# Patient Record
Sex: MALE | Race: WHITE | NOT HISPANIC OR LATINO | Employment: OTHER | URBAN - METROPOLITAN AREA
[De-identification: names, ages, dates, MRNs, and addresses within clinical notes are randomized per-mention and may not be internally consistent; named-entity substitution may affect disease eponyms.]

---

## 2020-07-24 ENCOUNTER — TRANSCRIBE ORDERS (OUTPATIENT)
Dept: LAB | Facility: CLINIC | Age: 85
End: 2020-07-24

## 2020-07-24 ENCOUNTER — APPOINTMENT (OUTPATIENT)
Dept: LAB | Facility: CLINIC | Age: 85
End: 2020-07-24
Payer: MEDICARE

## 2020-07-24 DIAGNOSIS — I10 ESSENTIAL HYPERTENSION, MALIGNANT: ICD-10-CM

## 2020-07-24 DIAGNOSIS — Z79.899 ENCOUNTER FOR LONG-TERM (CURRENT) USE OF OTHER MEDICATIONS: ICD-10-CM

## 2020-07-24 DIAGNOSIS — E11.9 DIABETES MELLITUS WITHOUT COMPLICATION (HCC): ICD-10-CM

## 2020-07-24 DIAGNOSIS — E78.5 HYPERLIPIDEMIA, UNSPECIFIED HYPERLIPIDEMIA TYPE: ICD-10-CM

## 2020-07-24 DIAGNOSIS — D64.9 ANEMIA, UNSPECIFIED TYPE: ICD-10-CM

## 2020-07-24 DIAGNOSIS — E11.9 DIABETES MELLITUS WITHOUT COMPLICATION (HCC): Primary | ICD-10-CM

## 2020-07-24 LAB
ALBUMIN SERPL BCP-MCNC: 3.8 G/DL (ref 3.5–5)
ALP SERPL-CCNC: 59 U/L (ref 46–116)
ALT SERPL W P-5'-P-CCNC: 28 U/L (ref 12–78)
ANION GAP SERPL CALCULATED.3IONS-SCNC: 7 MMOL/L (ref 4–13)
AST SERPL W P-5'-P-CCNC: 14 U/L (ref 5–45)
BASOPHILS # BLD AUTO: 0.07 THOUSANDS/ΜL (ref 0–0.1)
BASOPHILS NFR BLD AUTO: 1 % (ref 0–1)
BILIRUB SERPL-MCNC: 0.41 MG/DL (ref 0.2–1)
BUN SERPL-MCNC: 28 MG/DL (ref 5–25)
CALCIUM SERPL-MCNC: 8.7 MG/DL (ref 8.3–10.1)
CHLORIDE SERPL-SCNC: 111 MMOL/L (ref 100–108)
CHOLEST SERPL-MCNC: 141 MG/DL (ref 50–200)
CK SERPL-CCNC: 68 U/L (ref 39–308)
CO2 SERPL-SCNC: 24 MMOL/L (ref 21–32)
CREAT SERPL-MCNC: 1.44 MG/DL (ref 0.6–1.3)
EOSINOPHIL # BLD AUTO: 0.13 THOUSAND/ΜL (ref 0–0.61)
EOSINOPHIL NFR BLD AUTO: 2 % (ref 0–6)
ERYTHROCYTE [DISTWIDTH] IN BLOOD BY AUTOMATED COUNT: 12.4 % (ref 11.6–15.1)
EST. AVERAGE GLUCOSE BLD GHB EST-MCNC: 140 MG/DL
GFR SERPL CREATININE-BSD FRML MDRD: 44 ML/MIN/1.73SQ M
GLUCOSE P FAST SERPL-MCNC: 87 MG/DL (ref 65–99)
HBA1C MFR BLD: 6.5 %
HCT VFR BLD AUTO: 41.9 % (ref 36.5–49.3)
HDLC SERPL-MCNC: 42 MG/DL
HGB BLD-MCNC: 13.4 G/DL (ref 12–17)
IMM GRANULOCYTES # BLD AUTO: 0.01 THOUSAND/UL (ref 0–0.2)
IMM GRANULOCYTES NFR BLD AUTO: 0 % (ref 0–2)
LDLC SERPL CALC-MCNC: 81 MG/DL (ref 0–100)
LYMPHOCYTES # BLD AUTO: 2.49 THOUSANDS/ΜL (ref 0.6–4.47)
LYMPHOCYTES NFR BLD AUTO: 37 % (ref 14–44)
MCH RBC QN AUTO: 31.1 PG (ref 26.8–34.3)
MCHC RBC AUTO-ENTMCNC: 32 G/DL (ref 31.4–37.4)
MCV RBC AUTO: 97 FL (ref 82–98)
MONOCYTES # BLD AUTO: 0.67 THOUSAND/ΜL (ref 0.17–1.22)
MONOCYTES NFR BLD AUTO: 10 % (ref 4–12)
NEUTROPHILS # BLD AUTO: 3.28 THOUSANDS/ΜL (ref 1.85–7.62)
NEUTS SEG NFR BLD AUTO: 50 % (ref 43–75)
NONHDLC SERPL-MCNC: 99 MG/DL
NRBC BLD AUTO-RTO: 0 /100 WBCS
PLATELET # BLD AUTO: 153 THOUSANDS/UL (ref 149–390)
PMV BLD AUTO: 11.4 FL (ref 8.9–12.7)
POTASSIUM SERPL-SCNC: 4.3 MMOL/L (ref 3.5–5.3)
PROT SERPL-MCNC: 7.7 G/DL (ref 6.4–8.2)
RBC # BLD AUTO: 4.31 MILLION/UL (ref 3.88–5.62)
SODIUM SERPL-SCNC: 142 MMOL/L (ref 136–145)
TRIGL SERPL-MCNC: 89 MG/DL
WBC # BLD AUTO: 6.65 THOUSAND/UL (ref 4.31–10.16)

## 2020-07-24 PROCEDURE — 83036 HEMOGLOBIN GLYCOSYLATED A1C: CPT

## 2020-07-24 PROCEDURE — 80053 COMPREHEN METABOLIC PANEL: CPT

## 2020-07-24 PROCEDURE — 85025 COMPLETE CBC W/AUTO DIFF WBC: CPT

## 2020-07-24 PROCEDURE — 80061 LIPID PANEL: CPT

## 2020-07-24 PROCEDURE — 36415 COLL VENOUS BLD VENIPUNCTURE: CPT

## 2020-07-24 PROCEDURE — 82550 ASSAY OF CK (CPK): CPT

## 2020-09-15 LAB
CREAT ?TM UR-SCNC: 138 UMOL/L
EXT MICROALBUMIN URINE RANDOM: 2
MICROALBUMIN/CREAT UR: 14.5 MG/G{CREAT}

## 2020-11-23 ENCOUNTER — APPOINTMENT (EMERGENCY)
Dept: RADIOLOGY | Facility: HOSPITAL | Age: 85
DRG: 948 | End: 2020-11-23
Payer: MEDICARE

## 2020-11-23 ENCOUNTER — APPOINTMENT (EMERGENCY)
Dept: CT IMAGING | Facility: HOSPITAL | Age: 85
DRG: 948 | End: 2020-11-23
Payer: MEDICARE

## 2020-11-23 ENCOUNTER — HOSPITAL ENCOUNTER (INPATIENT)
Facility: HOSPITAL | Age: 85
LOS: 2 days | Discharge: HOME HEALTH | DRG: 948 | End: 2020-11-25
Attending: EMERGENCY MEDICINE | Admitting: INTERNAL MEDICINE
Payer: MEDICARE

## 2020-11-23 DIAGNOSIS — I10 ESSENTIAL HYPERTENSION: Chronic | ICD-10-CM

## 2020-11-23 DIAGNOSIS — E46 PROTEIN CALORIE MALNUTRITION (HCC): ICD-10-CM

## 2020-11-23 DIAGNOSIS — R53.1 GENERALIZED WEAKNESS: ICD-10-CM

## 2020-11-23 DIAGNOSIS — M50.90 CERVICAL DISC DISEASE: ICD-10-CM

## 2020-11-23 DIAGNOSIS — R53.1 WEAKNESS: Primary | ICD-10-CM

## 2020-11-23 DIAGNOSIS — M25.512 LEFT SHOULDER PAIN: ICD-10-CM

## 2020-11-23 DIAGNOSIS — E44.1 MILD PROTEIN-CALORIE MALNUTRITION (HCC): ICD-10-CM

## 2020-11-23 PROBLEM — E11.9 TYPE 2 DIABETES MELLITUS WITHOUT COMPLICATION, WITH LONG-TERM CURRENT USE OF INSULIN (HCC): Chronic | Status: ACTIVE | Noted: 2020-11-23

## 2020-11-23 PROBLEM — Z79.4 TYPE 2 DIABETES MELLITUS WITHOUT COMPLICATION, WITH LONG-TERM CURRENT USE OF INSULIN (HCC): Chronic | Status: ACTIVE | Noted: 2020-11-23

## 2020-11-23 PROBLEM — N18.30 STAGE 3 CHRONIC KIDNEY DISEASE (HCC): Status: ACTIVE | Noted: 2020-11-23

## 2020-11-23 LAB
ALBUMIN SERPL BCP-MCNC: 3.5 G/DL (ref 3.5–5)
ALP SERPL-CCNC: 80 U/L (ref 46–116)
ALT SERPL W P-5'-P-CCNC: 30 U/L (ref 12–78)
ANION GAP SERPL CALCULATED.3IONS-SCNC: 14 MMOL/L (ref 4–13)
AST SERPL W P-5'-P-CCNC: 14 U/L (ref 5–45)
BASOPHILS # BLD AUTO: 0.04 THOUSANDS/ΜL (ref 0–0.1)
BASOPHILS NFR BLD AUTO: 1 % (ref 0–1)
BILIRUB SERPL-MCNC: 0.36 MG/DL (ref 0.2–1)
BILIRUB UR QL STRIP: NEGATIVE
BUN SERPL-MCNC: 38 MG/DL (ref 5–25)
CALCIUM SERPL-MCNC: 9.4 MG/DL (ref 8.3–10.1)
CHLORIDE SERPL-SCNC: 103 MMOL/L (ref 100–108)
CLARITY UR: CLEAR
CO2 SERPL-SCNC: 22 MMOL/L (ref 21–32)
COLOR UR: YELLOW
CREAT SERPL-MCNC: 1.51 MG/DL (ref 0.6–1.3)
EOSINOPHIL # BLD AUTO: 0.1 THOUSAND/ΜL (ref 0–0.61)
EOSINOPHIL NFR BLD AUTO: 1 % (ref 0–6)
ERYTHROCYTE [DISTWIDTH] IN BLOOD BY AUTOMATED COUNT: 13 % (ref 11.6–15.1)
FLUAV RNA RESP QL NAA+PROBE: NEGATIVE
FLUBV RNA RESP QL NAA+PROBE: NEGATIVE
GFR SERPL CREATININE-BSD FRML MDRD: 41 ML/MIN/1.73SQ M
GLUCOSE SERPL-MCNC: 190 MG/DL (ref 65–140)
GLUCOSE SERPL-MCNC: 263 MG/DL (ref 65–140)
GLUCOSE UR STRIP-MCNC: NEGATIVE MG/DL
HCT VFR BLD AUTO: 44.3 % (ref 36.5–49.3)
HGB BLD-MCNC: 14.2 G/DL (ref 12–17)
HGB UR QL STRIP.AUTO: NEGATIVE
HOLD SPECIMEN: NORMAL
IMM GRANULOCYTES # BLD AUTO: 0.01 THOUSAND/UL (ref 0–0.2)
IMM GRANULOCYTES NFR BLD AUTO: 0 % (ref 0–2)
KETONES UR STRIP-MCNC: NEGATIVE MG/DL
LEUKOCYTE ESTERASE UR QL STRIP: NEGATIVE
LYMPHOCYTES # BLD AUTO: 1.84 THOUSANDS/ΜL (ref 0.6–4.47)
LYMPHOCYTES NFR BLD AUTO: 25 % (ref 14–44)
MCH RBC QN AUTO: 30.8 PG (ref 26.8–34.3)
MCHC RBC AUTO-ENTMCNC: 32.1 G/DL (ref 31.4–37.4)
MCV RBC AUTO: 96 FL (ref 82–98)
MONOCYTES # BLD AUTO: 0.78 THOUSAND/ΜL (ref 0.17–1.22)
MONOCYTES NFR BLD AUTO: 11 % (ref 4–12)
NEUTROPHILS # BLD AUTO: 4.63 THOUSANDS/ΜL (ref 1.85–7.62)
NEUTS SEG NFR BLD AUTO: 62 % (ref 43–75)
NITRITE UR QL STRIP: NEGATIVE
NRBC BLD AUTO-RTO: 0 /100 WBCS
PH UR STRIP.AUTO: 5.5 [PH]
PLATELET # BLD AUTO: 260 THOUSANDS/UL (ref 149–390)
PMV BLD AUTO: 9.5 FL (ref 8.9–12.7)
POTASSIUM SERPL-SCNC: 4.2 MMOL/L (ref 3.5–5.3)
PROT SERPL-MCNC: 8.2 G/DL (ref 6.4–8.2)
PROT UR STRIP-MCNC: NEGATIVE MG/DL
RBC # BLD AUTO: 4.61 MILLION/UL (ref 3.88–5.62)
RSV RNA RESP QL NAA+PROBE: NEGATIVE
SARS-COV-2 RNA RESP QL NAA+PROBE: NEGATIVE
SODIUM SERPL-SCNC: 139 MMOL/L (ref 136–145)
SP GR UR STRIP.AUTO: 1.02 (ref 1–1.03)
TROPONIN I SERPL-MCNC: <0.02 NG/ML
TSH SERPL DL<=0.05 MIU/L-ACNC: 3.57 UIU/ML (ref 0.36–3.74)
UROBILINOGEN UR QL STRIP.AUTO: 0.2 E.U./DL
WBC # BLD AUTO: 7.4 THOUSAND/UL (ref 4.31–10.16)

## 2020-11-23 PROCEDURE — 0241U HB NFCT DS VIR RESP RNA 4 TRGT: CPT | Performed by: PHYSICIAN ASSISTANT

## 2020-11-23 PROCEDURE — 82948 REAGENT STRIP/BLOOD GLUCOSE: CPT

## 2020-11-23 PROCEDURE — 84484 ASSAY OF TROPONIN QUANT: CPT | Performed by: PHYSICIAN ASSISTANT

## 2020-11-23 PROCEDURE — 99285 EMERGENCY DEPT VISIT HI MDM: CPT | Performed by: EMERGENCY MEDICINE

## 2020-11-23 PROCEDURE — 99285 EMERGENCY DEPT VISIT HI MDM: CPT

## 2020-11-23 PROCEDURE — 71045 X-RAY EXAM CHEST 1 VIEW: CPT

## 2020-11-23 PROCEDURE — 80053 COMPREHEN METABOLIC PANEL: CPT | Performed by: EMERGENCY MEDICINE

## 2020-11-23 PROCEDURE — 1123F ACP DISCUSS/DSCN MKR DOCD: CPT | Performed by: EMERGENCY MEDICINE

## 2020-11-23 PROCEDURE — 73030 X-RAY EXAM OF SHOULDER: CPT

## 2020-11-23 PROCEDURE — G1004 CDSM NDSC: HCPCS

## 2020-11-23 PROCEDURE — 85025 COMPLETE CBC W/AUTO DIFF WBC: CPT | Performed by: EMERGENCY MEDICINE

## 2020-11-23 PROCEDURE — 81003 URINALYSIS AUTO W/O SCOPE: CPT | Performed by: PHYSICIAN ASSISTANT

## 2020-11-23 PROCEDURE — 36415 COLL VENOUS BLD VENIPUNCTURE: CPT

## 2020-11-23 PROCEDURE — 84443 ASSAY THYROID STIM HORMONE: CPT | Performed by: PHYSICIAN ASSISTANT

## 2020-11-23 PROCEDURE — 70450 CT HEAD/BRAIN W/O DYE: CPT

## 2020-11-23 PROCEDURE — 93005 ELECTROCARDIOGRAM TRACING: CPT

## 2020-11-23 PROCEDURE — 99223 1ST HOSP IP/OBS HIGH 75: CPT | Performed by: INTERNAL MEDICINE

## 2020-11-23 RX ORDER — ACETAMINOPHEN 325 MG/1
650 TABLET ORAL EVERY 6 HOURS PRN
Status: DISCONTINUED | OUTPATIENT
Start: 2020-11-23 | End: 2020-11-26 | Stop reason: HOSPADM

## 2020-11-23 RX ORDER — ATORVASTATIN CALCIUM 40 MG/1
40 TABLET, FILM COATED ORAL
Status: DISCONTINUED | OUTPATIENT
Start: 2020-11-24 | End: 2020-11-26 | Stop reason: HOSPADM

## 2020-11-23 RX ORDER — ONDANSETRON 2 MG/ML
4 INJECTION INTRAMUSCULAR; INTRAVENOUS EVERY 6 HOURS PRN
Status: DISCONTINUED | OUTPATIENT
Start: 2020-11-23 | End: 2020-11-26 | Stop reason: HOSPADM

## 2020-11-23 RX ORDER — TAMSULOSIN HYDROCHLORIDE 0.4 MG/1
0.4 CAPSULE ORAL
Status: DISCONTINUED | OUTPATIENT
Start: 2020-11-24 | End: 2020-11-26 | Stop reason: HOSPADM

## 2020-11-23 RX ORDER — ASPIRIN 81 MG/1
81 TABLET ORAL DAILY
Status: DISCONTINUED | OUTPATIENT
Start: 2020-11-24 | End: 2020-11-26 | Stop reason: HOSPADM

## 2020-11-23 RX ORDER — ACETAMINOPHEN 325 MG/1
975 TABLET ORAL EVERY 8 HOURS SCHEDULED
Status: DISCONTINUED | OUTPATIENT
Start: 2020-11-23 | End: 2020-11-26 | Stop reason: HOSPADM

## 2020-11-23 RX ORDER — INSULIN GLARGINE 100 [IU]/ML
23 INJECTION, SOLUTION SUBCUTANEOUS
Status: COMPLETED | OUTPATIENT
Start: 2020-11-23 | End: 2020-11-23

## 2020-11-23 RX ORDER — OXYCODONE HYDROCHLORIDE 5 MG/1
5 TABLET ORAL EVERY 4 HOURS PRN
Status: DISCONTINUED | OUTPATIENT
Start: 2020-11-23 | End: 2020-11-26 | Stop reason: HOSPADM

## 2020-11-23 RX ORDER — METOPROLOL SUCCINATE 100 MG/1
100 TABLET, EXTENDED RELEASE ORAL DAILY
Status: DISCONTINUED | OUTPATIENT
Start: 2020-11-24 | End: 2020-11-26 | Stop reason: HOSPADM

## 2020-11-23 RX ORDER — AMLODIPINE BESYLATE 10 MG/1
10 TABLET ORAL DAILY
Status: DISCONTINUED | OUTPATIENT
Start: 2020-11-24 | End: 2020-11-26 | Stop reason: HOSPADM

## 2020-11-23 RX ORDER — OXYCODONE HYDROCHLORIDE 5 MG/1
2.5 TABLET ORAL EVERY 4 HOURS PRN
Status: DISCONTINUED | OUTPATIENT
Start: 2020-11-23 | End: 2020-11-26 | Stop reason: HOSPADM

## 2020-11-23 RX ORDER — INSULIN GLARGINE 100 [IU]/ML
23 INJECTION, SOLUTION SUBCUTANEOUS
Status: DISCONTINUED | OUTPATIENT
Start: 2020-11-23 | End: 2020-11-26 | Stop reason: HOSPADM

## 2020-11-23 RX ORDER — LIDOCAINE 50 MG/G
1 PATCH TOPICAL DAILY
Status: DISCONTINUED | OUTPATIENT
Start: 2020-11-24 | End: 2020-11-26 | Stop reason: HOSPADM

## 2020-11-23 RX ORDER — LOSARTAN POTASSIUM 25 MG/1
25 TABLET ORAL DAILY
Status: DISCONTINUED | OUTPATIENT
Start: 2020-11-24 | End: 2020-11-26 | Stop reason: HOSPADM

## 2020-11-23 RX ORDER — HEPARIN SODIUM 5000 [USP'U]/ML
5000 INJECTION, SOLUTION INTRAVENOUS; SUBCUTANEOUS EVERY 8 HOURS SCHEDULED
Status: DISCONTINUED | OUTPATIENT
Start: 2020-11-23 | End: 2020-11-26 | Stop reason: HOSPADM

## 2020-11-23 RX ORDER — INSULIN GLARGINE 100 [IU]/ML
23 INJECTION, SOLUTION SUBCUTANEOUS
Status: DISCONTINUED | OUTPATIENT
Start: 2020-11-23 | End: 2020-11-23

## 2020-11-23 RX ORDER — LATANOPROST 50 UG/ML
1 SOLUTION/ DROPS OPHTHALMIC
Status: DISCONTINUED | OUTPATIENT
Start: 2020-11-23 | End: 2020-11-26 | Stop reason: HOSPADM

## 2020-11-23 RX ADMIN — HEPARIN SODIUM 5000 UNITS: 5000 INJECTION INTRAVENOUS; SUBCUTANEOUS at 22:05

## 2020-11-23 RX ADMIN — INSULIN GLARGINE 23 UNITS: 100 INJECTION, SOLUTION SUBCUTANEOUS at 22:05

## 2020-11-23 RX ADMIN — INSULIN LISPRO 2 UNITS: 100 INJECTION, SOLUTION INTRAVENOUS; SUBCUTANEOUS at 22:05

## 2020-11-23 RX ADMIN — ACETAMINOPHEN 975 MG: 325 TABLET, FILM COATED ORAL at 22:06

## 2020-11-24 LAB
ANION GAP SERPL CALCULATED.3IONS-SCNC: 11 MMOL/L (ref 4–13)
ATRIAL RATE: 84 BPM
BUN SERPL-MCNC: 37 MG/DL (ref 5–25)
CALCIUM SERPL-MCNC: 8.8 MG/DL (ref 8.3–10.1)
CHLORIDE SERPL-SCNC: 106 MMOL/L (ref 100–108)
CK SERPL-CCNC: 42 U/L (ref 39–308)
CO2 SERPL-SCNC: 24 MMOL/L (ref 21–32)
CREAT SERPL-MCNC: 1.39 MG/DL (ref 0.6–1.3)
ERYTHROCYTE [DISTWIDTH] IN BLOOD BY AUTOMATED COUNT: 13 % (ref 11.6–15.1)
GFR SERPL CREATININE-BSD FRML MDRD: 45 ML/MIN/1.73SQ M
GLUCOSE SERPL-MCNC: 124 MG/DL (ref 65–140)
GLUCOSE SERPL-MCNC: 173 MG/DL (ref 65–140)
GLUCOSE SERPL-MCNC: 198 MG/DL (ref 65–140)
GLUCOSE SERPL-MCNC: 260 MG/DL (ref 65–140)
GLUCOSE SERPL-MCNC: 266 MG/DL (ref 65–140)
HCT VFR BLD AUTO: 37 % (ref 36.5–49.3)
HGB BLD-MCNC: 12 G/DL (ref 12–17)
MCH RBC QN AUTO: 31 PG (ref 26.8–34.3)
MCHC RBC AUTO-ENTMCNC: 32.4 G/DL (ref 31.4–37.4)
MCV RBC AUTO: 96 FL (ref 82–98)
P AXIS: 65 DEGREES
PLATELET # BLD AUTO: 221 THOUSANDS/UL (ref 149–390)
PMV BLD AUTO: 9.9 FL (ref 8.9–12.7)
POTASSIUM SERPL-SCNC: 3.9 MMOL/L (ref 3.5–5.3)
PR INTERVAL: 162 MS
QRS AXIS: 3 DEGREES
QRSD INTERVAL: 74 MS
QT INTERVAL: 360 MS
QTC INTERVAL: 425 MS
RBC # BLD AUTO: 3.87 MILLION/UL (ref 3.88–5.62)
SODIUM SERPL-SCNC: 141 MMOL/L (ref 136–145)
T WAVE AXIS: 54 DEGREES
VENTRICULAR RATE: 84 BPM
WBC # BLD AUTO: 6.67 THOUSAND/UL (ref 4.31–10.16)

## 2020-11-24 PROCEDURE — 93010 ELECTROCARDIOGRAM REPORT: CPT | Performed by: INTERNAL MEDICINE

## 2020-11-24 PROCEDURE — 84238 ASSAY NONENDOCRINE RECEPTOR: CPT | Performed by: PHYSICIAN ASSISTANT

## 2020-11-24 PROCEDURE — 82948 REAGENT STRIP/BLOOD GLUCOSE: CPT

## 2020-11-24 PROCEDURE — 36415 COLL VENOUS BLD VENIPUNCTURE: CPT | Performed by: PHYSICIAN ASSISTANT

## 2020-11-24 PROCEDURE — 85027 COMPLETE CBC AUTOMATED: CPT | Performed by: NURSE PRACTITIONER

## 2020-11-24 PROCEDURE — 80048 BASIC METABOLIC PNL TOTAL CA: CPT | Performed by: PHYSICIAN ASSISTANT

## 2020-11-24 PROCEDURE — 97110 THERAPEUTIC EXERCISES: CPT

## 2020-11-24 PROCEDURE — 97167 OT EVAL HIGH COMPLEX 60 MIN: CPT

## 2020-11-24 PROCEDURE — 99232 SBSQ HOSP IP/OBS MODERATE 35: CPT | Performed by: INTERNAL MEDICINE

## 2020-11-24 PROCEDURE — 99223 1ST HOSP IP/OBS HIGH 75: CPT | Performed by: PSYCHIATRY & NEUROLOGY

## 2020-11-24 PROCEDURE — 82085 ASSAY OF ALDOLASE: CPT | Performed by: PHYSICIAN ASSISTANT

## 2020-11-24 PROCEDURE — 82550 ASSAY OF CK (CPK): CPT | Performed by: PHYSICIAN ASSISTANT

## 2020-11-24 PROCEDURE — 97163 PT EVAL HIGH COMPLEX 45 MIN: CPT

## 2020-11-24 RX ADMIN — HEPARIN SODIUM 5000 UNITS: 5000 INJECTION INTRAVENOUS; SUBCUTANEOUS at 16:18

## 2020-11-24 RX ADMIN — ACETAMINOPHEN 975 MG: 325 TABLET, FILM COATED ORAL at 21:20

## 2020-11-24 RX ADMIN — LATANOPROST 1 DROP: 50 SOLUTION OPHTHALMIC at 21:40

## 2020-11-24 RX ADMIN — ATORVASTATIN CALCIUM 40 MG: 40 TABLET, FILM COATED ORAL at 16:18

## 2020-11-24 RX ADMIN — OXYCODONE HYDROCHLORIDE 5 MG: 5 TABLET ORAL at 00:40

## 2020-11-24 RX ADMIN — HEPARIN SODIUM 5000 UNITS: 5000 INJECTION INTRAVENOUS; SUBCUTANEOUS at 21:21

## 2020-11-24 RX ADMIN — ACETAMINOPHEN 975 MG: 325 TABLET, FILM COATED ORAL at 07:45

## 2020-11-24 RX ADMIN — INSULIN LISPRO 2 UNITS: 100 INJECTION, SOLUTION INTRAVENOUS; SUBCUTANEOUS at 21:20

## 2020-11-24 RX ADMIN — ACETAMINOPHEN 975 MG: 325 TABLET, FILM COATED ORAL at 16:18

## 2020-11-24 RX ADMIN — INSULIN GLARGINE 23 UNITS: 100 INJECTION, SOLUTION SUBCUTANEOUS at 21:21

## 2020-11-24 RX ADMIN — TAMSULOSIN HYDROCHLORIDE 0.4 MG: 0.4 CAPSULE ORAL at 16:18

## 2020-11-24 RX ADMIN — INSULIN LISPRO 1 UNITS: 100 INJECTION, SOLUTION INTRAVENOUS; SUBCUTANEOUS at 18:39

## 2020-11-24 RX ADMIN — OXYCODONE HYDROCHLORIDE 5 MG: 5 TABLET ORAL at 07:51

## 2020-11-24 RX ADMIN — HEPARIN SODIUM 5000 UNITS: 5000 INJECTION INTRAVENOUS; SUBCUTANEOUS at 07:46

## 2020-11-25 ENCOUNTER — APPOINTMENT (INPATIENT)
Dept: MRI IMAGING | Facility: HOSPITAL | Age: 85
DRG: 948 | End: 2020-11-25
Payer: MEDICARE

## 2020-11-25 VITALS
BODY MASS INDEX: 24.94 KG/M2 | DIASTOLIC BLOOD PRESSURE: 67 MMHG | RESPIRATION RATE: 18 BRPM | TEMPERATURE: 98.4 F | WEIGHT: 155.2 LBS | SYSTOLIC BLOOD PRESSURE: 110 MMHG | HEART RATE: 77 BPM | OXYGEN SATURATION: 97 % | HEIGHT: 66 IN

## 2020-11-25 PROBLEM — E44.1 MILD PROTEIN-CALORIE MALNUTRITION (HCC): Status: ACTIVE | Noted: 2020-11-25

## 2020-11-25 LAB
GLUCOSE SERPL-MCNC: 156 MG/DL (ref 65–140)
GLUCOSE SERPL-MCNC: 242 MG/DL (ref 65–140)
GLUCOSE SERPL-MCNC: 89 MG/DL (ref 65–140)

## 2020-11-25 PROCEDURE — 99232 SBSQ HOSP IP/OBS MODERATE 35: CPT | Performed by: PHYSICIAN ASSISTANT

## 2020-11-25 PROCEDURE — 97110 THERAPEUTIC EXERCISES: CPT

## 2020-11-25 PROCEDURE — 82948 REAGENT STRIP/BLOOD GLUCOSE: CPT

## 2020-11-25 PROCEDURE — 72156 MRI NECK SPINE W/O & W/DYE: CPT

## 2020-11-25 PROCEDURE — 99232 SBSQ HOSP IP/OBS MODERATE 35: CPT | Performed by: PSYCHIATRY & NEUROLOGY

## 2020-11-25 PROCEDURE — 97116 GAIT TRAINING THERAPY: CPT

## 2020-11-25 PROCEDURE — A9585 GADOBUTROL INJECTION: HCPCS | Performed by: PHYSICIAN ASSISTANT

## 2020-11-25 PROCEDURE — 99239 HOSP IP/OBS DSCHRG MGMT >30: CPT | Performed by: PHYSICIAN ASSISTANT

## 2020-11-25 PROCEDURE — G1004 CDSM NDSC: HCPCS

## 2020-11-25 PROCEDURE — 71552 MRI CHEST W/O & W/DYE: CPT

## 2020-11-25 RX ORDER — ACETAMINOPHEN 325 MG/1
975 TABLET ORAL EVERY 8 HOURS SCHEDULED
Refills: 0
Start: 2020-11-25 | End: 2021-04-06 | Stop reason: SDUPTHER

## 2020-11-25 RX ORDER — LIDOCAINE 50 MG/G
1 PATCH TOPICAL DAILY
Refills: 0
Start: 2020-11-26 | End: 2021-01-04 | Stop reason: ALTCHOICE

## 2020-11-25 RX ORDER — AMLODIPINE BESYLATE 10 MG/1
10 TABLET ORAL DAILY
Refills: 0
Start: 2020-11-26 | End: 2021-06-15 | Stop reason: HOSPADM

## 2020-11-25 RX ADMIN — ACETAMINOPHEN 975 MG: 325 TABLET, FILM COATED ORAL at 05:51

## 2020-11-25 RX ADMIN — ASPIRIN 81 MG: 81 TABLET, COATED ORAL at 09:21

## 2020-11-25 RX ADMIN — INSULIN LISPRO 1 UNITS: 100 INJECTION, SOLUTION INTRAVENOUS; SUBCUTANEOUS at 13:45

## 2020-11-25 RX ADMIN — ATORVASTATIN CALCIUM 40 MG: 40 TABLET, FILM COATED ORAL at 18:00

## 2020-11-25 RX ADMIN — LOSARTAN POTASSIUM 25 MG: 25 TABLET, FILM COATED ORAL at 09:22

## 2020-11-25 RX ADMIN — GADOBUTROL 7 ML: 604.72 INJECTION INTRAVENOUS at 17:19

## 2020-11-25 RX ADMIN — HEPARIN SODIUM 5000 UNITS: 5000 INJECTION INTRAVENOUS; SUBCUTANEOUS at 13:43

## 2020-11-25 RX ADMIN — AMLODIPINE BESYLATE 10 MG: 10 TABLET ORAL at 09:22

## 2020-11-25 RX ADMIN — METOPROLOL SUCCINATE 100 MG: 100 TABLET, EXTENDED RELEASE ORAL at 09:21

## 2020-11-25 RX ADMIN — LIDOCAINE 5% 1 PATCH: 700 PATCH TOPICAL at 09:22

## 2020-11-25 RX ADMIN — ACETAMINOPHEN 975 MG: 325 TABLET, FILM COATED ORAL at 21:34

## 2020-11-25 RX ADMIN — TAMSULOSIN HYDROCHLORIDE 0.4 MG: 0.4 CAPSULE ORAL at 18:00

## 2020-11-25 RX ADMIN — HEPARIN SODIUM 5000 UNITS: 5000 INJECTION INTRAVENOUS; SUBCUTANEOUS at 05:51

## 2020-11-25 RX ADMIN — INSULIN GLARGINE 23 UNITS: 100 INJECTION, SOLUTION SUBCUTANEOUS at 21:34

## 2020-11-25 RX ADMIN — INSULIN LISPRO 2 UNITS: 100 INJECTION, SOLUTION INTRAVENOUS; SUBCUTANEOUS at 18:07

## 2020-11-25 RX ADMIN — ACETAMINOPHEN 650 MG: 325 TABLET, FILM COATED ORAL at 11:46

## 2020-11-27 LAB — ALDOLASE SERPL-CCNC: 5.4 U/L (ref 3.3–10.3)

## 2020-11-28 LAB — ACHR BIND AB SER-SCNC: 0.05 NMOL/L (ref 0–0.24)

## 2020-11-30 ENCOUNTER — TELEPHONE (OUTPATIENT)
Dept: CASE MANAGEMENT | Facility: HOSPITAL | Age: 85
End: 2020-11-30

## 2021-01-04 ENCOUNTER — OFFICE VISIT (OUTPATIENT)
Dept: INTERNAL MEDICINE CLINIC | Facility: CLINIC | Age: 86
End: 2021-01-04
Payer: MEDICARE

## 2021-01-04 VITALS
SYSTOLIC BLOOD PRESSURE: 132 MMHG | BODY MASS INDEX: 25.5 KG/M2 | TEMPERATURE: 98.1 F | WEIGHT: 158 LBS | DIASTOLIC BLOOD PRESSURE: 70 MMHG

## 2021-01-04 DIAGNOSIS — I10 ESSENTIAL HYPERTENSION: Chronic | ICD-10-CM

## 2021-01-04 DIAGNOSIS — E44.1 MILD PROTEIN-CALORIE MALNUTRITION (HCC): ICD-10-CM

## 2021-01-04 DIAGNOSIS — E11.9 TYPE 2 DIABETES MELLITUS WITHOUT COMPLICATION, WITH LONG-TERM CURRENT USE OF INSULIN (HCC): Primary | Chronic | ICD-10-CM

## 2021-01-04 DIAGNOSIS — R53.1 GENERALIZED WEAKNESS: ICD-10-CM

## 2021-01-04 DIAGNOSIS — N18.30 STAGE 3 CHRONIC KIDNEY DISEASE, UNSPECIFIED WHETHER STAGE 3A OR 3B CKD (HCC): ICD-10-CM

## 2021-01-04 DIAGNOSIS — Z95.1 HISTORY OF CORONARY ARTERY BYPASS SURGERY: ICD-10-CM

## 2021-01-04 DIAGNOSIS — E78.5 HYPERLIPIDEMIA ASSOCIATED WITH TYPE 2 DIABETES MELLITUS (HCC): ICD-10-CM

## 2021-01-04 DIAGNOSIS — E11.69 HYPERLIPIDEMIA ASSOCIATED WITH TYPE 2 DIABETES MELLITUS (HCC): ICD-10-CM

## 2021-01-04 DIAGNOSIS — Z79.4 TYPE 2 DIABETES MELLITUS WITHOUT COMPLICATION, WITH LONG-TERM CURRENT USE OF INSULIN (HCC): Primary | Chronic | ICD-10-CM

## 2021-01-04 PROBLEM — K44.9 DIAPHRAGMATIC HERNIA: Status: ACTIVE | Noted: 2021-01-04

## 2021-01-04 PROBLEM — H35.30 AGE-RELATED MACULAR DEGENERATION: Status: ACTIVE | Noted: 2021-01-04

## 2021-01-04 PROBLEM — G56.00 CARPAL TUNNEL SYNDROME: Status: ACTIVE | Noted: 2021-01-04

## 2021-01-04 PROBLEM — R42 VERTIGO: Status: ACTIVE | Noted: 2021-01-04

## 2021-01-04 PROBLEM — I25.10 CORONARY ARTERY DISEASE: Status: ACTIVE | Noted: 2021-01-04

## 2021-01-04 PROBLEM — D31.30 CHOROIDAL NEVUS: Status: ACTIVE | Noted: 2021-01-04

## 2021-01-04 PROBLEM — H91.90 HEARING LOSS: Status: ACTIVE | Noted: 2021-01-04

## 2021-01-04 PROBLEM — R80.9 MICROALBUMINURIA DUE TO TYPE 2 DIABETES MELLITUS (HCC): Status: ACTIVE | Noted: 2017-02-27

## 2021-01-04 PROBLEM — J30.9 ALLERGIC RHINITIS: Status: ACTIVE | Noted: 2021-01-04

## 2021-01-04 PROBLEM — K21.9 GASTROESOPHAGEAL REFLUX DISEASE: Status: ACTIVE | Noted: 2021-01-04

## 2021-01-04 PROBLEM — I65.23 BILATERAL CAROTID ARTERY STENOSIS: Chronic | Status: ACTIVE | Noted: 2021-01-04

## 2021-01-04 PROBLEM — M54.50 LOW BACK PAIN: Status: ACTIVE | Noted: 2021-01-04

## 2021-01-04 PROBLEM — M20.20 ACQUIRED HALLUX RIGIDUS: Status: ACTIVE | Noted: 2021-01-04

## 2021-01-04 PROBLEM — E11.29 MICROALBUMINURIA DUE TO TYPE 2 DIABETES MELLITUS (HCC): Status: ACTIVE | Noted: 2017-02-27

## 2021-01-04 PROBLEM — I05.9 MITRAL VALVE DISEASE: Status: ACTIVE | Noted: 2021-01-04

## 2021-01-04 PROBLEM — E11.3299 NPDR (NONPROLIFERATIVE DIABETIC RETINOPATHY) (HCC): Status: ACTIVE | Noted: 2017-10-03

## 2021-01-04 PROBLEM — N18.9 CHRONIC RENAL FAILURE SYNDROME: Status: ACTIVE | Noted: 2021-01-04

## 2021-01-04 PROBLEM — H40.9 GLAUCOMA: Status: ACTIVE | Noted: 2021-01-04

## 2021-01-04 PROCEDURE — 99214 OFFICE O/P EST MOD 30 MIN: CPT | Performed by: INTERNAL MEDICINE

## 2021-01-04 RX ORDER — TRAMADOL HYDROCHLORIDE 50 MG/1
50 TABLET ORAL DAILY PRN
COMMUNITY
Start: 2020-11-30 | End: 2021-01-04 | Stop reason: ALTCHOICE

## 2021-01-04 RX ORDER — METOPROLOL SUCCINATE 50 MG/1
100 TABLET, EXTENDED RELEASE ORAL DAILY
COMMUNITY
End: 2021-01-04 | Stop reason: SDUPTHER

## 2021-01-04 RX ORDER — LISINOPRIL 2.5 MG/1
TABLET ORAL
COMMUNITY
End: 2021-01-04 | Stop reason: CLARIF

## 2021-01-04 RX ORDER — TAMSULOSIN HYDROCHLORIDE 0.4 MG/1
0.4 CAPSULE ORAL
COMMUNITY
End: 2021-01-04 | Stop reason: CLARIF

## 2021-01-04 RX ORDER — VIT A/VIT C/VIT E/ZINC/COPPER 2148-113
TABLET ORAL
COMMUNITY
End: 2021-04-02 | Stop reason: SDUPTHER

## 2021-01-04 RX ORDER — CLOTRIMAZOLE 1 G/ML
SOLUTION TOPICAL 2 TIMES DAILY
Status: ON HOLD | COMMUNITY
End: 2021-06-12 | Stop reason: CLARIF

## 2021-01-04 RX ORDER — OMEPRAZOLE 20 MG/1
20 CAPSULE, DELAYED RELEASE ORAL DAILY
COMMUNITY

## 2021-01-04 RX ORDER — ACETAMINOPHEN AND CODEINE PHOSPHATE 300; 30 MG/1; MG/1
1 TABLET ORAL EVERY 4 HOURS PRN
COMMUNITY
Start: 2020-10-16 | End: 2021-01-04 | Stop reason: ALTCHOICE

## 2021-01-04 RX ORDER — AMPICILLIN TRIHYDRATE 250 MG
500 CAPSULE ORAL
COMMUNITY

## 2021-01-04 RX ORDER — LOSARTAN POTASSIUM 100 MG/1
TABLET ORAL
COMMUNITY
Start: 2020-09-04 | End: 2021-06-15 | Stop reason: HOSPADM

## 2021-01-04 RX ORDER — AMLODIPINE BESYLATE AND ATORVASTATIN CALCIUM 10; 10 MG/1; MG/1
1 TABLET, FILM COATED ORAL DAILY
COMMUNITY
End: 2021-01-04 | Stop reason: CLARIF

## 2021-01-04 RX ORDER — TRAMADOL HYDROCHLORIDE 50 MG/1
TABLET ORAL
COMMUNITY
Start: 2020-12-01 | End: 2021-01-04 | Stop reason: ALTCHOICE

## 2021-01-04 RX ORDER — MELOXICAM 15 MG/1
15 TABLET ORAL DAILY
COMMUNITY
Start: 2020-10-26 | End: 2021-01-04 | Stop reason: CLARIF

## 2021-01-04 RX ORDER — CELECOXIB 200 MG/1
CAPSULE ORAL
COMMUNITY
Start: 2020-09-21 | End: 2021-01-04 | Stop reason: ALTCHOICE

## 2021-01-04 NOTE — PATIENT INSTRUCTIONS
Pt was advise to discontinue glipizide qpm dose and decrease Lantus insulin to 20 units at [facility]  Address for 1800 calorie ADA diet low-salt low-cholesterol diet  Advised to follow up with me in 3 months    Call if blood sugar less than 80

## 2021-01-05 NOTE — PROGRESS NOTES
Assessment/Plan:    Generalized weakness  His weakness is better by about 70%  He is able to ambulate without assistance  Diagnoses and all orders for this visit:    Type 2 diabetes mellitus without complication, with long-term current use of insulin (HCC)  -     Discontinue: metFORMIN (GLUCOPHAGE) 1000 MG tablet; Take 0 5 tablets (500 mg total) by mouth 2 (two) times a day with meals  -     Comprehensive metabolic panel; Future  -     Lipid panel; Future  -     Hemoglobin A1C; Future    Hyperlipidemia associated with type 2 diabetes mellitus (HCC)  -     Comprehensive metabolic panel; Future  -     Lipid panel; Future  -     Hemoglobin A1C; Future    Essential hypertension  -     Comprehensive metabolic panel; Future  -     Lipid panel; Future  -     Hemoglobin A1C; Future    Stage 3 chronic kidney disease, unspecified whether stage 3a or 3b CKD  -     Comprehensive metabolic panel; Future  -     Lipid panel; Future  -     Hemoglobin A1C; Future    Generalized weakness    Mild protein-calorie malnutrition (HCC)    History of coronary artery bypass surgery    Other orders  -     Discontinue: acetaminophen-codeine (TYLENOL #3) 300-30 mg per tablet; Take 1 tablet by mouth every 4 (four) hours as needed  -     Discontinue: Brinzolamide-Brimonidine 1-0 2 % SUSP; INSTILL ONE DROP IN BOTH EYES TWICE A DAY  -     Discontinue: celecoxib (CeleBREX) 200 mg capsule; TAKE 1 CAPSULE BY MOUTH DAILY FOR PAIN OR INFLAMMATION - TAKE WITH FOOD OR MILK  -     clotrimazole 1 % external solution; Apply topically 2 (two) times a day  -     Discontinue: lisinopril (ZESTRIL) 2 5 mg tablet  -     Discontinue: meloxicam (MOBIC) 15 mg tablet; Take 15 mg by mouth daily  -     Discontinue: meloxicam (MOBIC) 15 mg tablet; Take 15 mg by mouth daily  -     Discontinue: traMADol (ULTRAM) 50 mg tablet;  Take 50 mg by mouth daily as needed  -     Discontinue: traMADol (ULTRAM) 50 mg tablet; TAKE 1 TABLET BY MOUTH NIGHTLY AS NEEDED FOR SEVERE PAIN (PAIN SCORE 7 10)  -     losartan (COZAAR) 100 MG tablet; TAKE ONE TABLET BY MOUTH DAILY FOR BLOOD PRESSURE/HEART  -     Discontinue: metoprolol succinate (TOPROL-XL) 50 mg 24 hr tablet; Take 100 mg by mouth daily  -     Discontinue: tamsulosin (FLOMAX) 0 4 mg; Take 0 4 mg by mouth  -     Multiple Vitamins-Minerals (PreserVision AREDS) TABS; Take by mouth  -     omeprazole (PriLOSEC) 20 mg delayed release capsule; Take 20 mg by mouth daily  -     Discontinue: amLODIPine-atorvastatin (CADUET) 10-10 MG per tablet; Take 1 tablet by mouth daily  -     Cinnamon 500 MG capsule; Take 500 mg by mouth 2 (two) times a day  -     metFORMIN (GLUCOPHAGE) 500 mg tablet; Take 500 mg by mouth 2 (two) times a day          Subjective:      Patient ID: Reina Caballero is a 80 y o  male  He is here for follow up  He is started getting better bu still has some generalized weakness  Feels he is 70%better  He does not require cane or walker now for ambulation  he has twice low blood sugar in AM around 60s FBS  Other wise no new problem  The following portions of the patient's history were reviewed and updated as appropriate: allergies, current medications, past family history, past medical history, past social history, past surgical history and problem list     Review of Systems   Constitutional: Negative  Negative for activity change and fever  HENT: Positive for hearing loss (chronic,wears hearing aids)  Negative for ear pain, sinus pain and sore throat  Eyes: Negative  Respiratory: Negative  Negative for cough  Cardiovascular: Negative  Gastrointestinal: Negative  Genitourinary: Negative for flank pain and hematuria  Musculoskeletal: Negative  Skin: Negative  Neurological: Positive for weakness (generalized,more of legs)  Negative for speech difficulty and headaches           Objective:      /70 (BP Location: Left arm, Patient Position: Sitting, Cuff Size: Adult)   Temp 98 1 °F (36 7 °C) (Tympanic)   Wt 71 7 kg (158 lb)   BMI 25 50 kg/m²          Physical Exam  Vitals signs and nursing note reviewed  Constitutional:       Appearance: Normal appearance  HENT:      Head: Atraumatic  Right Ear: Ear canal normal       Left Ear: Ear canal normal       Mouth/Throat:      Comments: Pt  Has face mask on  Neck:      Musculoskeletal: Normal range of motion  Cardiovascular:      Rate and Rhythm: Normal rate and regular rhythm  Pulses: Normal pulses  Heart sounds: Normal heart sounds  Pulmonary:      Effort: Pulmonary effort is normal       Breath sounds: Normal breath sounds  Abdominal:      General: Abdomen is flat  Bowel sounds are normal       Palpations: Abdomen is soft  Musculoskeletal: Normal range of motion  Skin:     General: Skin is warm  Neurological:      General: No focal deficit present  Mental Status: He is alert     Psychiatric:         Behavior: Behavior normal

## 2021-01-07 DIAGNOSIS — Z79.4 TYPE 2 DIABETES MELLITUS WITHOUT COMPLICATION, WITH LONG-TERM CURRENT USE OF INSULIN (HCC): Primary | Chronic | ICD-10-CM

## 2021-01-07 DIAGNOSIS — E11.9 TYPE 2 DIABETES MELLITUS WITHOUT COMPLICATION, WITH LONG-TERM CURRENT USE OF INSULIN (HCC): Primary | Chronic | ICD-10-CM

## 2021-01-07 NOTE — TELEPHONE ENCOUNTER
Pt said they were here Monday and you called in metformin to [address] and when they went to pick it up it was cancelled   They need this called in

## 2021-01-20 DIAGNOSIS — Z23 ENCOUNTER FOR IMMUNIZATION: ICD-10-CM

## 2021-01-25 ENCOUNTER — IMMUNIZATIONS (OUTPATIENT)
Dept: FAMILY MEDICINE CLINIC | Facility: HOSPITAL | Age: 86
End: 2021-01-25

## 2021-01-25 DIAGNOSIS — Z23 ENCOUNTER FOR IMMUNIZATION: Primary | ICD-10-CM

## 2021-01-25 PROCEDURE — 0011A SARS-COV-2 / COVID-19 MRNA VACCINE (MODERNA) 100 MCG: CPT | Performed by: INTERNAL MEDICINE

## 2021-01-25 PROCEDURE — 91301 SARS-COV-2 / COVID-19 MRNA VACCINE (MODERNA) 100 MCG: CPT | Performed by: INTERNAL MEDICINE

## 2021-02-21 ENCOUNTER — IMMUNIZATIONS (OUTPATIENT)
Dept: FAMILY MEDICINE CLINIC | Facility: HOSPITAL | Age: 86
End: 2021-02-21

## 2021-02-21 DIAGNOSIS — Z23 ENCOUNTER FOR IMMUNIZATION: Primary | ICD-10-CM

## 2021-02-21 PROCEDURE — 0012A SARS-COV-2 / COVID-19 MRNA VACCINE (MODERNA) 100 MCG: CPT

## 2021-02-21 PROCEDURE — 91301 SARS-COV-2 / COVID-19 MRNA VACCINE (MODERNA) 100 MCG: CPT

## 2021-03-01 LAB — HBA1C MFR BLD HPLC: 7.4 %

## 2021-04-02 PROBLEM — Z95.2 S/P AVR (AORTIC VALVE REPLACEMENT): Status: ACTIVE | Noted: 2021-04-02

## 2021-04-02 PROBLEM — M48.02 SPINAL STENOSIS IN CERVICAL REGION: Status: ACTIVE | Noted: 2021-04-02

## 2021-04-02 PROBLEM — I73.9 PVD (PERIPHERAL VASCULAR DISEASE) (HCC): Status: ACTIVE | Noted: 2021-04-02

## 2021-04-02 RX ORDER — LANOLIN ALCOHOL/MO/W.PET/CERES
1 CREAM (GRAM) TOPICAL EVERY OTHER DAY
COMMUNITY

## 2021-04-02 RX ORDER — MULTIVITAMIN
1 TABLET ORAL DAILY
COMMUNITY

## 2021-04-06 ENCOUNTER — OFFICE VISIT (OUTPATIENT)
Dept: INTERNAL MEDICINE CLINIC | Facility: CLINIC | Age: 86
End: 2021-04-06
Payer: MEDICARE

## 2021-04-06 VITALS
BODY MASS INDEX: 25.23 KG/M2 | OXYGEN SATURATION: 98 % | SYSTOLIC BLOOD PRESSURE: 126 MMHG | DIASTOLIC BLOOD PRESSURE: 82 MMHG | TEMPERATURE: 98.6 F | HEIGHT: 66 IN | WEIGHT: 157 LBS | HEART RATE: 72 BPM

## 2021-04-06 DIAGNOSIS — Z79.4 TYPE 2 DIABETES MELLITUS WITHOUT COMPLICATION, WITH LONG-TERM CURRENT USE OF INSULIN (HCC): Primary | Chronic | ICD-10-CM

## 2021-04-06 DIAGNOSIS — M25.50 PAIN IN JOINTS: ICD-10-CM

## 2021-04-06 DIAGNOSIS — N18.31 STAGE 3A CHRONIC KIDNEY DISEASE (HCC): ICD-10-CM

## 2021-04-06 DIAGNOSIS — I25.10 CORONARY ARTERY DISEASE INVOLVING NATIVE CORONARY ARTERY OF NATIVE HEART WITHOUT ANGINA PECTORIS: ICD-10-CM

## 2021-04-06 DIAGNOSIS — E11.9 TYPE 2 DIABETES MELLITUS WITHOUT COMPLICATION, WITH LONG-TERM CURRENT USE OF INSULIN (HCC): Primary | Chronic | ICD-10-CM

## 2021-04-06 DIAGNOSIS — M79.10 MYALGIA: ICD-10-CM

## 2021-04-06 DIAGNOSIS — K21.9 GASTROESOPHAGEAL REFLUX DISEASE WITHOUT ESOPHAGITIS: ICD-10-CM

## 2021-04-06 DIAGNOSIS — Z95.2 S/P AVR (AORTIC VALVE REPLACEMENT): ICD-10-CM

## 2021-04-06 DIAGNOSIS — I10 ESSENTIAL HYPERTENSION: Chronic | ICD-10-CM

## 2021-04-06 DIAGNOSIS — E78.2 MIXED HYPERLIPIDEMIA: ICD-10-CM

## 2021-04-06 PROCEDURE — 99214 OFFICE O/P EST MOD 30 MIN: CPT | Performed by: INTERNAL MEDICINE

## 2021-04-06 RX ORDER — ACETAMINOPHEN 500 MG
500 TABLET ORAL 2 TIMES WEEKLY
COMMUNITY

## 2021-04-06 RX ORDER — GLIPIZIDE 5 MG/1
5 TABLET ORAL
Qty: 90 TABLET | Refills: 1 | Status: SHIPPED | OUTPATIENT
Start: 2021-04-06 | End: 2022-02-01 | Stop reason: SDUPTHER

## 2021-04-06 NOTE — PATIENT INSTRUCTIONS
Patient advised to continue present medications discussed with the patient medications and laboratory data in detail  Follow-up with me in 3 months    If any blood test was ordered please do 1 week prior to next appointment  If you have any questions please call the office [phone]

## 2021-04-06 NOTE — PROGRESS NOTES
Assessment/Plan:    1  Type 2 diabetes mellitus without complication, with long-term current use of insulin (HCC)  Assessment & Plan:    Lab Results   Component Value Date    HGBA1C 6 5 (H) 07/24/2020   Sometimes he gets some low blood sugar in the morning around 60s  So advised to discontinue p m  dose of glipizide  Call if blood sugar less than 80  Patient has been seeing eye doctor as well as podiatrist   Meanwhile advised to continue present medications and 1800 calorie ADA diet  Orders:  -     metFORMIN (GLUCOPHAGE) 500 mg tablet; Take 1 tablet (500 mg total) by mouth 2 (two) times a day  -     glipiZIDE (GLUCOTROL) 5 mg tablet; Take 1 tablet (5 mg total) by mouth daily in the early morning  -     Basic metabolic panel; Future    2  Gastroesophageal reflux disease without esophagitis  Assessment & Plan:  Patient needs omeprazole to control his symptoms  H2 blocker  not effective  Advised to continue to watch diet and GE reflux precautions  3  Essential hypertension  Assessment & Plan:  Blood pressure well controlled  Advised to continue present medication  Advised for low-salt diet  4  Stage 3a chronic kidney disease  Assessment & Plan:  Lab Results   Component Value Date    EGFR 45 11/24/2020    EGFR 41 11/23/2020    EGFR 44 07/24/2020    CREATININE 1 39 (H) 11/24/2020    CREATININE 1 51 (H) 11/23/2020    CREATININE 1 44 (H) 07/24/2020   His last creatinine was 1 18 which was somewhat better than before  Patient said he has been drinking more fluids  Will monitor  Orders:  -     Basic metabolic panel; Future    5  Mixed hyperlipidemia  Assessment & Plan:  Cholesterol 139 triglyceride 113 LDL 84 on atorvastatin and diet  But will discontinue atorvastatin for now due to patient's myalgia and has a joint pain  If symptom does not improve despite being off atorvastatin will restart atorvastatin    If symptoms improves will discontinue atorvastatin and if patient agrees will started new medication in injection form  6  S/P AVR (aortic valve replacement)  Assessment & Plan:  Patient has been followed by his cardiologist   Patient is going for echocardiogram       7  Coronary artery disease involving native coronary artery of native heart without angina pectoris  Assessment & Plan:  Patient has been followed by his cardiologist       8  Pain in joints  Assessment & Plan:  Most likely DJD  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  Advised to try acetaminophen 500 mg p o  b i d  p r n  for pain  9  Myalgia  Assessment & Plan:  Patient complained of generalized muscle aches  Discussed with the patient will discontinue atorvastatin to see may be side effect of statin  Meanwhile advised to watch diet for cholesterol  10  BMI 25 0-25 9,adult    BMI Counseling: Body mass index is 25 34 kg/m²  The BMI is above normal  Nutrition recommendations include decreasing portion sizes, decreasing fast food intake, consuming healthier snacks, limiting drinks that contain sugar, moderation in carbohydrate intake, reducing intake of saturated and trans fat and reducing intake of cholesterol  Exercise recommendations include exercising 3-5 times per week and strength training exercises  No pharmacotherapy was ordered  Subjective:  Patient presents for follow-up  Patient ID: Erica Motta is a 80 y o  male  HPI   55-year-old white male patient presents for follow-up of his medical problems  Patient denies any chest pain, shortness with, pain abdomen  Complain of  pain in the joints of the fingers   Complain of generalized muscle aches  Denies any fever, chills, cough  Denies any nausea, vomiting, diarrhea  No new medications      The following portions of the patient's history were reviewed and updated as appropriate:     Past Medical History:  He has a past medical history of Bilateral carotid artery stenosis (1/4/2021), Bilateral carotid bruits, BMI 25 0-25 9,adult (4/6/2021), Coronary artery disease involving native coronary artery of native heart without angina pectoris (4/6/2021), CTS (carpal tunnel syndrome), Deviated septum, Diabetes ([facility] [address] ), 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[facility] [address] ), Dizziness, Double vision, Ear problems, Fatigue, General weakness, GERD (gastroesophageal reflux disease), Hearing impaired person, bilateral, HL (hearing loss), Hypertension, Microscopic hematuria, Myalgia (4/6/2021), Pain in joints (4/6/2021), PVD (peripheral vascular disease) ([facility] [address] ) (4/2/2021), Renal calculi, Right lumbar radiculopathy, S/P AVR (aortic valve replacement) (4/2/2021), SOB (shortness of breath) on exertion, Spinal stenosis in cervical region (4/2/2021), Tachycardia, Urinary frequency, and Vertigo (1/4/2021)  ,  _______________________________________________________________________  Past Surgical History:   has a past surgical history that includes Ureterectomy; Bypass Graft; Carpal tunnel release (Right); Hand surgery (Left); Cataract extraction, bilateral; Aortic valve replacement (09/2016); 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); and Colonoscopy (09/16/2015)  ,  _______________________________________________________________________  Family History:  Family history is unknown by patient  ,  _______________________________________________________________________  Social History:   reports that he has quit smoking  He has never used smokeless tobacco  He reports previous alcohol use  He reports that he does not use drugs  ,  _______________________________________________________________________  Allergies:  is allergic to lisinopril; penicillin g; and penicillins     _______________________________________________________________________  Current Outpatient Medications   Medication Sig Dispense Refill    acetaminophen (TYLENOL) 500 mg tablet Take 500 mg by mouth 2 (two) times a week      amLODIPine (NORVASC) 10 mg tablet Take 1 tablet (10 mg total) by mouth daily Home med  0    aspirin (ECOTRIN LOW STRENGTH) 81 mg EC tablet Take 81 mg by mouth daily      atorvastatin (LIPITOR) 40 mg tablet Take 40 mg by mouth daily      Cinnamon 500 MG capsule Take 500 mg by mouth 2 (two) times a day      clotrimazole 1 % external solution Apply topically 2 (two) times a day      glipiZIDE (GLUCOTROL) 5 mg tablet Take 1 tablet (5 mg total) by mouth daily in the early morning 90 tablet 1    insulin glargine (LANTUS) 100 units/mL subcutaneous injection Inject 20 Units under the skin daily at bedtime      losartan (COZAAR) 100 MG tablet TAKE ONE TABLET BY MOUTH DAILY FOR BLOOD PRESSURE/HEART      metFORMIN (GLUCOPHAGE) 500 mg tablet Take 1 tablet (500 mg total) by mouth 2 (two) times a day 180 tablet 1    metoprolol succinate (TOPROL-XL) 100 mg 24 hr tablet Take 100 mg by mouth daily      Multiple Vitamin (multivitamin) tablet Take 1 tablet by mouth daily      omeprazole (PriLOSEC) 20 mg delayed release capsule Take 20 mg by mouth daily      vitamin B-12 (VITAMIN B-12) 1,000 mcg tablet Take 1 tablet by mouth every other day       No current facility-administered medications for this visit       _______________________________________________________________________  Review of Systems   Constitutional: Negative for chills and fever  HENT: Negative for congestion, ear pain, hearing loss, nosebleeds, sinus pain, sore throat and trouble swallowing  Eyes: Negative for discharge, redness and visual disturbance  Respiratory: Negative for cough, chest tightness and shortness of breath  Cardiovascular: Negative for chest pain and palpitations  Gastrointestinal: Negative for abdominal pain, blood in stool, constipation, diarrhea, nausea and vomiting  Genitourinary: Negative for dysuria, flank pain, frequency and hematuria  Musculoskeletal: Positive for arthralgias and myalgias  Negative for neck pain  Skin: Negative for color change and rash     Neurological: Negative for dizziness, speech difficulty, weakness and headaches  Hematological: Does not bruise/bleed easily  Psychiatric/Behavioral: Negative for agitation and behavioral problems  Objective:  Vitals:    04/06/21 1316   BP: 126/82   BP Location: Right arm   Patient Position: Sitting   Cuff Size: Adult   Pulse: 72   Temp: 98 6 °F (37 °C)   TempSrc: Tympanic   SpO2: 98%   Weight: 71 2 kg (157 lb)   Height: 5' 6" (1 676 m)     Body mass index is 25 34 kg/m²  Physical Exam  Vitals signs and nursing note reviewed  Constitutional:       General: He is not in acute distress  Appearance: Normal appearance  HENT:      Head: Normocephalic and atraumatic  Right Ear: Ear canal and external ear normal       Left Ear: Ear canal and external ear normal       Nose:      Comments: Patient has a face mask on  Eyes:      General: No scleral icterus  Extraocular Movements: Extraocular movements intact  Conjunctiva/sclera: Conjunctivae normal    Neck:      Musculoskeletal: Normal range of motion and neck supple  No muscular tenderness  Cardiovascular:      Rate and Rhythm: Normal rate and regular rhythm  Pulses: Normal pulses  Heart sounds: No murmur  Pulmonary:      Effort: Pulmonary effort is normal       Breath sounds: Normal breath sounds  Abdominal:      General: Bowel sounds are normal       Palpations: Abdomen is soft  Tenderness: There is no abdominal tenderness  Musculoskeletal: Normal range of motion  General: Tenderness (Patient has a DJD changes of joints of fingers with some discomfort on flexion extension with some mild swelling ) present  Right lower leg: No edema  Left lower leg: No edema  Skin:     General: Skin is warm  Findings: No rash  Neurological:      General: No focal deficit present  Mental Status: He is alert and oriented to person, place, and time     Psychiatric:         Mood and Affect: Mood normal          Behavior: Behavior normal        I spent 30 minutes with the patient today    More than 50% time spent for reviewing of external notes, reviewing of the results of diagnostics test, management of care, patient education and ordering of test

## 2021-04-07 NOTE — ASSESSMENT & PLAN NOTE
Patient needs omeprazole to control his symptoms  H2 blocker  not effective  Advised to continue to watch diet and GE reflux precautions

## 2021-04-07 NOTE — ASSESSMENT & PLAN NOTE
Lab Results   Component Value Date    HGBA1C 6 5 (H) 07/24/2020   Sometimes he gets some low blood sugar in the morning around 60s  So advised to discontinue p m  dose of glipizide  Call if blood sugar less than 80  Patient has been seeing eye doctor as well as podiatrist   Meanwhile advised to continue present medications and 1800 calorie ADA diet

## 2021-04-07 NOTE — ASSESSMENT & PLAN NOTE
Cholesterol 139 triglyceride 113 LDL 84 on atorvastatin and diet  But will discontinue atorvastatin for now due to patient's myalgia and has a joint pain  If symptom does not improve despite being off atorvastatin will restart atorvastatin  If symptoms improves will discontinue atorvastatin and if patient agrees will started new medication in injection form

## 2021-04-07 NOTE — ASSESSMENT & PLAN NOTE
Patient complained of generalized muscle aches  Discussed with the patient will discontinue atorvastatin to see may be side effect of statin  Meanwhile advised to watch diet for cholesterol

## 2021-04-07 NOTE — ASSESSMENT & PLAN NOTE
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

## 2021-04-07 NOTE — ASSESSMENT & PLAN NOTE
Lab Results   Component Value Date    EGFR 45 11/24/2020    EGFR 41 11/23/2020    EGFR 44 07/24/2020    CREATININE 1 39 (H) 11/24/2020    CREATININE 1 51 (H) 11/23/2020    CREATININE 1 44 (H) 07/24/2020   His last creatinine was 1 18 which was somewhat better than before  Patient said he has been drinking more fluids  Will monitor

## 2021-06-07 ENCOUNTER — APPOINTMENT (EMERGENCY)
Dept: CT IMAGING | Facility: HOSPITAL | Age: 86
DRG: 299 | End: 2021-06-07
Payer: MEDICARE

## 2021-06-07 ENCOUNTER — HOSPITAL ENCOUNTER (OUTPATIENT)
Dept: VASCULAR ULTRASOUND | Facility: HOSPITAL | Age: 86
Discharge: HOME | DRG: 299 | End: 2021-06-07
Payer: MEDICARE

## 2021-06-07 ENCOUNTER — HOSPITAL ENCOUNTER (INPATIENT)
Facility: HOSPITAL | Age: 86
LOS: 7 days | Discharge: HOME HEALTH | DRG: 299 | End: 2021-06-15
Attending: EMERGENCY MEDICINE | Admitting: INTERNAL MEDICINE
Payer: MEDICARE

## 2021-06-07 ENCOUNTER — OFFICE VISIT (OUTPATIENT)
Dept: FAMILY MEDICINE CLINIC | Facility: CLINIC | Age: 86
End: 2021-06-07
Payer: MEDICARE

## 2021-06-07 VITALS
HEART RATE: 100 BPM | BODY MASS INDEX: 24.27 KG/M2 | TEMPERATURE: 98.4 F | SYSTOLIC BLOOD PRESSURE: 122 MMHG | WEIGHT: 151 LBS | OXYGEN SATURATION: 97 % | DIASTOLIC BLOOD PRESSURE: 78 MMHG | HEIGHT: 66 IN

## 2021-06-07 DIAGNOSIS — K92.2 GASTROINTESTINAL HEMORRHAGE, UNSPECIFIED GASTROINTESTINAL HEMORRHAGE TYPE: ICD-10-CM

## 2021-06-07 DIAGNOSIS — K62.5 RECTAL BLEEDING: ICD-10-CM

## 2021-06-07 DIAGNOSIS — I10 ESSENTIAL HYPERTENSION: ICD-10-CM

## 2021-06-07 DIAGNOSIS — I73.9 PVD (PERIPHERAL VASCULAR DISEASE) (HCC): ICD-10-CM

## 2021-06-07 DIAGNOSIS — M79.661 RIGHT CALF PAIN: ICD-10-CM

## 2021-06-07 DIAGNOSIS — I26.99 ACUTE PULMONARY EMBOLISM (HCC): Primary | ICD-10-CM

## 2021-06-07 DIAGNOSIS — I10 ESSENTIAL HYPERTENSION: Chronic | ICD-10-CM

## 2021-06-07 DIAGNOSIS — M25.50 PAIN IN JOINTS: ICD-10-CM

## 2021-06-07 DIAGNOSIS — M79.89 RIGHT LEG SWELLING: ICD-10-CM

## 2021-06-07 DIAGNOSIS — M79.661 RIGHT CALF PAIN: Primary | ICD-10-CM

## 2021-06-07 DIAGNOSIS — G56.00 CARPAL TUNNEL SYNDROME: ICD-10-CM

## 2021-06-07 LAB
ANION GAP SERPL CALCULATED.3IONS-SCNC: 14 MMOL/L (ref 4–13)
APTT PPP: 26 SECONDS (ref 23–37)
BASOPHILS # BLD AUTO: 0.04 THOUSANDS/ΜL (ref 0–0.1)
BASOPHILS NFR BLD AUTO: 1 % (ref 0–1)
BUN SERPL-MCNC: 31 MG/DL (ref 5–25)
CALCIUM SERPL-MCNC: 8.9 MG/DL (ref 8.3–10.1)
CHLORIDE SERPL-SCNC: 106 MMOL/L (ref 100–108)
CO2 SERPL-SCNC: 23 MMOL/L (ref 21–32)
CREAT SERPL-MCNC: 1.27 MG/DL (ref 0.6–1.3)
EOSINOPHIL # BLD AUTO: 0.18 THOUSAND/ΜL (ref 0–0.61)
EOSINOPHIL NFR BLD AUTO: 2 % (ref 0–6)
ERYTHROCYTE [DISTWIDTH] IN BLOOD BY AUTOMATED COUNT: 12.8 % (ref 11.6–15.1)
GFR SERPL CREATININE-BSD FRML MDRD: 50 ML/MIN/1.73SQ M
GLUCOSE SERPL-MCNC: 212 MG/DL (ref 65–140)
HCT VFR BLD AUTO: 37.9 % (ref 36.5–49.3)
HGB BLD-MCNC: 12.6 G/DL (ref 12–17)
IMM GRANULOCYTES # BLD AUTO: 0.03 THOUSAND/UL (ref 0–0.2)
IMM GRANULOCYTES NFR BLD AUTO: 0 % (ref 0–2)
INR PPP: 1.09 (ref 0.84–1.19)
LYMPHOCYTES # BLD AUTO: 1.77 THOUSANDS/ΜL (ref 0.6–4.47)
LYMPHOCYTES NFR BLD AUTO: 22 % (ref 14–44)
MCH RBC QN AUTO: 31.5 PG (ref 26.8–34.3)
MCHC RBC AUTO-ENTMCNC: 33.2 G/DL (ref 31.4–37.4)
MCV RBC AUTO: 95 FL (ref 82–98)
MONOCYTES # BLD AUTO: 0.85 THOUSAND/ΜL (ref 0.17–1.22)
MONOCYTES NFR BLD AUTO: 11 % (ref 4–12)
NEUTROPHILS # BLD AUTO: 5.08 THOUSANDS/ΜL (ref 1.85–7.62)
NEUTS SEG NFR BLD AUTO: 64 % (ref 43–75)
NRBC BLD AUTO-RTO: 0 /100 WBCS
NT-PROBNP SERPL-MCNC: 508 PG/ML
PLATELET # BLD AUTO: 212 THOUSANDS/UL (ref 149–390)
PMV BLD AUTO: 10 FL (ref 8.9–12.7)
POTASSIUM SERPL-SCNC: 4.3 MMOL/L (ref 3.5–5.3)
PROTHROMBIN TIME: 14.2 SECONDS (ref 11.6–14.5)
RBC # BLD AUTO: 4 MILLION/UL (ref 3.88–5.62)
SODIUM SERPL-SCNC: 143 MMOL/L (ref 136–145)
TROPONIN I SERPL-MCNC: <0.02 NG/ML
WBC # BLD AUTO: 7.95 THOUSAND/UL (ref 4.31–10.16)

## 2021-06-07 PROCEDURE — 83880 ASSAY OF NATRIURETIC PEPTIDE: CPT | Performed by: EMERGENCY MEDICINE

## 2021-06-07 PROCEDURE — 99285 EMERGENCY DEPT VISIT HI MDM: CPT

## 2021-06-07 PROCEDURE — NC001 PR NO CHARGE: Performed by: INTERNAL MEDICINE

## 2021-06-07 PROCEDURE — 85730 THROMBOPLASTIN TIME PARTIAL: CPT | Performed by: EMERGENCY MEDICINE

## 2021-06-07 PROCEDURE — 85025 COMPLETE CBC W/AUTO DIFF WBC: CPT | Performed by: EMERGENCY MEDICINE

## 2021-06-07 PROCEDURE — 93971 EXTREMITY STUDY: CPT

## 2021-06-07 PROCEDURE — 71275 CT ANGIOGRAPHY CHEST: CPT

## 2021-06-07 PROCEDURE — 99285 EMERGENCY DEPT VISIT HI MDM: CPT | Performed by: EMERGENCY MEDICINE

## 2021-06-07 PROCEDURE — 93971 EXTREMITY STUDY: CPT | Performed by: SURGERY

## 2021-06-07 PROCEDURE — 36415 COLL VENOUS BLD VENIPUNCTURE: CPT | Performed by: EMERGENCY MEDICINE

## 2021-06-07 PROCEDURE — 96365 THER/PROPH/DIAG IV INF INIT: CPT

## 2021-06-07 PROCEDURE — 99214 OFFICE O/P EST MOD 30 MIN: CPT | Performed by: NURSE PRACTITIONER

## 2021-06-07 PROCEDURE — 85610 PROTHROMBIN TIME: CPT | Performed by: EMERGENCY MEDICINE

## 2021-06-07 PROCEDURE — 80048 BASIC METABOLIC PNL TOTAL CA: CPT | Performed by: EMERGENCY MEDICINE

## 2021-06-07 PROCEDURE — 84484 ASSAY OF TROPONIN QUANT: CPT | Performed by: EMERGENCY MEDICINE

## 2021-06-07 PROCEDURE — 93005 ELECTROCARDIOGRAM TRACING: CPT

## 2021-06-07 PROCEDURE — 96375 TX/PRO/DX INJ NEW DRUG ADDON: CPT

## 2021-06-07 PROCEDURE — 96366 THER/PROPH/DIAG IV INF ADDON: CPT

## 2021-06-07 RX ORDER — HEPARIN SODIUM 1000 [USP'U]/ML
2600 INJECTION, SOLUTION INTRAVENOUS; SUBCUTANEOUS
Status: DISCONTINUED | OUTPATIENT
Start: 2021-06-07 | End: 2021-06-14

## 2021-06-07 RX ORDER — HEPARIN SODIUM 1000 [USP'U]/ML
5200 INJECTION, SOLUTION INTRAVENOUS; SUBCUTANEOUS ONCE
Status: COMPLETED | OUTPATIENT
Start: 2021-06-07 | End: 2021-06-07

## 2021-06-07 RX ORDER — HEPARIN SODIUM 10000 [USP'U]/100ML
3-30 INJECTION, SOLUTION INTRAVENOUS
Status: DISCONTINUED | OUTPATIENT
Start: 2021-06-07 | End: 2021-06-14

## 2021-06-07 RX ORDER — HEPARIN SODIUM 1000 [USP'U]/ML
5200 INJECTION, SOLUTION INTRAVENOUS; SUBCUTANEOUS
Status: DISCONTINUED | OUTPATIENT
Start: 2021-06-07 | End: 2021-06-14

## 2021-06-07 RX ADMIN — HEPARIN SODIUM 18 UNITS/KG/HR: 10000 INJECTION, SOLUTION INTRAVENOUS at 22:00

## 2021-06-07 RX ADMIN — HEPARIN SODIUM 18 UNITS/KG/HR: 10000 INJECTION, SOLUTION INTRAVENOUS at 21:59

## 2021-06-07 RX ADMIN — IOHEXOL 100 ML: 350 INJECTION, SOLUTION INTRAVENOUS at 21:46

## 2021-06-07 RX ADMIN — HEPARIN SODIUM 5200 UNITS: 1000 INJECTION INTRAVENOUS; SUBCUTANEOUS at 22:02

## 2021-06-07 NOTE — ASSESSMENT & PLAN NOTE
/78  Patient currently on Cozaar 100 mg p o  Daily, Norvasc 10 mg p o  Daily, metoprolol succinate 100 mg p o  Daily  Instructed on low-sodium low-cholesterol diet

## 2021-06-07 NOTE — PATIENT INSTRUCTIONS
Leg Cramps   American Academy of Orthopaedic Surgeons (AAOS): Muscle cramp  American Academy of Orthopaedic Surgeons (AAOS)  [address] [facility], [address] [facility]  2010  Available from URL: http://[email or web address]  aaos  org/topic [email or web address]  As accessed 2011-01-31  Hilario Palencia, Caryl CROWELL, & [address]: Nocturnal leg cramps in older people  [address] 2002; 23(763):306-399  Destini GM: Cramps, Muscle Stiffness, and Exercise Intolerance  In: Clinical Pediatric Neurology: A Signs and Symptoms Approach, 6th ed  [address], [place], 2009  Shilo G, Navjot G, Carmelita WAN, et al: Cramps and muscular pain: prevention with pycnogenol in normal subjects, venous patients, athletes, claudicants and in diabetic microangiopathy  Angiology 2006; 57(3):331-339  © Copyright [address] [place] [address] Information is for End User's use only and may not be sold, redistributed or otherwise used for commercial purposes  All illustrations and images included in CareNotes® are the copyrighted property of A D A M , Inc  or [address] [place] [address] [facility]  The above information is an  only  It is not intended as medical advice for individual conditions or treatments  Talk to your doctor, nurse or pharmacist before following any medical regimen to see if it is safe and effective for you  Deep Vein Thrombosis   WHAT YOU NEED TO KNOW:   What is a deep vein thrombosis (DVT)? A DVT is a blood clot that forms in a deep vein of the body  The deep veins in the legs, thighs, and hips are the most common sites for DVT  A DVT can also occur in a deep vein within your arms  The clot prevents the normal flow of blood in the vein  The blood backs up and causes pain and swelling  The DVT can break into smaller pieces and travel to your lungs and cause a blockage called a pulmonary embolism (PE)  A PE can become life-threatening  What increases my risk for a DVT? After you have a DVT, your risk for another increases   A DVT can happen to anyone, but any of the following may increase your risk:  · A family history of blood clots    · Limited activity caused by bed rest, a leg cast, or sitting for long periods    · Injury to a deep vein, or surgery    · A blood disorder that makes your blood clot faster than normal, such as factor V Leiden mutation    · Age older than 60 years    · Hormone replacement therapy    · Birth control pills, especially in women who smoke or are older than 35 years    · Pregnancy, and for 6 weeks after childbirth     · Cancer or heart failure     · A catheter placed in a large vein    · Smoking cigarettes    · Obesity or varicose veins    What are the signs and symptoms of a DVT? · Swelling    · Redness    · Warmth, pain, or tenderness     How is a DVT diagnosed? · A D-dimer blood test  may be done to check for signs of a blood clot  · An ultrasound  uses sound waves to show pictures on a monitor  An ultrasound may be done to show a clot in your vein  · Contrast venography  is an x-ray of a vein  Contrast liquid is used to make the vein easier to see on the x-ray  Tell a healthcare provider if you have ever had an allergic reaction to contrast liquid  How is a DVT treated? · Blood thinners  help prevent blood clots  Clots can cause strokes, heart attacks, and death  The following are general safety guidelines to follow while you are taking a blood thinner:    ? Watch for bleeding and bruising while you take blood thinners  Watch for bleeding from your gums or nose  Watch for blood in your urine and bowel movements  Use a soft washcloth on your skin, and a soft toothbrush to brush your teeth  This can keep your skin and gums from bleeding  If you shave, use an electric shaver  Do not play contact sports  ? Tell your dentist and other healthcare providers that you take a blood thinner  Wear a bracelet or necklace that says you take this medicine       ? Do not start or stop any other medicines unless your healthcare provider tells you to  Many medicines cannot be used with blood thinners  ? Take your blood thinner exactly as prescribed by your healthcare provider  Do not skip does or take less than prescribed  Tell your provider right away if you forget to take your blood thinner, or if you take too much  ? Warfarin  is a blood thinner that you may need to take  The following are things you should be aware of if you take warfarin:     § Foods and medicines can affect the amount of warfarin in your blood  Do not make major changes to your diet while you take warfarin  Warfarin works best when you eat about the same amount of vitamin K every day  Vitamin K is found in green leafy vegetables and certain other foods  Ask for more information about what to eat when you are taking warfarin  § You will need to see your healthcare provider for follow-up visits when you are on warfarin  You will need regular blood tests  These tests are used to decide how much medicine you need  · A vena cava filter  may be placed inside your vena cava to treat your DVT  The vena cava is a large vein that brings blood from your lower body up to your heart  The filter may help trap pieces of a blood clot and prevent them from going into your lungs  · Surgery  called a thrombectomy may be done to remove the clot  A procedure called thrombolysis may instead be done to inject a clot buster that helps break the clot apart  What can I do to manage a DVT? · Wear pressure stockings as directed  The stockings put pressure on your legs  This improves blood flow and helps prevent clots  Wear the stockings during the day  Do not wear them when you sleep  · Elevate your legs above the level of your heart  Elevate your legs when you sit or lie down, as often as you can  This will help decrease swelling and pain  Prop your legs on pillows or blankets to keep them elevated comfortably  What can I do to prevent a DVT?    · Exercise regularly to help increase your blood flow  Walking is a good low-impact exercise  Talk to your healthcare provider about the best exercise plan for you  · Change your body position or move around often  Move and stretch in your seat several times each hour if you travel by car or work at a desk  In an airplane, get up and walk every hour  Move your legs by tightening and releasing your leg muscles while sitting  You can move your legs while sitting by raising and lowering your heels  Keep your toes on the floor while you do this  You can also raise and lower your toes while keeping your heels on the floor  · Maintain a healthy weight  Ask your healthcare provider how much you should weigh  Ask him or her to help you create a weight loss plan if you are overweight  · Do not smoke  Nicotine and other chemicals in cigarettes and cigars can damage blood vessels and make it more difficult to manage your DVT  Ask your healthcare provider for information if you currently smoke and need help to quit  E-cigarettes or smokeless tobacco still contain nicotine  Talk to your healthcare provider before you use these products  · Ask about birth control if you are a woman who takes the pill  A birth control pill increases the risk for PE in certain women  The risk is higher if you are also older than 35, smoke cigarettes, or have a blood clotting disorder  Talk to your healthcare provider about other ways to prevent pregnancy, such as a cervical cap or intrauterine device (IUD)  Call your local emergency number (911 in the [address] at the [facility] [address]) if:   · You feel lightheaded, short of breath, and have chest pain  · You cough up blood  When should I call my doctor? · Your arm or leg feels warm, tender, and painful  It may look swollen and red  · You have questions or concerns about your condition or care  CARE AGREEMENT:   You have the right to help plan your care   Learn about your health condition and how it may be treated  Discuss treatment options with your healthcare providers to decide what care you want to receive  You always have the right to refuse treatment  The above information is an  only  It is not intended as medical advice for individual conditions or treatments  Talk to your doctor, nurse or pharmacist before following any medical regimen to see if it is safe and effective for you  © Copyright [address] Information is for End User's use only and may not be sold, redistributed or otherwise used for commercial purposes   All illustrations and images included in CareNotes® are the copyrighted property of A D A M , Inc  or [address] [place] [address]

## 2021-06-07 NOTE — ASSESSMENT & PLAN NOTE
Right calf evaluated negative for Homans  No warmth, or redness noted  Plus two pitting edema of the right lower extremity noted  Lower extremity venous duplex ordered at this time for further evaluation  Positive DP he has and posterior tibials faint and deep

## 2021-06-07 NOTE — ASSESSMENT & PLAN NOTE
Patient instructed to elevate right lower extremity  He has also been instructed on low-sodium diet  Patient currently sent for lower extremity duplex for further evaluation to rule out DVT

## 2021-06-07 NOTE — PROGRESS NOTES
BMI Counseling: Body mass index is 24 37 kg/m²  The BMI is above normal  Nutrition recommendations include decreasing portion sizes, encouraging healthy choices of fruits and vegetables, decreasing fast food intake, consuming healthier snacks, limiting drinks that contain sugar, moderation in carbohydrate intake, increasing intake of lean protein, reducing intake of saturated and trans fat and reducing intake of cholesterol  Exercise recommendations include vigorous physical activity 75 minutes/week, exercising 3-5 times per week, obtaining a gym membership and strength training exercises  No pharmacotherapy was ordered  Depression Screening and Follow-up Plan: Clincally patient does not have depression  No treatment is required  Falls Plan of Care: balance, strength, and gait training instructions were provided  Medications that increase falls were reviewed  Vitamin D supplementation was recommended  Home safety education provided  Assessment/Plan:         Problem List Items Addressed This Visit        Cardiovascular and Mediastinum    Essential hypertension (Chronic)     /78  Patient currently on Cozaar 100 mg p o  Daily, Norvasc 10 mg p o  Daily, metoprolol succinate 100 mg p o  Daily  Instructed on low-sodium low-cholesterol diet  PVD (peripheral vascular disease) ([facility] [address] )     Patient has longstanding history of vascular issues  Dr Gonzalez Ee  Other    BMI 24 0-24 9, adult     Patient BMI 24 37 kg/M2  Patient instructed on proper diet, nutrition and exercise  Right calf pain - Primary     Right calf evaluated negative for Homans  No warmth, or redness noted  Plus two pitting edema of the right lower extremity noted  Lower extremity venous duplex ordered at this time for further evaluation  Positive DP he has and posterior tibials faint and deep           Relevant Orders    VAS lower limb venous duplex study, unilateral/limited    Right leg swelling Patient instructed to elevate right lower extremity  He has also been instructed on low-sodium diet  Patient currently sent for lower extremity duplex for further evaluation to rule out DVT  Subjective:      Patient ID: Brandon Werner is a 80 y o  male  And a 17-year-old male who reports right calf pain and tenderness with swelling  Patient indicates that he had left arm surgery 2 weeks prior  He indicates that he was at home resting in his recliner chair Postop early for last 2 weeks  His wife and him  Have concern over blood clots  To the right lower extremity  Patient negative Homans sign, +2 pitting edema, decreased DP and PT  With deep palpation  Positive warmth, sensation and mobility lower extremity  Capillary refill 2-3 seconds  Patient sent currently for lower extremity duplex of the right lower extremity  Patient education on DVT information provided        The following portions of the patient's history were reviewed and updated as appropriate:   Past Medical History:  He has a past medical history of Bilateral carotid artery stenosis (1/4/2021), Bilateral carotid bruits, BMI 25 0-25 9,adult (4/6/2021), Coronary artery disease involving native coronary artery of native heart without angina pectoris (4/6/2021), CTS (carpal tunnel syndrome), Deviated septum, Diabetes ([address] ), 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[address] ), Dizziness, Double vision, Ear problems, Fatigue, General weakness, GERD (gastroesophageal reflux disease), Hearing impaired person, bilateral, HL (hearing loss), Hypertension, Microscopic hematuria, Myalgia (4/6/2021), Pain in joints (4/6/2021), PVD (peripheral vascular disease) ([address] ) (4/2/2021), Renal calculi, Right lumbar radiculopathy, S/P AVR (aortic valve replacement) (4/2/2021), SOB (shortness of breath) on exertion, Spinal stenosis in cervical region (4/2/2021), Tachycardia, Urinary frequency, and Vertigo (1/4/2021)  ,  _______________________________________________________________________  Medical Problems:  does not have any pertinent problems on file ,  _______________________________________________________________________  Past Surgical History:   has a past surgical history that includes Ureterectomy; Bypass Graft; Carpal tunnel release (Right); Hand surgery (Left); Cataract extraction, bilateral; Aortic valve replacement (09/2016); 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); and Colonoscopy (09/16/2015)  ,  _______________________________________________________________________  Family History:  Family history is unknown by patient  ,  _______________________________________________________________________  Social History:   reports that he has quit smoking  He has never used smokeless tobacco  He reports previous alcohol use  He reports that he does not use drugs  ,  _______________________________________________________________________  Allergies:  is allergic to lisinopril; penicillin g; and penicillins     _______________________________________________________________________  Current Outpatient Medications   Medication Sig Dispense Refill    acetaminophen (TYLENOL) 500 mg tablet Take 500 mg by mouth 2 (two) times a week      amLODIPine (NORVASC) 10 mg tablet Take 1 tablet (10 mg total) by mouth daily Home med  0    aspirin (ECOTRIN LOW STRENGTH) 81 mg EC tablet Take 81 mg by mouth daily      atorvastatin (LIPITOR) 40 mg tablet Take 40 mg by mouth daily      Cinnamon 500 MG capsule Take 500 mg by mouth 2 (two) times a day      clotrimazole 1 % external solution Apply topically 2 (two) times a day      glipiZIDE (GLUCOTROL) 5 mg tablet Take 1 tablet (5 mg total) by mouth daily in the early morning 90 tablet 1    insulin glargine (LANTUS) 100 units/mL subcutaneous injection Inject 20 Units under the skin daily at bedtime      losartan (COZAAR) 100 MG tablet TAKE ONE TABLET BY MOUTH DAILY FOR BLOOD PRESSURE/HEART      metFORMIN (GLUCOPHAGE) 500 mg tablet Take 1 tablet (500 mg total) by mouth 2 (two) times a day 180 tablet 1    metoprolol succinate (TOPROL-XL) 100 mg 24 hr tablet Take 100 mg by mouth daily      Multiple Vitamin (multivitamin) tablet Take 1 tablet by mouth daily      omeprazole (PriLOSEC) 20 mg delayed release capsule Take 20 mg by mouth daily      vitamin B-12 (VITAMIN B-12) 1,000 mcg tablet Take 1 tablet by mouth every other day       No current facility-administered medications for this visit       _______________________________________________________________________  Review of Systems   Constitutional: Negative for activity change, appetite change, chills, fatigue, fever and unexpected weight change  HENT: Negative for congestion, ear discharge, ear pain, nosebleeds, postnasal drip, rhinorrhea, sinus pressure, sinus pain, sneezing, sore throat and voice change  Eyes: Negative for pain, redness and visual disturbance  Respiratory: Negative for apnea, cough, chest tightness, shortness of breath and wheezing  Cardiovascular: Negative for chest pain and palpitations  Right lower extremity swelling over the last week  Gastrointestinal: Negative for abdominal distention, abdominal pain, constipation, diarrhea, nausea and vomiting  Endocrine: Negative  Genitourinary: Negative for difficulty urinating, dysuria, flank pain, frequency, hematuria and urgency  Musculoskeletal: Negative for arthralgias, back pain and joint swelling  Skin: Negative  Negative for color change and rash  Allergic/Immunologic: Negative  Neurological: Negative  Negative for dizziness, seizures, syncope, speech difficulty, weakness, light-headedness and headaches  Hematological: Negative  Psychiatric/Behavioral: Negative  All other systems reviewed and are negative          Objective:  Vitals:    06/07/21 1407   BP: 122/78   BP Location: Right arm   Patient Position: Sitting Cuff Size: Adult   Pulse: 100   Temp: 98 4 °F (36 9 °C)   TempSrc: Tympanic   SpO2: 97%   Weight: 68 5 kg (151 lb)   Height: 5' 6" (1 676 m)     Body mass index is 24 37 kg/m²  Physical Exam  Vitals signs and nursing note reviewed  Constitutional:       Appearance: Normal appearance  He is well-developed and normal weight  HENT:      Head: Normocephalic and atraumatic  Right Ear: Tympanic membrane, ear canal and external ear normal       Left Ear: Tympanic membrane, ear canal and external ear normal       Nose: Nose normal       Mouth/Throat:      Mouth: Mucous membranes are moist    Eyes:      Extraocular Movements: Extraocular movements intact  Conjunctiva/sclera: Conjunctivae normal       Pupils: Pupils are equal, round, and reactive to light  Neck:      Musculoskeletal: Normal range of motion  Cardiovascular:      Rate and Rhythm: Normal rate and regular rhythm  Pulses: Normal pulses  Heart sounds: Normal heart sounds  No murmur  Pulmonary:      Effort: Pulmonary effort is normal       Breath sounds: Normal breath sounds  Abdominal:      General: Bowel sounds are normal       Palpations: Abdomen is soft  Musculoskeletal: Normal range of motion  General: Swelling and tenderness present  Right lower leg: Edema present  Left lower leg: No edema  Comments: - Homans sign  Pulses deep and faint  atDP, PT signs  Patient has no redness or warmth to posterior calf  Skin:     General: Skin is warm  Capillary Refill: Capillary refill takes less than 2 seconds  Neurological:      General: No focal deficit present  Mental Status: He is alert and oriented to person, place, and time     Psychiatric:         Mood and Affect: Mood normal          Behavior: Behavior normal

## 2021-06-08 ENCOUNTER — APPOINTMENT (INPATIENT)
Dept: NON INVASIVE DIAGNOSTICS | Facility: HOSPITAL | Age: 86
DRG: 299 | End: 2021-06-08
Payer: MEDICARE

## 2021-06-08 PROBLEM — I26.99 ACUTE PULMONARY EMBOLISM (HCC): Status: ACTIVE | Noted: 2021-06-08

## 2021-06-08 PROBLEM — I82.411 ACUTE DEEP VEIN THROMBOSIS (DVT) OF FEMORAL VEIN OF RIGHT LOWER EXTREMITY (HCC): Status: ACTIVE | Noted: 2021-06-08

## 2021-06-08 LAB
ANION GAP SERPL CALCULATED.3IONS-SCNC: 12 MMOL/L (ref 4–13)
APTT PPP: 181 SECONDS (ref 23–37)
APTT PPP: 67 SECONDS (ref 23–37)
APTT PPP: 94 SECONDS (ref 23–37)
ATRIAL RATE: 60 BPM
ATRIAL RATE: 69 BPM
BASOPHILS # BLD AUTO: 0.05 THOUSANDS/ΜL (ref 0–0.1)
BASOPHILS NFR BLD AUTO: 1 % (ref 0–1)
BUN SERPL-MCNC: 27 MG/DL (ref 5–25)
CALCIUM SERPL-MCNC: 8.7 MG/DL (ref 8.3–10.1)
CHLORIDE SERPL-SCNC: 105 MMOL/L (ref 100–108)
CO2 SERPL-SCNC: 22 MMOL/L (ref 21–32)
CREAT SERPL-MCNC: 1.03 MG/DL (ref 0.6–1.3)
EOSINOPHIL # BLD AUTO: 0.26 THOUSAND/ΜL (ref 0–0.61)
EOSINOPHIL NFR BLD AUTO: 3 % (ref 0–6)
ERYTHROCYTE [DISTWIDTH] IN BLOOD BY AUTOMATED COUNT: 12.9 % (ref 11.6–15.1)
GFR SERPL CREATININE-BSD FRML MDRD: 65 ML/MIN/1.73SQ M
GLUCOSE SERPL-MCNC: 134 MG/DL (ref 65–140)
GLUCOSE SERPL-MCNC: 137 MG/DL (ref 65–140)
GLUCOSE SERPL-MCNC: 161 MG/DL (ref 65–140)
GLUCOSE SERPL-MCNC: 179 MG/DL (ref 65–140)
GLUCOSE SERPL-MCNC: 199 MG/DL (ref 65–140)
GLUCOSE SERPL-MCNC: 213 MG/DL (ref 65–140)
HCT VFR BLD AUTO: 35.2 % (ref 36.5–49.3)
HGB BLD-MCNC: 11.4 G/DL (ref 12–17)
IMM GRANULOCYTES # BLD AUTO: 0.05 THOUSAND/UL (ref 0–0.2)
IMM GRANULOCYTES NFR BLD AUTO: 1 % (ref 0–2)
LYMPHOCYTES # BLD AUTO: 2.45 THOUSANDS/ΜL (ref 0.6–4.47)
LYMPHOCYTES NFR BLD AUTO: 28 % (ref 14–44)
MAGNESIUM SERPL-MCNC: 1.6 MG/DL (ref 1.6–2.6)
MCH RBC QN AUTO: 30.2 PG (ref 26.8–34.3)
MCHC RBC AUTO-ENTMCNC: 32.4 G/DL (ref 31.4–37.4)
MCV RBC AUTO: 93 FL (ref 82–98)
MONOCYTES # BLD AUTO: 0.8 THOUSAND/ΜL (ref 0.17–1.22)
MONOCYTES NFR BLD AUTO: 9 % (ref 4–12)
NEUTROPHILS # BLD AUTO: 5.01 THOUSANDS/ΜL (ref 1.85–7.62)
NEUTS SEG NFR BLD AUTO: 58 % (ref 43–75)
NRBC BLD AUTO-RTO: 0 /100 WBCS
P AXIS: 38 DEGREES
P AXIS: 73 DEGREES
PHOSPHATE SERPL-MCNC: 3.1 MG/DL (ref 2.3–4.1)
PLATELET # BLD AUTO: 217 THOUSANDS/UL (ref 149–390)
PMV BLD AUTO: 10.4 FL (ref 8.9–12.7)
POTASSIUM SERPL-SCNC: 3.8 MMOL/L (ref 3.5–5.3)
PR INTERVAL: 154 MS
PR INTERVAL: 160 MS
QRS AXIS: 22 DEGREES
QRS AXIS: 3 DEGREES
QRSD INTERVAL: 68 MS
QRSD INTERVAL: 72 MS
QT INTERVAL: 390 MS
QT INTERVAL: 404 MS
QTC INTERVAL: 404 MS
QTC INTERVAL: 417 MS
RBC # BLD AUTO: 3.77 MILLION/UL (ref 3.88–5.62)
SODIUM SERPL-SCNC: 139 MMOL/L (ref 136–145)
T WAVE AXIS: 10 DEGREES
T WAVE AXIS: 8 DEGREES
TROPONIN I SERPL-MCNC: <0.02 NG/ML
VENTRICULAR RATE: 60 BPM
VENTRICULAR RATE: 69 BPM
WBC # BLD AUTO: 8.62 THOUSAND/UL (ref 4.31–10.16)

## 2021-06-08 PROCEDURE — 85025 COMPLETE CBC W/AUTO DIFF WBC: CPT | Performed by: NURSE PRACTITIONER

## 2021-06-08 PROCEDURE — 97167 OT EVAL HIGH COMPLEX 60 MIN: CPT

## 2021-06-08 PROCEDURE — 97163 PT EVAL HIGH COMPLEX 45 MIN: CPT

## 2021-06-08 PROCEDURE — 83735 ASSAY OF MAGNESIUM: CPT | Performed by: NURSE PRACTITIONER

## 2021-06-08 PROCEDURE — 85730 THROMBOPLASTIN TIME PARTIAL: CPT | Performed by: NURSE PRACTITIONER

## 2021-06-08 PROCEDURE — 93325 DOPPLER ECHO COLOR FLOW MAPG: CPT | Performed by: INTERNAL MEDICINE

## 2021-06-08 PROCEDURE — 93005 ELECTROCARDIOGRAM TRACING: CPT

## 2021-06-08 PROCEDURE — 82948 REAGENT STRIP/BLOOD GLUCOSE: CPT

## 2021-06-08 PROCEDURE — 93321 DOPPLER ECHO F-UP/LMTD STD: CPT | Performed by: INTERNAL MEDICINE

## 2021-06-08 PROCEDURE — 93308 TTE F-UP OR LMTD: CPT | Performed by: INTERNAL MEDICINE

## 2021-06-08 PROCEDURE — 93010 ELECTROCARDIOGRAM REPORT: CPT | Performed by: INTERNAL MEDICINE

## 2021-06-08 PROCEDURE — 80048 BASIC METABOLIC PNL TOTAL CA: CPT | Performed by: NURSE PRACTITIONER

## 2021-06-08 PROCEDURE — 84484 ASSAY OF TROPONIN QUANT: CPT | Performed by: INTERNAL MEDICINE

## 2021-06-08 PROCEDURE — 99223 1ST HOSP IP/OBS HIGH 75: CPT | Performed by: INTERNAL MEDICINE

## 2021-06-08 PROCEDURE — 84100 ASSAY OF PHOSPHORUS: CPT | Performed by: NURSE PRACTITIONER

## 2021-06-08 PROCEDURE — 93308 TTE F-UP OR LMTD: CPT

## 2021-06-08 PROCEDURE — 85730 THROMBOPLASTIN TIME PARTIAL: CPT | Performed by: INTERNAL MEDICINE

## 2021-06-08 RX ORDER — WARFARIN SODIUM 5 MG/1
5 TABLET ORAL
Status: DISCONTINUED | OUTPATIENT
Start: 2021-06-08 | End: 2021-06-10

## 2021-06-08 RX ORDER — CLOTRIMAZOLE 1 %
CREAM (GRAM) TOPICAL 2 TIMES DAILY
Status: DISCONTINUED | OUTPATIENT
Start: 2021-06-08 | End: 2021-06-12

## 2021-06-08 RX ORDER — MAGNESIUM SULFATE HEPTAHYDRATE 40 MG/ML
4 INJECTION, SOLUTION INTRAVENOUS ONCE
Status: COMPLETED | OUTPATIENT
Start: 2021-06-08 | End: 2021-06-08

## 2021-06-08 RX ORDER — POTASSIUM CHLORIDE 20 MEQ/1
20 TABLET, EXTENDED RELEASE ORAL ONCE
Status: COMPLETED | OUTPATIENT
Start: 2021-06-08 | End: 2021-06-08

## 2021-06-08 RX ORDER — PANTOPRAZOLE SODIUM 40 MG/1
40 TABLET, DELAYED RELEASE ORAL
Status: DISCONTINUED | OUTPATIENT
Start: 2021-06-08 | End: 2021-06-15 | Stop reason: HOSPADM

## 2021-06-08 RX ORDER — LANOLIN ALCOHOL/MO/W.PET/CERES
6 CREAM (GRAM) TOPICAL
Status: DISCONTINUED | OUTPATIENT
Start: 2021-06-08 | End: 2021-06-15 | Stop reason: HOSPADM

## 2021-06-08 RX ORDER — AMOXICILLIN 250 MG
2 CAPSULE ORAL 2 TIMES DAILY
Status: DISCONTINUED | OUTPATIENT
Start: 2021-06-08 | End: 2021-06-15 | Stop reason: HOSPADM

## 2021-06-08 RX ORDER — INSULIN GLARGINE 100 [IU]/ML
20 INJECTION, SOLUTION SUBCUTANEOUS
Status: DISCONTINUED | OUTPATIENT
Start: 2021-06-08 | End: 2021-06-11

## 2021-06-08 RX ORDER — ATORVASTATIN CALCIUM 40 MG/1
40 TABLET, FILM COATED ORAL DAILY
Status: DISCONTINUED | OUTPATIENT
Start: 2021-06-08 | End: 2021-06-15 | Stop reason: HOSPADM

## 2021-06-08 RX ORDER — ACETAMINOPHEN 325 MG/1
650 TABLET ORAL EVERY 6 HOURS PRN
Status: DISCONTINUED | OUTPATIENT
Start: 2021-06-08 | End: 2021-06-15 | Stop reason: HOSPADM

## 2021-06-08 RX ORDER — POLYETHYLENE GLYCOL 3350 17 G/17G
17 POWDER, FOR SOLUTION ORAL DAILY
Status: DISCONTINUED | OUTPATIENT
Start: 2021-06-08 | End: 2021-06-15 | Stop reason: HOSPADM

## 2021-06-08 RX ADMIN — MAGNESIUM SULFATE HEPTAHYDRATE 4 G: 40 INJECTION, SOLUTION INTRAVENOUS at 06:16

## 2021-06-08 RX ADMIN — DOCUSATE SODIUM AND SENNOSIDES 2 TABLET: 8.6; 5 TABLET, FILM COATED ORAL at 17:00

## 2021-06-08 RX ADMIN — CLOTRIMAZOLE: 1 CREAM TOPICAL at 17:00

## 2021-06-08 RX ADMIN — INSULIN LISPRO 1 UNITS: 100 INJECTION, SOLUTION INTRAVENOUS; SUBCUTANEOUS at 16:51

## 2021-06-08 RX ADMIN — ATORVASTATIN CALCIUM 40 MG: 40 TABLET, FILM COATED ORAL at 09:33

## 2021-06-08 RX ADMIN — DOCUSATE SODIUM AND SENNOSIDES 2 TABLET: 8.6; 5 TABLET, FILM COATED ORAL at 09:33

## 2021-06-08 RX ADMIN — INSULIN LISPRO 1 UNITS: 100 INJECTION, SOLUTION INTRAVENOUS; SUBCUTANEOUS at 12:13

## 2021-06-08 RX ADMIN — POTASSIUM CHLORIDE 20 MEQ: 1500 TABLET, EXTENDED RELEASE ORAL at 06:16

## 2021-06-08 RX ADMIN — INSULIN GLARGINE 20 UNITS: 100 INJECTION, SOLUTION SUBCUTANEOUS at 21:40

## 2021-06-08 RX ADMIN — INSULIN LISPRO 2 UNITS: 100 INJECTION, SOLUTION INTRAVENOUS; SUBCUTANEOUS at 21:40

## 2021-06-08 RX ADMIN — WARFARIN SODIUM 5 MG: 5 TABLET ORAL at 17:00

## 2021-06-08 RX ADMIN — Medication 6 MG: at 21:40

## 2021-06-08 RX ADMIN — PANTOPRAZOLE SODIUM 40 MG: 40 TABLET, DELAYED RELEASE ORAL at 06:15

## 2021-06-08 RX ADMIN — CYANOCOBALAMIN TAB 500 MCG 1000 MCG: 500 TAB at 09:33

## 2021-06-08 NOTE — APP STUDENT NOTE
H&P Exam - Critical Care   Michael Birmingham 80 y o  male MRN: [id number]  Unit/Bed#: ED 10 Encounter: [id number]      -------------------------------------------------------------------------------------------------------------  Chief Complaint: right lower leg pain    History of Present Illness   HX and PE limited by: none  Michael Birmingham is a 80 y o  male with a past medical history of hypertension, CAD, DMT2, CKD3, GERD, who presents with right lower leg pain  The pt stated he was in his usual state of health when he woke up this morning  He reported to his wife that he was having pain in his right leg rated 4 out of 10  He stated that the pain feels achy and it comes and goes away on its on  He was instructed by his wife to call his doctor for further instructions  He called and spoke with his doctor, received an appointment and was seen today in the office  His doctor instructed him to go to the hospital in which an appointment for a lower extremity duplex was made  He reported going to the appointment and being told by the physician that he has a blood blot in his right leg and needs to go to the emergency room for further workup  The pt reports no nausea, vomiting, diarrhea, fevers, or recent sick contacts  Of note, the pt had a recent left hand and elbow surgery for carpal tunnel  He saw his surgeon last Thursday upon which his dressings were removed   He was scheduled to have another visit this upcoming Thursday for suture removal     History obtained from the patient   -------------------------------------------------------------------------------------------------------------  Assessment and Plan:    Neuro:     Diagnosis:   o Plan: frequent neurological monitoring  o Monitor for delirium patterns    CV:    Diagnosis: history of CAD, hypertension  o Plan: continue Aspirin 81 mg  o Hold antihypertensive medications for now      Pulm:   Diagnosis: CT Chest PE study - Left mainstem pulmonary artery emboli extending into the left lower lobe pulmonary arteries  o Plan: supplement with O2 as needed  o Obtain 2D Echo  o Encourage pulmonary hygiene    GI:    Diagnosis: history of GERD  o Plan: diet as tolerated  o Encourage mobility  o Pantoprazole  o PRN stool softener - colace    :    Diagnosis: stage 3 chronic kidney disease  o Plan: avoid nephrotoxic agents    F/E/N:    Plan: encourage oral intake   Monitor intake and output      Heme/Onc:    Diagnosis:   o Plan: Heparin gtt for PE/DVT    Endo:    Diagnosis: hyperglycemia, glucose 202  o Plan: continue home dose Lantus 20 units daily  o Insulin sliding scale with coverage  o glucose checks with meals and at bedtime    ID:    Diagnosis:   o Plan: encourage pulmonary hygiene  o Monitor urine    MSK/Skin:    Diagnosis: left arm with recent surgical procedure  o Plan: activity as tolerated  o Encourage ROM exercises as tolerated      Disposition: Admit to Critical Care   Code Status: Prior  --------------------------------------------------------------------------------------------------------------  Review of Systems   Constitutional: Negative  HENT: Negative  Eyes: Negative  Respiratory: Negative for cough, shortness of breath and wheezing  Cardiovascular: Positive for leg swelling  Gastrointestinal: Negative for abdominal distention, abdominal pain, constipation, diarrhea and nausea  Endocrine: Negative  Genitourinary: Negative for difficulty urinating and dysuria  Musculoskeletal: Negative  Skin: Negative  Neurological: Negative for dizziness, syncope, light-headedness, numbness and headaches  Psychiatric/Behavioral: Negative  A 12-point, complete review of systems was reviewed and negative except as stated above     Physical Exam  Constitutional:       Appearance: Normal appearance  HENT:      Head: Normocephalic and atraumatic        Nose: Nose normal       Mouth/Throat:      Mouth: Mucous membranes are moist  Pharynx: Oropharynx is clear  Eyes:      Pupils: Pupils are equal, round, and reactive to light  Neck:      Musculoskeletal: Normal range of motion and neck supple  Cardiovascular:      Rate and Rhythm: Normal rate and regular rhythm  Pulmonary:      Effort: No respiratory distress  Breath sounds: No wheezing, rhonchi or rales  Abdominal:      General: Bowel sounds are normal       Palpations: Abdomen is soft  Musculoskeletal: Normal range of motion  Right lower le+ Edema present  Skin:     General: Skin is warm  Capillary Refill: Capillary refill takes less than 2 seconds  Neurological:      General: No focal deficit present  Mental Status: He is alert and oriented to person, place, and time  Mental status is at baseline  Psychiatric:         Mood and Affect: Mood normal          Behavior: Behavior normal        --------------------------------------------------------------------------------------------------------------  Vitals:   Vitals:    21 2230 21 2300 21 2330 21 0000   BP: 143/68 140/74 144/77 146/72   BP Location: Right arm Right arm Right arm Right arm   Pulse: 76 70 66 72   Resp: 20 20 20 20   Temp:       TempSrc:       SpO2: 98% 97% 96% 98%     Temp  Min: 98 4 °F (36 9 °C)  Max: 98 5 °F (36 9 °C)        There is no height or weight on file to calculate BMI    N/A    Laboratory and Diagnostics:  Results from last 7 days   Lab Units 21   WBC Thousand/uL 7 95   HEMOGLOBIN g/dL 12 6   HEMATOCRIT % 37 9   PLATELETS Thousands/uL 212   NEUTROS PCT % 64   MONOS PCT % 11     Results from last 7 days   Lab Units 212   SODIUM mmol/L 143   POTASSIUM mmol/L 4 3   CHLORIDE mmol/L 106   CO2 mmol/L 23   ANION GAP mmol/L 14*   BUN mg/dL 31*   CREATININE mg/dL 1 27   CALCIUM mg/dL 8 9   GLUCOSE RANDOM mg/dL 212*          Results from last 7 days   Lab Units 21  2112   INR  1 09   PTT seconds 26      Results from last 7 days   Lab Units 06/07/21 2112   TROPONIN I ng/mL <0 02         ABG:    VBG:          Micro:        EKG: none  Imaging: I have personally reviewed pertinent reports  Historical Information   Past Medical History:   Diagnosis Date    Bilateral carotid artery stenosis 1/4/2021    <50% on doppler 7/27/2018   No change from Aug/2016    Bilateral carotid bruits     BMI 25 0-25 9,adult 4/6/2021    Coronary artery disease involving native coronary artery of native heart without angina pectoris 4/6/2021    CTS (carpal tunnel syndrome)     Deviated septum     Diabetes ([facility] [address] )     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HCC)     Dizziness     Double vision     Ear problems     Fatigue     General weakness     GERD (gastroesophageal reflux disease)     Hearing impaired person, bilateral     HL (hearing loss)     Hypertension     Microscopic hematuria     Myalgia 4/6/2021    Pain in joints 4/6/2021    PVD (peripheral vascular disease) ([facility] [address] ) 4/2/2021    Renal calculi     Right lumbar radiculopathy     S/P AVR (aortic valve replacement) 4/2/2021    SOB (shortness of breath) on exertion     Spinal stenosis in cervical region 4/2/2021    Tachycardia     Urinary frequency     Vertigo 1/4/2021     Past Surgical History:   Procedure Laterality Date    AORTIC VALVE REPLACEMENT  09/2016    BYPASS GRAFT      CARPAL TUNNEL RELEASE Right     by Dr Pedro Luis Goodwin EXTRACTION, BILATERAL      COLONOSCOPY  09/16/2015    CORONARY ARTERY BYPASS GRAFT  09/2016    x1     HAND SURGERY Left     URETERECTOMY       Social History   Social History     Substance and Sexual Activity   Alcohol Use Not Currently     Social History     Substance and Sexual Activity   Drug Use Never     Social History     Tobacco Use   Smoking Status Former Smoker   Smokeless Tobacco Never Used   Tobacco Comment    Never smoker - As per [employer]     Exercise History: none   Family History:   Family History   Family history unknown: Yes     I have reviewed this patient's family history and commented on sigificant items within the HPI      Medications:  Current Facility-Administered Medications   Medication Dose Route Frequency    heparin (porcine) 25,000 units in 0 45% NaCl 250 mL infusion (premix)  3-30 Units/kg/hr (Order-Specific) Intravenous Titrated    heparin (porcine) injection 2,600 Units  2,600 Units Intravenous Q1H PRN    heparin (porcine) injection 5,200 Units  5,200 Units Intravenous Q1H PRN     Home medications:  Prior to Admission Medications   Prescriptions Last Dose Informant Patient Reported? Taking?    Cinnamon 500 MG capsule  Self Yes No   Sig: Take 500 mg by mouth 2 (two) times a day   Multiple Vitamin (multivitamin) tablet   Yes No   Sig: Take 1 tablet by mouth daily   acetaminophen (TYLENOL) 500 mg tablet   Yes No   Sig: Take 500 mg by mouth 2 (two) times a week   amLODIPine (NORVASC) 10 mg tablet   No No   Sig: Take 1 tablet (10 mg total) by mouth daily Home med   aspirin (ECOTRIN LOW STRENGTH) 81 mg EC tablet  Self Yes No   Sig: Take 81 mg by mouth daily   atorvastatin (LIPITOR) 40 mg tablet  Self Yes No   Sig: Take 40 mg by mouth daily   clotrimazole 1 % external solution   Yes No   Sig: Apply topically 2 (two) times a day   glipiZIDE (GLUCOTROL) 5 mg tablet   No No   Sig: Take 1 tablet (5 mg total) by mouth daily in the early morning   insulin glargine (LANTUS) 100 units/mL subcutaneous injection  Self Yes No   Sig: Inject 20 Units under the skin daily at bedtime   losartan (COZAAR) 100 MG tablet   Yes No   Sig: TAKE ONE TABLET BY MOUTH DAILY FOR BLOOD PRESSURE/HEART   metFORMIN (GLUCOPHAGE) 500 mg tablet   No No   Sig: Take 1 tablet (500 mg total) by mouth 2 (two) times a day   metoprolol succinate (TOPROL-XL) 100 mg 24 hr tablet  Self Yes No   Sig: Take 100 mg by mouth daily   omeprazole (PriLOSEC) 20 mg delayed release capsule  Self Yes No   Sig: Take 20 mg by mouth daily   vitamin B-12 (VITAMIN B-12) 1,000 mcg tablet   Yes No   Sig: Take 1 tablet by mouth every other day      Facility-Administered Medications: None     Allergies: Allergies   Allergen Reactions    Lisinopril Cough    Penicillin G Hives    Penicillins      ------------------------------------------------------------------------------------------------------------  Advance Directive and Living Will:      Power of :    POLST:    ------------------------------------------------------------------------------------------------------------  Anticipated Length of Stay is > 2 midnights    Care Time Delivered:   Upon my evaluation, this patient had a high probability of imminent or life-threatening deterioration due to pulmonary embolism, which required my direct attention, intervention, and personal management  I have personally provided 20 minutes (2355 to 0015) of critical care time, exclusive of procedures, teaching, family meetings, and any prior time recorded by providers other than myself  Maria Zavala        Portions of the record may have been created with voice recognition software  Occasional wrong word or "sound a like" substitutions may have occurred due to the inherent limitations of voice recognition software    Read the chart carefully and recognize, using context, where substitutions have occurred

## 2021-06-08 NOTE — PLAN OF CARE
Problem: Potential for Falls  Goal: Patient will remain free of falls  Description: INTERVENTIONS:  - Assess patient frequently for physical needs  -  Identify cognitive and physical deficits and behaviors that affect risk of falls  -  [place] fall precautions as indicated by assessment   - Educate patient/family on patient safety including physical limitations  - Instruct patient to call for assistance with activity based on assessment  - Modify environment to reduce risk of injury  - Consider OT/PT consult to assist with strengthening/mobility  Outcome: Progressing     Problem: Prexisting or High Potential for Compromised Skin Integrity  Goal: Skin integrity is maintained or improved  Description: INTERVENTIONS:  - Identify patients at risk for skin breakdown  - Assess and monitor skin integrity  - Assess and monitor nutrition and hydration status  - Monitor labs   - Assess for incontinence   - Turn and reposition patient  - Assist with mobility/ambulation  - Relieve pressure over bony prominences  - Avoid friction and shearing  - Provide appropriate hygiene as needed including keeping skin clean and dry  - Evaluate need for skin moisturizer/barrier cream  - Collaborate with interdisciplinary team   - Patient/family teaching  - Consider wound care consult   Outcome: Progressing     Problem: Nutrition/Hydration-ADULT  Goal: Nutrient/Hydration intake appropriate for improving, restoring or maintaining nutritional needs  Description: Monitor and assess patient's nutrition/hydration status for malnutrition  Collaborate with interdisciplinary team and initiate plan and interventions as ordered  Monitor patient's weight and dietary intake as ordered or per policy  Utilize nutrition screening tool and intervene as necessary  Determine patient's food preferences and provide high-protein, high-caloric foods as appropriate       INTERVENTIONS:  - Monitor oral intake, urinary output, labs, and treatment plans  - Assess nutrition and hydration status and recommend course of action  - Evaluate amount of meals eaten  - Assist patient with eating if necessary   - Allow adequate time for meals  - Recommend/ encourage appropriate diets, oral nutritional supplements, and vitamin/mineral supplements  - Order, calculate, and assess calorie counts as needed  - Recommend, monitor, and adjust tube feedings and TPN/PPN based on assessed needs  - Assess need for intravenous fluids  - Provide specific nutrition/hydration education as appropriate  - Include patient/family/caregiver in decisions related to nutrition  Outcome: Progressing

## 2021-06-08 NOTE — PLAN OF CARE
Problem: Potential for Falls  Goal: Patient will remain free of falls  Description: INTERVENTIONS:  - Assess patient frequently for physical needs  -  Identify cognitive and physical deficits and behaviors that affect risk of falls    -  [place] fall precautions as indicated by assessment   - Educate patient/family on patient safety including physical limitations  - Instruct patient to call for assistance with activity based on assessment  - Modify environment to reduce risk of injury  - Consider OT/PT consult to assist with strengthening/mobility  Outcome: Not Progressing

## 2021-06-08 NOTE — PLAN OF CARE
Problem: PHYSICAL THERAPY ADULT  Goal: Performs mobility at highest level of function for planned discharge setting  See evaluation for individualized goals  Description: Treatment/Interventions: Functional transfer training, LE strengthening/ROM, Therapeutic exercise, Endurance training, Patient/family training, Bed mobility, Gait training, Elevations, Equipment eval/education  Equipment Recommended: Cane       See flowsheet documentation for full assessment, interventions and recommendations  Note: Prognosis: Fair  Problem List: Decreased strength, Decreased endurance, Impaired balance, Decreased mobility, Decreased safety awareness  Assessment: Pt is a 80 y o  male seen for PT evaluation s/p admit to [address] [place] [address] on 6/7/2021 w/ Acute pulmonary embolism ([facility] [address] )  Order placed for PT  Comorbidities affecting pt's physical performance at time of assessment are listed above  Personal factors affecting pt at time of IE include: steps to enter environment, advanced age, inability to perform IADLs and limited insight into impairments  Prior to admission, pt was was independent w/ all functional mobility / [facility] and lives with his wife in a 1 SH with 1 CHAZ  Upon evaluation: Pt requires supervision for sit to stand and supervision for ambulation with SPC  (Please find full objective findings from PT assessment regarding body systems outlined above)  Impairments and limitations also listed above, especially due to  weakness, impaired balance, decreased endurance, gait deviations, decreased safety awareness and fall risk  The following objective measures performed on IE also reveal limitations: Barthel Index 55/100  Pt's clinical presentation is currently unstable/unpredictable seen in pt's presentation of decreased safety awareness, fall risk, continuous monitoring in the ICU, and limited insight into deficits    Pt to benefit from continued skilled PT tx while in hospital and upon DC to address deficits as defined above and maximize level of functional mobility  From PT/mobility standpoint, recommendation at time of d/c would be Home PT and home with family support  Recommend progression of ambulation and potential trial with RW if appropriate  PT Discharge Recommendation: Home with home health rehabilitation          See flowsheet documentation for full assessment

## 2021-06-08 NOTE — ASSESSMENT & PLAN NOTE
Lab Results   Component Value Date    HGBA1C 6 5 (H) 07/24/2020       Recent Labs     06/08/21  0128   POCGLU 161*     · Continue home Lantus  · Begin sliding scale    Blood Sugar Average: Last 72 hrs:  (P) 161

## 2021-06-08 NOTE — H&P
[facility]  H&P- Concepcion Jimenes 1933, 80 y o  male MRN: [id number]  Unit/Bed#: ICU 08 Encounter: [id number]  Primary Care Provider: Elodia Desir MD   Date and time admitted to hospital: 6/7/2021  8:42 PM    Acute deep vein thrombosis (DVT) of femoral vein of right lower extremity (HCC)  Assessment & Plan  · Continue IV heparin  · Discuss oral anticoagulation after ECHO    Hyperlipidemia  Assessment & Plan  · Continue statin    Gastroesophageal reflux disease  Assessment & Plan  · Continue PPI    Coronary artery disease  Assessment & Plan  · Continue statin  · Hold metoprolol    Essential hypertension  Assessment & Plan  · Holding home antihypertensives at present    Type 2 diabetes mellitus without complication, with long-term current use of insulin [facility])  Assessment & Plan  Lab Results   Component Value Date    HGBA1C 6 5 (H) 07/24/2020       Recent Labs     06/08/21  0128   POCGLU 161*     · Continue home Lantus  · Begin sliding scale    Blood Sugar Average: Last 72 hrs:  (P) 161    Stage 3 chronic kidney disease ([facility] [address] )  Assessment & Plan  Lab Results   Component Value Date    EGFR 50 06/07/2021    EGFR 45 11/24/2020    EGFR 41 11/23/2020    CREATININE 1 27 06/07/2021    CREATININE 1 39 (H) 11/24/2020    CREATININE 1 51 (H) 11/23/2020     · Renal function appears better than prior baseline  · Close intake and output, daily weights, trend serum creatinine    * Acute pulmonary embolism (HCC)  Assessment & Plan  · Continue VTE heparin  · Pending ECHO  · Discuss oral anticoagulation      -------------------------------------------------------------------------------------------------------------  Chief Complaint: Right lower extremity edema    History of Present Illness   HX and PE limited by: None  Concepcion Jimenes is a 80 y o  male who presents 6/8 with complaint of worsening right lower extremity edema over the past day    He has a past medical history of hypertension, hyperlipidemia, coronary artery disease, type 2 diabetes, and recent 1 left upper extremity carpal tunnel procedure  The patient denies any fevers or changes in sensation in the right lower extremity  His outpatient provider recommended lower extremity venous duplex which was positive for deep vein thrombosis in the majority of the superior right veins  He underwent a CTA of his chest shows which was also notable for pulmonary embolism with an elevated RV to LV ratio and a mildly elevated proBNP  He is being referred to the step-down unit for closer monitoring  History obtained from chart review and the patient   -------------------------------------------------------------------------------------------------------------  Dispo: Admit to Stepdown Level 1    Code Status: Level 1 - Full Code  --------------------------------------------------------------------------------------------------------------  Review of Systems   Constitutional: Negative for activity change, fatigue and fever  HENT: Negative for trouble swallowing  Eyes: Negative for visual disturbance  Respiratory: Negative for chest tightness and shortness of breath  Cardiovascular: Negative for chest pain  Gastrointestinal: Negative for constipation, diarrhea, nausea and vomiting  Genitourinary: Negative for difficulty urinating  Musculoskeletal: Positive for joint swelling (Right lower extremity)  Negative for arthralgias and myalgias  Neurological: Negative for weakness  Physical Exam  Constitutional:       General: He is not in acute distress  Appearance: Normal appearance  He is not ill-appearing  HENT:      Head: Normocephalic and atraumatic  Mouth/Throat:      Mouth: Mucous membranes are moist    Eyes:      Pupils: Pupils are equal, round, and reactive to light  Cardiovascular:      Rate and Rhythm: Normal rate and regular rhythm  Pulses: Normal pulses     Pulmonary:      Effort: Pulmonary effort is normal  No respiratory distress  Breath sounds: Normal breath sounds  Abdominal:      General: Abdomen is flat  Bowel sounds are normal  There is no distension  Palpations: Abdomen is soft  Musculoskeletal: Normal range of motion  General: No tenderness or signs of injury  Right lower leg: Edema (2+ right lower extremity edema) present  Skin:     General: Skin is warm and dry  Capillary Refill: Capillary refill takes less than 2 seconds  Findings: No erythema  Neurological:      Mental Status: He is alert  GCS: GCS eye subscore is 4  GCS verbal subscore is 5  GCS motor subscore is 6        --------------------------------------------------------------------------------------------------------------  Vitals:   Vitals:    06/07/21 2300 06/07/21 2330 06/08/21 0000 06/08/21 0040   BP: 140/74 144/77 146/72 166/90   BP Location: Right arm Right arm Right arm Right arm   Pulse: 70 66 72 79   Resp: 20 20 20 (!) 24   Temp:    98 5 °F (36 9 °C)   TempSrc:    Oral   SpO2: 97% 96% 98% 99%   Weight:    67 3 kg (148 lb 5 9 oz)     Temp  Min: 98 4 °F (36 9 °C)  Max: 98 5 °F (36 9 °C)        Body mass index is 23 95 kg/m²  Laboratory and Diagnostics:  Results from last 7 days   Lab Units 06/07/21 2112   WBC Thousand/uL 7 95   HEMOGLOBIN g/dL 12 6   HEMATOCRIT % 37 9   PLATELETS Thousands/uL 212   NEUTROS PCT % 64   MONOS PCT % 11     Results from last 7 days   Lab Units 06/07/21 2112   SODIUM mmol/L 143   POTASSIUM mmol/L 4 3   CHLORIDE mmol/L 106   CO2 mmol/L 23   ANION GAP mmol/L 14*   BUN mg/dL 31*   CREATININE mg/dL 1 27   CALCIUM mg/dL 8 9   GLUCOSE RANDOM mg/dL 212*          Results from last 7 days   Lab Units 06/07/21 2112   INR  1 09   PTT seconds 26      Results from last 7 days   Lab Units 06/07/21  2112   TROPONIN I ng/mL <0 02         ABG:    VBG:          Micro:        EKG:  Review of telemetry demonstrates sinus 7  Imaging: I have personally reviewed pertinent reports     and I have personally reviewed pertinent films in PACS      Historical Information   Past Medical History:   Diagnosis Date    Bilateral carotid artery stenosis 1/4/2021    <50% on doppler 7/27/2018   No change from Aug/2016    Bilateral carotid bruits     BMI 25 0-25 9,adult 4/6/2021    Coronary artery disease involving native coronary artery of native heart without angina pectoris 4/6/2021    CTS (carpal tunnel syndrome)     Deviated septum     Diabetes ([facility] [address] )     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HCC)     Dizziness     Double vision     Ear problems     Fatigue     General weakness     GERD (gastroesophageal reflux disease)     Hearing impaired person, bilateral     HL (hearing loss)     Hypertension     Microscopic hematuria     Myalgia 4/6/2021    Pain in joints 4/6/2021    PVD (peripheral vascular disease) ([facility] [address] ) 4/2/2021    Renal calculi     Right lumbar radiculopathy     S/P AVR (aortic valve replacement) 4/2/2021    SOB (shortness of breath) on exertion     Spinal stenosis in cervical region 4/2/2021    Tachycardia     Urinary frequency     Vertigo 1/4/2021     Past Surgical History:   Procedure Laterality Date    AORTIC VALVE REPLACEMENT  09/2016    BYPASS GRAFT      CARPAL TUNNEL RELEASE Right     by Dr Thad Keita EXTRACTION, BILATERAL      COLONOSCOPY  09/16/2015    CORONARY ARTERY BYPASS GRAFT  09/2016    x1     HAND SURGERY Left     URETERECTOMY       Social History   Social History     Substance and Sexual Activity   Alcohol Use Not Currently     Social History     Substance and Sexual Activity   Drug Use Never     Social History     Tobacco Use   Smoking Status Former Smoker   Smokeless Tobacco Never Used   Tobacco Comment    Never smoker - As per [employer]     Exercise History:  Independent in ADLs  Family History:   Family History   Family history unknown: Yes     I have reviewed this patient's family history and commented on sigificant items within the HPI      Medications:  Current Facility-Administered Medications   Medication Dose Route Frequency    atorvastatin (LIPITOR) tablet 40 mg  40 mg Oral Daily    clotrimazole (LOTRIMIN) 1 % cream   Topical BID    cyanocobalamin (VITAMIN B-12) tablet 1,000 mcg  1,000 mcg Oral Every Other Day    heparin (porcine) 25,000 units in 0 45% NaCl 250 mL infusion (premix)  3-30 Units/kg/hr (Order-Specific) Intravenous Titrated    heparin (porcine) injection 2,600 Units  2,600 Units Intravenous Q1H PRN    heparin (porcine) injection 5,200 Units  5,200 Units Intravenous Q1H PRN    insulin glargine (LANTUS) subcutaneous injection 20 Units 0 2 mL  20 Units Subcutaneous HS    insulin lispro (HumaLOG) 100 units/mL subcutaneous injection 1-5 Units  1-5 Units Subcutaneous TID AC    insulin lispro (HumaLOG) 100 units/mL subcutaneous injection 1-5 Units  1-5 Units Subcutaneous HS    melatonin tablet 6 mg  6 mg Oral HS    pantoprazole (PROTONIX) EC tablet 40 mg  40 mg Oral Early Morning    polyethylene glycol (MIRALAX) packet 17 g  17 g Oral Daily    senna-docusate sodium (SENOKOT S) 8 6-50 mg per tablet 2 tablet  2 tablet Oral BID     Home medications:  Prior to Admission Medications   Prescriptions Last Dose Informant Patient Reported? Taking?    Cinnamon 500 MG capsule  Self Yes No   Sig: Take 500 mg by mouth 2 (two) times a day   Multiple Vitamin (multivitamin) tablet   Yes No   Sig: Take 1 tablet by mouth daily   acetaminophen (TYLENOL) 500 mg tablet   Yes No   Sig: Take 500 mg by mouth 2 (two) times a week   amLODIPine (NORVASC) 10 mg tablet   No No   Sig: Take 1 tablet (10 mg total) by mouth daily Home med   aspirin (ECOTRIN LOW STRENGTH) 81 mg EC tablet  Self Yes No   Sig: Take 81 mg by mouth daily   atorvastatin (LIPITOR) 40 mg tablet  Self Yes No   Sig: Take 40 mg by mouth daily   clotrimazole 1 % external solution   Yes No   Sig: Apply topically 2 (two) times a day   glipiZIDE (GLUCOTROL) 5 mg tablet   No No   Sig: Take 1 tablet (5 mg total) by mouth daily in the early morning   insulin glargine (LANTUS) 100 units/mL subcutaneous injection  Self Yes No   Sig: Inject 20 Units under the skin daily at bedtime   losartan (COZAAR) 100 MG tablet   Yes No   Sig: TAKE ONE TABLET BY MOUTH DAILY FOR BLOOD PRESSURE/HEART   metFORMIN (GLUCOPHAGE) 500 mg tablet   No No   Sig: Take 1 tablet (500 mg total) by mouth 2 (two) times a day   metoprolol succinate (TOPROL-XL) 100 mg 24 hr tablet  Self Yes No   Sig: Take 100 mg by mouth daily   omeprazole (PriLOSEC) 20 mg delayed release capsule  Self Yes No   Sig: Take 20 mg by mouth daily   vitamin B-12 (VITAMIN B-12) 1,000 mcg tablet   Yes No   Sig: Take 1 tablet by mouth every other day      Facility-Administered Medications: None     Allergies: Allergies   Allergen Reactions    Lisinopril Cough    Penicillin G Hives    Penicillins        ------------------------------------------------------------------------------------------------------------  Advance Directive and Living Will:      Power of :    POLST:    ------------------------------------------------------------------------------------------------------------  Anticipated Length of Stay is > 2 midnights    Care Time Delivered:   No Critical Care time spent       SERAFIN Hawkins        Portions of the record may have been created with voice recognition software  Occasional wrong word or "sound a like" substitutions may have occurred due to the inherent limitations of voice recognition software    Read the chart carefully and recognize, using context, where substitutions have occurred

## 2021-06-08 NOTE — ED PROVIDER NOTES
History  Chief Complaint   Patient presents with    Leg Swelling     right leg swelling, sent here from PCP to r/o DVT  denies cp/sob     HPI     Patient is a pleasant 49-year-old male that reports the emergency department with known DVT in the right lower extremity  He notes having a recent left arm surgery  He noted some mild subjective shortness of breath yesterday but is currently well-appearing, no acute distress  No vomiting or diarrhea  Medical decision making:  Pleasant 49-year-old male, DVT in right lower extremity, will anticoagulate, furthermore, will rule out PE given the shortness of breath  Prior to Admission Medications   Prescriptions Last Dose Informant Patient Reported? Taking?    Cinnamon 500 MG capsule  Self Yes No   Sig: Take 500 mg by mouth 2 (two) times a day   Multiple Vitamin (multivitamin) tablet   Yes No   Sig: Take 1 tablet by mouth daily   acetaminophen (TYLENOL) 500 mg tablet   Yes No   Sig: Take 500 mg by mouth 2 (two) times a week   amLODIPine (NORVASC) 10 mg tablet   No No   Sig: Take 1 tablet (10 mg total) by mouth daily Home med   aspirin (ECOTRIN LOW STRENGTH) 81 mg EC tablet  Self Yes No   Sig: Take 81 mg by mouth daily   atorvastatin (LIPITOR) 40 mg tablet  Self Yes No   Sig: Take 40 mg by mouth daily 1/2 tablet   clotrimazole 1 % external solution   Yes No   Sig: Apply topically 2 (two) times a day   glipiZIDE (GLUCOTROL) 5 mg tablet   No No   Sig: Take 1 tablet (5 mg total) by mouth daily in the early morning   insulin glargine (LANTUS) 100 units/mL subcutaneous injection  Self Yes No   Sig: Inject 22 Units under the skin daily at bedtime    losartan (COZAAR) 100 MG tablet   Yes No   Sig: TAKE ONE TABLET BY MOUTH DAILY FOR BLOOD PRESSURE/HEART   metFORMIN (GLUCOPHAGE) 500 mg tablet   No No   Sig: Take 1 tablet (500 mg total) by mouth 2 (two) times a day   metoprolol succinate (TOPROL-XL) 100 mg 24 hr tablet  Self Yes No   Sig: Take 100 mg by mouth daily omeprazole (PriLOSEC) 20 mg delayed release capsule  Self Yes No   Sig: Take 20 mg by mouth daily   vitamin B-12 (VITAMIN B-12) 1,000 mcg tablet   Yes No   Sig: Take 1 tablet by mouth every other day      Facility-Administered Medications: None       Past Medical History:   Diagnosis Date    Bilateral carotid artery stenosis 1/4/2021    <50% on doppler 7/27/2018  No change from Aug/2016    Bilateral carotid bruits     BMI 25 0-25 9,adult 4/6/2021    Coronary artery disease involving native coronary artery of native heart without angina pectoris 4/6/2021    CTS (carpal tunnel syndrome)     Deviated septum     Diabetes ([facility] )     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HCC)     Dizziness     Double vision     Ear problems     Fatigue     General weakness     GERD (gastroesophageal reflux disease)     Hearing impaired person, bilateral     HL (hearing loss)     Hypertension     Microscopic hematuria     Myalgia 4/6/2021    Pain in joints 4/6/2021    PVD (peripheral vascular disease) ([facility] ) 4/2/2021    Renal calculi     Right lumbar radiculopathy     S/P AVR (aortic valve replacement) 4/2/2021    SOB (shortness of breath) on exertion     Spinal stenosis in cervical region 4/2/2021    Tachycardia     Urinary frequency     Vertigo 1/4/2021       Past Surgical History:   Procedure Laterality Date    AORTIC VALVE REPLACEMENT  09/2016    BYPASS GRAFT      CARPAL TUNNEL RELEASE Right     by Dr Lisa Carlisle EXTRACTION, BILATERAL      COLONOSCOPY  09/16/2015    CORONARY ARTERY BYPASS GRAFT  09/2016    x1     HAND SURGERY Left     URETERECTOMY         Family History   Family history unknown: Yes     I have reviewed and agree with the history as documented      E-Cigarette/Vaping    E-Cigarette Use Never User      E-Cigarette/Vaping Substances     Social History     Tobacco Use    Smoking status: Former Smoker    Smokeless tobacco: Never Used    Tobacco comment: Never smoker - As per [employer]   Substance Use Topics    Alcohol use: Not Currently    Drug use: Never       Review of Systems   Respiratory: Positive for shortness of breath  Musculoskeletal:        + leg swelling   All other systems reviewed and are negative  Physical Exam  Physical Exam  Vitals signs and nursing note reviewed  Constitutional:       Appearance: He is well-developed  He is not diaphoretic  HENT:      Head: Normocephalic and atraumatic  Right Ear: External ear normal       Left Ear: External ear normal       Nose: No congestion  Eyes:      General:         Right eye: No discharge  Left eye: No discharge  Extraocular Movements: Extraocular movements intact  Neck:      Musculoskeletal: Normal range of motion and neck supple  Cardiovascular:      Rate and Rhythm: Normal rate and regular rhythm  Heart sounds: Normal heart sounds  No murmur  Pulmonary:      Effort: Pulmonary effort is normal  No respiratory distress  Breath sounds: Normal breath sounds  No wheezing  Abdominal:      General: There is no distension  Palpations: Abdomen is soft  Tenderness: There is no abdominal tenderness  Musculoskeletal:         General: No tenderness or signs of injury  Right lower leg: Edema present  Skin:     General: Skin is warm and dry  Findings: No erythema  Neurological:      General: No focal deficit present  Mental Status: He is alert and oriented to person, place, and time  Motor: No weakness     Psychiatric:         Mood and Affect: Mood normal          Behavior: Behavior normal          Vital Signs  ED Triage Vitals   Temperature Pulse Respirations Blood Pressure SpO2   06/07/21 1838 06/07/21 1838 06/07/21 1838 06/07/21 1838 06/07/21 1838   98 5 °F (36 9 °C) 64 18 160/77 100 %      Temp Source Heart Rate Source Patient Position - Orthostatic VS BP Location FiO2 (%)   06/07/21 1838 06/07/21 1838 06/07/21 2115 06/07/21 2115 --   Oral Monitor Lying Right arm       Pain Score       06/08/21 0051       No Pain           Vitals:    06/08/21 0000 06/08/21 0040 06/08/21 0700 06/08/21 1100   BP: 146/72 166/90 137/68 126/78   Pulse: 72 79 69 75   Patient Position - Orthostatic VS: Lying Lying Lying          Visual Acuity  Visual Acuity      Most Recent Value   L Pupil Size (mm)  2   R Pupil Size (mm)  2   L Pupil Shape  Round   R Pupil Shape  Round          ED Medications  Medications   heparin (porcine) 25,000 units in 0 45% NaCl 250 mL infusion (premix) (13 Units/kg/hr × 65 kg (Order-Specific) Intravenous Rate/Dose Change 6/8/21 1401)   heparin (porcine) injection 5,200 Units (has no administration in time range)   heparin (porcine) injection 2,600 Units (has no administration in time range)   atorvastatin (LIPITOR) tablet 40 mg (40 mg Oral Given 6/8/21 0933)   clotrimazole (LOTRIMIN) 1 % cream ( Topical Refused 6/8/21 1000)   insulin glargine (LANTUS) subcutaneous injection 20 Units 0 2 mL (0 Units Subcutaneous Hold 6/8/21 0119)   pantoprazole (PROTONIX) EC tablet 40 mg (40 mg Oral Given 6/8/21 0615)   cyanocobalamin (VITAMIN B-12) tablet 1,000 mcg (1,000 mcg Oral Given 6/8/21 0933)   senna-docusate sodium (SENOKOT S) 8 6-50 mg per tablet 2 tablet (2 tablets Oral Given 6/8/21 0933)   polyethylene glycol (MIRALAX) packet 17 g (17 g Oral Refused 6/8/21 0932)   melatonin tablet 6 mg (0 mg Oral Hold 6/8/21 0121)   insulin lispro (HumaLOG) 100 units/mL subcutaneous injection 1-5 Units (1 Units Subcutaneous Given 6/8/21 1213)   insulin lispro (HumaLOG) 100 units/mL subcutaneous injection 1-5 Units (0 Units Subcutaneous Hold 6/8/21 0231)   acetaminophen (TYLENOL) tablet 650 mg (has no administration in time range)   heparin (porcine) injection 5,200 Units (5,200 Units Intravenous Given 6/7/21 2202)   iohexol (OMNIPAQUE) 350 MG/ML injection (SINGLE-DOSE) 100 mL (100 mL Intravenous Given 6/7/21 8881)   potassium chloride (K-DUR,KLOR-CON) CR tablet 20 mEq ( Oral Canceled Entry 6/8/21 3130)   magnesium sulfate 4 g/100 mL IVPB (premix) 4 g (0 g Intravenous Stopped 6/8/21 1130)       Diagnostic Studies  Results Reviewed     Procedure Component Value Units Date/Time    APTT [[id number]]  (Abnormal) Collected: 06/08/21 0424    Lab Status: Final result Specimen: Blood from Arm, Left Updated: 06/08/21 0602      seconds     Troponin I [[id number]]  (Normal) Collected: 06/08/21 0213    Lab Status: Final result Specimen: Blood from Arm, Left Updated: 06/08/21 0239     Troponin I <0 02 ng/mL     NT-BNP PRO [[id number]]  (Abnormal) Collected: 06/07/21 2112    Lab Status: Final result Specimen: Blood from Arm, Right Updated: 06/07/21 2147     NT-proBNP 508 pg/mL     Troponin I [[id number]]  (Normal) Collected: 06/07/21 2112    Lab Status: Final result Specimen: Blood from Arm, Right Updated: 06/07/21 2141     Troponin I <0 02 ng/mL     Basic metabolic panel [[id number]]  (Abnormal) Collected: 06/07/21 2112    Lab Status: Final result Specimen: Blood from Arm, Right Updated: 06/07/21 2133     Sodium 143 mmol/L      Potassium 4 3 mmol/L      Chloride 106 mmol/L      CO2 23 mmol/L      ANION GAP 14 mmol/L      BUN 31 mg/dL      Creatinine 1 27 mg/dL      Glucose 212 mg/dL      Calcium 8 9 mg/dL      eGFR 50 ml/min/1 73sq m     Narrative:      Abhijeet guidelines for Chronic Kidney Disease (CKD):     Stage 1 with normal or high GFR (GFR > 90 mL/min/1 73 square meters)    Stage 2 Mild CKD (GFR = 60-89 mL/min/1 73 square meters)    Stage 3A Moderate CKD (GFR = 45-59 mL/min/1 73 square meters)    Stage 3B Moderate CKD (GFR = 30-44 mL/min/1 73 square meters)    Stage 4 Severe CKD (GFR = 15-29 mL/min/1 73 square meters)    Stage 5 End Stage CKD (GFR <15 mL/min/1 73 square meters)  Note: GFR calculation is accurate only with a steady state creatinine    Protime-INR [[id number]]  (Normal) Collected: 06/07/21 2112    Lab Status: Final result Specimen: Blood from Arm, Right Updated: 06/07/21 2133     Protime 14 2 seconds      INR 1 09    APTT [[id number]]  (Normal) Collected: 06/07/21 2112    Lab Status: Final result Specimen: Blood from Arm, Right Updated: 06/07/21 2133     PTT 26 seconds     CBC and differential [[id number]] Collected: 06/07/21 2112    Lab Status: Final result Specimen: Blood from Arm, Right Updated: 06/07/21 2123     WBC 7 95 Thousand/uL      RBC 4 00 Million/uL      Hemoglobin 12 6 g/dL      Hematocrit 37 9 %      MCV 95 fL      MCH 31 5 pg      MCHC 33 2 g/dL      RDW 12 8 %      MPV 10 0 fL      Platelets 110 Thousands/uL      nRBC 0 /100 WBCs      Neutrophils Relative 64 %      Immat GRANS % 0 %      Lymphocytes Relative 22 %      Monocytes Relative 11 %      Eosinophils Relative 2 %      Basophils Relative 1 %      Neutrophils Absolute 5 08 Thousands/µL      Immature Grans Absolute 0 03 Thousand/uL      Lymphocytes Absolute 1 77 Thousands/µL      Monocytes Absolute 0 85 Thousand/µL      Eosinophils Absolute 0 18 Thousand/µL      Basophils Absolute 0 04 Thousands/µL                  CTA ED chest PE Study   Final Result by Nancy Morales DO (06/07 2318)      Left mainstem pulmonary artery emboli extending into the left lower lobe pulmonary arteries  Focal atelectasis versus early infarct within the superior portion of the left lower lobe  The calculated ratio of right ventricular to left ventricular diameter (RV/LV ratio) is 1 78      This is greater than 0 9, which is abnormal and indicates right heart strain  An abnormal RV/LV ratio has been shown to be associated with an increased risk of 30 day mortality in the setting of acute pulmonary embolism  Urgent consultation with the    medical critical care team is recommended               I personally discussed this study with Dr Abel Lo on 6/7/2021 at 10:58 PM                Workstation performed: [id number]                    Procedures  Procedures         ED Course MDM    Disposition  Final diagnoses:   Acute pulmonary embolism ([facility] [address] )     Time reflects when diagnosis was documented in both MDM as applicable and the Disposition within this note     Time User Action Codes Description Comment    6/8/2021  1:28 PM Yady Fan Stu [I26 99] Acute pulmonary embolism [facility])       ED Disposition     None      Follow-up Information    None         Current Discharge Medication List      START taking these medications    Details   apixaban (ELIQUIS) 5 mg Take 2 tablets (10 mg total) by mouth 2 (two) times a day for 7 days, THEN 1 tablet (5 mg total) 2 (two) times a day    Qty: 88 tablet, Refills: 0    Associated Diagnoses: Acute pulmonary embolism ([facility])      rivaroxaban (XARELTO) 10 mg tablet Take 1 5 tablets (15 mg total) by mouth daily for 21 days  Qty: 32 tablet, Refills: 0    Associated Diagnoses: [facility] ([facility] )         CONTINUE these medications which have NOT CHANGED    Details   acetaminophen (TYLENOL) 500 mg tablet Take 500 mg by mouth 2 (two) times a week      amLODIPine (NORVASC) 10 mg tablet Take 1 tablet (10 mg total) by mouth daily Home med  Refills: 0    Associated Diagnoses: Essential hypertension      aspirin (ECOTRIN LOW STRENGTH) 81 mg EC tablet Take 81 mg by mouth daily      atorvastatin (LIPITOR) 40 mg tablet Take 40 mg by mouth daily 1/2 tablet      Cinnamon 500 MG capsule Take 500 mg by mouth 2 (two) times a day      clotrimazole 1 % external solution Apply topically 2 (two) times a day      glipiZIDE (GLUCOTROL) 5 mg tablet Take 1 tablet (5 mg total) by mouth daily in the early morning  Qty: 90 tablet, Refills: 1    Associated Diagnoses: Type 2 diabetes mellitus without complication, with long-term current use of insulin ([facility])      insulin glargine (LANTUS) 100 units/mL subcutaneous injection Inject 22 Units under the skin daily at bedtime       losartan (COZAAR) 100 MG tablet TAKE ONE TABLET BY MOUTH DAILY FOR BLOOD PRESSURE/HEART      metFORMIN (GLUCOPHAGE) 500 mg tablet Take 1 tablet (500 mg total) by mouth 2 (two) times a day  Qty: 180 tablet, Refills: 1    Associated Diagnoses: Type 2 diabetes mellitus without complication, with long-term current use of insulin (HCC)      metoprolol succinate (TOPROL-XL) 100 mg 24 hr tablet Take 100 mg by mouth daily      Multiple Vitamin (multivitamin) tablet Take 1 tablet by mouth daily      omeprazole (PriLOSEC) 20 mg delayed release capsule Take 20 mg by mouth daily      vitamin B-12 (VITAMIN B-12) 1,000 mcg tablet Take 1 tablet by mouth every other day           No discharge procedures on file      PDMP Review     None          ED Provider  Electronically Signed by           Todd Alonso MD  06/08/21 4251
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
## 2021-06-08 NOTE — OCCUPATIONAL THERAPY NOTE
Occupational Therapy Evaluation     Patient Name: Karoline Castañeda  Today's Date: 6/8/2021  Problem List  Principal Problem:    Acute pulmonary embolism ([facility] )  Active Problems:    Stage 3 chronic kidney disease (HCC)    Type 2 diabetes mellitus without complication, with long-term current use of insulin (HCC)    Essential hypertension    Coronary artery disease    Gastroesophageal reflux disease    Hyperlipidemia    Acute deep vein thrombosis (DVT) of femoral vein of right lower extremity [facility])    Past Medical History  Past Medical History:   Diagnosis Date    Bilateral carotid artery stenosis 1/4/2021    <50% on doppler 7/27/2018   No change from Aug/2016    Bilateral carotid bruits     BMI 25 0-25 9,adult 4/6/2021    Coronary artery disease involving native coronary artery of native heart without angina pectoris 4/6/2021    CTS (carpal tunnel syndrome)     Deviated septum     Diabetes ([facility] )     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[facility])     Dizziness     Double vision     Ear problems     Fatigue     General weakness     GERD (gastroesophageal reflux disease)     Hearing impaired person, bilateral     HL (hearing loss)     Hypertension     Microscopic hematuria     Myalgia 4/6/2021    Pain in joints 4/6/2021    PVD (peripheral vascular disease) ([facility] ) 4/2/2021    Renal calculi     Right lumbar radiculopathy     S/P AVR (aortic valve replacement) 4/2/2021    SOB (shortness of breath) on exertion     Spinal stenosis in cervical region 4/2/2021    Tachycardia     Urinary frequency     Vertigo 1/4/2021     Past Surgical History  Past Surgical History:   Procedure Laterality Date    AORTIC VALVE REPLACEMENT  09/2016    BYPASS GRAFT      CARPAL TUNNEL RELEASE Right     by Dr Caitlyn Bae EXTRACTION, BILATERAL      COLONOSCOPY  09/16/2015    CORONARY ARTERY BYPASS GRAFT  09/2016    x1     HAND SURGERY Left     URETERECTOMY          06/08/21 1157   OT Last Visit   OT Visit Date 06/08/21  (Tuesday)   Note Type   Note type Evaluation   Restrictions/Precautions   Weight Bearing Precautions Per Order No   Other Precautions Multiple lines  (DVT/PE)   Pain Assessment   Pain Assessment Tool 0-10   Pain Score No Pain   Home Living   Type of [address] One level;Performs ADLs on one level; Able to live on main level with bedroom/bathroom; Access  ([address] )   Bathroom Shower/Tub Walk-in shower   Bathroom Toilet Raised   Bathroom Equipment Shower chair; Toilet raiser;Grab bars around toilet;Grab bars in shower   [address]  (quad cane primarily used for community mobility)   Additional Comments Pt lives in single story house w/ spouse  Pt has a walk in shower and shower chair  Pt reports using shower chair except "to put my foot on it to wash and dry" Pt educated about safety/fall risk and recommended LH sponge or to sit on shower chair to wash feet  Pt reports furniture surfing within house and primarily uses cane for community mobility  Pt has raised toilet with a toilet raiser attached   Prior Function   Level of [place] Needs assistance with IADLs   Lives With Spouse   Receives Help From Family   ADL Assistance Independent   IADLs Needs assistance   Vocational Retired   Comments Pt reports wife does laundry and meal prep  Pt reports wife makes food list every thursday and daughter shops  Pt reports no A for grooming, showering, or dressing  Pt manages med management    Lifestyle   Autonomy Pt reports being I w/ ADLs  Pt drives and mows lawn at baseline prior to November   Reciprocal Relationships Pt lives with wife  Has a daughter that visits regularly and visits frequently and son who currently lives in [place]  Service to Others Pt is a retired    Intrinsic Gratification Pt enjoys taking daily walks, watching tv, seeing grandchildren   Pt reports taking walks around neighborhood or at the local mall   Psychosocial Psychosocial (WDL) WDL   Subjective   Subjective "since November I have been having trouble getting up from chairs"  (Pt educated on techniques to use to STS)   ADL   Where Assessed Standing at sink  (vs  chair)   Eating Assistance 6  Modified independent   Eating Deficit Setup  (through simulated ADL)   Grooming Assistance 4  Minimal Assistance  (CGA)   Grooming Deficit Setup;Steadying;Supervision/safety; Increased time to complete  (standing at sink)   UB Dressing Assistance 5  Supervision/Setup   UB Dressing Deficit Setup;Verbal cueing;Supervision/safety   LB Dressing Assistance 5  Supervision/Setup   LB Dressing Deficit Setup;Steadying;Supervision/safety; Increased time to complete   Toileting Assistance  5  Supervision/Setup   Toileting Deficit Setup;Steadying;Verbal cueing;Supervison/safety; Increased time to complete  (through simulated ADL)   Additional Comments Pt demonstrated Mod I w/ eating seated in chair  Pt reguired min A (CGA) for grooming task standing at sink  Pt required S for UBD/LBD and toileting   Pt required increased A for grooming standing at sink d/t unsteadiness    Bed Mobility   Rolling R   (NT  OOB in chair upon arrival)   Rolling L   (NT  OOB in chair upon arrival)   Supine to Sit   (NT  OOB in chair upon arrival)   Sit to Supine   (NT  OOB in chair upon arrival)   Transfers   Sit to Stand 5  Supervision   Additional items Increased time required;Verbal cues   Stand to Sit 5  Supervision   Additional items Increased time required;Verbal cues   Additional Comments Pt required S for sit <> stand   Functional Mobility   Functional Mobility 5  Supervision   Additional Comments Pt required S for functional mobility of household distances throughout hospital room   Additional items   (quad cane)   Balance   Static Sitting Good   Static Standing Fair   Dynamic Standing Fair -   Ambulatory Fair   Activity Tolerance   Activity Tolerance Patient tolerated treatment well   Nurse Made Aware Spoke to RN, Trevor Kemp  patient appropriate to see   RUE Assessment   RUE Assessment WFL   LUE Assessment   LUE Assessment WFL   Hand Function   Gross Motor Coordination Functional   Fine Motor Coordination Impaired   Sensation   Light Touch   (responded to light touch of BUE)   Additional Comments Pt reports having carpal tunnel surgery, although reports no sign of improvement  States he has numbness and tingling in 4th and 5th digit   Vision-Basic Assessment   Current Vision Wears glasses all the time  (bifocals)   Cognition   Overall Cognitive Status WFL   Arousal/Participation Alert;Arousable; Cooperative   Attention Within functional limits   Orientation Level Oriented X4   Memory Within functional limits   Following Commands Follows one step commands without difficulty   Comments Pt identified w/ full name and   Assessment   Limitation Decreased ADL status; Decreased UE strength;Decreased endurance   Assessment Pt is a 81 yo male, admitted to ED on 2021 for R leg swelling and diagnosed w/ DVT of femoral vein of RLE and acute pulmonary embolism  OT and up w/ A orders are documented  The pt's significant PMH is impacting occupational performance includes hyperlipidemia, CAD, HTV, DM2, Stage 3 chronic kidney disease, LUE carpal tunnel procedure, bilateral carotid artery stenosis, spinal stenosis in cervical region, tachycardia  PTA pt was I w/ ADLs and received A w/ IADLs  Pt's wife completed laundry and meal prep  Pt managed his medication  Personal factors limiting the patient currently includes generalized weakness, decreased endurance, advanced age, decreased standing balance, increased fatigue, hard of hearing, significant PMH  Currently pt demonstrates eating with Mod I and required min A (CGA) for grooming  Pt requires S for UBD/LBD, toileting, sit <> stand, functional mobility  Pt required increased VCs for hand placement and safety   Pt is currently functioning below baseline level of I and would benefit from occupational therapy services while admitted in acute care  When pt is medically stable, recommendation for discharge is home w/ outpatient services vs  home health per orthopedic recommendations  OT will follow while in acute care  The patient's raw score on the AM-PAC Daily Activity inpatient short form is 21, standardized score is 44 27, greater than 39 4  Patients at this level are likely to benefit from discharge to home  Please refer to the recommendation of the Occupational Therapist for safe discharge planning  Goals   Patient Goals "return home to do what I used to do"   Plan   Treatment Interventions ADL retraining;Functional transfer training;UE strengthening/ROM; Endurance training;Patient/family training;Energy conservation; Activityengagement;Continued evaluation   Goal Expiration Date 06/15/21   OT Frequency 1-2x/wk   Recommendation   OT Discharge Recommendation Home with outpatient rehabilitation  (vs  home health, per orthopedic doctor recommendation)   AM-PAC Daily Activity Inpatient   Lower Body Dressing 3   Bathing 3   Toileting 3   Upper Body Dressing 4   Grooming 4   Eating 4   Daily Activity Raw Score 21   Daily Activity Standardized Score (Calc for Raw Score >=11) 44 27   AM-PAC Applied Cognition Inpatient   Following a Speech/Presentation 4   Understanding Ordinary Conversation 4   Taking Medications 4   Remembering Where Things Are Placed or Put Away 3   Remembering List of 4-5 Errands 3   Taking Care of Complicated Tasks 3   Applied Cognition Raw Score 21   Applied Cognition Standardized Score 44 3   Barthel Index   Feeding 10   Bathing 0   Grooming Score 5   Dressing Score 5   Bladder Score 10   Bowels Score 10   Toilet Use Score 10   Transfers (Bed/Chair) Score 10   Mobility (Level Surface) Score 0   Stairs Score 0   Barthel Index Score 60      Goals:    -Pt will demonstrate UBD/LBD w/ mod I using LHAE as needed to max I w/ ADLs and reduce burden of care      -Pt will demonstrate functional transfers between several surfaces w/ I to return home and reduce burden of care    -Pt will demonstrate functional mobility of household distances around hospital room w/ Mod I to increase I w/ ADLs and return to walking routine     -Pt will actively participate in continued evaluation of bed mobility to return home w/ max I     -Pt will demonstrate grooming standing at sink w/ Mod I and fair + standing balance to max I w/ ADLs and reduce fall risk    -Pt will increase standing endurance to 3-5 minutes w/ no appearance of fatigue or discomfort while engaged in ADL task  -Pt will increase sit <> stand transfer to mod I to max I w/ ADLs and return home      Arnaldo MONTERO

## 2021-06-08 NOTE — PLAN OF CARE
Problem: OCCUPATIONAL THERAPY ADULT  Goal: Performs self-care activities at highest level of function for planned discharge setting  See evaluation for individualized goals  Description: Treatment Interventions: ADL retraining, Functional transfer training, UE strengthening/ROM, Endurance training, Patient/family training, Energy conservation, Activityengagement, Continued evaluation          See flowsheet documentation for full assessment, interventions and recommendations  Note: Limitation: Decreased ADL status, Decreased UE strength, Decreased endurance     Assessment: Pt is a 81 yo male, admitted to ED on 6/8/2021 for R leg swelling and diagnosed w/ DVT of femoral vein of RLE and acute pulmonary embolism  OT and up w/ A orders are documented  The pt's significant PMH is impacting occupational performance includes hyperlipidemia, CAD, HTV, DM2, Stage 3 chronic kidney disease, LUE carpal tunnel procedure, bilateral carotid artery stenosis, spinal stenosis in cervical region, tachycardia  PTA pt was I w/ ADLs and received A w/ IADLs  Pt's wife completed laundry and meal prep  Pt managed his medication  Personal factors limiting the patient currently includes generalized weakness, decreased endurance, advanced age, decreased standing balance, increased fatigue, hard of hearing, significant PMH  Currently pt demonstrates eating with Mod I and required min A (CGA) for grooming  Pt requires S for UBD/LBD, toileting, sit <> stand, functional mobility  Pt required increased VCs for hand placement and safety  Pt is currently functioning below baseline level of I and would benefit from occupational therapy services while admitted in acute care  When pt is medically stable, recommendation for discharge is home w/ outpatient services vs  home health per orthopedic recommendations  OT will follow while in acute care   The patient's raw score on the AM-PAC Daily Activity inpatient short form is 21, standardized score is 44 27, greater than 39 4  Patients at this level are likely to benefit from discharge to home  Please refer to the recommendation of the Occupational Therapist for safe discharge planning       OT Discharge Recommendation: Home with outpatient rehabilitation(vs  home health, per orthopedic doctor recommendation)

## 2021-06-08 NOTE — ASSESSMENT & PLAN NOTE
Lab Results   Component Value Date    EGFR 50 06/07/2021    EGFR 45 11/24/2020    EGFR 41 11/23/2020    CREATININE 1 27 06/07/2021    CREATININE 1 39 (H) 11/24/2020    CREATININE 1 51 (H) 11/23/2020     · Renal function appears better than prior baseline  · Close intake and output, daily weights, trend serum creatinine

## 2021-06-08 NOTE — PROGRESS NOTES
25-year-old male with hypertension, diabetes presenting from primary care office for evaluation of right calf pain and dyspnea  Of note patient had left upper extremity orthopedic surgery approximately 2 weeks ago  In the emergency department patient noted to have right lower extremity DVT and submassive pulmonary embolism (imaging revealing involvement of left main pulmonary artery extending to left lower lobe pulmonary artery and some involvement in right pulmonary artery)  BNP mildly elevated to 503, troponin negative, RV/LV ratio on CTA 1 76  Patient currently hemodynamically stable and on room air  Although having persistent dyspnea  Will continue heparin GTT and obtain Echo  Will monitor patient under step-down 1 due to extensive nature of image findings and persistent dyspnea  Discussed with attending Dr Varun Merida and Critical Care AP Gregg Leos

## 2021-06-09 PROBLEM — E44.0 MODERATE PROTEIN-CALORIE MALNUTRITION (HCC): Status: ACTIVE | Noted: 2021-06-09

## 2021-06-09 PROBLEM — K92.2 GI BLEEDING: Status: ACTIVE | Noted: 2021-06-09

## 2021-06-09 LAB
ANION GAP SERPL CALCULATED.3IONS-SCNC: 9 MMOL/L (ref 4–13)
APTT PPP: 65 SECONDS (ref 23–37)
APTT PPP: 71 SECONDS (ref 23–37)
BUN SERPL-MCNC: 32 MG/DL (ref 5–25)
CALCIUM SERPL-MCNC: 8.4 MG/DL (ref 8.3–10.1)
CHLORIDE SERPL-SCNC: 106 MMOL/L (ref 100–108)
CO2 SERPL-SCNC: 23 MMOL/L (ref 21–32)
CREAT SERPL-MCNC: 1.24 MG/DL (ref 0.6–1.3)
ERYTHROCYTE [DISTWIDTH] IN BLOOD BY AUTOMATED COUNT: 12.7 % (ref 11.6–15.1)
GFR SERPL CREATININE-BSD FRML MDRD: 52 ML/MIN/1.73SQ M
GLUCOSE SERPL-MCNC: 144 MG/DL (ref 65–140)
GLUCOSE SERPL-MCNC: 166 MG/DL (ref 65–140)
GLUCOSE SERPL-MCNC: 168 MG/DL (ref 65–140)
GLUCOSE SERPL-MCNC: 288 MG/DL (ref 65–140)
GLUCOSE SERPL-MCNC: 310 MG/DL (ref 65–140)
HCT VFR BLD AUTO: 34.8 % (ref 36.5–49.3)
HGB BLD-MCNC: 11.4 G/DL (ref 12–17)
INR PPP: 1.05 (ref 0.84–1.19)
MAGNESIUM SERPL-MCNC: 2.3 MG/DL (ref 1.6–2.6)
MCH RBC QN AUTO: 30.5 PG (ref 26.8–34.3)
MCHC RBC AUTO-ENTMCNC: 32.8 G/DL (ref 31.4–37.4)
MCV RBC AUTO: 93 FL (ref 82–98)
PLATELET # BLD AUTO: 232 THOUSANDS/UL (ref 149–390)
PMV BLD AUTO: 9.8 FL (ref 8.9–12.7)
POTASSIUM SERPL-SCNC: 4.4 MMOL/L (ref 3.5–5.3)
PROTHROMBIN TIME: 13.8 SECONDS (ref 11.6–14.5)
RBC # BLD AUTO: 3.74 MILLION/UL (ref 3.88–5.62)
SODIUM SERPL-SCNC: 138 MMOL/L (ref 136–145)
WBC # BLD AUTO: 7.61 THOUSAND/UL (ref 4.31–10.16)

## 2021-06-09 PROCEDURE — 97110 THERAPEUTIC EXERCISES: CPT

## 2021-06-09 PROCEDURE — 85610 PROTHROMBIN TIME: CPT | Performed by: NURSE PRACTITIONER

## 2021-06-09 PROCEDURE — 97530 THERAPEUTIC ACTIVITIES: CPT

## 2021-06-09 PROCEDURE — 85027 COMPLETE CBC AUTOMATED: CPT | Performed by: NURSE PRACTITIONER

## 2021-06-09 PROCEDURE — 82948 REAGENT STRIP/BLOOD GLUCOSE: CPT

## 2021-06-09 PROCEDURE — 85730 THROMBOPLASTIN TIME PARTIAL: CPT | Performed by: NURSE PRACTITIONER

## 2021-06-09 PROCEDURE — 83735 ASSAY OF MAGNESIUM: CPT | Performed by: NURSE PRACTITIONER

## 2021-06-09 PROCEDURE — 97116 GAIT TRAINING THERAPY: CPT

## 2021-06-09 PROCEDURE — 85730 THROMBOPLASTIN TIME PARTIAL: CPT | Performed by: PHYSICIAN ASSISTANT

## 2021-06-09 PROCEDURE — 80048 BASIC METABOLIC PNL TOTAL CA: CPT | Performed by: NURSE PRACTITIONER

## 2021-06-09 PROCEDURE — 99232 SBSQ HOSP IP/OBS MODERATE 35: CPT | Performed by: PHYSICIAN ASSISTANT

## 2021-06-09 RX ADMIN — CLOTRIMAZOLE: 1 CREAM TOPICAL at 19:01

## 2021-06-09 RX ADMIN — INSULIN LISPRO 3 UNITS: 100 INJECTION, SOLUTION INTRAVENOUS; SUBCUTANEOUS at 19:01

## 2021-06-09 RX ADMIN — WARFARIN SODIUM 5 MG: 5 TABLET ORAL at 18:22

## 2021-06-09 RX ADMIN — PANTOPRAZOLE SODIUM 40 MG: 40 TABLET, DELAYED RELEASE ORAL at 05:26

## 2021-06-09 RX ADMIN — ATORVASTATIN CALCIUM 40 MG: 40 TABLET, FILM COATED ORAL at 08:31

## 2021-06-09 RX ADMIN — Medication 6 MG: at 23:23

## 2021-06-09 RX ADMIN — HEPARIN SODIUM 13 UNITS/KG/HR: 10000 INJECTION, SOLUTION INTRAVENOUS at 00:02

## 2021-06-09 RX ADMIN — INSULIN LISPRO 1 UNITS: 100 INJECTION, SOLUTION INTRAVENOUS; SUBCUTANEOUS at 12:49

## 2021-06-09 RX ADMIN — INSULIN GLARGINE 20 UNITS: 100 INJECTION, SOLUTION SUBCUTANEOUS at 23:23

## 2021-06-09 RX ADMIN — CLOTRIMAZOLE 1 APPLICATION: 1 CREAM TOPICAL at 08:31

## 2021-06-09 NOTE — PROGRESS NOTES
[facility]  Progress Note - [facility] 1933, 80 y o  male MRN: [id number]  Unit/Bed#: S -01 Encounter: [id number]  Primary Care Provider: Jana Hayes MD   Date and time admitted to hospital: 6/7/2021  8:42 PM    * Acute pulmonary embolism [facility])  Assessment & Plan  Patient initially presented due to right lower extremity/calf pain found to have DVT and CTA chest revealed left mainstem pulmonary artery emboli extending into the left lower lobe pulmonary arteries with focal atelectasis versus early infarct within the superior portion of the left lower lobe  Right heart strain noted on CTA  Patient initially admitted to ICU, now stable to downgrade to med surg  Patient on room air, no chest pain, clinically asymptomatic  Echocardiogram shows normal right ventricular function  Appreciate input from Critical Care/pulmonology  Unable to afford Eliquis/DOAC, thus will continue on heparin drip bridge to coumadin   Goal INR 2-3   OOB as able, encourage ambulation     GI bleeding  Assessment & Plan  Patient reports episode of blood when wiping after a bowel movement today  Will continue to monitor as he will require anticoagulation  Repeat H&H in the morning    Moderate protein-calorie malnutrition (HCC)  Assessment & Plan  Malnutrition Findings:   Adult Malnutrition type: Acute illness  Adult Degree of Malnutrition: Malnutrition of moderate degree(related to inadequate intake to maintain weight)  BMI Findings: Body mass index is 23 03 kg/m²  Acute deep vein thrombosis (DVT) of femoral vein of right lower extremity [facility])  Assessment & Plan  Patient presented initially with right lower extremity/calf pain  Right lower extremity venous duplex shows acute versus subacute DVT in the external iliac, common femoral, superficial femoral, popliteal and calf veins  Occlusive thrombus noted in the proximal distal superficial femoral vein    Started on heparin drip, continue bridge to Coumadin with goal INR 2-3    Hyperlipidemia  Assessment & Plan  Continue Lipitor    Gastroesophageal reflux disease  Assessment & Plan  Continue Protonix    Carpal tunnel syndrome  Assessment & Plan  Recent surgery on LUE  Sutures removed by nursing today after discussing with his PCP     Coronary artery disease  Assessment & Plan  Hx of CABG at [address] [facility] asa, beta blocker, statin   Reports being on warfarin short term after procedure     Essential hypertension  Assessment & Plan  Continue amlodipine, Cozaar, Toprol-XL    Type 2 diabetes mellitus without complication, with long-term current use of insulin [facility])  Assessment & Plan  Lab Results   Component Value Date    HGBA1C 6 5 (H) 07/24/2020     Recent Labs     06/08/21  1615 06/08/21  2048 06/09/21  0726 06/09/21  1041   POCGLU 199* 213* 144* 168*     Blood Sugar Average: Last 72 hrs:  (P) 171 [id number]   Continue Lantus at bedtime, hold oral diabetic medications    Stage 3 chronic kidney disease [facility])  Assessment & Plan  Lab Results   Component Value Date    EGFR 52 06/09/2021    EGFR 65 06/08/2021    EGFR 50 06/07/2021    CREATININE 1 24 06/09/2021    CREATININE 1 03 06/08/2021    CREATININE 1 27 06/07/2021     Creatinine is at baseline    VTE Pharmacologic Prophylaxis:   Pharmacologic: Heparin Drip and warfarin   Mechanical VTE Prophylaxis in Place: Yes    Patient Centered Rounds: I have performed bedside rounds with nursing staff today  Discussions with Specialists or Other Care Team Provider: nursing, called sonia Merino per pt request     Education and Discussions with Family / Patient: patient     Time Spent for Care: 30 minutes  More than 50% of total time spent on counseling and coordination of care as described above      Current Length of Stay: 1 day(s)    Current Patient Status: Inpatient   Certification Statement: The patient will continue to require additional inpatient hospital stay due to pending therapeutic INR Discharge Plan: pending clinical course     Code Status: Level 1 - Full Code    Subjective:   Pt seen and examined at bedside  No new complaints  No cp or sob  Objective:     Vitals:   Temp (24hrs), Av 2 °F (36 8 °C), Min:98 °F (36 7 °C), Max:98 3 °F (36 8 °C)    Temp:  [98 °F (36 7 °C)-98 3 °F (36 8 °C)] 98 3 °F (36 8 °C)  HR:  [73-92] 88  Resp:  [16-20] 17  BP: (129-165)/(66-88) 140/86  SpO2:  [96 %-98 %] 96 %  Body mass index is 23 03 kg/m²  Input and Output Summary (last 24 hours): Intake/Output Summary (Last 24 hours) at 2021 1444  Last data filed at 2021 1435  Gross per 24 hour   Intake 721 77 ml   Output 870 ml   Net -148 23 ml     Physical Exam:     Physical Exam  Constitutional:       Appearance: Normal appearance  HENT:      Head: Normocephalic and atraumatic  Mouth/Throat:      Mouth: Mucous membranes are moist    Eyes:      Extraocular Movements: Extraocular movements intact  Neck:      Musculoskeletal: Normal range of motion and neck supple  Cardiovascular:      Rate and Rhythm: Normal rate and regular rhythm  Pulmonary:      Effort: Pulmonary effort is normal       Breath sounds: Normal breath sounds  Abdominal:      General: Abdomen is flat  Palpations: Abdomen is soft  Musculoskeletal: Normal range of motion  General: Swelling (mild right ankle swelling ) present  Skin:     General: Skin is warm and dry  Neurological:      General: No focal deficit present  Mental Status: He is alert and oriented to person, place, and time     Psychiatric:         Mood and Affect: Mood normal          Behavior: Behavior normal        Additional Data:    Labs:    Results from last 7 days   Lab Units 21  0526 21  0424   WBC Thousand/uL 7 61 8 62   HEMOGLOBIN g/dL 11 4* 11 4*   HEMATOCRIT % 34 8* 35 2*   PLATELETS Thousands/uL 232 217   NEUTROS PCT %  --  58   LYMPHS PCT %  --  28   MONOS PCT %  --  9   EOS PCT %  --  3     Results from last 7 days   Lab Units 06/09/21  0526   SODIUM mmol/L 138   POTASSIUM mmol/L 4 4   CHLORIDE mmol/L 106   CO2 mmol/L 23   BUN mg/dL 32*   CREATININE mg/dL 1 24   ANION GAP mmol/L 9   CALCIUM mg/dL 8 4   GLUCOSE RANDOM mg/dL 166*     Results from last 7 days   Lab Units 06/09/21  0526   INR  1 05     Results from last 7 days   Lab Units 06/09/21  1041 06/09/21  0726 06/08/21  2048 06/08/21  1615 06/08/21  1136 06/08/21  0716 06/08/21  0128   POC GLUCOSE mg/dl 168* 144* 213* 199* 179* 134 161*             * I Have Reviewed All Lab Data Listed Above  * Additional Pertinent Lab Tests Reviewed:  Anuradha 66 Admission Reviewed    Imaging:    Imaging Reports Reviewed Today Include: all  Imaging Personally Reviewed by Myself Includes:  none    Recent Cultures (last 7 days):           Last 24 Hours Medication List:   Current Facility-Administered Medications   Medication Dose Route Frequency Provider Last Rate    [MAR Hold] acetaminophen  650 mg Oral Q6H PRN Antony Fraser SYLVIA      [facility] Hold] atorvastatin  40 mg Oral Daily Ernestina Carcamo SYLVIA      [facility]] clotrimazole   Topical BID Ernestina Carcamo SYLVIA      [facility]] vitamin B-12  1,000 mcg Oral Every Other Day SERAFIN Fong      heparin (porcine)  3-30 Units/kg/hr (Order-Specific) Intravenous Titrated SERAFIN Fong 13 Units/kg/hr (06/09/21 0002)    [MAR Hold] heparin (porcine)  2,600 Units Intravenous Q1H PRN Ernestina SERAFIN Carcamo      [facility]] heparin (porcine)  5,200 Units Intravenous Q1H PRN Ernestina Carlos Alberto SYLVIA      [facility]] insulin glargine  20 Units Subcutaneous HS Ernestina Carcamo SYLVIA      [facility] Hold] insulin lispro  1-5 Units Subcutaneous TID JULIAN Bermudez SYLVIA      [facility] Hold] insulin lispro  1-5 Units Subcutaneous HS Ernestina Carcamo SYLVIA      [facility] Hold] melatonin  6 mg Oral HS Ernestina Carlos Alberto, CRNP      [facility] Hold] pantoprazole  40 mg Oral Early Morning SERAFIN Fong      [facility] Hold] polyethylene glycol  17 g Oral Daily SERAFIN Hernandez      [facility] Hold] senna-docusate sodium  2 tablet Oral BID SERAFIN Hernandez      [facility] Hold] warfarin  5 mg Oral Daily (warfarin) SERAFIN Child        Today, Patient Was Seen By: Marycruz Martínez PA-C    ** Please Note: Dictation voice to text software may have been used in the creation of this document   **

## 2021-06-09 NOTE — ASSESSMENT & PLAN NOTE
Lab Results   Component Value Date    EGFR 52 06/09/2021    EGFR 65 06/08/2021    EGFR 50 06/07/2021    CREATININE 1 24 06/09/2021    CREATININE 1 03 06/08/2021    CREATININE 1 27 06/07/2021     Creatinine is at baseline

## 2021-06-09 NOTE — ASSESSMENT & PLAN NOTE
Malnutrition Findings:   Adult Malnutrition type: Acute illness  Adult Degree of Malnutrition: Malnutrition of moderate degree(related to inadequate intake to maintain weight)  BMI Findings: Body mass index is 23 03 kg/m²

## 2021-06-09 NOTE — MALNUTRITION/BMI
This medical record reflects one or more clinical indicators suggestive of malnutrition and/or morbid obesity  Malnutrition Findings:   Adult Malnutrition type: Acute illness  Adult Degree of Malnutrition: Malnutrition of moderate degree(related to inadequate intake to maintain weight)  Malnutrition Characteristics: Inadequate energy, Weight loss(as evidenced by 6 4% wt loss x 2 months (4/6/21 157 lb, current wt 147 lb), intake inadequate to meet estimated needs per pt reported diet history  Treatment - diet, nutrition supplement )    See Nutrition note dated 6/9/21 for additional details  Completed nutrition assessment is viewable in the nutrition documentation

## 2021-06-09 NOTE — ASSESSMENT & PLAN NOTE
Hx of CABG at [facility] asa, beta blocker, statin   Reports being on warfarin short term after procedure

## 2021-06-09 NOTE — ASSESSMENT & PLAN NOTE
Patient reports episode of blood when wiping after a bowel movement today  Will continue to monitor as he will require anticoagulation  Repeat H&H in the morning

## 2021-06-09 NOTE — PHYSICAL THERAPY NOTE
PHYSICAL THERAPY TREATMENT    Patient Name: Valdo Johnson  XEDBR'F Date: 21 1134   PT Last Visit   PT Visit Date 21   Note Type   Note Type Treatment   Pain Assessment   Pain Assessment Tool Pain Assessment not indicated - pt denies pain   Pain Score No Pain   Restrictions/Precautions   Weight Bearing Precautions Per Order No   Other Precautions Fall Risk  (IV)   General   Chart Reviewed Yes   Response to Previous Treatment Patient with no complaints from previous session  Family/Caregiver Present No   Cognition   Overall Cognitive Status WFL   Arousal/Participation Alert;Arousable; Cooperative   Attention Within functional limits   Comments Pt identified by name and   Subjective   Subjective Agrees to PT treatment and is pleasant and cooperative throughout session  Bed Mobility   Supine to Sit 6  Modified independent   Additional items HOB elevated; Bedrails; Increased time required   Sit to Supine Unable to assess  (left OOB in chair post session with all needs in reach)   Transfers   Sit to Stand 5  Supervision   Additional items Increased time required;Verbal cues   Stand to Sit 5  Supervision   Additional items Increased time required;Verbal cues   Additional Comments poor eccentric control upon descent into chair; requires verbal cues for hand placement   Ambulation/Elevation   Gait pattern Improper Weight shift;Decreased foot clearance; Short stride  (2-3 small self-corrected LOB)   Gait Assistance 5  Supervision   Additional items Verbal cues   Assistive Device SPC   Distance 20` x2 and 150` x1   Balance   Static Sitting Good   Static Standing Fair +   Dynamic Standing Fair   Ambulatory Fair   Endurance Deficit   Endurance Deficit Yes   Endurance Deficit Description mild gait degradation   Activity Tolerance   Activity Tolerance Patient tolerated treatment well   Nurse Made Aware Per RN, pt appropriate to treat   Exercises   Knee AROM Long Arc Quad Sitting;10 reps;AROM; Bilateral  (x2)   Ankle Pumps Sitting;10 reps;AROM; Bilateral  (x2)   Heel Cord Stretch Sitting;10 reps;AROM; Bilateral  (x2)   Marching Sitting;10 reps;AROM; Bilateral  (x2)   Assessment   Prognosis Fair   Problem List Decreased strength;Decreased endurance; Impaired balance;Decreased mobility; Decreased safety awareness   Assessment Pt agrees to PT treatment and is pleasant and cooperative throughout session  Progress noted this session as pt is able to ambulate increased distance  Continues to have 2-3 small LOB (self-corrected) in vogel  Education provided on fall precautions and possibility of a RW trial   Pt is currently declining a RW trial for today, however may be open to the possibility in 1 or 2 days  Pt is able to perform toilet transfer with supervision and verbal cues to descend in a controlled manner  Tolerates seated LE exercises well  Will continue to benefit from ongoing skilled PT to maximize his functional mobility and decrease his risk of falls  Goals   Patient Goals to do everything I used to do at home   STG Expiration Date 06/17/21   PT Treatment Day 1   Plan   Treatment/Interventions Functional transfer training;LE strengthening/ROM; Therapeutic exercise; Endurance training;Elevations; Patient/family training;Equipment eval/education; Bed mobility;Gait training   Progress Progressing toward goals   PT Frequency Other (Comment)  (3-5x/week)   Recommendation   PT Discharge Recommendation Home with home health rehabilitation   [address] [place] [address] Mobility Inpatient   Turning in Bed Without Bedrails 4   Lying on Back to Sitting on Edge of Flat Bed 4   Moving Bed to Chair 3   Standing Up From Chair 3   Walk in Room 3   Climb 3-5 Stairs 3   Basic Mobility Inpatient Raw Score 20   Basic Mobility Standardized Score 43 99   The patient's AM-PAC Basic Mobility Inpatient Short Form Raw Score is 20, Standardized Score is 43 99   A standardized score greater than 42 9 suggests the patient may benefit from discharge to home  Please also refer to the recommendation of the Physical Therapist for safe discharge planning      Meggan Vasquez, PT,DPT

## 2021-06-09 NOTE — ASSESSMENT & PLAN NOTE
Patient presented initially with right lower extremity/calf pain  Right lower extremity venous duplex shows acute versus subacute DVT in the external iliac, common femoral, superficial femoral, popliteal and calf veins  Occlusive thrombus noted in the proximal distal superficial femoral vein    Started on heparin drip, continue bridge to Coumadin with goal INR 2-3

## 2021-06-09 NOTE — ASSESSMENT & PLAN NOTE
Patient initially presented due to right lower extremity/calf pain found to have DVT and CTA chest revealed left mainstem pulmonary artery emboli extending into the left lower lobe pulmonary arteries with focal atelectasis versus early infarct within the superior portion of the left lower lobe  Right heart strain noted on CTA      Patient initially admitted to ICU, now stable to downgrade to med surg  Patient on room air, no chest pain, clinically asymptomatic  Echocardiogram shows normal right ventricular function  Appreciate input from Critical Care/pulmonology  Unable to afford Eliquis/DOAC, thus will continue on heparin drip bridge to coumadin   Goal INR 2-3   OOB as able, encourage ambulation

## 2021-06-09 NOTE — PLAN OF CARE
Problem: PHYSICAL THERAPY ADULT  Goal: Performs mobility at highest level of function for planned discharge setting  See evaluation for individualized goals  Description: Treatment/Interventions: Functional transfer training, LE strengthening/ROM, Therapeutic exercise, Endurance training, Patient/family training, Bed mobility, Gait training, Elevations, Equipment eval/education  Equipment Recommended: Cane       See flowsheet documentation for full assessment, interventions and recommendations  Outcome: Progressing  Note: Prognosis: Fair  Problem List: Decreased strength, Decreased endurance, Impaired balance, Decreased mobility, Decreased safety awareness  Assessment: Pt agrees to PT treatment and is pleasant and cooperative throughout session  Progress noted this session as pt is able to ambulate increased distance  Continues to have 2-3 small LOB (self-corrected) in vogel  Education provided on fall precautions and possibility of a RW trial   Pt is currently declining a RW trial for today, however may be open to the possibility in 1 or 2 days  Pt is able to perform toilet transfer with supervision and verbal cues to descend in a controlled manner  Tolerates seated LE exercises well  Will continue to benefit from ongoing skilled PT to maximize his functional mobility and decrease his risk of falls  PT Discharge Recommendation: Home with home health rehabilitation          See flowsheet documentation for full assessment

## 2021-06-09 NOTE — ASSESSMENT & PLAN NOTE
Lab Results   Component Value Date    HGBA1C 6 5 (H) 07/24/2020     Recent Labs     06/08/21  1615 06/08/21  2048 06/09/21  0726 06/09/21  1041   POCGLU 199* 213* 144* 168*     Blood Sugar Average: Last 72 hrs:  (P) 171 [id number]   Continue Lantus at bedtime, hold oral diabetic medications

## 2021-06-10 LAB
ANION GAP SERPL CALCULATED.3IONS-SCNC: 12 MMOL/L (ref 4–13)
APTT PPP: 117 SECONDS (ref 23–37)
APTT PPP: 56 SECONDS (ref 23–37)
APTT PPP: 65 SECONDS (ref 23–37)
BUN SERPL-MCNC: 38 MG/DL (ref 5–25)
CALCIUM SERPL-MCNC: 8.6 MG/DL (ref 8.3–10.1)
CHLORIDE SERPL-SCNC: 107 MMOL/L (ref 100–108)
CO2 SERPL-SCNC: 20 MMOL/L (ref 21–32)
CREAT SERPL-MCNC: 1.25 MG/DL (ref 0.6–1.3)
ERYTHROCYTE [DISTWIDTH] IN BLOOD BY AUTOMATED COUNT: 12.9 % (ref 11.6–15.1)
GFR SERPL CREATININE-BSD FRML MDRD: 51 ML/MIN/1.73SQ M
GLUCOSE SERPL-MCNC: 181 MG/DL (ref 65–140)
GLUCOSE SERPL-MCNC: 234 MG/DL (ref 65–140)
GLUCOSE SERPL-MCNC: 268 MG/DL (ref 65–140)
GLUCOSE SERPL-MCNC: 269 MG/DL (ref 65–140)
GLUCOSE SERPL-MCNC: 282 MG/DL (ref 65–140)
HCT VFR BLD AUTO: 34.7 % (ref 36.5–49.3)
HCT VFR BLD AUTO: 36.4 % (ref 36.5–49.3)
HGB BLD-MCNC: 11.2 G/DL (ref 12–17)
HGB BLD-MCNC: 11.8 G/DL (ref 12–17)
INR PPP: 1.1 (ref 0.84–1.19)
MCH RBC QN AUTO: 30.5 PG (ref 26.8–34.3)
MCHC RBC AUTO-ENTMCNC: 32.3 G/DL (ref 31.4–37.4)
MCV RBC AUTO: 95 FL (ref 82–98)
PLATELET # BLD AUTO: 258 THOUSANDS/UL (ref 149–390)
PMV BLD AUTO: 10.5 FL (ref 8.9–12.7)
POTASSIUM SERPL-SCNC: 4.6 MMOL/L (ref 3.5–5.3)
PROTHROMBIN TIME: 14.3 SECONDS (ref 11.6–14.5)
RBC # BLD AUTO: 3.67 MILLION/UL (ref 3.88–5.62)
SODIUM SERPL-SCNC: 139 MMOL/L (ref 136–145)
WBC # BLD AUTO: 7.12 THOUSAND/UL (ref 4.31–10.16)

## 2021-06-10 PROCEDURE — 85018 HEMOGLOBIN: CPT | Performed by: PHYSICIAN ASSISTANT

## 2021-06-10 PROCEDURE — 85027 COMPLETE CBC AUTOMATED: CPT | Performed by: HOSPITALIST

## 2021-06-10 PROCEDURE — 99232 SBSQ HOSP IP/OBS MODERATE 35: CPT | Performed by: PHYSICIAN ASSISTANT

## 2021-06-10 PROCEDURE — 85610 PROTHROMBIN TIME: CPT | Performed by: HOSPITALIST

## 2021-06-10 PROCEDURE — 80048 BASIC METABOLIC PNL TOTAL CA: CPT | Performed by: HOSPITALIST

## 2021-06-10 PROCEDURE — 85730 THROMBOPLASTIN TIME PARTIAL: CPT | Performed by: INTERNAL MEDICINE

## 2021-06-10 PROCEDURE — 85014 HEMATOCRIT: CPT | Performed by: PHYSICIAN ASSISTANT

## 2021-06-10 PROCEDURE — 82948 REAGENT STRIP/BLOOD GLUCOSE: CPT

## 2021-06-10 PROCEDURE — 85730 THROMBOPLASTIN TIME PARTIAL: CPT | Performed by: HOSPITALIST

## 2021-06-10 RX ORDER — WARFARIN SODIUM 7.5 MG/1
7.5 TABLET ORAL
Status: DISCONTINUED | OUTPATIENT
Start: 2021-06-10 | End: 2021-06-11

## 2021-06-10 RX ADMIN — PANTOPRAZOLE SODIUM 40 MG: 40 TABLET, DELAYED RELEASE ORAL at 05:35

## 2021-06-10 RX ADMIN — HEPARIN SODIUM 2600 UNITS: 1000 INJECTION INTRAVENOUS; SUBCUTANEOUS at 07:22

## 2021-06-10 RX ADMIN — Medication 6 MG: at 22:43

## 2021-06-10 RX ADMIN — INSULIN LISPRO 2 UNITS: 100 INJECTION, SOLUTION INTRAVENOUS; SUBCUTANEOUS at 11:54

## 2021-06-10 RX ADMIN — INSULIN LISPRO 2 UNITS: 100 INJECTION, SOLUTION INTRAVENOUS; SUBCUTANEOUS at 17:11

## 2021-06-10 RX ADMIN — INSULIN LISPRO 3 UNITS: 100 INJECTION, SOLUTION INTRAVENOUS; SUBCUTANEOUS at 23:56

## 2021-06-10 RX ADMIN — INSULIN LISPRO 1 UNITS: 100 INJECTION, SOLUTION INTRAVENOUS; SUBCUTANEOUS at 08:42

## 2021-06-10 RX ADMIN — ATORVASTATIN CALCIUM 40 MG: 40 TABLET, FILM COATED ORAL at 08:43

## 2021-06-10 RX ADMIN — CYANOCOBALAMIN TAB 500 MCG 1000 MCG: 500 TAB at 08:42

## 2021-06-10 RX ADMIN — POLYETHYLENE GLYCOL 3350 17 G: 17 POWDER, FOR SOLUTION ORAL at 08:43

## 2021-06-10 RX ADMIN — INSULIN GLARGINE 20 UNITS: 100 INJECTION, SOLUTION SUBCUTANEOUS at 22:40

## 2021-06-10 RX ADMIN — DOCUSATE SODIUM AND SENNOSIDES 2 TABLET: 8.6; 5 TABLET, FILM COATED ORAL at 08:42

## 2021-06-10 RX ADMIN — HEPARIN SODIUM 15 UNITS/KG/HR: 10000 INJECTION, SOLUTION INTRAVENOUS at 07:17

## 2021-06-10 RX ADMIN — WARFARIN SODIUM 7.5 MG: 7.5 TABLET ORAL at 17:10

## 2021-06-10 NOTE — PROGRESS NOTES
[facility]  Progress Note - Nikki Gaitan 1933, 80 y o  male MRN: [id number]  Unit/Bed#: S -01 Encounter: [id number]  Primary Care Provider: Rachel Madera MD   Date and time admitted to hospital: 6/7/2021  8:42 PM    * Acute pulmonary embolism [facility])  Assessment & Plan  Patient initially presented due to right lower extremity/calf pain found to have DVT and CTA chest revealed left mainstem pulmonary artery emboli extending into the left lower lobe pulmonary arteries with focal atelectasis versus early infarct within the superior portion of the left lower lobe  Right heart strain noted on CTA  Patient initially admitted to ICU, now stable to downgrade to med surg  Patient on room air, no chest pain, clinically asymptomatic  Echocardiogram shows normal right ventricular function  Appreciate input from Critical Care/pulmonology  Unable to afford Eliquis/DOAC, thus will continue on heparin drip bridge to coumadin   Increase Coumadin to 7 5 mg for tonight    Goal INR 2-3   OOB as able, encourage ambulation     Type 2 diabetes mellitus without complication, with long-term current use of insulin [facility])  Assessment & Plan  Lab Results   Component Value Date    HGBA1C 6 5 (H) 07/24/2020     Recent Labs     06/09/21  1041 06/09/21  1823 06/09/21  2107 06/10/21  0743   POCGLU 168* 288* 310* 181*     Blood Sugar Average: Last 72 hrs:  (P) 197 7   Blood sugars labile  Continue Lantus at bedtime, hold oral diabetic medications  Continue sliding scale insulin and Accu-Cheks  Diabetic diet    Stage 3 chronic kidney disease [facility])  Assessment & Plan  Lab Results   Component Value Date    EGFR 51 06/10/2021    EGFR 52 06/09/2021    EGFR 65 06/08/2021    CREATININE 1 25 06/10/2021    CREATININE 1 24 06/09/2021    CREATININE 1 03 06/08/2021     Creatinine is at baseline    GI bleeding  Assessment & Plan  Patient reports episode of blood when wiping after a bowel movement 6/9  Will continue to monitor as he will require anticoagulation  Hemoglobin is stable  No further episodes of bleeding    Moderate protein-calorie malnutrition (HCC)  Assessment & Plan  Malnutrition Findings:   Adult Malnutrition type: Acute illness  Adult Degree of Malnutrition: Malnutrition of moderate degree(related to inadequate intake to maintain weight)  BMI Findings: Body mass index is 23 1 kg/m²  Acute deep vein thrombosis (DVT) of femoral vein of right lower extremity [facility])  Assessment & Plan  Patient presented initially with right lower extremity/calf pain  Right lower extremity venous duplex shows acute versus subacute DVT in the external iliac, common femoral, superficial femoral, popliteal and calf veins  Occlusive thrombus noted in the proximal distal superficial femoral vein  Continue heparin drip, continue bridge to Coumadin with goal INR 2-3    Hyperlipidemia  Assessment & Plan  Continue Lipitor    Gastroesophageal reflux disease  Assessment & Plan  Continue Protonix    Carpal tunnel syndrome  Assessment & Plan  Recent surgery on LUE  Sutures removed by nursing 6/9 after discussing with his PCP     Coronary artery disease  Assessment & Plan  Hx of CABG at [address] [place] [address] asa, beta blocker, statin   Reports being on warfarin short term after procedure     Essential hypertension  Assessment & Plan  Continue amlodipine, Cozaar, Toprol-XL    VTE Pharmacologic Prophylaxis:   Pharmacologic: Heparin Drip  Mechanical VTE Prophylaxis in Place: Yes    Patient Centered Rounds: I have performed bedside rounds with nursing staff today  Discussions with Specialists or Other Care Team Provider: nursing     Education and Discussions with Family / Patient: patient, called ayannar pilar     Time Spent for Care: 30 minutes  More than 50% of total time spent on counseling and coordination of care as described above  Current Length of Stay: 2 day(s)    Current Patient Status: Inpatient   Certification Statement:  The patient will continue to require additional inpatient hospital stay due to pending therapeutic INR     Discharge Plan: pending clinical course     Code Status: Level 1 - Full Code      Subjective:   Pt seen and examined at bedside  Did pt yesterday, having right arm and shoulder pain  Objective:     Vitals:   Temp (24hrs), Av 3 °F (36 8 °C), Min:98 1 °F (36 7 °C), Max:98 4 °F (36 9 °C)    Temp:  [98 1 °F (36 7 °C)-98 4 °F (36 9 °C)] 98 4 °F (36 9 °C)  HR:  [73-85] 73  Resp:  [18] 18  BP: (121-143)/(66-83) 121/66  SpO2:  [94 %-95 %] 95 %  Body mass index is 23 1 kg/m²  Input and Output Summary (last 24 hours): Intake/Output Summary (Last 24 hours) at 6/10/2021 1116  Last data filed at 6/10/2021 0439  Gross per 24 hour   Intake 180 ml   Output 200 ml   Net -20 ml       Physical Exam:     Physical Exam  Constitutional:       Appearance: Normal appearance  HENT:      Head: Normocephalic and atraumatic  Mouth/Throat:      Mouth: Mucous membranes are moist    Eyes:      Extraocular Movements: Extraocular movements intact  Neck:      Musculoskeletal: Normal range of motion and neck supple  Cardiovascular:      Rate and Rhythm: Normal rate and regular rhythm  Pulmonary:      Effort: Pulmonary effort is normal       Breath sounds: Normal breath sounds  Abdominal:      General: Abdomen is flat  Palpations: Abdomen is soft  Musculoskeletal: Normal range of motion  General: No swelling  Right lower leg: Edema (trace ankle edema ) present  Skin:     General: Skin is warm and dry  Neurological:      General: No focal deficit present  Mental Status: He is alert     Psychiatric:         Mood and Affect: Mood normal          Behavior: Behavior normal        Additional Data:     Labs:    Results from last 7 days   Lab Units 06/10/21  0430  21  0424   WBC Thousand/uL 7 12   < > 8 62   HEMOGLOBIN g/dL 11 2*   < > 11 4*   HEMATOCRIT % 34 7*   < > 35 2*   PLATELETS Thousands/uL 258   < > 217   NEUTROS PCT %  --   --  58   LYMPHS PCT %  --   --  28   MONOS PCT %  --   --  9   EOS PCT %  --   --  3    < > = values in this interval not displayed  Results from last 7 days   Lab Units 06/10/21  0430   SODIUM mmol/L 139   POTASSIUM mmol/L 4 6   CHLORIDE mmol/L 107   CO2 mmol/L 20*   BUN mg/dL 38*   CREATININE mg/dL 1 25   ANION GAP mmol/L 12   CALCIUM mg/dL 8 6   GLUCOSE RANDOM mg/dL 234*     Results from last 7 days   Lab Units 06/10/21  0430   INR  1 10     Results from last 7 days   Lab Units 06/10/21  1101 06/10/21  0743 06/09/21  2107 06/09/21  1823 06/09/21  1041 06/09/21  0726 06/08/21  2048 06/08/21  1615 06/08/21  1136 06/08/21  0716 06/08/21  0128   POC GLUCOSE mg/dl 268* 181* 310* 288* 168* 144* 213* 199* 179* 134 161*               * I Have Reviewed All Lab Data Listed Above  * Additional Pertinent Lab Tests Reviewed:  Anuradha 66 Admission Reviewed    Imaging:    Imaging Reports Reviewed Today Include: all   Imaging Personally Reviewed by Myself Includes:  none    Recent Cultures (last 7 days):           Last 24 Hours Medication List:   Current Facility-Administered Medications   Medication Dose Route Frequency Provider Last Rate    acetaminophen  650 mg Oral Q6H PRN Aamir Allen PA-C      atorvastatin  40 mg Oral Daily Aamir Allen PA-C      clotrimazole   Topical BID Aamir Allen PA-C      vitamin B-12  1,000 mcg Oral Every Other Day Aamir Allen PA-C      heparin (porcine)  3-30 Units/kg/hr (Order-Specific) Intravenous Titrated Aamir Allen PA-C 15 Units/kg/hr (06/10/21 0717)    heparin (porcine)  2,600 Units Intravenous Q1H PRN UNIQUE Armstrong-VINAY      heparin (porcine)  5,200 Units Intravenous Q1H PRN Aamir Allen PA-C      insulin glargine  20 Units Subcutaneous HS Aamir Allen PA-C      insulin lispro  1-5 Units Subcutaneous TID AC Aamir Allen, [place]      insulin lispro  1-5 Units Subcutaneous HS Aamir Allen PA-C      melatonin  6 mg Oral HS Aamir Allen PA-C      pantoprazole  40 mg Oral Early Morning Aamir Allen PA-C      polyethylene glycol  17 g Oral Daily Aamir Allen PA-C      senna-docusate sodium  2 tablet Oral BID Aamir Allen PA-C      warfarin  7 5 mg Oral Daily (warfarin) Tae Allen PA-C          Today, Patient Was Seen By: Genia Rooney PA-C    ** Please Note: Dictation voice to text software may have been used in the creation of this document   **

## 2021-06-10 NOTE — ASSESSMENT & PLAN NOTE
Lab Results   Component Value Date    EGFR 51 06/10/2021    EGFR 52 06/09/2021    EGFR 65 06/08/2021    CREATININE 1 25 06/10/2021    CREATININE 1 24 06/09/2021    CREATININE 1 03 06/08/2021     Creatinine is at baseline

## 2021-06-10 NOTE — ASSESSMENT & PLAN NOTE
Hx of CABG at [facility] asa, beta blocker, statin   Reports being on warfarin short term after procedure

## 2021-06-10 NOTE — ASSESSMENT & PLAN NOTE
Patient reports episode of blood when wiping after a bowel movement 6/9  Will continue to monitor as he will require anticoagulation  Hemoglobin is stable  No further episodes of bleeding

## 2021-06-10 NOTE — CASE MANAGEMENT
LOS 2   NOT A BUNDLE;  NOT A READMISSION;  GREEN UNPLANNED READMISSION RISK  Cm met with pt to review role and introduce self  Pt lives in a [address] with his wife  He uses a RW and has a shower chair  He is able to perform ADLs however will need assistance with IADLs  Pt has not been to rehab however has had [facility] in the past   Pt is able to afford his medications as most of them go through the [facility]  He will also use [facility] [address] for some medications  Pt states his wife is his POA and he has given permission to speak with her and his daughter  Pt does not have a hx of mh/D&A  PCP is Timmy Nieto MD   Pt's daughter will be transporting him home at DC  Cm reviewed the recommendations made by the care team for [facility] services  Grace of choice was reviewed  Pt is in agreement with having home therapy as he feels he would need it  Pt requested a blanket referral be made to agencies  Referrals have been made  Larissa Zarate is able to see pt  Contact information has been placed on pt's DCI  CM reviewed the availability of treatment team to discuss questions or concerns patient and/or family may have regarding  understanding medications and recognizing signs and symptoms at discharge  CM also encouraged patient to follow up with all recommended appointments after discharge  CM reviewed the information that will be provided to pt/family on the discharge instructions  Patient advised of importance for patient and family to participate in managing patient's medical well being

## 2021-06-10 NOTE — ASSESSMENT & PLAN NOTE
Malnutrition Findings:   Adult Malnutrition type: Acute illness  Adult Degree of Malnutrition: Malnutrition of moderate degree(related to inadequate intake to maintain weight)  BMI Findings: Body mass index is 23 1 kg/m²

## 2021-06-10 NOTE — ASSESSMENT & PLAN NOTE
Patient presented initially with right lower extremity/calf pain  Right lower extremity venous duplex shows acute versus subacute DVT in the external iliac, common femoral, superficial femoral, popliteal and calf veins  Occlusive thrombus noted in the proximal distal superficial femoral vein    Continue heparin drip, continue bridge to Coumadin with goal INR 2-3

## 2021-06-10 NOTE — TREATMENT PLAN
Notified by RN that patient had BRBPR  He is on heparin-coumadin bridge for acute PE  Will add Q12 H/H  Given possibility of hemorrhoidal bleeding will add stool softeners  Last Hgb stable at 11 2  Most recent PTT above range for therapeutic heparin  Continue to adjust heparin drip as per nursing protocol  Aydin Cook PA-C

## 2021-06-10 NOTE — ASSESSMENT & PLAN NOTE
Patient initially presented due to right lower extremity/calf pain found to have DVT and CTA chest revealed left mainstem pulmonary artery emboli extending into the left lower lobe pulmonary arteries with focal atelectasis versus early infarct within the superior portion of the left lower lobe  Right heart strain noted on CTA  Patient initially admitted to ICU, now stable to downgrade to med surg  Patient on room air, no chest pain, clinically asymptomatic  Echocardiogram shows normal right ventricular function  Appreciate input from Critical Care/pulmonology  Unable to afford Eliquis/DOAC, thus will continue on heparin drip bridge to coumadin   Increase Coumadin to 7 5 mg for tonight    Goal INR 2-3   OOB as able, encourage ambulation

## 2021-06-10 NOTE — PLAN OF CARE
Problem: Potential for Falls  Goal: Patient will remain free of falls  Description: INTERVENTIONS:  - Assess patient frequently for physical needs  -  Identify cognitive and physical deficits and behaviors that affect risk of falls  -  [place] fall precautions as indicated by assessment   - Educate patient/family on patient safety including physical limitations  - Instruct patient to call for assistance with activity based on assessment  - Modify environment to reduce risk of injury  - Consider OT/PT consult to assist with strengthening/mobility  6/10/2021 1039 by Ottoniel Gordon  Outcome: Progressing  6/10/2021 1038 by Ottoniel Gordon  Outcome: Progressing     Problem: Nutrition/Hydration-ADULT  Goal: Nutrient/Hydration intake appropriate for improving, restoring or maintaining nutritional needs  Description: Monitor and assess patient's nutrition/hydration status for malnutrition  Collaborate with interdisciplinary team and initiate plan and interventions as ordered  Monitor patient's weight and dietary intake as ordered or per policy  Utilize nutrition screening tool and intervene as necessary  Determine patient's food preferences and provide high-protein, high-caloric foods as appropriate       INTERVENTIONS:  - Monitor oral intake, urinary output, labs, and treatment plans  - Assess nutrition and hydration status and recommend course of action  - Evaluate amount of meals eaten  - Assist patient with eating if necessary   - Allow adequate time for meals  - Recommend/ encourage appropriate diets, oral nutritional supplements, and vitamin/mineral supplements  - Order, calculate, and assess calorie counts as needed  - Recommend, monitor, and adjust tube feedings and TPN/PPN based on assessed needs  - Assess need for intravenous fluids  - Provide specific nutrition/hydration education as appropriate  - Include patient/family/caregiver in decisions related to nutrition  6/10/2021 1039 by [facility] Tabby Dural  Outcome: Progressing  6/10/2021 1038 by Henny Flanagan  Outcome: Progressing     Problem: PAIN - ADULT  Goal: Verbalizes/displays adequate comfort level or baseline comfort level  Description: Interventions:  - Encourage patient to monitor pain and request assistance  - Assess pain using appropriate pain scale  - Administer analgesics based on type and severity of pain and evaluate response  - Implement non-pharmacological measures as appropriate and evaluate response  - Consider cultural and social influences on pain and pain management  - Notify physician/advanced practitioner if interventions unsuccessful or patient reports new pain  Outcome: Progressing     Problem: INFECTION - ADULT  Goal: Absence or prevention of progression during hospitalization  Description: INTERVENTIONS:  - Assess and monitor for signs and symptoms of infection  - Monitor lab/diagnostic results  - Monitor all insertion sites, i e  indwelling lines, tubes, and drains  - Monitor endotracheal if appropriate and nasal secretions for changes in amount and color  - [place] appropriate cooling/warming therapies per order  - Administer medications as ordered  - Instruct and encourage patient and family to use good hand hygiene technique  - Identify and instruct in appropriate isolation precautions for identified infection/condition  Outcome: Progressing     Problem: SAFETY ADULT  Goal: Patient will remain free of falls  Description: INTERVENTIONS:  - Assess patient frequently for physical needs  -  Identify cognitive and physical deficits and behaviors that affect risk of falls    -  [place] fall precautions as indicated by assessment   - Educate patient/family on patient safety including physical limitations  - Instruct patient to call for assistance with activity based on assessment  - Modify environment to reduce risk of injury  - Consider OT/PT consult to assist with strengthening/mobility  6/10/2021 1039 by [facility] Pete Valdes  Outcome: Progressing  6/10/2021 1038 by Conner Toure  Outcome: Progressing

## 2021-06-10 NOTE — ASSESSMENT & PLAN NOTE
Lab Results   Component Value Date    HGBA1C 6 5 (H) 07/24/2020     Recent Labs     06/09/21  1041 06/09/21  1823 06/09/21  2107 06/10/21  0743   POCGLU 168* 288* 310* 181*     Blood Sugar Average: Last 72 hrs:  (P) 197 7   Blood sugars labile  Continue Lantus at bedtime, hold oral diabetic medications  Continue sliding scale insulin and Accu-Cheks  Diabetic diet

## 2021-06-11 LAB
APTT PPP: 72 SECONDS (ref 23–37)
GLUCOSE SERPL-MCNC: 181 MG/DL (ref 65–140)
GLUCOSE SERPL-MCNC: 238 MG/DL (ref 65–140)
GLUCOSE SERPL-MCNC: 252 MG/DL (ref 65–140)
GLUCOSE SERPL-MCNC: 295 MG/DL (ref 65–140)
HCT VFR BLD AUTO: 33.4 % (ref 36.5–49.3)
HCT VFR BLD AUTO: 36.9 % (ref 36.5–49.3)
HGB BLD-MCNC: 11.1 G/DL (ref 12–17)
HGB BLD-MCNC: 11.8 G/DL (ref 12–17)
INR PPP: 1.44 (ref 0.84–1.19)
PROTHROMBIN TIME: 17.7 SECONDS (ref 11.6–14.5)

## 2021-06-11 PROCEDURE — 85610 PROTHROMBIN TIME: CPT | Performed by: PHYSICIAN ASSISTANT

## 2021-06-11 PROCEDURE — 85014 HEMATOCRIT: CPT | Performed by: PHYSICIAN ASSISTANT

## 2021-06-11 PROCEDURE — 82948 REAGENT STRIP/BLOOD GLUCOSE: CPT

## 2021-06-11 PROCEDURE — 99232 SBSQ HOSP IP/OBS MODERATE 35: CPT | Performed by: INTERNAL MEDICINE

## 2021-06-11 PROCEDURE — 85018 HEMOGLOBIN: CPT | Performed by: PHYSICIAN ASSISTANT

## 2021-06-11 PROCEDURE — 85730 THROMBOPLASTIN TIME PARTIAL: CPT | Performed by: HOSPITALIST

## 2021-06-11 PROCEDURE — 99223 1ST HOSP IP/OBS HIGH 75: CPT | Performed by: INTERNAL MEDICINE

## 2021-06-11 PROCEDURE — 97116 GAIT TRAINING THERAPY: CPT

## 2021-06-11 RX ORDER — INSULIN GLARGINE 100 [IU]/ML
22 INJECTION, SOLUTION SUBCUTANEOUS
Status: DISCONTINUED | OUTPATIENT
Start: 2021-06-11 | End: 2021-06-12

## 2021-06-11 RX ADMIN — ATORVASTATIN CALCIUM 40 MG: 40 TABLET, FILM COATED ORAL at 08:24

## 2021-06-11 RX ADMIN — POLYETHYLENE GLYCOL 3350 17 G: 17 POWDER, FOR SOLUTION ORAL at 08:24

## 2021-06-11 RX ADMIN — INSULIN LISPRO 1 UNITS: 100 INJECTION, SOLUTION INTRAVENOUS; SUBCUTANEOUS at 08:26

## 2021-06-11 RX ADMIN — INSULIN LISPRO 3 UNITS: 100 INJECTION, SOLUTION INTRAVENOUS; SUBCUTANEOUS at 17:21

## 2021-06-11 RX ADMIN — HEPARIN SODIUM 12 UNITS/KG/HR: 10000 INJECTION, SOLUTION INTRAVENOUS at 15:19

## 2021-06-11 RX ADMIN — INSULIN LISPRO 2 UNITS: 100 INJECTION, SOLUTION INTRAVENOUS; SUBCUTANEOUS at 11:29

## 2021-06-11 RX ADMIN — INSULIN LISPRO 2 UNITS: 100 INJECTION, SOLUTION INTRAVENOUS; SUBCUTANEOUS at 21:49

## 2021-06-11 RX ADMIN — PANTOPRAZOLE SODIUM 40 MG: 40 TABLET, DELAYED RELEASE ORAL at 06:24

## 2021-06-11 RX ADMIN — INSULIN GLARGINE 22 UNITS: 100 INJECTION, SOLUTION SUBCUTANEOUS at 21:51

## 2021-06-11 RX ADMIN — Medication 6 MG: at 21:48

## 2021-06-11 NOTE — ASSESSMENT & PLAN NOTE
Episodes of rectal bleeding on 6/9 and yesterday  Hb stable at present  Discussed with gastroenterology - plan for colonoscopy on 6/14  Monitor H/H  Transfuse as needed

## 2021-06-11 NOTE — ASSESSMENT & PLAN NOTE
Malnutrition Findings:   Adult Malnutrition type: Acute illness  Adult Degree of Malnutrition: Malnutrition of moderate degree(related to inadequate intake to maintain weight)  BMI Findings: Body mass index is 22 93 kg/m²       Ct nutrition supplements

## 2021-06-11 NOTE — ASSESSMENT & PLAN NOTE
Patient initially presented due to right lower extremity/calf pain found to have DVT and CTA chest revealed left mainstem pulmonary artery emboli extending into the left lower lobe pulmonary arteries with focal atelectasis versus early infarct within the superior portion of the left lower lobe  Right heart strain noted on CTA  Patient initially admitted to ICU, now on med surg  Patient on room air, no chest pain, clinically asymptomatic  Echocardiogram shows normal right ventricular function  Appreciate input from Critical Care/pulmonology  Unable to afford Eliquis/DOAC, thus will continue on heparin drip   Coumadin held due to recurrent rectal bleeding with plan for colonoscopy on 6/14    OOB as able, encourage ambulation

## 2021-06-11 NOTE — PHYSICAL THERAPY NOTE
PHYSICAL THERAPY NOTE      Patient Name: Mary Ambriz  Today's Date: 21 1441   PT Last Visit   PT Visit Date 21   Note Type   Note Type Treatment   Pain Assessment   Pain Assessment Tool 0-10   Pain Score 5   Pain Location/Orientation Orientation: Right;Location: Arm   Restrictions/Precautions   Weight Bearing Precautions Per Order No   Other Precautions Fall Risk  (IV pole)   General   Family/Caregiver Present No   Subjective   Subjective Patient supine in bed and is agreeable to participate in therapy session  Pt identifers obtained from name &   Bed Mobility   Supine to Sit 6  Modified independent   Sit to Supine 6  Modified independent   Additional Comments Pt supine in bed post session per request with call bell and belongings in reach  Transfers   Sit to Stand 5  Supervision   Additional items Assist x 1; Armrests; Increased time required   Stand to Sit 5  Supervision   Additional items Assist x 1; Armrests; Increased time required   Ambulation/Elevation   Gait pattern Improper Weight shift;Decreased foot clearance; Short stride   Gait Assistance 5  Supervision   Additional items Assist x 1;Verbal cues   Assistive Device Rolling walker   Distance 130' x1   Stair Management Assistance   (declined trial)   Balance   Static Sitting Good   Static Standing Fair +   Dynamic Standing Fair   Ambulatory Fair   Activity Tolerance   Activity Tolerance Patient tolerated treatment well   Nurse Made Aware Spoke to YING Aceves    Assessment   Prognosis Fair   Problem List Decreased strength;Decreased endurance; Impaired balance;Decreased mobility; Decreased safety awareness   Assessment Patient agreeable to participate in therapy session  Patient remains consistent with mod I for supine<>sit with good technique and safety   Sit<>stand transfers remains consistent with supervision with increased time for positioning prior to descending to chair  Patient able to ambulate increased gait distance with roller walker and close supervision with no LOB noted  Pt noted has been using roller walker instead of cane the past days in room  Presents with increased stability and tolerance to ambulation  Attempt made to initate stair training however patient declined at this time  Continue to focus on OOB mobility with progression of ambulation as appropriate and able  Goals   Patient Goals none stated   STG Expiration Date 06/17/21   PT Treatment Day 2   Plan   Treatment/Interventions Functional transfer training;LE strengthening/ROM; Therapeutic exercise; Endurance training;Elevations; Patient/family training;Equipment eval/education; Bed mobility;Gait training;Spoke to nursing   Progress Progressing toward goals   PT Frequency Other (Comment)  (3-5x/week)   Recommendation   PT Discharge Recommendation Home with home health rehabilitation   [place] [phone]   Turning in Bed Without Bedrails 4   Lying on Back to Sitting on Edge of Flat Bed 4   Moving Bed to Chair 3   Standing Up From Chair 3   Walk in Room 3   Climb 3-5 Stairs 3   Basic Mobility Inpatient Raw Score 20   Basic Mobility Standardized Score 43 99       Christy Dunbar, [facility]

## 2021-06-11 NOTE — CONSULTS
Consultation - [facility]  Marcelle Apodaca 80 y o  male MRN: [id number]  Unit/Bed#: S -01 Encounter: [id number]        Inpatient consult to gastroenterology  Consult performed by: Yann Ewing PA-C  Consult ordered by: India Mcneil MD          Reason for Consult / Principal Problem:  Rectal bleeding in the setting of anticoagulation for recent pulmonary embolism     HPI: Marcelle Apodaca is a 80y o  year old male with history of diabetes, carotid stenosis, who was admitted 5 days ago on 06/07 after being sent to the emergency room by his PCP to rule out deep venous thrombosis, admitting some mild shortness of breath at that time apparently  He was found to have a submassive pulmonary embolism on CT angiogram with some evidence of right heart strain, although apparently no evidence of heart strain was found on subsequent echocardiogram   He was started on anticoagulation, heparin drip with plan to transition to Coumadin, he most recently received Coumadin yesterday and remains on heparin drip at this time  GI consultation was requested as there was concern for bright red blood per rectum  More specifically, the patient says that 2 days ago, he had a bowel movement in the morning which consisted of large amount of bright red blood notable in the toilet bowl, he said he had 2 more bowel movements over the course of that day, the next movement had less blood in the 3rd movement had no blood  He said yesterday he noted some bright red blood in the stool again  He has not had any further bowel movement last night or this morning  His hemoglobin appears to be stable at 11 8 compared with 11 2 yesterday  The patient tells me he has not had bleeding like this in the past   He does note that he has had poor appetite lately and has unintentionally lost about 20 lb since November    He thinks his last colonoscopy was done in [facility] in roughly 5 years ago but is not sure of details, he says he also had EGD about 4 5 years ago with stretching done for his esophagus, as he has periodically had issues with sensation of food getting stuck in the throat with swallowing  He reports able to tolerate diet currently  Denies any abdominal pain, nausea or vomiting  Denies any shortness of breath currently  REVIEW OF SYSTEMS:    CONSTITUTIONAL: Denies any fever, chills, or rigors  Good appetite, and no recent weight loss  HEENT: No earache or tinnitus  Denies hearing loss or visual disturbances  CARDIOVASCULAR: No chest pain or palpitations  RESPIRATORY: Denies any cough, hemoptysis, shortness of breath or dyspnea on exertion  GASTROINTESTINAL: As noted in the History of Present Illness  GENITOURINARY: No problems with urination  Denies any hematuria or dysuria  NEUROLOGIC: No dizziness or vertigo, denies headaches  MUSCULOSKELETAL: Denies any muscle or joint pain  SKIN: Denies skin rashes or itching  ENDOCRINE: Denies excessive thirst  Denies intolerance to heat or cold  PSYCHOSOCIAL: Denies depression or anxiety  Denies any recent memory loss  Historical Information   Past Medical History:   Diagnosis Date    Bilateral carotid artery stenosis 1/4/2021    <50% on doppler 7/27/2018   No change from Aug/2016    Bilateral carotid bruits     BMI 25 0-25 9,adult 4/6/2021    Coronary artery disease involving native coronary artery of native heart without angina pectoris 4/6/2021    CTS (carpal tunnel syndrome)     Deviated septum     Diabetes ([facility] [address] )     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HCC)     Dizziness     Double vision     Ear problems     Fatigue     General weakness     GERD (gastroesophageal reflux disease)     Hearing impaired person, bilateral     HL (hearing loss)     Hypertension     Microscopic hematuria     Myalgia 4/6/2021    Pain in joints 4/6/2021    PVD (peripheral vascular disease) ([facility] [address] ) 4/2/2021    Renal calculi     Right lumbar radiculopathy     S/P AVR (aortic valve replacement) 4/2/2021    SOB (shortness of breath) on exertion     Spinal stenosis in cervical region 4/2/2021    Tachycardia     Urinary frequency     Vertigo 1/4/2021     Past Surgical History:   Procedure Laterality Date    AORTIC VALVE REPLACEMENT  09/2016    BYPASS GRAFT      CARPAL TUNNEL RELEASE Right     by Dr Mohan Farley EXTRACTION, BILATERAL      COLONOSCOPY  09/16/2015    CORONARY ARTERY BYPASS GRAFT  09/2016    x1     HAND SURGERY Left     URETERECTOMY       Social History   Social History     Substance and Sexual Activity   Alcohol Use Not Currently     Social History     Substance and Sexual Activity   Drug Use Never     Social History     Tobacco Use   Smoking Status Former Smoker   Smokeless Tobacco Never Used   Tobacco Comment    Never smoker - As per [employer]     Family History   Family history unknown: Yes       Meds/Allergies     Medications Prior to Admission   Medication    acetaminophen (TYLENOL) 500 mg tablet    amLODIPine (NORVASC) 10 mg tablet    aspirin (ECOTRIN LOW STRENGTH) 81 mg EC tablet    atorvastatin (LIPITOR) 40 mg tablet    Cinnamon 500 MG capsule    clotrimazole 1 % external solution    glipiZIDE (GLUCOTROL) 5 mg tablet    insulin glargine (LANTUS) 100 units/mL subcutaneous injection    losartan (COZAAR) 100 MG tablet    metFORMIN (GLUCOPHAGE) 500 mg tablet    metoprolol succinate (TOPROL-XL) 100 mg 24 hr tablet    Multiple Vitamin (multivitamin) tablet    omeprazole (PriLOSEC) 20 mg delayed release capsule    vitamin B-12 (VITAMIN B-12) 1,000 mcg tablet     Current Facility-Administered Medications   Medication Dose Route Frequency    acetaminophen (TYLENOL) tablet 650 mg  650 mg Oral Q6H PRN    atorvastatin (LIPITOR) tablet 40 mg  40 mg Oral Daily    clotrimazole (LOTRIMIN) 1 % cream   Topical BID    cyanocobalamin (VITAMIN B-12) tablet 1,000 mcg  1,000 mcg Oral Every Other Day    heparin (porcine) 25,000 units in 0 45% NaCl 250 mL infusion (premix)  3-30 Units/kg/hr (Order-Specific) Intravenous Titrated    heparin (porcine) injection 2,600 Units  2,600 Units Intravenous Q1H PRN    heparin (porcine) injection 5,200 Units  5,200 Units Intravenous Q1H PRN    insulin glargine (LANTUS) subcutaneous injection 20 Units 0 2 mL  20 Units Subcutaneous HS    insulin lispro (HumaLOG) 100 units/mL subcutaneous injection 1-5 Units  1-5 Units Subcutaneous TID AC    insulin lispro (HumaLOG) 100 units/mL subcutaneous injection 1-5 Units  1-5 Units Subcutaneous HS    melatonin tablet 6 mg  6 mg Oral HS    pantoprazole (PROTONIX) EC tablet 40 mg  40 mg Oral Early Morning    polyethylene glycol (MIRALAX) packet 17 g  17 g Oral Daily    senna-docusate sodium (SENOKOT S) 8 6-50 mg per tablet 2 tablet  2 tablet Oral BID       Allergies   Allergen Reactions    Lisinopril Cough    Penicillin G Hives    Penicillins            Objective     Blood pressure 126/59, pulse 92, temperature 98 °F (36 7 °C), temperature source Oral, resp  rate 16, height 5' 7" (1 702 m), weight 66 4 kg (146 lb 6 2 oz), SpO2 97 %  Intake/Output Summary (Last 24 hours) at 6/11/2021 1409  Last data filed at 6/11/2021 0829  Gross per 24 hour   Intake --   Output 900 ml   Net -900 ml         PHYSICAL EXAM     General Appearance:   Alert, cooperative, no distress, appears stated age    HEENT:   Normocephalic, atraumatic, anicteric      Neck:  Supple, symmetrical, trachea midline, no adenopathy;    thyroid: no enlargement/tenderness/nodules; no carotid  bruit or JVD    Lungs:   Clear to auscultation bilaterally; no rales, rhonchi or wheezing; respirations unlabored    Heart[de-identified]   S1 and S2 normal; regular rate and rhythm; no murmur, rub, or gallop     Abdomen:   Soft, non-tender, non-distended; normal bowel sounds; no masses, no organomegaly    Genitalia:   Deferred    Rectal:   Deferred    Extremities:  No cyanosis, clubbing or edema    Pulses:  2+ and symmetric all extremities    Skin:  Skin color, texture, turgor normal, no rashes or lesions    Lymph nodes:  No palpable cervical, axillary or inguinal lymphadenopathy        Lab Results:   Admission on 06/07/2021   Component Date Value    WBC 06/07/2021 7 95     RBC 06/07/2021 4 00     Hemoglobin 06/07/2021 12 6     Hematocrit 06/07/2021 37 9     MCV 06/07/2021 95     MCH 06/07/2021 31 5     MCHC 06/07/2021 33 2     RDW 06/07/2021 12 8     MPV 06/07/2021 10 0     Platelets 55/59/0338 212     nRBC 06/07/2021 0     Neutrophils Relative 06/07/2021 64     Immat GRANS % 06/07/2021 0     Lymphocytes Relative 06/07/2021 22     Monocytes Relative 06/07/2021 11     Eosinophils Relative 06/07/2021 2     Basophils Relative 06/07/2021 1     Neutrophils Absolute 06/07/2021 5 08     Immature Grans Absolute 06/07/2021 0 03     Lymphocytes Absolute 06/07/2021 1 77     Monocytes Absolute 06/07/2021 0 85     Eosinophils Absolute 06/07/2021 0 18     Basophils Absolute 06/07/2021 0 04     Protime 06/07/2021 14 2     INR 06/07/2021 1 09     PTT 06/07/2021 26     Troponin I 06/07/2021 <0 02     NT-proBNP 06/07/2021 508*    Sodium 06/07/2021 143     Potassium 06/07/2021 4 3     Chloride 06/07/2021 106     CO2 06/07/2021 23     ANION GAP 06/07/2021 14*    BUN 06/07/2021 31*    Creatinine 06/07/2021 1 27     Glucose 06/07/2021 212*    Calcium 06/07/2021 8 9     eGFR 06/07/2021 50     Troponin I 06/08/2021 <0 02     PTT 06/08/2021 181*    POC Glucose 06/08/2021 161*    Sodium 06/08/2021 139     Potassium 06/08/2021 3 8     Chloride 06/08/2021 105     CO2 06/08/2021 22     ANION GAP 06/08/2021 12     BUN 06/08/2021 27*    Creatinine 06/08/2021 1 03     Glucose 06/08/2021 137     Calcium 06/08/2021 8 7     eGFR 06/08/2021 65     WBC 06/08/2021 8 62     RBC 06/08/2021 3 77*    Hemoglobin 06/08/2021 11 4*    Hematocrit 06/08/2021 35 2*    MCV 06/08/2021 93     MCH 06/08/2021 30 2     MCHC 06/08/2021 32 4     RDW 06/08/2021 12 9     MPV 06/08/2021 10 4     Platelets 41/89/9926 217     nRBC 06/08/2021 0     Neutrophils Relative 06/08/2021 58     Immat GRANS % 06/08/2021 1     Lymphocytes Relative 06/08/2021 28     Monocytes Relative 06/08/2021 9     Eosinophils Relative 06/08/2021 3     Basophils Relative 06/08/2021 1     Neutrophils Absolute 06/08/2021 5 01     Immature Grans Absolute 06/08/2021 0 05     Lymphocytes Absolute 06/08/2021 2 45     Monocytes Absolute 06/08/2021 0 80     Eosinophils Absolute 06/08/2021 0 26     Basophils Absolute 06/08/2021 0 05     Magnesium 06/08/2021 1 6     Phosphorus 06/08/2021 3 1     POC Glucose 06/08/2021 134     PTT 06/08/2021 94*    POC Glucose 06/08/2021 179*    PTT 06/08/2021 67*    POC Glucose 06/08/2021 199*    Ventricular Rate 06/08/2021 69     Atrial Rate 06/08/2021 69     GA Interval 06/08/2021 160     QRSD Interval 06/08/2021 72     QT Interval 06/08/2021 390     QTC Interval 06/08/2021 417     P Axis 06/08/2021 38     QRS Axis 06/08/2021 22     T Wave Axis 06/08/2021 8     Ventricular Rate 06/07/2021 60     Atrial Rate 06/07/2021 60     GA Interval 06/07/2021 154     QRSD Interval 06/07/2021 68     QT Interval 06/07/2021 404     QTC Interval 06/07/2021 404     P Axis 06/07/2021 73     QRS Axis 06/07/2021 3     T Wave Axis 06/07/2021 10     POC Glucose 06/08/2021 213*    PTT 06/09/2021 65*    WBC 06/09/2021 7 61     RBC 06/09/2021 3 74*    Hemoglobin 06/09/2021 11 4*    Hematocrit 06/09/2021 34 8*    MCV 06/09/2021 93     MCH 06/09/2021 30 5     MCHC 06/09/2021 32 8     RDW 06/09/2021 12 7     Platelets 15/91/6370 232     MPV 06/09/2021 9 8     Sodium 06/09/2021 138     Potassium 06/09/2021 4 4     Chloride 06/09/2021 106     CO2 06/09/2021 23     ANION GAP 06/09/2021 9     BUN 06/09/2021 32*    Creatinine 06/09/2021 1 24     Glucose 06/09/2021 166*    Calcium 06/09/2021 8 4     eGFR 06/09/2021 52     Magnesium 06/09/2021 2 3     Protime 06/09/2021 13 8     INR 06/09/2021 1 05     POC Glucose 06/09/2021 144*    PTT 06/09/2021 71*    POC Glucose 06/09/2021 168*    POC Glucose 06/09/2021 288*    POC Glucose 06/09/2021 310*    PTT 06/10/2021 56*    Sodium 06/10/2021 139     Potassium 06/10/2021 4 6     Chloride 06/10/2021 107     CO2 06/10/2021 20*    ANION GAP 06/10/2021 12     BUN 06/10/2021 38*    Creatinine 06/10/2021 1 25     Glucose 06/10/2021 234*    Calcium 06/10/2021 8 6     eGFR 06/10/2021 51     WBC 06/10/2021 7 12     RBC 06/10/2021 3 67*    Hemoglobin 06/10/2021 11 2*    Hematocrit 06/10/2021 34 7*    MCV 06/10/2021 95     MCH 06/10/2021 30 5     MCHC 06/10/2021 32 3     RDW 06/10/2021 12 9     Platelets 37/89/6804 258     MPV 06/10/2021 10 5     Protime 06/10/2021 14 3     INR 06/10/2021 1 10     POC Glucose 06/10/2021 181*    POC Glucose 06/10/2021 268*    PTT 06/10/2021 117*    POC Glucose 06/10/2021 269*    PTT 06/10/2021 65*    Hemoglobin 06/10/2021 11 8*    Hematocrit 06/10/2021 36 4*    POC Glucose 06/10/2021 282*    PTT 06/11/2021 72*    Protime 06/11/2021 17 7*    INR 06/11/2021 1 44*    Hemoglobin 06/11/2021 11 8*    Hematocrit 06/11/2021 36 9     POC Glucose 06/11/2021 181*    POC Glucose 06/11/2021 252*       Imaging Studies: I have personally reviewed pertinent reports  CTA - CHEST WITH IV CONTRAST - PULMONARY ANGIOGRAM     INDICATION:   rule out PE, patient with current DVT      COMPARISON: None      TECHNIQUE: CTA examination of the chest was performed using angiographic technique according to a protocol specifically tailored to evaluate for pulmonary embolism  Axial, sagittal, and coronal 2D reformatted images were created from the source data and   submitted for interpretation  In addition, coronal 3D MIP postprocessing was performed on the acquisition scanner        Radiation dose length product (DLP) for this visit:  330 mGy-cm   This examination, like all CT scans performed in the [place], was performed utilizing techniques to minimize radiation dose exposure, including the use of iterative   reconstruction and automated exposure control      IV Contrast:  100 mL of iohexol (OMNIPAQUE)     FINDINGS:     PULMONARY ARTERIAL TREE:  Left mainstem pulmonary artery emboli extending into the left lower lobe pulmonary arteries is seen      LUNGS:  The trachea and central bronchial tree are patent  There is focal atelectasis or early infarct seen within the superior portion of the left lower lobe     PLEURA:  Unremarkable      HEART/GREAT VESSELS:  Unremarkable for patient's age      MEDIASTINUM AND JESSIE:  Mediastinal lymph nodes measuring up to 1 3 cm are seen  Small hiatal hernia is seen      CHEST WALL AND LOWER NECK:   Unremarkable      VISUALIZED STRUCTURES IN THE UPPER ABDOMEN:  Unremarkable      OSSEOUS STRUCTURES:  No acute fracture or destructive osseous lesion      IMPRESSION:     Left mainstem pulmonary artery emboli extending into the left lower lobe pulmonary arteries      Focal atelectasis versus early infarct within the superior portion of the left lower lobe      The calculated ratio of right ventricular to left ventricular diameter (RV/LV ratio) is 1 78     This is greater than 0 9, which is abnormal and indicates right heart strain  An abnormal RV/LV ratio has been shown to be associated with an increased risk of 30 day mortality in the setting of acute pulmonary embolism  Urgent consultation with the   medical critical care team is recommended  ASSESSMENT/PLAN:     1  Bright red blood per rectum intermittently over the last 2 days in the setting of anticoagulation for submassive pulmonary embolism    Appears hemodynamically stable at this time and does not appear to be having active GI hemorrhage, but should rule out sources of lower GI bleeding prior to resuming oral anticoagulation    -I discussed patient's case and plan with Dr Lois Gomez of Internal Medicine  -monitor hemoglobin closely, transfuse if needed  -can discontinue Coumadin and continue heparin drip for anticoagulation  -can prep patient over the weekend for colonoscopy Monday assuming patient remains stable from a cardiopulmonary standpoint, and can plan to resume oral anticoagulation pending findings from the exam  -can plan for endoscopic evaluation sooner if urgently indicated (if patient develops hemodynamically significant GI bleeding)    The patient was seen and examined by Dr Vadim Wong, all gandhi medical decisions were made with Dr Vadim Wong  Thank you for allowing us to participate in the care of this pleasant patient  We will follow up with you closely

## 2021-06-11 NOTE — PROGRESS NOTES
[facility]  Progress Note - [facility] 1933, 80 y o  male MRN: [id number]  Unit/Bed#: S -01 Encounter: [id number]  Primary Care Provider: Rosalinda Wylie MD   Date and time admitted to hospital: 6/7/2021  8:42 PM    * Acute pulmonary embolism [facility])  Assessment & Plan  Patient initially presented due to right lower extremity/calf pain found to have DVT and CTA chest revealed left mainstem pulmonary artery emboli extending into the left lower lobe pulmonary arteries with focal atelectasis versus early infarct within the superior portion of the left lower lobe  Right heart strain noted on CTA  Patient initially admitted to ICU, now on med surg  Patient on room air, no chest pain, clinically asymptomatic  Echocardiogram shows normal right ventricular function  Appreciate input from Critical Care/pulmonology  Unable to afford Eliquis/DOAC, thus will continue on heparin drip  Coumadin held due to recurrent rectal bleeding with plan for colonoscopy on 6/14    OOB as able, encourage ambulation     Acute deep vein thrombosis (DVT) of femoral vein of right lower extremity [facility])  Assessment & Plan  Patient presented initially with right lower extremity/calf pain  Right lower extremity venous duplex shows acute versus subacute DVT in the external iliac, common femoral, superficial femoral, popliteal and calf veins  Occlusive thrombus noted in the proximal distal superficial femoral vein    Continue heparin drip  Bridge to Coumadin after colonoscopy    GI bleeding  Assessment & Plan  Episodes of rectal bleeding on 6/9 and yesterday  Hb stable at present  Discussed with gastroenterology - plan for colonoscopy on 6/14  Monitor H/H  Transfuse as needed    Type 2 diabetes mellitus without complication, with long-term current use of insulin [facility])  Assessment & Plan  Lab Results   Component Value Date    HGBA1C 6 5 (H) 07/24/2020     Recent Labs     06/10/21  2251 06/11/21  4423 06/11/21  1104 06/11/21  1537   POCGLU 282* 181* 252* 295*     Blood Sugar Average: Last 72 hrs:  (P) 220 25   Uncontrolled  Increase Lantus to 22 hs and add Humalog 3 tid  Oral diabetic medications held  Continue sliding scale insulin and Accu-Cheks  Monitor blood sugars and adjust insulin as needed  Diabetic diet    Stage 3 chronic kidney disease [facility])  Assessment & Plan  Lab Results   Component Value Date    EGFR 51 06/10/2021    EGFR 52 06/09/2021    EGFR 65 06/08/2021    CREATININE 1 25 06/10/2021    CREATININE 1 24 06/09/2021    CREATININE 1 03 06/08/2021     Creatinine is at baseline    Moderate protein-calorie malnutrition (HCC)  Assessment & Plan  Malnutrition Findings:   Adult Malnutrition type: Acute illness  Adult Degree of Malnutrition: Malnutrition of moderate degree(related to inadequate intake to maintain weight)  BMI Findings: Body mass index is 22 93 kg/m²  Ct nutrition supplements    Hyperlipidemia  Assessment & Plan  Continue Lipitor    Gastroesophageal reflux disease  Assessment & Plan  Continue Protonix    Essential hypertension  Assessment & Plan  Meds held due to hypotension  Was on Amlodipine, Cozaar, Toprol-XL    Coronary artery disease  Assessment & Plan  Hx of CABG at [facility]   Continue statin   ASA held due to GIB  Reports being on warfarin short term after procedure     Carpal tunnel syndrome  Assessment & Plan  Recent surgery on LUE  Sutures removed by nursing 6/9 after discussing with his PCP           VTE Pharmacologic Prophylaxis: VTE Score: 8 High Risk (Score >/= 5) - Pharmacological DVT Prophylaxis Ordered: heparin drip  Sequential Compression Devices Ordered  Patient Centered Rounds: Discussed with RN  Discussions with Specialists or Other Care Team Provider: Discussed with gastroenterology    Education and Discussions with Family / Patient: Updated  (daughter) via phone  Time Spent for Care: 20 minutes   More than 50% of total time spent on counseling and coordination of care as described above  Current Length of Stay: 3 day(s)  Current Patient Status: Inpatient   Certification Statement: The patient will continue to require additional inpatient hospital stay due to GI bleed  Discharge Plan: Anticipate discharge in >72 hrs to home  Code Status: Level 1 - Full Code    Subjective:   2 episodes of rectal bleeding yesterday  No abdominal pain  No shortness of breath  No right calf pain    Objective:     Vitals:   Temp (24hrs), Av 2 °F (36 8 °C), Min:98 °F (36 7 °C), Max:98 4 °F (36 9 °C)    Temp:  [98 °F (36 7 °C)-98 4 °F (36 9 °C)] 98 °F (36 7 °C)  HR:  [92-96] 96  Resp:  [16-18] 16  BP: (126-186)/(59-92) 140/84  SpO2:  [95 %-98 %] 98 %  Body mass index is 22 93 kg/m²  Physical Exam:   Physical Exam  HENT:      Head: Normocephalic  Nose: Nose normal       Mouth/Throat:      Mouth: Mucous membranes are moist    Eyes:      Extraocular Movements: Extraocular movements intact  Neck:      Musculoskeletal: Neck supple  Cardiovascular:      Rate and Rhythm: Normal rate and regular rhythm  Pulmonary:      Effort: Pulmonary effort is normal  No respiratory distress  Breath sounds: Normal breath sounds  Abdominal:      General: Bowel sounds are normal  There is no distension  Palpations: Abdomen is soft  Tenderness: There is no abdominal tenderness  Musculoskeletal:         General: No swelling  Skin:     General: Skin is warm and dry  Neurological:      General: No focal deficit present  Mental Status: He is alert and oriented to person, place, and time     Psychiatric:         Mood and Affect: Mood normal          Behavior: Behavior normal           Additional Data:     Labs:  Results from last 7 days   Lab Units 21  0752  06/10/21  0430  21  0424   WBC Thousand/uL  --   --  7 12   < > 8 62   HEMOGLOBIN g/dL 11 8*   < > 11 2*   < > 11 4*   HEMATOCRIT % 36 9   < > 34 7*   < > 35 2*   PLATELETS Thousands/uL  --   --  258   < > 217   NEUTROS PCT %  --   --   --   --  58   LYMPHS PCT %  --   --   --   --  28   MONOS PCT %  --   --   --   --  9   EOS PCT %  --   --   --   --  3    < > = values in this interval not displayed       Results from last 7 days   Lab Units 06/10/21  0430   SODIUM mmol/L 139   POTASSIUM mmol/L 4 6   CHLORIDE mmol/L 107   CO2 mmol/L 20*   BUN mg/dL 38*   CREATININE mg/dL 1 25   ANION GAP mmol/L 12   CALCIUM mg/dL 8 6   GLUCOSE RANDOM mg/dL 234*     Results from last 7 days   Lab Units 06/11/21  0453   INR  1 44*     Results from last 7 days   Lab Units 06/11/21  1537 06/11/21  1104 06/11/21  0653 06/10/21  2251 06/10/21  1616 06/10/21  1101 06/10/21  0743 06/09/21  2107 06/09/21  1823 06/09/21  1041 06/09/21  0726 06/08/21  2048   POC GLUCOSE mg/dl 295* 252* 181* 282* 269* 268* 181* 310* 288* 168* 144* 213*               Lines/Drains:  Invasive Devices     Peripheral Intravenous Line            Peripheral IV 06/09/21 Distal;Right;Dorsal (posterior) Forearm 1 day                Imaging: Reviewed radiology reports from this admission including: chest CT scan      Last 24 Hours Medication List:   Current Facility-Administered Medications   Medication Dose Route Frequency Provider Last Rate    acetaminophen  650 mg Oral Q6H PRN Aamir Allen PA-C      atorvastatin  40 mg Oral Daily Aamir Allen PA-C      clotrimazole   Topical BID Aamir Allen PA-C      vitamin B-12  1,000 mcg Oral Every Other Day Aamir Allen PA-C      heparin (porcine)  3-30 Units/kg/hr (Order-Specific) Intravenous Titrated UNIQUE Armstrong-VINAY 12 Units/kg/hr (06/11/21 1519)    heparin (porcine)  2,600 Units Intravenous Q1H PRN UNIQUE Armstrong-VINAY      heparin (porcine)  5,200 Units Intravenous Q1H PRN UNIQUE Armstrong-C      insulin glargine  22 Units Subcutaneous HS Aishwarya Nassar MD      insulin lispro  1-5 Units Subcutaneous TID AC Aamir L CA Allen      insulin lispro  1-5 Units Subcutaneous HS Aamir Allen PA-C      insulin lispro  3 Units Subcutaneous TID With Meals Rajesh Coleman MD      melatonin  6 mg Oral HS Aamir Allen PA-C      pantoprazole  40 mg Oral Early Morning Aamir Allen PA-C      polyethylene glycol  17 g Oral Daily Aamir Allen PA-C      senna-docusate sodium  2 tablet Oral BID Vamsi Allen PA-C          Today, Patient Was Seen By: Rajesh Coleman MD    **Please Note: This note may have been constructed using a voice recognition system  **

## 2021-06-11 NOTE — ASSESSMENT & PLAN NOTE
Hx of CABG at [facility] asa, beta blocker, statin   Reports being on warfarin short term after procedure

## 2021-06-11 NOTE — ASSESSMENT & PLAN NOTE
Lab Results   Component Value Date    HGBA1C 6 5 (H) 07/24/2020     Recent Labs     06/10/21  2251 06/11/21  0653 06/11/21  1104 06/11/21  1537   POCGLU 282* 181* 252* 295*     Blood Sugar Average: Last 72 hrs:  (P) 220 25   Uncontrolled  Increase Lantus to 22 hs and add Humalog 3 tid  Oral diabetic medications held  Continue sliding scale insulin and Accu-Cheks  Monitor blood sugars and adjust insulin as needed  Diabetic diet

## 2021-06-11 NOTE — ASSESSMENT & PLAN NOTE
Patient presented initially with right lower extremity/calf pain  Right lower extremity venous duplex shows acute versus subacute DVT in the external iliac, common femoral, superficial femoral, popliteal and calf veins  Occlusive thrombus noted in the proximal distal superficial femoral vein    Continue heparin drip  Bridge to Coumadin after colonoscopy

## 2021-06-11 NOTE — PLAN OF CARE
Problem: PHYSICAL THERAPY ADULT  Goal: Performs mobility at highest level of function for planned discharge setting  See evaluation for individualized goals  Description: Treatment/Interventions: Functional transfer training, LE strengthening/ROM, Therapeutic exercise, Endurance training, Patient/family training, Bed mobility, Gait training, Elevations, Equipment eval/education  Equipment Recommended: Cane       See flowsheet documentation for full assessment, interventions and recommendations  Outcome: Progressing  Note: Prognosis: Fair  Problem List: Decreased strength, Decreased endurance, Impaired balance, Decreased mobility, Decreased safety awareness  Assessment: Patient agreeable to participate in therapy session  Patient remains consistent with mod I for supine<>sit with good technique and safety  Sit<>stand transfers remains consistent with supervision with increased time for positioning prior to descending to chair  Patient able to ambulate increased gait distance with roller walker and close supervision with no LOB noted  Pt noted has been using roller walker instead of cane the past days in room  Presents with increased stability and tolerance to ambulation  Attempt made to initate stair training however patient declined at this time  Continue to focus on OOB mobility with progression of ambulation as appropriate and able  PT Discharge Recommendation: Home with home health rehabilitation          See flowsheet documentation for full assessment

## 2021-06-12 PROBLEM — I82.401 ACUTE DEEP VEIN THROMBOSIS (DVT) OF RIGHT LOWER EXTREMITY (HCC): Status: ACTIVE | Noted: 2021-06-08

## 2021-06-12 LAB
ANION GAP SERPL CALCULATED.3IONS-SCNC: 8 MMOL/L (ref 4–13)
APTT PPP: 83 SECONDS (ref 23–37)
BUN SERPL-MCNC: 34 MG/DL (ref 5–25)
CALCIUM SERPL-MCNC: 8.6 MG/DL (ref 8.3–10.1)
CHLORIDE SERPL-SCNC: 105 MMOL/L (ref 100–108)
CO2 SERPL-SCNC: 23 MMOL/L (ref 21–32)
CREAT SERPL-MCNC: 1.27 MG/DL (ref 0.6–1.3)
ERYTHROCYTE [DISTWIDTH] IN BLOOD BY AUTOMATED COUNT: 13.1 % (ref 11.6–15.1)
GFR SERPL CREATININE-BSD FRML MDRD: 50 ML/MIN/1.73SQ M
GLUCOSE SERPL-MCNC: 183 MG/DL (ref 65–140)
GLUCOSE SERPL-MCNC: 194 MG/DL (ref 65–140)
GLUCOSE SERPL-MCNC: 200 MG/DL (ref 65–140)
GLUCOSE SERPL-MCNC: 227 MG/DL (ref 65–140)
GLUCOSE SERPL-MCNC: 402 MG/DL (ref 65–140)
HCT VFR BLD AUTO: 34.8 % (ref 36.5–49.3)
HGB BLD-MCNC: 11.4 G/DL (ref 12–17)
INR PPP: 1.78 (ref 0.84–1.19)
MCH RBC QN AUTO: 30.7 PG (ref 26.8–34.3)
MCHC RBC AUTO-ENTMCNC: 32.8 G/DL (ref 31.4–37.4)
MCV RBC AUTO: 94 FL (ref 82–98)
PLATELET # BLD AUTO: 263 THOUSANDS/UL (ref 149–390)
PMV BLD AUTO: 9.8 FL (ref 8.9–12.7)
POTASSIUM SERPL-SCNC: 4.8 MMOL/L (ref 3.5–5.3)
PROTHROMBIN TIME: 20.8 SECONDS (ref 11.6–14.5)
RBC # BLD AUTO: 3.71 MILLION/UL (ref 3.88–5.62)
SODIUM SERPL-SCNC: 136 MMOL/L (ref 136–145)
WBC # BLD AUTO: 9.3 THOUSAND/UL (ref 4.31–10.16)

## 2021-06-12 PROCEDURE — 85610 PROTHROMBIN TIME: CPT | Performed by: PHYSICIAN ASSISTANT

## 2021-06-12 PROCEDURE — 82948 REAGENT STRIP/BLOOD GLUCOSE: CPT

## 2021-06-12 PROCEDURE — 80048 BASIC METABOLIC PNL TOTAL CA: CPT | Performed by: INTERNAL MEDICINE

## 2021-06-12 PROCEDURE — 99232 SBSQ HOSP IP/OBS MODERATE 35: CPT | Performed by: INTERNAL MEDICINE

## 2021-06-12 PROCEDURE — 99231 SBSQ HOSP IP/OBS SF/LOW 25: CPT | Performed by: INTERNAL MEDICINE

## 2021-06-12 PROCEDURE — 85027 COMPLETE CBC AUTOMATED: CPT | Performed by: INTERNAL MEDICINE

## 2021-06-12 PROCEDURE — 85730 THROMBOPLASTIN TIME PARTIAL: CPT | Performed by: HOSPITALIST

## 2021-06-12 RX ORDER — LATANOPROST 50 UG/ML
1 SOLUTION/ DROPS OPHTHALMIC
Status: DISCONTINUED | OUTPATIENT
Start: 2021-06-12 | End: 2021-06-15 | Stop reason: HOSPADM

## 2021-06-12 RX ORDER — LATANOPROST 50 UG/ML
1 SOLUTION/ DROPS OPHTHALMIC
COMMUNITY

## 2021-06-12 RX ORDER — METOPROLOL SUCCINATE 50 MG/1
50 TABLET, EXTENDED RELEASE ORAL DAILY
Status: DISCONTINUED | OUTPATIENT
Start: 2021-06-12 | End: 2021-06-15 | Stop reason: HOSPADM

## 2021-06-12 RX ORDER — INSULIN GLARGINE 100 [IU]/ML
8 INJECTION, SOLUTION SUBCUTANEOUS EVERY MORNING
Status: DISCONTINUED | OUTPATIENT
Start: 2021-06-12 | End: 2021-06-12

## 2021-06-12 RX ORDER — INSULIN GLARGINE 100 [IU]/ML
18 INJECTION, SOLUTION SUBCUTANEOUS
Status: DISCONTINUED | OUTPATIENT
Start: 2021-06-12 | End: 2021-06-13

## 2021-06-12 RX ORDER — BRINZOLAMIDE/BRIMONIDINE TARTRATE 10; 2 MG/ML; MG/ML
1 SUSPENSION/ DROPS OPHTHALMIC 2 TIMES DAILY
COMMUNITY

## 2021-06-12 RX ADMIN — Medication 6 MG: at 21:22

## 2021-06-12 RX ADMIN — INSULIN LISPRO 5 UNITS: 100 INJECTION, SOLUTION INTRAVENOUS; SUBCUTANEOUS at 21:23

## 2021-06-12 RX ADMIN — INSULIN GLARGINE 8 UNITS: 100 INJECTION, SOLUTION SUBCUTANEOUS at 11:31

## 2021-06-12 RX ADMIN — PANTOPRAZOLE SODIUM 40 MG: 40 TABLET, DELAYED RELEASE ORAL at 06:30

## 2021-06-12 RX ADMIN — INSULIN LISPRO 1 UNITS: 100 INJECTION, SOLUTION INTRAVENOUS; SUBCUTANEOUS at 08:06

## 2021-06-12 RX ADMIN — POLYETHYLENE GLYCOL 3350 17 G: 17 POWDER, FOR SOLUTION ORAL at 08:05

## 2021-06-12 RX ADMIN — DOCUSATE SODIUM AND SENNOSIDES 2 TABLET: 8.6; 5 TABLET, FILM COATED ORAL at 08:05

## 2021-06-12 RX ADMIN — INSULIN LISPRO 3 UNITS: 100 INJECTION, SOLUTION INTRAVENOUS; SUBCUTANEOUS at 08:06

## 2021-06-12 RX ADMIN — INSULIN LISPRO 1 UNITS: 100 INJECTION, SOLUTION INTRAVENOUS; SUBCUTANEOUS at 11:30

## 2021-06-12 RX ADMIN — LATANOPROST 1 DROP: 50 SOLUTION OPHTHALMIC at 21:25

## 2021-06-12 RX ADMIN — INSULIN LISPRO 3 UNITS: 100 INJECTION, SOLUTION INTRAVENOUS; SUBCUTANEOUS at 11:32

## 2021-06-12 RX ADMIN — ATORVASTATIN CALCIUM 40 MG: 40 TABLET, FILM COATED ORAL at 08:05

## 2021-06-12 RX ADMIN — BRINZOLAMIDE/BRIMONIDINE TARTRATE 1 DROP: 10; 2 SUSPENSION/ DROPS OPHTHALMIC at 21:25

## 2021-06-12 RX ADMIN — INSULIN LISPRO 3 UNITS: 100 INJECTION, SOLUTION INTRAVENOUS; SUBCUTANEOUS at 17:10

## 2021-06-12 RX ADMIN — CYANOCOBALAMIN TAB 500 MCG 1000 MCG: 500 TAB at 08:05

## 2021-06-12 RX ADMIN — METOPROLOL SUCCINATE 50 MG: 50 TABLET, EXTENDED RELEASE ORAL at 11:30

## 2021-06-12 RX ADMIN — HEPARIN SODIUM 12 UNITS/KG/HR: 10000 INJECTION, SOLUTION INTRAVENOUS at 21:30

## 2021-06-12 RX ADMIN — INSULIN GLARGINE 18 UNITS: 100 INJECTION, SOLUTION SUBCUTANEOUS at 21:22

## 2021-06-12 RX ADMIN — INSULIN LISPRO 2 UNITS: 100 INJECTION, SOLUTION INTRAVENOUS; SUBCUTANEOUS at 17:11

## 2021-06-12 NOTE — ASSESSMENT & PLAN NOTE
· Was on Amlodipine 10 mg daily, Losartan 100 mg daily and Toprol  mg daily at home  · Meds were held from admission as BP on lower jermain  · Restart Toprol XL at lower dose of 50 mg and increase to 100 mg in am if BP acceptable  · Monitor BP and restart Losartan and Amlodipine if needed

## 2021-06-12 NOTE — ASSESSMENT & PLAN NOTE
· Patient presented to PCP with right calf pain as above  · RLE venous doppler - "Evidence of acute vs subacute deep vein thrombosis noted in the external iliac,  common femoral  superficial femoral, popliteal, and calf veins   Occlusive thrombus noted in the proximal-distal superficial femoral vein "  · Continue heparin drip  · Bridge to Coumadin when cleared by GI after EGD/colonoscopy

## 2021-06-12 NOTE — ASSESSMENT & PLAN NOTE
· Presented to PCP's office on 6/7 with right calf pain and noted to have 2+ edema  In the setting of left carpal tunnel surgery approximately 2 weeks prior  · RLE doppler revealed acute vs subacute DVT and he was sent to the ED  · CTA chest - "Left mainstem pulmonary artery emboli extending into the left lower lobe pulmonary arteries  Focal atelectasis versus early infarct within the superior portion of the left lower lobe  The calculated ratio of right ventricular to left ventricular diameter (RV/LV ratio) is 1 78  This is greater than 0 9, which is abnormal and indicates right heart strain  An abnormal RV/LV ratio has been shown to be associated with an increased risk of 30 day mortality in the setting of acute pulmonary embolism  Urgent consultation with the medical critical care team is recommended "  · Was admitted to critical care for a day  · No shortness of breath, chest pain  No hypoxia  · Unable to afford Eliquis/DOAC, hence continued on heparin drip  · Was being bridged to Coumadin but developed recurrent rectal bleeding hence Coumadin held  Plan for EGD/colonoscopy on 6/14  Coumadin to be restarted after endoscopy when cleared by GI

## 2021-06-12 NOTE — ASSESSMENT & PLAN NOTE
Episodes of rectal bleeding on 6/9 and 6/10  Hb stable at present  GI following - diverticular vs hemorrhoidal vs AVM  Will begin clear liquid diet from tomorrow   Plan for EGD/colonoscopy on 6/14  Monitor H/H   Transfuse as needed

## 2021-06-12 NOTE — ASSESSMENT & PLAN NOTE
Hx of CABG at [facility]   Reports being on warfarin short term after procedure   ASA held due to GIB  Ct BB(dose as above), statin

## 2021-06-12 NOTE — ASSESSMENT & PLAN NOTE
Lab Results   Component Value Date    HGBA1C 6 5 (H) 07/24/2020     Recent Labs     06/11/21  1537 06/11/21  2033 06/12/21  0753 06/12/21  1103   POCGLU 295* 238* 183* 200*     Blood Sugar Average: Last 72 hrs:  (P) 232 [id number]   Received additional Lantus 8 this am  Will be on clear liquids from am of 6/13  Decrease Lantus to 18 hs, ct Humalog 3 tid  Decrease Lantus dose on 6/13 as will be NPO  Oral diabetic medications held  Continue sliding scale insulin and Accu-Cheks  Monitor blood sugars and adjust insulin as needed

## 2021-06-12 NOTE — PROGRESS NOTES
Progress Note - Terell Nava 80 y o  male MRN: [id number]    Unit/Bed#: S -01 Encounter: [id number]        Assessment/Plan:  1  Bright red blood per rectum intermittently over the last 2 days in the setting of anticoagulation for submassive pulmonary embolism  Appears hemodynamically stable at this time and does not appear to be having active GI hemorrhage, but should rule out sources of lower GI bleeding prior to resuming oral anticoagulation    -monitor hemoglobin closely, transfuse if needed, hgb stable  -can discontinue Coumadin and continue heparin drip for anticoagulation  -can prep patient over the weekend for colonoscopy Monday assuming patient remains stable from a cardiopulmonary standpoint, and can plan to resume oral anticoagulation pending findings from the exam  -can plan for endoscopic evaluation sooner if urgently indicated (if patient develops hemodynamically significant GI bleeding)  -start clear liquids in a m  for prep tomorrow       Subjective:   Patient is lying in bed  He denies any further episodes of bleeding and states that he had a normal bowel movement today and bowels were previously loose because of stool softener  Patient is tolerating diet      Objective:     Vitals: /83 (BP Location: Right arm)   Pulse 90   Temp 98 3 °F (36 8 °C) (Oral)   Resp 16   Ht 5' 7" (1 702 m)   Wt 66 3 kg (146 lb 2 6 oz)   SpO2 96%   BMI 22 89 kg/m²       Physical Exam:  Gen-alert no acute distress  Abd-positive bowel sounds nontender nondistended       Lab, Imaging and other studies:   Recent Results (from the past 72 hour(s))   Fingerstick Glucose (POCT)    Collection Time: 06/09/21 10:41 AM   Result Value Ref Range    POC Glucose 168 (H) 65 - 140 mg/dl   Fingerstick Glucose (POCT)    Collection Time: 06/09/21  6:23 PM   Result Value Ref Range    POC Glucose 288 (H) 65 - 140 mg/dl   Fingerstick Glucose (POCT)    Collection Time: 06/09/21  9:07 PM   Result Value Ref Range    POC Glucose 310 (H) 65 - 140 mg/dl   Basic metabolic panel    Collection Time: 06/10/21  4:30 AM   Result Value Ref Range    Sodium 139 136 - 145 mmol/L    Potassium 4 6 3 5 - 5 3 mmol/L    Chloride 107 100 - 108 mmol/L    CO2 20 (L) 21 - 32 mmol/L    ANION GAP 12 4 - 13 mmol/L    BUN 38 (H) 5 - 25 mg/dL    Creatinine 1 25 0 60 - 1 30 mg/dL    Glucose 234 (H) 65 - 140 mg/dL    Calcium 8 6 8 3 - 10 1 mg/dL    eGFR 51 ml/min/1 73sq m   CBC and Platelet    Collection Time: 06/10/21  4:30 AM   Result Value Ref Range    WBC 7 12 4 31 - 10 16 Thousand/uL    RBC 3 67 (L) 3 88 - 5 62 Million/uL    Hemoglobin 11 2 (L) 12 0 - 17 0 g/dL    Hematocrit 34 7 (L) 36 5 - 49 3 %    MCV 95 82 - 98 fL    MCH 30 5 26 8 - 34 3 pg    MCHC 32 3 31 4 - 37 4 g/dL    RDW 12 9 11 6 - 15 1 %    Platelets 913 198 - 157 Thousands/uL    MPV 10 5 8 9 - 12 7 fL   Protime-INR    Collection Time: 06/10/21  4:30 AM   Result Value Ref Range    Protime 14 3 11 6 - 14 5 seconds    INR 1 10 0 84 - 1 19   APTT    Collection Time: 06/10/21  4:31 AM   Result Value Ref Range    PTT 56 (H) 23 - 37 seconds   Fingerstick Glucose (POCT)    Collection Time: 06/10/21  7:43 AM   Result Value Ref Range    POC Glucose 181 (H) 65 - 140 mg/dl   Fingerstick Glucose (POCT)    Collection Time: 06/10/21 11:01 AM   Result Value Ref Range    POC Glucose 268 (H) 65 - 140 mg/dl   APTT    Collection Time: 06/10/21 12:21 PM   Result Value Ref Range     (H) 23 - 37 seconds   Fingerstick Glucose (POCT)    Collection Time: 06/10/21  4:16 PM   Result Value Ref Range    POC Glucose 269 (H) 65 - 140 mg/dl   APTT    Collection Time: 06/10/21  8:13 PM   Result Value Ref Range    PTT 65 (H) 23 - 37 seconds   Hemoglobin and hematocrit, blood    Collection Time: 06/10/21  8:13 PM   Result Value Ref Range    Hemoglobin 11 8 (L) 12 0 - 17 0 g/dL    Hematocrit 36 4 (L) 36 5 - 49 3 %   Fingerstick Glucose (POCT)    Collection Time: 06/10/21 10:51 PM   Result Value Ref Range    POC Glucose 282 (H) 65 - 140 mg/dl   APTT    Collection Time: 06/11/21  2:08 AM   Result Value Ref Range    PTT 72 (H) 23 - 37 seconds   Protime-INR    Collection Time: 06/11/21  4:53 AM   Result Value Ref Range    Protime 17 7 (H) 11 6 - 14 5 seconds    INR 1 44 (H) 0 84 - 1 19   Fingerstick Glucose (POCT)    Collection Time: 06/11/21  6:53 AM   Result Value Ref Range    POC Glucose 181 (H) 65 - 140 mg/dl   Hemoglobin and hematocrit, blood    Collection Time: 06/11/21  7:52 AM   Result Value Ref Range    Hemoglobin 11 8 (L) 12 0 - 17 0 g/dL    Hematocrit 36 9 36 5 - 49 3 %   Fingerstick Glucose (POCT)    Collection Time: 06/11/21 11:04 AM   Result Value Ref Range    POC Glucose 252 (H) 65 - 140 mg/dl   Fingerstick Glucose (POCT)    Collection Time: 06/11/21  3:37 PM   Result Value Ref Range    POC Glucose 295 (H) 65 - 140 mg/dl   Hemoglobin and hematocrit, blood    Collection Time: 06/11/21  7:56 PM   Result Value Ref Range    Hemoglobin 11 1 (L) 12 0 - 17 0 g/dL    Hematocrit 33 4 (L) 36 5 - 49 3 %   Fingerstick Glucose (POCT)    Collection Time: 06/11/21  8:33 PM   Result Value Ref Range    POC Glucose 238 (H) 65 - 140 mg/dl   Protime-INR    Collection Time: 06/12/21  5:11 AM   Result Value Ref Range    Protime 20 8 (H) 11 6 - 14 5 seconds    INR 1 78 (H) 0 84 - 9 30   Basic metabolic panel    Collection Time: 06/12/21  5:11 AM   Result Value Ref Range    Sodium 136 136 - 145 mmol/L    Potassium 4 8 3 5 - 5 3 mmol/L    Chloride 105 100 - 108 mmol/L    CO2 23 21 - 32 mmol/L    ANION GAP 8 4 - 13 mmol/L    BUN 34 (H) 5 - 25 mg/dL    Creatinine 1 27 0 60 - 1 30 mg/dL    Glucose 194 (H) 65 - 140 mg/dL    Calcium 8 6 8 3 - 10 1 mg/dL    eGFR 50 ml/min/1 73sq m   CBC and Platelet    Collection Time: 06/12/21  5:11 AM   Result Value Ref Range    WBC 9 30 4 31 - 10 16 Thousand/uL    RBC 3 71 (L) 3 88 - 5 62 Million/uL    Hemoglobin 11 4 (L) 12 0 - 17 0 g/dL    Hematocrit 34 8 (L) 36 5 - 49 3 %    MCV 94 82 - 98 fL    MCH 30 7 26 8 - 34 3 pg MCHC 32 8 31 4 - 37 4 g/dL    RDW 13 1 11 6 - 15 1 %    Platelets 708 511 - 395 Thousands/uL    MPV 9 8 8 9 - 12 7 fL   APTT    Collection Time: 06/12/21  5:59 AM   Result Value Ref Range    PTT 83 (H) 23 - 37 seconds   Fingerstick Glucose (POCT)    Collection Time: 06/12/21  7:53 AM   Result Value Ref Range    POC Glucose 183 (H) 65 - 140 mg/dl

## 2021-06-12 NOTE — ASSESSMENT & PLAN NOTE
Lab Results   Component Value Date    EGFR 50 06/12/2021    EGFR 51 06/10/2021    EGFR 52 06/09/2021    CREATININE 1 27 06/12/2021    CREATININE 1 25 06/10/2021    CREATININE 1 24 06/09/2021     Baseline creatinine 1 3 to 1 4  Monitor

## 2021-06-12 NOTE — ASSESSMENT & PLAN NOTE
Malnutrition Findings:   Adult Malnutrition type: Acute illness  Adult Degree of Malnutrition: Malnutrition of moderate degree(related to inadequate intake to maintain weight)  BMI Findings: Body mass index is 22 89 kg/m²       Ct nutrition supplements

## 2021-06-12 NOTE — PROGRESS NOTES
[facility]  Progress Note - Brandon Werner 1933, 80 y o  male MRN: [id number]  Unit/Bed#: S -01 Encounter: [id number]  Primary Care Provider: Andrey Dozier MD   Date and time admitted to hospital: 6/7/2021  8:42 PM    * Acute pulmonary embolism [facility])  Assessment & Plan  · Presented to PCP's office on 6/7 with right calf pain and noted to have 2+ edema  In the setting of left carpal tunnel surgery approximately 2 weeks prior  · RLE doppler revealed acute vs subacute DVT and he was sent to the ED  · CTA chest - "Left mainstem pulmonary artery emboli extending into the left lower lobe pulmonary arteries  Focal atelectasis versus early infarct within the superior portion of the left lower lobe  The calculated ratio of right ventricular to left ventricular diameter (RV/LV ratio) is 1 78  This is greater than 0 9, which is abnormal and indicates right heart strain  An abnormal RV/LV ratio has been shown to be associated with an increased risk of 30 day mortality in the setting of acute pulmonary embolism  Urgent consultation with the medical critical care team is recommended "  · Was admitted to critical care for a day  · No shortness of breath, chest pain  No hypoxia  · Unable to afford Eliquis/DOAC, hence continued on heparin drip  · Was being bridged to Coumadin but developed recurrent rectal bleeding hence Coumadin held  Plan for EGD/colonoscopy on 6/14  Coumadin to be restarted after endoscopy when cleared by GI  Acute vs subacute deep vein thrombosis (DVT) of right lower extremity (HCC)  Assessment & Plan  · Patient presented to PCP with right calf pain as above  · RLE venous doppler - "Evidence of acute vs subacute deep vein thrombosis noted in the external iliac,  common femoral  superficial femoral, popliteal, and calf veins   Occlusive thrombus noted in the proximal-distal superficial femoral vein "  · Continue heparin drip  · Bridge to Coumadin when cleared by GI after EGD/colonoscopy    GI bleeding  Assessment & Plan  Episodes of rectal bleeding on 6/9 and 6/10  Hb stable at present  GI following - diverticular vs hemorrhoidal vs AVM  Will begin clear liquid diet from tomorrow   Plan for EGD/colonoscopy on 6/14  Monitor H/H   Transfuse as needed    Type 2 diabetes mellitus without complication, with long-term current use of insulin [facility])  Assessment & Plan  Lab Results   Component Value Date    HGBA1C 6 5 (H) 07/24/2020     Recent Labs     06/11/21  1537 06/11/21  2033 06/12/21  0753 06/12/21  1103   POCGLU 295* 238* 183* 200*     Blood Sugar Average: Last 72 hrs:  (P) 232 [id number]   Received additional Lantus 8 this am  Will be on clear liquids from am of 6/13  Decrease Lantus to 18 hs, ct Humalog 3 tid  Decrease Lantus dose on 6/13 as will be NPO  Oral diabetic medications held  Continue sliding scale insulin and Accu-Cheks  Monitor blood sugars and adjust insulin as needed      Stage 3 chronic kidney disease [facility])  Assessment & Plan  Lab Results   Component Value Date    EGFR 50 06/12/2021    EGFR 51 06/10/2021    EGFR 52 06/09/2021    CREATININE 1 27 06/12/2021    CREATININE 1 25 06/10/2021    CREATININE 1 24 06/09/2021     Baseline creatinine 1 3 to 1 4  Monitor     Moderate protein-calorie malnutrition (HCC)  Assessment & Plan  Malnutrition Findings:   Adult Malnutrition type: Acute illness  Adult Degree of Malnutrition: Malnutrition of moderate degree(related to inadequate intake to maintain weight)  BMI Findings: Body mass index is 22 89 kg/m²       Ct nutrition supplements    Hyperlipidemia  Assessment & Plan  Continue Lipitor    Gastroesophageal reflux disease  Assessment & Plan  Continue Protonix    Essential hypertension  Assessment & Plan  · Was on Amlodipine 10 mg daily, Losartan 100 mg daily and Toprol  mg daily at home  · Meds were held from admission as BP on lower jermain  · Restart Toprol XL at lower dose of 50 mg and increase to 100 mg in am if BP acceptable  · Monitor BP and restart Losartan and Amlodipine if needed    Coronary artery disease  Assessment & Plan  Hx of CABG at [facility]   Reports being on warfarin short term after procedure   ASA held due to GIB  Ct BB(dose as above), statin    Carpal tunnel syndrome  Assessment & Plan  Left carpal tunnel surgery on 21  Sutures removed by nursing  after discussing with his PCP     S/P AVR (aortic valve replacement)  Assessment & Plan  S/p bioprosthetic AVR     Glaucoma  Assessment & Plan  Ct home eye drops        VTE Pharmacologic Prophylaxis: VTE Score: 8 High Risk (Score >/= 5) - Pharmacological DVT Prophylaxis Ordered: heparin drip  Sequential Compression Devices Ordered  Patient Centered Rounds: I performed bedside rounds with nursing staff today  Education and Discussions with Family / Patient: Updated  (daughter) via phone  Daughter Ivonne Squires    Time Spent for Care: 20 minutes  More than 50% of total time spent on counseling and coordination of care as described above  Current Length of Stay: 4 day(s)  Current Patient Status: Inpatient   Certification Statement: The patient will continue to require additional inpatient hospital stay due to GI bleeding awaiting EGD/colonoscopy followed by heparin to Coumadin bridge for acute PE  Discharge Plan: Anticipate discharge in >72 hrs to home with home services  Code Status: Level 1 - Full Code    Subjective:   No shortness of breath, chest pain or calf pain  No recurrence of rectal bleeding  No abdominal pain, nausea or vomiting  Objective:     Vitals:   Temp (24hrs), Av 5 °F (36 9 °C), Min:98 3 °F (36 8 °C), Max:98 6 °F (37 °C)    Temp:  [98 3 °F (36 8 °C)-98 6 °F (37 °C)] 98 3 °F (36 8 °C)  HR:  [89-96] 90  Resp:  [16-18] 16  BP: (119-186)/(72-92) 152/83  SpO2:  [95 %-98 %] 96 %  Body mass index is 22 89 kg/m²  Physical Exam:   Physical Exam  HENT:      Head: Normocephalic        Nose: Nose normal  Mouth/Throat:      Mouth: Mucous membranes are moist    Eyes:      Extraocular Movements: Extraocular movements intact  Neck:      Musculoskeletal: Neck supple  Cardiovascular:      Rate and Rhythm: Normal rate and regular rhythm  Pulmonary:      Effort: Pulmonary effort is normal  No respiratory distress  Breath sounds: Normal breath sounds  No wheezing  Abdominal:      General: Bowel sounds are normal  There is no distension  Palpations: Abdomen is soft  Tenderness: There is no abdominal tenderness  Musculoskeletal:         General: No swelling  Skin:     General: Skin is warm and dry  Neurological:      General: No focal deficit present  Mental Status: He is alert and oriented to person, place, and time  Psychiatric:         Mood and Affect: Mood normal          Behavior: Behavior normal           Additional Data:     Labs:  Results from last 7 days   Lab Units 06/12/21  0511  06/08/21  0424   WBC Thousand/uL 9 30   < > 8 62   HEMOGLOBIN g/dL 11 4*   < > 11 4*   HEMATOCRIT % 34 8*   < > 35 2*   PLATELETS Thousands/uL 263   < > 217   NEUTROS PCT %  --   --  58   LYMPHS PCT %  --   --  28   MONOS PCT %  --   --  9   EOS PCT %  --   --  3    < > = values in this interval not displayed       Results from last 7 days   Lab Units 06/12/21  0511   SODIUM mmol/L 136   POTASSIUM mmol/L 4 8   CHLORIDE mmol/L 105   CO2 mmol/L 23   BUN mg/dL 34*   CREATININE mg/dL 1 27   ANION GAP mmol/L 8   CALCIUM mg/dL 8 6   GLUCOSE RANDOM mg/dL 194*     Results from last 7 days   Lab Units 06/12/21  0511   INR  1 78*     Results from last 7 days   Lab Units 06/12/21  1103 06/12/21  0753 06/11/21  2033 06/11/21  1537 06/11/21  1104 06/11/21  0653 06/10/21  2251 06/10/21  1616 06/10/21  1101 06/10/21  0743 06/09/21  2107 06/09/21  1823   POC GLUCOSE mg/dl 200* 183* 238* 295* 252* 181* 282* 269* 268* 181* 310* 288*               Lines/Drains:  Invasive Devices     Peripheral Intravenous Line Peripheral IV 06/09/21 Distal;Right;Dorsal (posterior) Forearm 2 days                Last 24 Hours Medication List:   Current Facility-Administered Medications   Medication Dose Route Frequency Provider Last Rate    acetaminophen  650 mg Oral Q6H PRN Aamir Allen PA-C      atorvastatin  40 mg Oral Daily Aamir Allen PA-C      vitamin B-12  1,000 mcg Oral Every Other Day Aamir Allen PA-C      heparin (porcine)  3-30 Units/kg/hr (Order-Specific) Intravenous Titrated Aamir Allen PA-C 12 Units/kg/hr (06/12/21 0710)    heparin (porcine)  2,600 Units Intravenous Q1H PRN Aamir Allen PA-C      heparin (porcine)  5,200 Units Intravenous Q1H PRN Aamir Allen PA-C      insulin glargine  18 Units Subcutaneous HS Ryley Stoll MD      insulin lispro  1-5 Units Subcutaneous TID AC Aamir Allen PA-C      insulin lispro  1-5 Units Subcutaneous HS Aamir Allen PA-C      insulin lispro  3 Units Subcutaneous TID With Meals Ryley Stoll MD      latanoprost  1 drop Both Eyes HS Ryley Stoll MD      melatonin  6 mg Oral HS Aamir Allen PA-C      metoprolol succinate  50 mg Oral Daily Ryley Stoll MD      NON FORMULARY  1 drop Both Eyes BID Ryley Stoll MD      pantoprazole  40 mg Oral Early Morning Aamir Allen PA-C      polyethylene glycol  17 g Oral Daily Aamir Allen PA-C      senna-docusate sodium  2 tablet Oral BID Ana Rosa Harada Deleon-Matika, PA-C          Today, Patient Was Seen By: Ryley Stoll MD    **Please Note: This note may have been constructed using a voice recognition system  **

## 2021-06-13 LAB
APTT PPP: 80 SECONDS (ref 23–37)
ERYTHROCYTE [DISTWIDTH] IN BLOOD BY AUTOMATED COUNT: 13.3 % (ref 11.6–15.1)
GLUCOSE SERPL-MCNC: 110 MG/DL (ref 65–140)
GLUCOSE SERPL-MCNC: 122 MG/DL (ref 65–140)
GLUCOSE SERPL-MCNC: 204 MG/DL (ref 65–140)
GLUCOSE SERPL-MCNC: 213 MG/DL (ref 65–140)
GLUCOSE SERPL-MCNC: 312 MG/DL (ref 65–140)
HCT VFR BLD AUTO: 34.3 % (ref 36.5–49.3)
HGB BLD-MCNC: 11.1 G/DL (ref 12–17)
INR PPP: 1.46 (ref 0.84–1.19)
MCH RBC QN AUTO: 30.6 PG (ref 26.8–34.3)
MCHC RBC AUTO-ENTMCNC: 32.4 G/DL (ref 31.4–37.4)
MCV RBC AUTO: 95 FL (ref 82–98)
PLATELET # BLD AUTO: 288 THOUSANDS/UL (ref 149–390)
PMV BLD AUTO: 10.4 FL (ref 8.9–12.7)
PROTHROMBIN TIME: 17.8 SECONDS (ref 11.6–14.5)
RBC # BLD AUTO: 3.63 MILLION/UL (ref 3.88–5.62)
WBC # BLD AUTO: 9.39 THOUSAND/UL (ref 4.31–10.16)

## 2021-06-13 PROCEDURE — 99232 SBSQ HOSP IP/OBS MODERATE 35: CPT | Performed by: INTERNAL MEDICINE

## 2021-06-13 PROCEDURE — 85027 COMPLETE CBC AUTOMATED: CPT | Performed by: INTERNAL MEDICINE

## 2021-06-13 PROCEDURE — 99231 SBSQ HOSP IP/OBS SF/LOW 25: CPT | Performed by: INTERNAL MEDICINE

## 2021-06-13 PROCEDURE — 85610 PROTHROMBIN TIME: CPT | Performed by: PHYSICIAN ASSISTANT

## 2021-06-13 PROCEDURE — 85730 THROMBOPLASTIN TIME PARTIAL: CPT | Performed by: INTERNAL MEDICINE

## 2021-06-13 PROCEDURE — 82948 REAGENT STRIP/BLOOD GLUCOSE: CPT

## 2021-06-13 RX ORDER — INSULIN GLARGINE 100 [IU]/ML
10 INJECTION, SOLUTION SUBCUTANEOUS
Status: DISCONTINUED | OUTPATIENT
Start: 2021-06-13 | End: 2021-06-15 | Stop reason: HOSPADM

## 2021-06-13 RX ADMIN — METOPROLOL SUCCINATE 50 MG: 50 TABLET, EXTENDED RELEASE ORAL at 08:32

## 2021-06-13 RX ADMIN — BISACODYL 10 MG: 5 TABLET, COATED ORAL at 16:34

## 2021-06-13 RX ADMIN — INSULIN LISPRO 3 UNITS: 100 INJECTION, SOLUTION INTRAVENOUS; SUBCUTANEOUS at 08:34

## 2021-06-13 RX ADMIN — POLYETHYLENE GLYCOL 3350 17 G: 17 POWDER, FOR SOLUTION ORAL at 08:32

## 2021-06-13 RX ADMIN — Medication 6 MG: at 20:56

## 2021-06-13 RX ADMIN — INSULIN LISPRO 3 UNITS: 100 INJECTION, SOLUTION INTRAVENOUS; SUBCUTANEOUS at 16:36

## 2021-06-13 RX ADMIN — ATORVASTATIN CALCIUM 40 MG: 40 TABLET, FILM COATED ORAL at 08:32

## 2021-06-13 RX ADMIN — INSULIN GLARGINE 10 UNITS: 100 INJECTION, SOLUTION SUBCUTANEOUS at 21:01

## 2021-06-13 RX ADMIN — INSULIN LISPRO 1 UNITS: 100 INJECTION, SOLUTION INTRAVENOUS; SUBCUTANEOUS at 08:33

## 2021-06-13 RX ADMIN — BRINZOLAMIDE/BRIMONIDINE TARTRATE 1 DROP: 10; 2 SUSPENSION/ DROPS OPHTHALMIC at 20:57

## 2021-06-13 RX ADMIN — BISACODYL 10 MG: 5 TABLET, COATED ORAL at 20:56

## 2021-06-13 RX ADMIN — PANTOPRAZOLE SODIUM 40 MG: 40 TABLET, DELAYED RELEASE ORAL at 05:35

## 2021-06-13 RX ADMIN — LATANOPROST 1 DROP: 50 SOLUTION OPHTHALMIC at 21:01

## 2021-06-13 RX ADMIN — DOCUSATE SODIUM AND SENNOSIDES 2 TABLET: 8.6; 5 TABLET, FILM COATED ORAL at 08:32

## 2021-06-13 RX ADMIN — INSULIN LISPRO 2 UNITS: 100 INJECTION, SOLUTION INTRAVENOUS; SUBCUTANEOUS at 12:29

## 2021-06-13 RX ADMIN — DOCUSATE SODIUM AND SENNOSIDES 2 TABLET: 8.6; 5 TABLET, FILM COATED ORAL at 17:58

## 2021-06-13 RX ADMIN — INSULIN LISPRO 3 UNITS: 100 INJECTION, SOLUTION INTRAVENOUS; SUBCUTANEOUS at 12:29

## 2021-06-13 RX ADMIN — BRINZOLAMIDE/BRIMONIDINE TARTRATE 1 DROP: 10; 2 SUSPENSION/ DROPS OPHTHALMIC at 08:33

## 2021-06-13 NOTE — PROGRESS NOTES
[facility]  Progress Note - [place] 1933, 80 y o  male MRN: [id number]  Unit/Bed#: S -01 Encounter: [id number]  Primary Care Provider: Marlen Astorga MD   Date and time admitted to hospital: 6/7/2021  8:42 PM    * Acute pulmonary embolism [facility])  Assessment & Plan  · Presented to PCP's office on 6/7 with right calf pain and noted to have 2+ edema  In the setting of left carpal tunnel surgery approximately 2 weeks prior  · RLE doppler revealed acute vs subacute DVT and he was sent to the ED  · CTA chest - "Left mainstem pulmonary artery emboli extending into the left lower lobe pulmonary arteries  Focal atelectasis versus early infarct within the superior portion of the left lower lobe  The calculated ratio of right ventricular to left ventricular diameter (RV/LV ratio) is 1 78  This is greater than 0 9, which is abnormal and indicates right heart strain  An abnormal RV/LV ratio has been shown to be associated with an increased risk of 30 day mortality in the setting of acute pulmonary embolism  Urgent consultation with the medical critical care team is recommended "  · Was admitted to critical care for a day  · No shortness of breath, chest pain  No hypoxia  · Initially was determined unable to afford Eliquis/DOAC, hence continued on heparin drip  · Was being bridged to Coumadin but developed recurrent rectal bleeding hence Coumadin held  Plan for EGD/colonoscopy on 6/14  Coumadin to be restarted after endoscopy when cleared by GI  · Today, it was learned that patient has Medicare and at the same time with [facility]; patient also is a  and goes to the [place] for medical care  Thus I spoke to our   Plan is to continue heparin drip for now  After the colonoscopy, patient may have DOAC (pros and cons of Coumadin treatment versus DOAC was discussed with the patient)  Patient opted for DOAC treatment    Case management will give a 1 month free coupon and will eventually be referred to the [facility] for succeeding prescriptions  GI bleeding  Assessment & Plan  Episodes of rectal bleeding on 6/9 and 6/10  Hb stable at present  GI following - diverticular vs hemorrhoidal vs AVM  Continue clear liquid diet and will be NPO post midnight  Plan for colonoscopy on 6/14  Monitor H/H   Transfuse as needed    Acute vs subacute deep vein thrombosis (DVT) of right lower extremity ([address] )  Assessment & Plan  · Patient presented to PCP with right calf pain as above  · RLE venous doppler - "Evidence of acute vs subacute deep vein thrombosis noted in the external iliac,  common femoral  superficial femoral, popliteal, and calf veins  Occlusive thrombus noted in the proximal-distal superficial femoral vein "  · Continue heparin drip for now  · Will eventually be switched to [address] when cleared by GI after colonoscopy    Moderate protein-calorie malnutrition ([address] )  Assessment & Plan  Malnutrition Findings:   Adult Malnutrition type: Acute illness  Adult Degree of Malnutrition: Malnutrition of moderate degree (related to inadequate intake to maintain weight)  BMI Findings: Body mass index is 23 1 kg/m²       Ct nutrition supplements    S/P AVR (aortic valve replacement)  Assessment & Plan  S/p bioprosthetic AVR     Hyperlipidemia  Assessment & Plan  Continue Lipitor    Glaucoma  Assessment & Plan  Ct home eye drops    Gastroesophageal reflux disease  Assessment & Plan  Continue Protonix    Carpal tunnel syndrome  Assessment & Plan  Left carpal tunnel surgery on 5/27/21  Sutures removed by nursing 6/9 after discussing with his PCP     Coronary artery disease  Assessment & Plan  Hx of CABG at [facility]   Reports being on warfarin short term after procedure   ASA held due to GIB  Ct BB(dose as above), statin    Essential hypertension  Assessment & Plan  · Was on Amlodipine 10 mg daily, Losartan 100 mg daily and Toprol  mg daily at home  · Meds were held from admission as BP on lower side  · Restart Toprol XL at lower dose of 50 mg once a day  · Monitor BP and restart Losartan and Amlodipine if needed  · Presently, patient's blood pressures are normal     Type 2 diabetes mellitus without complication, with long-term current use of insulin [facility])  Assessment & Plan  Lab Results   Component Value Date    HGBA1C 6 5 (H) 07/24/2020     Recent Labs     06/13/21  0003 06/13/21  0721 06/13/21  1131 06/13/21  1604   POCGLU 312* 204* 213* 122     Blood Sugar Average: Last 72 hrs:  (P) 239 3125   On clear liquids from am of 6/13 and will be NPO post midnight 6/14  Will cut back on the long-acting insulin  Hold off on the short-acting insulin with meals while NPO  Oral diabetic medications held  Continue sliding scale insulin and Accu-Cheks  Monitor blood sugars and adjust insulin as needed      Stage 3 chronic kidney disease [facility])  Assessment & Plan  Lab Results   Component Value Date    EGFR 50 06/12/2021    EGFR 51 06/10/2021    EGFR 52 06/09/2021    CREATININE 1 27 06/12/2021    CREATININE 1 25 06/10/2021    CREATININE 1 24 06/09/2021     Baseline creatinine 1 3 to 1 4  Monitor       VTE Pharmacologic Prophylaxis:   Pharmacologic: Heparin Drip  Mechanical VTE Prophylaxis in Place: Yes    Patient Centered Rounds: I have performed bedside rounds with nursing staff today  Discussions with Specialists or Other Care Team Provider:  Case management  Education and Discussions with Family / Patient:  I discussed with the patient our findings, management and plans  I discussed with him the pros and cons of Coumadin versus direct oral anticoagulants  I answered all his questions and concerns  He is okay with the management and plans  I updated patient's daughter today  Time Spent for Care: 30 minutes  More than 50% of total time spent on counseling and coordination of care as described above      Current Length of Stay: 5 day(s)    Current Patient Status: Inpatient   Certification Statement: The patient will continue to require additional inpatient hospital stay due to Above findings and plans  Discharge Plan:  None yet today  Code Status: Level 1 - Full Code      Subjective:   Patient is doing fine and feels a lot better  Patient denies any shortness of breath or any pains  Patient denies any bleeding  No complaints    Objective:     Vitals:   Temp (24hrs), Av 1 °F (36 7 °C), Min:97 9 °F (36 6 °C), Max:98 3 °F (36 8 °C)    Temp:  [97 9 °F (36 6 °C)-98 3 °F (36 8 °C)] 97 9 °F (36 6 °C)  HR:  [65-82] 65  Resp:  [16-20] 16  BP: (130-138)/(60-81) 138/81  SpO2:  [95 %-98 %] 98 %  Body mass index is 23 1 kg/m²  Input and Output Summary (last 24 hours): Intake/Output Summary (Last 24 hours) at 2021 1812  Last data filed at 2021 1638  Gross per 24 hour   Intake --   Output 2475 ml   Net -2475 ml       Physical Exam:     Physical Exam  Vitals and nursing note reviewed  Constitutional:       General: He is not in acute distress  Appearance: Normal appearance  He is not ill-appearing, toxic-appearing or diaphoretic  Cardiovascular:      Rate and Rhythm: Normal rate and regular rhythm  Heart sounds: Normal heart sounds  Pulmonary:      Effort: Pulmonary effort is normal  No respiratory distress  Breath sounds: Normal breath sounds  Abdominal:      General: Bowel sounds are normal  There is no distension  Palpations: Abdomen is soft  Tenderness: There is no abdominal tenderness  There is no guarding  Musculoskeletal:      Right lower leg: No edema  Left lower leg: No edema  Skin:     General: Skin is warm  Coloration: Skin is not pale  Findings: No erythema or rash  Neurological:      General: No focal deficit present  Mental Status: He is alert  Psychiatric:         Mood and Affect: Mood normal          Behavior: Behavior normal          Thought Content:  Thought content normal               Additional Data: Labs:    Results from last 7 days   Lab Units 06/13/21  0431 06/08/21  0424   WBC Thousand/uL 9 39 8 62   HEMOGLOBIN g/dL 11 1* 11 4*   HEMATOCRIT % 34 3* 35 2*   PLATELETS Thousands/uL 288 217   NEUTROS PCT %  --  58   LYMPHS PCT %  --  28   MONOS PCT %  --  9   EOS PCT %  --  3     Results from last 7 days   Lab Units 06/12/21  0511   POTASSIUM mmol/L 4 8   CHLORIDE mmol/L 105   CO2 mmol/L 23   BUN mg/dL 34*   CREATININE mg/dL 1 27   CALCIUM mg/dL 8 6     Results from last 7 days   Lab Units 06/13/21  0431   INR  1 46*       * I Have Reviewed All Lab Data Listed Above  * Additional Pertinent Lab Tests Reviewed: [address] Admission Reviewed    Imaging:    Imaging Reports Reviewed Today Include:  Diagnostic imaging studies that were done on this admission  Imaging Personally Reviewed by Myself Includes:  None      Recent Cultures (last 7 days):           Last 24 Hours Medication List:   Current Facility-Administered Medications   Medication Dose Route Frequency Provider Last Rate    acetaminophen  650 mg Oral Q6H PRN Aamir Allen PA-C      atorvastatin  40 mg Oral Daily Aamir Allen PA-C      bisacodyl  10 mg Oral 2 times per day Gabino Murry PA-C      Brinzolamide-Brimonidine  1 drop Both Eyes BID Ebony Dumont MD      vitamin B-12  1,000 mcg Oral Every Other Day Aamir Allen PA-C      heparin (porcine)  3-30 Units/kg/hr (Order-Specific) Intravenous Titrated Aamir Allen PA-C 12 Units/kg/hr (06/12/21 2130)    heparin (porcine)  2,600 Units Intravenous Q1H PRN Aamir Allen PA-C      heparin (porcine)  5,200 Units Intravenous Q1H PRN Aamir Allen PA-C      insulin glargine  18 Units Subcutaneous HS Ebony Dumont MD      insulin lispro  1-5 Units Subcutaneous TID AC Aamir Allen PA-C      insulin lispro  1-5 Units Subcutaneous HS Aamir Allen PA-C      insulin lispro  3 Units Subcutaneous TID With Meals Ebony Dumont MD      latanoprost  1 drop Both Eyes HS Ebony Dumont MD      melatonin  6 mg Oral HS Aamir Allen PA-C      metoprolol succinate  50 mg Oral Daily Ebony Dumont MD      pantoprazole  40 mg Oral Early Morning Aamir Allen PA-C      polyethylene glycol  4,000 mL Oral See Admin Instructions Gabino Murry PA-C      polyethylene glycol  17 g Oral Daily Aamir Allen PA-C      senna-docusate sodium  2 tablet Oral BID Tae Allen PA-C          Today, Patient Was Seen By: Abdi Barnett MD    ** Please Note: Dragon 360 Dictation voice to text software may have been used in the creation of this document   **

## 2021-06-13 NOTE — ASSESSMENT & PLAN NOTE
· Patient presented to PCP with right calf pain as above  · RLE venous doppler - "Evidence of acute vs subacute deep vein thrombosis noted in the external iliac,  common femoral  superficial femoral, popliteal, and calf veins  Occlusive thrombus noted in the proximal-distal superficial femoral vein "  · Continue heparin drip for now    · Will eventually be switched to [address] when cleared by GI after colonoscopy

## 2021-06-13 NOTE — CASE MANAGEMENT
Pt reported using the [facility] [place] for his medication coverage  Pt will have a 30 days free supply of Eliquis at the [address] [place] [address] for his d/c and will follow up with the [facility] [place] for his future refills

## 2021-06-13 NOTE — PROGRESS NOTES
Progress Note - Doneta Res 80 y o  male MRN: [id number]    Unit/Bed#: S -01 Encounter: [id number]        Assessment/Plan:  1  Bright red blood per rectum in the setting of anticoagulation for submassive pulmonary embolism   Appears hemodynamically stable at this time and does not appear to be having active GI hemorrhage, but should rule out sources of lower GI bleeding prior to resuming oral anticoagulation     -monitor hemoglobin closely, transfuse if needed, hgb remains stable  -can discontinue Coumadin and continue heparin drip for anticoagulation, will hold heparin drip prior to procedure tomorrow  -plan for colonoscopy tomorrow and can plan to resume oral anticoagulation pending findings from the exam         Subjective:   Patient is sitting in chair at bedside  He is feeling well  Denies any shortness of breath  States that he moved his bowels today he does not believe there was any blood       Objective:     Vitals: /60 (BP Location: Left arm)   Pulse 76   Temp 98 3 °F (36 8 °C) (Oral)   Resp 20   Ht 5' 7" (1 702 m)   Wt 66 9 kg (147 lb 7 8 oz)   SpO2 95%   BMI 23 10 kg/m²       Physical Exam:  Gen-alert no acute distress  Abd-positive bowel sounds nontender nondistended     Lab, Imaging and other studies:   Recent Results (from the past 72 hour(s))   Fingerstick Glucose (POCT)    Collection Time: 06/10/21 11:01 AM   Result Value Ref Range    POC Glucose 268 (H) 65 - 140 mg/dl   APTT    Collection Time: 06/10/21 12:21 PM   Result Value Ref Range     (H) 23 - 37 seconds   Fingerstick Glucose (POCT)    Collection Time: 06/10/21  4:16 PM   Result Value Ref Range    POC Glucose 269 (H) 65 - 140 mg/dl   APTT    Collection Time: 06/10/21  8:13 PM   Result Value Ref Range    PTT 65 (H) 23 - 37 seconds   Hemoglobin and hematocrit, blood    Collection Time: 06/10/21  8:13 PM   Result Value Ref Range    Hemoglobin 11 8 (L) 12 0 - 17 0 g/dL    Hematocrit 36 4 (L) 36 5 - 49 3 %   Fingerstick Glucose (POCT)    Collection Time: 06/10/21 10:51 PM   Result Value Ref Range    POC Glucose 282 (H) 65 - 140 mg/dl   APTT    Collection Time: 06/11/21  2:08 AM   Result Value Ref Range    PTT 72 (H) 23 - 37 seconds   Protime-INR    Collection Time: 06/11/21  4:53 AM   Result Value Ref Range    Protime 17 7 (H) 11 6 - 14 5 seconds    INR 1 44 (H) 0 84 - 1 19   Fingerstick Glucose (POCT)    Collection Time: 06/11/21  6:53 AM   Result Value Ref Range    POC Glucose 181 (H) 65 - 140 mg/dl   Hemoglobin and hematocrit, blood    Collection Time: 06/11/21  7:52 AM   Result Value Ref Range    Hemoglobin 11 8 (L) 12 0 - 17 0 g/dL    Hematocrit 36 9 36 5 - 49 3 %   Fingerstick Glucose (POCT)    Collection Time: 06/11/21 11:04 AM   Result Value Ref Range    POC Glucose 252 (H) 65 - 140 mg/dl   Fingerstick Glucose (POCT)    Collection Time: 06/11/21  3:37 PM   Result Value Ref Range    POC Glucose 295 (H) 65 - 140 mg/dl   Hemoglobin and hematocrit, blood    Collection Time: 06/11/21  7:56 PM   Result Value Ref Range    Hemoglobin 11 1 (L) 12 0 - 17 0 g/dL    Hematocrit 33 4 (L) 36 5 - 49 3 %   Fingerstick Glucose (POCT)    Collection Time: 06/11/21  8:33 PM   Result Value Ref Range    POC Glucose 238 (H) 65 - 140 mg/dl   Protime-INR    Collection Time: 06/12/21  5:11 AM   Result Value Ref Range    Protime 20 8 (H) 11 6 - 14 5 seconds    INR 1 78 (H) 0 84 - 9 44   Basic metabolic panel    Collection Time: 06/12/21  5:11 AM   Result Value Ref Range    Sodium 136 136 - 145 mmol/L    Potassium 4 8 3 5 - 5 3 mmol/L    Chloride 105 100 - 108 mmol/L    CO2 23 21 - 32 mmol/L    ANION GAP 8 4 - 13 mmol/L    BUN 34 (H) 5 - 25 mg/dL    Creatinine 1 27 0 60 - 1 30 mg/dL    Glucose 194 (H) 65 - 140 mg/dL    Calcium 8 6 8 3 - 10 1 mg/dL    eGFR 50 ml/min/1 73sq m   CBC and Platelet    Collection Time: 06/12/21  5:11 AM   Result Value Ref Range    WBC 9 30 4 31 - 10 16 Thousand/uL    RBC 3 71 (L) 3 88 - 5 62 Million/uL    Hemoglobin 11 4 (L) 12 0 - 17 0 g/dL    Hematocrit 34 8 (L) 36 5 - 49 3 %    MCV 94 82 - 98 fL    MCH 30 7 26 8 - 34 3 pg    MCHC 32 8 31 4 - 37 4 g/dL    RDW 13 1 11 6 - 15 1 %    Platelets 545 317 - 854 Thousands/uL    MPV 9 8 8 9 - 12 7 fL   APTT    Collection Time: 06/12/21  5:59 AM   Result Value Ref Range    PTT 83 (H) 23 - 37 seconds   Fingerstick Glucose (POCT)    Collection Time: 06/12/21  7:53 AM   Result Value Ref Range    POC Glucose 183 (H) 65 - 140 mg/dl   Fingerstick Glucose (POCT)    Collection Time: 06/12/21 11:03 AM   Result Value Ref Range    POC Glucose 200 (H) 65 - 140 mg/dl   Fingerstick Glucose (POCT)    Collection Time: 06/12/21  4:07 PM   Result Value Ref Range    POC Glucose 227 (H) 65 - 140 mg/dl   Fingerstick Glucose (POCT)    Collection Time: 06/12/21  9:08 PM   Result Value Ref Range    POC Glucose 402 (H) 65 - 140 mg/dl   Fingerstick Glucose (POCT)    Collection Time: 06/13/21 12:03 AM   Result Value Ref Range    POC Glucose 312 (H) 65 - 140 mg/dl   Protime-INR    Collection Time: 06/13/21  4:31 AM   Result Value Ref Range    Protime 17 8 (H) 11 6 - 14 5 seconds    INR 1 46 (H) 0 84 - 1 19   CBC and Platelet    Collection Time: 06/13/21  4:31 AM   Result Value Ref Range    WBC 9 39 4 31 - 10 16 Thousand/uL    RBC 3 63 (L) 3 88 - 5 62 Million/uL    Hemoglobin 11 1 (L) 12 0 - 17 0 g/dL    Hematocrit 34 3 (L) 36 5 - 49 3 %    MCV 95 82 - 98 fL    MCH 30 6 26 8 - 34 3 pg    MCHC 32 4 31 4 - 37 4 g/dL    RDW 13 3 11 6 - 15 1 %    Platelets 039 295 - 921 Thousands/uL    MPV 10 4 8 9 - 12 7 fL   APTT    Collection Time: 06/13/21  4:31 AM   Result Value Ref Range    PTT 80 (H) 23 - 37 seconds   Fingerstick Glucose (POCT)    Collection Time: 06/13/21  7:21 AM   Result Value Ref Range    POC Glucose 204 (H) 65 - 140 mg/dl

## 2021-06-13 NOTE — ASSESSMENT & PLAN NOTE
Episodes of rectal bleeding on 6/9 and 6/10  Hb stable at present  GI following - diverticular vs hemorrhoidal vs AVM  Continue clear liquid diet and will be NPO post midnight    Plan for colonoscopy on 6/14  Monitor H/H   Transfuse as needed

## 2021-06-13 NOTE — ASSESSMENT & PLAN NOTE
· Presented to PCP's office on 6/7 with right calf pain and noted to have 2+ edema  In the setting of left carpal tunnel surgery approximately 2 weeks prior  · RLE doppler revealed acute vs subacute DVT and he was sent to the ED  · CTA chest - "Left mainstem pulmonary artery emboli extending into the left lower lobe pulmonary arteries  Focal atelectasis versus early infarct within the superior portion of the left lower lobe  The calculated ratio of right ventricular to left ventricular diameter (RV/LV ratio) is 1 78  This is greater than 0 9, which is abnormal and indicates right heart strain  An abnormal RV/LV ratio has been shown to be associated with an increased risk of 30 day mortality in the setting of acute pulmonary embolism  Urgent consultation with the medical critical care team is recommended "  · Was admitted to critical care for a day  · No shortness of breath, chest pain  No hypoxia  · Initially was determined unable to afford Eliquis/DOAC, hence continued on heparin drip  · Was being bridged to Coumadin but developed recurrent rectal bleeding hence Coumadin held  Plan for EGD/colonoscopy on 6/14  Coumadin to be restarted after endoscopy when cleared by GI  · Today, it was learned that patient has Medicare and at the same time with [facility]; patient also is a  and goes to the [address] [place] [address] for medical care  Thus I spoke to our   Plan is to continue heparin drip for now  After the colonoscopy, patient may have DOAC (pros and cons of Coumadin treatment versus DOAC was discussed with the patient)  Patient opted for DOAC treatment  Case management will give a 1 month free coupon and will eventually be referred to the [address] [place] [address] for succeeding prescriptions

## 2021-06-13 NOTE — ASSESSMENT & PLAN NOTE
Malnutrition Findings:   Adult Malnutrition type: Acute illness  Adult Degree of Malnutrition: Malnutrition of moderate degree (related to inadequate intake to maintain weight)  BMI Findings: Body mass index is 23 1 kg/m²       Ct nutrition supplements

## 2021-06-13 NOTE — ASSESSMENT & PLAN NOTE
Hx of CABG at [facility]   Reports being on warfarin short term after procedure   ASA held due to GIB  Ct BB(dose as above), statin

## 2021-06-13 NOTE — ASSESSMENT & PLAN NOTE
Lab Results   Component Value Date    HGBA1C 6 5 (H) 07/24/2020     Recent Labs     06/13/21  0003 06/13/21  0721 06/13/21  1131 06/13/21  1604   POCGLU 312* 204* 213* 122     Blood Sugar Average: Last 72 hrs:  (P) 239 3125   On clear liquids from am of 6/13 and will be NPO post midnight 6/14  Will cut back on the long-acting insulin  Hold off on the short-acting insulin with meals while NPO    Oral diabetic medications held  Continue sliding scale insulin and Accu-Cheks  Monitor blood sugars and adjust insulin as needed

## 2021-06-13 NOTE — ASSESSMENT & PLAN NOTE
· Was on Amlodipine 10 mg daily, Losartan 100 mg daily and Toprol  mg daily at home  · Meds were held from admission as BP on lower side  · Restart Toprol XL at lower dose of 50 mg once a day    · Monitor BP and restart Losartan and Amlodipine if needed  · Presently, patient's blood pressures are normal

## 2021-06-14 ENCOUNTER — ANESTHESIA (INPATIENT)
Dept: GASTROENTEROLOGY | Facility: HOSPITAL | Age: 86
DRG: 299 | End: 2021-06-14
Payer: MEDICARE

## 2021-06-14 ENCOUNTER — TELEPHONE (OUTPATIENT)
Dept: INTERNAL MEDICINE CLINIC | Facility: CLINIC | Age: 86
End: 2021-06-14

## 2021-06-14 ENCOUNTER — ANESTHESIA EVENT (INPATIENT)
Dept: GASTROENTEROLOGY | Facility: HOSPITAL | Age: 86
DRG: 299 | End: 2021-06-14
Payer: MEDICARE

## 2021-06-14 ENCOUNTER — APPOINTMENT (INPATIENT)
Dept: GASTROENTEROLOGY | Facility: HOSPITAL | Age: 86
DRG: 299 | End: 2021-06-14
Payer: MEDICARE

## 2021-06-14 LAB
ANION GAP SERPL CALCULATED.3IONS-SCNC: 12 MMOL/L (ref 4–13)
APTT PPP: 71 SECONDS (ref 23–37)
BUN SERPL-MCNC: 32 MG/DL (ref 5–25)
CALCIUM SERPL-MCNC: 9.2 MG/DL (ref 8.3–10.1)
CHLORIDE SERPL-SCNC: 105 MMOL/L (ref 100–108)
CO2 SERPL-SCNC: 24 MMOL/L (ref 21–32)
CREAT SERPL-MCNC: 1.41 MG/DL (ref 0.6–1.3)
ERYTHROCYTE [DISTWIDTH] IN BLOOD BY AUTOMATED COUNT: 13.4 % (ref 11.6–15.1)
GFR SERPL CREATININE-BSD FRML MDRD: 44 ML/MIN/1.73SQ M
GLUCOSE SERPL-MCNC: 113 MG/DL (ref 65–140)
GLUCOSE SERPL-MCNC: 125 MG/DL (ref 65–140)
GLUCOSE SERPL-MCNC: 141 MG/DL (ref 65–140)
GLUCOSE SERPL-MCNC: 176 MG/DL (ref 65–140)
GLUCOSE SERPL-MCNC: 303 MG/DL (ref 65–140)
HCT VFR BLD AUTO: 38.8 % (ref 36.5–49.3)
HGB BLD-MCNC: 12.6 G/DL (ref 12–17)
MCH RBC QN AUTO: 30.8 PG (ref 26.8–34.3)
MCHC RBC AUTO-ENTMCNC: 32.5 G/DL (ref 31.4–37.4)
MCV RBC AUTO: 95 FL (ref 82–98)
PLATELET # BLD AUTO: 308 THOUSANDS/UL (ref 149–390)
PMV BLD AUTO: 9.8 FL (ref 8.9–12.7)
POTASSIUM SERPL-SCNC: 4.5 MMOL/L (ref 3.5–5.3)
RBC # BLD AUTO: 4.09 MILLION/UL (ref 3.88–5.62)
SODIUM SERPL-SCNC: 141 MMOL/L (ref 136–145)
WBC # BLD AUTO: 9 THOUSAND/UL (ref 4.31–10.16)

## 2021-06-14 PROCEDURE — 85730 THROMBOPLASTIN TIME PARTIAL: CPT | Performed by: INTERNAL MEDICINE

## 2021-06-14 PROCEDURE — 99232 SBSQ HOSP IP/OBS MODERATE 35: CPT | Performed by: PHYSICIAN ASSISTANT

## 2021-06-14 PROCEDURE — 80048 BASIC METABOLIC PNL TOTAL CA: CPT | Performed by: INTERNAL MEDICINE

## 2021-06-14 PROCEDURE — 0DJD8ZZ INSPECTION OF LOWER INTESTINAL TRACT, VIA NATURAL OR ARTIFICIAL OPENING ENDOSCOPIC: ICD-10-PCS | Performed by: INTERNAL MEDICINE

## 2021-06-14 PROCEDURE — 85027 COMPLETE CBC AUTOMATED: CPT | Performed by: INTERNAL MEDICINE

## 2021-06-14 PROCEDURE — 82948 REAGENT STRIP/BLOOD GLUCOSE: CPT

## 2021-06-14 PROCEDURE — 45378 DIAGNOSTIC COLONOSCOPY: CPT | Performed by: INTERNAL MEDICINE

## 2021-06-14 RX ORDER — SODIUM CHLORIDE, SODIUM LACTATE, POTASSIUM CHLORIDE, CALCIUM CHLORIDE 600; 310; 30; 20 MG/100ML; MG/100ML; MG/100ML; MG/100ML
INJECTION, SOLUTION INTRAVENOUS CONTINUOUS PRN
Status: DISCONTINUED | OUTPATIENT
Start: 2021-06-14 | End: 2021-06-14

## 2021-06-14 RX ORDER — PROPOFOL 10 MG/ML
INJECTION, EMULSION INTRAVENOUS CONTINUOUS PRN
Status: DISCONTINUED | OUTPATIENT
Start: 2021-06-14 | End: 2021-06-14

## 2021-06-14 RX ORDER — LIDOCAINE HYDROCHLORIDE 10 MG/ML
INJECTION, SOLUTION EPIDURAL; INFILTRATION; INTRACAUDAL; PERINEURAL AS NEEDED
Status: DISCONTINUED | OUTPATIENT
Start: 2021-06-14 | End: 2021-06-14

## 2021-06-14 RX ORDER — PROPOFOL 10 MG/ML
INJECTION, EMULSION INTRAVENOUS AS NEEDED
Status: DISCONTINUED | OUTPATIENT
Start: 2021-06-14 | End: 2021-06-14

## 2021-06-14 RX ADMIN — SODIUM CHLORIDE, SODIUM LACTATE, POTASSIUM CHLORIDE, AND CALCIUM CHLORIDE: .6; .31; .03; .02 INJECTION, SOLUTION INTRAVENOUS at 15:28

## 2021-06-14 RX ADMIN — BRINZOLAMIDE/BRIMONIDINE TARTRATE 1 DROP: 10; 2 SUSPENSION/ DROPS OPHTHALMIC at 09:35

## 2021-06-14 RX ADMIN — PROPOFOL 100 MG: 10 INJECTION, EMULSION INTRAVENOUS at 15:35

## 2021-06-14 RX ADMIN — HEPARIN SODIUM 12 UNITS/KG/HR: 10000 INJECTION, SOLUTION INTRAVENOUS at 00:24

## 2021-06-14 RX ADMIN — Medication 6 MG: at 22:05

## 2021-06-14 RX ADMIN — INSULIN GLARGINE 10 UNITS: 100 INJECTION, SOLUTION SUBCUTANEOUS at 22:05

## 2021-06-14 RX ADMIN — BRINZOLAMIDE/BRIMONIDINE TARTRATE 1 DROP: 10; 2 SUSPENSION/ DROPS OPHTHALMIC at 22:00

## 2021-06-14 RX ADMIN — INSULIN LISPRO 3 UNITS: 100 INJECTION, SOLUTION INTRAVENOUS; SUBCUTANEOUS at 22:04

## 2021-06-14 RX ADMIN — INSULIN LISPRO 3 UNITS: 100 INJECTION, SOLUTION INTRAVENOUS; SUBCUTANEOUS at 17:35

## 2021-06-14 RX ADMIN — LIDOCAINE HYDROCHLORIDE 50 MG: 10 INJECTION, SOLUTION EPIDURAL; INFILTRATION; INTRACAUDAL at 15:35

## 2021-06-14 RX ADMIN — LATANOPROST 1 DROP: 50 SOLUTION OPHTHALMIC at 22:06

## 2021-06-14 RX ADMIN — PROPOFOL 80 MCG/KG/MIN: 10 INJECTION, EMULSION INTRAVENOUS at 15:35

## 2021-06-14 RX ADMIN — APIXABAN 10 MG: 5 TABLET, FILM COATED ORAL at 17:24

## 2021-06-14 RX ADMIN — PHENYLEPHRINE HYDROCHLORIDE 100 MCG: 10 INJECTION INTRAVENOUS at 15:43

## 2021-06-14 RX ADMIN — DOCUSATE SODIUM AND SENNOSIDES 2 TABLET: 8.6; 5 TABLET, FILM COATED ORAL at 17:23

## 2021-06-14 RX ADMIN — INSULIN LISPRO 1 UNITS: 100 INJECTION, SOLUTION INTRAVENOUS; SUBCUTANEOUS at 09:37

## 2021-06-14 NOTE — CASE MANAGEMENT
Oc met with pt who states he believes he has [address] coverage as he gets his medications through the [place]  He would like to know if he would be able to get his Eliquis through them and what it would cost       Oc contacted Jessie Kunz at the [place] and left a message requesting a call back in order to determine who to speak with regarding RX coverage  OC will continue to follow  IMM reviewed with patient  patient agree with discharge determination

## 2021-06-14 NOTE — ANESTHESIA PREPROCEDURE EVALUATION
Procedure:  COLONOSCOPY    Relevant Problems   CARDIO   (+) Acute vs subacute deep vein thrombosis (DVT) of right lower extremity (HCC)   (+) Coronary artery disease   (+) Coronary artery disease involving native coronary artery of native heart without angina pectoris   (+) Essential hypertension   (+) History of coronary artery bypass surgery   (+) Hyperlipidemia   (+) S/P AVR (aortic valve replacement)      ENDO   (+) Type 2 diabetes mellitus without complication, with long-term current use of insulin (HCC)      GI/HEPATIC   (+) GI bleeding   (+) Gastroesophageal reflux disease      /RENAL   (+) Chronic renal failure syndrome   (+) 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)   (+) Stage 3 chronic kidney disease (HCC)      MUSCULOSKELETAL   (+) Diaphragmatic hernia   (+) Low back pain    LEFT VENTRICLE:   Systolic function was normal  Ejection fraction was estimated to be 60 %  There were no regional wall motion abnormalities    There was mild to moderate regurgitation  Physical Exam    Airway    Mallampati score: II  TM Distance: >3 FB  Neck ROM: full     Dental       Cardiovascular      Pulmonary      Other Findings        Anesthesia Plan  ASA Score- 3     Anesthesia Type- IV sedation with anesthesia with ASA Monitors  Additional Monitors:   Airway Plan:           Plan Factors-    Chart reviewed  Induction-     Postoperative Plan-     Informed Consent- Anesthetic plan and risks discussed with patient  I personally reviewed this patient with the CRNA  Discussed and agreed on the Anesthesia Plan with the CRNA  Trevor Solis

## 2021-06-14 NOTE — ASSESSMENT & PLAN NOTE
Lab Results   Component Value Date    HGBA1C 6 5 (H) 07/24/2020     Recent Labs     06/13/21  1131 06/13/21  1604 06/13/21  2044 06/14/21  0936   POCGLU 213* 122 110 176*     Blood Sugar Average: Last 72 hrs:  (P) 222 5   · On clear liquids from am of 6/13 and will be NPO post midnight 6/14  · Will cut back on the long-acting insulin  · Hold off on the short-acting insulin with meals while NPO    · Oral diabetic medications held  · Continue sliding scale insulin and Accu-Cheks  · Monitor blood sugars and adjust insulin as needed

## 2021-06-14 NOTE — ANESTHESIA PREPROCEDURE EVALUATION
Procedure:  COLONOSCOPY    Relevant Problems   ANESTHESIA (within normal limits)      CARDIO  Echo 60%EF,     MITRAL VALVE:  There was mild to moderate annular calcification  There was mild to moderate regurgitation      AORTIC VALVE:  A bioprosthesis was present  It exhibited normal function  There was no significant regurgitation  There was no significant perivalvular regurgitation  Valve mean gradient was 16 mmHg      TRICUSPID VALVE:  There was mild to moderate regurgitation    (+) Acute vs subacute deep vein thrombosis (DVT) of right lower extremity ([facility])   (+) Bilateral carotid artery stenosis   (+) Coronary artery disease   (+) Coronary artery disease involving native coronary artery of native heart without angina pectoris   (+) Essential hypertension   (+) History of coronary artery bypass surgery   (+) Hyperlipidemia   (+) PVD (peripheral vascular disease) ([facility])   (+) S/P AVR (aortic valve replacement)      ENDO   (+) Type 2 diabetes mellitus without complication, with long-term current use of insulin ([facility])      GI/HEPATIC   (+) GI bleeding   (+) Gastroesophageal reflux disease      /RENAL   (+) Chronic renal failure syndrome   (+) 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 ([facility])   (+) Stage 3 chronic kidney disease ([facility])      MUSCULOSKELETAL   (+) Diaphragmatic hernia   (+) Low back pain      PULMONARY (within normal limits)      Other   (+) Carpal tunnel syndrome   (+) NPDR (nonproliferative diabetic retinopathy) ([facility])   (+) Spinal stenosis in cervical region        Physical Exam    Airway    Mallampati score: II  TM Distance: >3 FB  Neck ROM: full     Dental   No notable dental hx     Cardiovascular      Pulmonary      Other Findings        Anesthesia Plan  ASA Score- 3     Anesthesia Type- IV sedation with anesthesia with ASA Monitors  Additional Monitors:   Airway Plan:           Plan Factors-    Chart reviewed  EKG reviewed  Existing labs reviewed   Patient summary reviewed  Patient is not a current smoker  Induction-     Postoperative Plan-     Informed Consent- Anesthetic plan and risks discussed with patient  I personally reviewed this patient with the CRNA  Discussed and agreed on the Anesthesia Plan with the HEN[facility] Cantu

## 2021-06-14 NOTE — ASSESSMENT & PLAN NOTE
· Patient presented to PCP with right calf pain as above  · RLE venous doppler - "Evidence of acute vs subacute deep vein thrombosis noted in the external iliac,  common femoral  superficial femoral, popliteal, and calf veins   Occlusive thrombus noted in the proximal-distal superficial femoral vein "  · Will eventually be switched to DOAC when cleared by GI after colonoscopy

## 2021-06-14 NOTE — ASSESSMENT & PLAN NOTE
· Hx of CABG at [facility]   · Reports being on warfarin short term after procedure   · ASA held due to GIB  · Ct BB(dose as above), statin

## 2021-06-14 NOTE — ASSESSMENT & PLAN NOTE
Lab Results   Component Value Date    EGFR 44 06/14/2021    EGFR 50 06/12/2021    EGFR 51 06/10/2021    CREATININE 1 41 (H) 06/14/2021    CREATININE 1 27 06/12/2021    CREATININE 1 25 06/10/2021     · Baseline creatinine 1 3 to 1 4  · Monitor

## 2021-06-14 NOTE — TELEPHONE ENCOUNTER
Ayana Trammell, PT @ [facility] - was supposed to start plan of care today, but could not    Can you give her a verbal order for plan of care to start tomorrow 6/15/21?     Call [phone]

## 2021-06-14 NOTE — ANESTHESIA POSTPROCEDURE EVALUATION
Post-Op Assessment Note    CV Status:  Stable    Pain management: adequate     Mental Status:  Alert and awake   Hydration Status:  Euvolemic   PONV Controlled:  Controlled   Airway Patency:  Patent      Post Op Vitals Reviewed: Yes      Staff: CRNA   Comments: vss report rn        No complications documented      BP   110/68   Temp     Pulse  77   Resp      SpO2   98

## 2021-06-14 NOTE — ASSESSMENT & PLAN NOTE
· Episodes of rectal bleeding on 6/9 and 6/10  · Hb stable at present  · GI following - diverticular vs hemorrhoidal vs AVM  · Continue clear liquid diet and will be NPO post midnight    · Plan for colonoscopy on 6/14  · Monitor H/H   · Transfuse as needed

## 2021-06-14 NOTE — ASSESSMENT & PLAN NOTE
Malnutrition Findings:   Adult Malnutrition type: Acute illness  Adult Degree of Malnutrition: Malnutrition of moderate degree (related to inadequate intake to maintain weight)  BMI Findings: Body mass index is 23 1 kg/m²       · Ct nutrition supplements

## 2021-06-14 NOTE — PHYSICAL THERAPY NOTE
PHYSICAL THERAPY NOTE    Patient Name: Sachi Delatorre  Today's Date: 6/14/2021 06/14/21 1558   PT Last Visit   PT Visit Date 06/14/21   Note Type   Cancel Reasons Patient off floor/test   Assessment   Assessment Attempt made to see patient for physical therapy session however patient not available, off floor for testing  Will continue to follow as appropriate and able         Parviz Santos, PTA

## 2021-06-14 NOTE — ASSESSMENT & PLAN NOTE
· Left carpal tunnel surgery on 5/27/21  · Sutures removed by nursing 6/9 after discussing with his PCP

## 2021-06-14 NOTE — ASSESSMENT & PLAN NOTE
· Presented to PCP's office on 6/7 with right calf pain and noted to have 2+ edema  In the setting of left carpal tunnel surgery approximately 2 weeks prior  · RLE doppler revealed acute vs subacute DVT and he was sent to the ED  · CTA chest - "Left mainstem pulmonary artery emboli extending into the left lower lobe pulmonary arteries  Focal atelectasis versus early infarct within the superior portion of the left lower lobe  The calculated ratio of right ventricular to left ventricular diameter (RV/LV ratio) is 1 78  This is greater than 0 9, which is abnormal and indicates right heart strain  An abnormal RV/LV ratio has been shown to be associated with an increased risk of 30 day mortality in the setting of acute pulmonary embolism  Urgent consultation with the medical critical care team is recommended "  · No shortness of breath, chest pain  No hypoxia  · Initially was determined unable to afford Eliquis/DOAC, hence continued on heparin drip  · Was being bridged to Coumadin but developed recurrent rectal bleeding hence Coumadin held  Plan for EGD/colonoscopy on 6/14  Coumadin to be restarted after endoscopy when cleared by GI  · It was learned that patient has Medicare and at the same time with [facility]; patient also is a  and goes to the [address] [place] [address] for medical care  Thus I spoke to our   Plan is to continue heparin drip for now  After the colonoscopy, patient may have DOAC (pros and cons of Coumadin treatment versus DOAC was discussed with the patient)  Patient opted for DOAC treatment  Case management will give a 1 month free coupon and will eventually be referred to the [address] [place] [address] for succeeding prescriptions

## 2021-06-14 NOTE — PROGRESS NOTES
[facility]  Progress Note - Wilman Done 1933, 80 y o  male MRN: [id number]  Unit/Bed#: S -01 Encounter: [id number]  Primary Care Provider: Jessie Pena MD   Date and time admitted to hospital: 6/7/2021  8:42 PM    * Acute pulmonary embolism [facility])  Assessment & Plan  · Presented to PCP's office on 6/7 with right calf pain and noted to have 2+ edema  In the setting of left carpal tunnel surgery approximately 2 weeks prior  · RLE doppler revealed acute vs subacute DVT and he was sent to the ED  · CTA chest - "Left mainstem pulmonary artery emboli extending into the left lower lobe pulmonary arteries  Focal atelectasis versus early infarct within the superior portion of the left lower lobe  The calculated ratio of right ventricular to left ventricular diameter (RV/LV ratio) is 1 78  This is greater than 0 9, which is abnormal and indicates right heart strain  An abnormal RV/LV ratio has been shown to be associated with an increased risk of 30 day mortality in the setting of acute pulmonary embolism  Urgent consultation with the medical critical care team is recommended "  · No shortness of breath, chest pain  No hypoxia  · Initially was determined unable to afford Eliquis/DOAC, hence continued on heparin drip  · Was being bridged to Coumadin but developed recurrent rectal bleeding hence Coumadin held  Plan for EGD/colonoscopy on 6/14  Coumadin to be restarted after endoscopy when cleared by GI  · It was learned that patient has Medicare and at the same time with Eileen Flood; patient also is a  and goes to the [address] [place] [address] for medical care  Thus I spoke to our   Plan is to continue heparin drip for now  After the colonoscopy, patient may have DOAC (pros and cons of Coumadin treatment versus DOAC was discussed with the patient)  Patient opted for DOAC treatment    Case management will give a 1 month free coupon and will eventually be referred to the [address] [place] [address] for succeeding prescriptions  Acute vs subacute deep vein thrombosis (DVT) of right lower extremity (HCC)  Assessment & Plan  · Patient presented to PCP with right calf pain as above  · RLE venous doppler - "Evidence of acute vs subacute deep vein thrombosis noted in the external iliac,  common femoral  superficial femoral, popliteal, and calf veins  Occlusive thrombus noted in the proximal-distal superficial femoral vein "  · Will eventually be switched to DOAC when cleared by GI after colonoscopy    GI bleeding  Assessment & Plan  · Episodes of rectal bleeding on 6/9 and 6/10  · Hb stable at present  · GI following - diverticular vs hemorrhoidal vs AVM  · Continue clear liquid diet and will be NPO post midnight  · Plan for colonoscopy on 6/14  · Monitor H/H   · Transfuse as needed    Moderate protein-calorie malnutrition (HCC)  Assessment & Plan  Malnutrition Findings:   Adult Malnutrition type: Acute illness  Adult Degree of Malnutrition: Malnutrition of moderate degree (related to inadequate intake to maintain weight)  BMI Findings: Body mass index is 23 1 kg/m²       · Ct nutrition supplements    S/P AVR (aortic valve replacement)  Assessment & Plan  · S/p bioprosthetic AVR     Hyperlipidemia  Assessment & Plan  · Continue Lipitor    Glaucoma  Assessment & Plan  · Ct home eye drops    Gastroesophageal reflux disease  Assessment & Plan  · Continue Protonix    Carpal tunnel syndrome  Assessment & Plan  · Left carpal tunnel surgery on 5/27/21  · Sutures removed by nursing 6/9 after discussing with his PCP     Coronary artery disease  Assessment & Plan  · Hx of CABG at [facility]   · Reports being on warfarin short term after procedure   · ASA held due to GIB  · Ct BB(dose as above), statin    Essential hypertension  Assessment & Plan  · Was on Amlodipine 10 mg daily, Losartan 100 mg daily and Toprol  mg daily at home  · Meds were held from admission as BP on lower side  · Restart Toprol XL at lower dose of 50 mg once a day  · Monitor BP and restart Losartan and Amlodipine if needed  · Presently, patient's blood pressures are normal     Type 2 diabetes mellitus without complication, with long-term current use of insulin [facility])  Assessment & Plan  Lab Results   Component Value Date    HGBA1C 6 5 (H) 07/24/2020     Recent Labs     06/13/21  1131 06/13/21  1604 06/13/21  2044 06/14/21  0936   POCGLU 213* 122 110 176*     Blood Sugar Average: Last 72 hrs:  (P) 222 5   · On clear liquids from am of 6/13 and will be NPO post midnight 6/14  · Will cut back on the long-acting insulin  · Hold off on the short-acting insulin with meals while NPO  · Oral diabetic medications held  · Continue sliding scale insulin and Accu-Cheks  · Monitor blood sugars and adjust insulin as needed    Stage 3 chronic kidney disease [facility])  Assessment & Plan  Lab Results   Component Value Date    EGFR 44 06/14/2021    EGFR 50 06/12/2021    EGFR 51 06/10/2021    CREATININE 1 41 (H) 06/14/2021    CREATININE 1 27 06/12/2021    CREATININE 1 25 06/10/2021     · Baseline creatinine 1 3 to 1 4  · Monitor     VTE Pharmacologic Prophylaxis:   Pharmacologic: Pharmacologic VTE Prophylaxis contraindicated due to GI bleeding  Mechanical VTE Prophylaxis in Place: Yes    Patient Centered Rounds: I have performed bedside rounds with nursing staff today  Discussions with Specialists or Other Care Team Provider: CM, nursing     Education and Discussions with Family / Patient: patient at bedside, called daughter    Time Spent for Care: 30 minutes  More than 50% of total time spent on counseling and coordination of care as described above      Current Length of Stay: 6 day(s)    Current Patient Status: Inpatient   Certification Statement: The patient will continue to require additional inpatient hospital stay due to GI bleed getting c-scope today    Discharge Plan: likely  tomorrow to home once cleared by GI    Code Status: Level 1 - Full Code      Subjective: Patient examined at bedside  Completed bowel prep overnight in his stools are clear  No evidence of bleeding in his stools  No overnight events per nursing  For colonoscopy later this afternoon  Objective:     Vitals:   Temp (24hrs), Av 1 °F (36 7 °C), Min:97 9 °F (36 6 °C), Max:98 3 °F (36 8 °C)    Temp:  [97 9 °F (36 6 °C)-98 3 °F (36 8 °C)] 98 3 °F (36 8 °C)  HR:  [65-89] 89  Resp:  [16-18] 16  BP: (115-138)/(70-81) 115/70  SpO2:  [97 %-98 %] 97 %  Body mass index is 23 1 kg/m²  Input and Output Summary (last 24 hours): Intake/Output Summary (Last 24 hours) at 2021 0956  Last data filed at 2021 0902  Gross per 24 hour   Intake --   Output 3075 ml   Net -3075 ml       Physical Exam:     Physical Exam  Vitals and nursing note reviewed  Constitutional:       General: He is not in acute distress  Appearance: Normal appearance  HENT:      Head: Normocephalic  Mouth/Throat:      Mouth: Mucous membranes are moist    Eyes:      Pupils: Pupils are equal, round, and reactive to light  Cardiovascular:      Rate and Rhythm: Normal rate and regular rhythm  Heart sounds: No murmur heard  Pulmonary:      Effort: Pulmonary effort is normal  No respiratory distress  Breath sounds: Normal breath sounds  No wheezing, rhonchi or rales  Abdominal:      General: Bowel sounds are normal  There is no distension  Palpations: Abdomen is soft  Tenderness: There is no abdominal tenderness  There is no guarding  Musculoskeletal:         General: No deformity  Cervical back: Normal range of motion  Right lower leg: No edema  Left lower leg: No edema  Skin:     Capillary Refill: Capillary refill takes less than 2 seconds  Neurological:      General: No focal deficit present  Mental Status: He is alert and oriented to person, place, and time  Mental status is at baseline           Additional Data:     Labs:    Results from last 7 days   Lab Units 06/14/21  0459 06/08/21  0424   WBC Thousand/uL 9 00 8 62   HEMOGLOBIN g/dL 12 6 11 4*   HEMATOCRIT % 38 8 35 2*   PLATELETS Thousands/uL 308 217   NEUTROS PCT %  --  58   LYMPHS PCT %  --  28   MONOS PCT %  --  9   EOS PCT %  --  3     Results from last 7 days   Lab Units 06/14/21  0459   SODIUM mmol/L 141   POTASSIUM mmol/L 4 5   CHLORIDE mmol/L 105   CO2 mmol/L 24   BUN mg/dL 32*   CREATININE mg/dL 1 41*   ANION GAP mmol/L 12   CALCIUM mg/dL 9 2   GLUCOSE RANDOM mg/dL 141*     Results from last 7 days   Lab Units 06/13/21  0431   INR  1 46*     Results from last 7 days   Lab Units 06/14/21  0936 06/13/21  2044 06/13/21  1604 06/13/21  1131 06/13/21  0721 06/13/21  0003 06/12/21  2108 06/12/21  1607 06/12/21  1103 06/12/21  0753 06/11/21  2033 06/11/21  1537   POC GLUCOSE mg/dl 176* 110 122 213* 204* 312* 402* 227* 200* 183* 238* 295*                   * I Have Reviewed All Lab Data Listed Above  * Additional Pertinent Lab Tests Reviewed:  All Labs Within Last 24 Hours Reviewed    Imaging:    Imaging Reports Reviewed Today Include: none  Imaging Personally Reviewed by Myself Includes:  none    Recent Cultures (last 7 days):           Last 24 Hours Medication List:   Current Facility-Administered Medications   Medication Dose Route Frequency Provider Last Rate    acetaminophen  650 mg Oral Q6H PRN Aamir Allen PA-C      atorvastatin  40 mg Oral Daily Aamir Allen PA-C      Brinzolamide-Brimonidine  1 drop Both Eyes BID Lawrence Davenport MD      vitamin B-12  1,000 mcg Oral Every Other Day Aamir Allen PA-C      heparin (porcine)  3-30 Units/kg/hr (Order-Specific) Intravenous Titrated Aamir Allen PA-C Stopped (06/14/21 0800)    heparin (porcine)  2,600 Units Intravenous Q1H PRN Aamir Allen PA-C      heparin (porcine)  5,200 Units Intravenous Q1H PRN Aamir Allen, PA-C      insulin glargine  10 Units Subcutaneous HS Ashlyn Wise MD     [facility] insulin lispro  1-5 Units Subcutaneous TID AC Aamir Allen PA-C      insulin lispro  1-5 Units Subcutaneous HS Aamir Allen PA-C      insulin lispro  3 Units Subcutaneous TID With Meals Praveena Schmitz MD      latanoprost  1 drop Both Eyes HS Praveena Schmitz MD      melatonin  6 mg Oral HS Aamir Allen PA-C      metoprolol succinate  50 mg Oral Daily Praveena Schmitz MD      pantoprazole  40 mg Oral Early Morning Aamir Allen PA-C      polyethylene glycol  4,000 mL Oral See Admin Instructions Rosemary Boss PA-C      polyethylene glycol  17 g Oral Daily Aamir Allen PA-C      senna-docusate sodium  2 tablet Oral BID Hans Allen PA-C          Today, Patient Was Seen By: Berenice Amaral PA-C    ** Please Note: Dictation voice to text software may have been used in the creation of this document   **

## 2021-06-15 ENCOUNTER — TELEPHONE (OUTPATIENT)
Dept: FAMILY MEDICINE CLINIC | Facility: CLINIC | Age: 86
End: 2021-06-15

## 2021-06-15 ENCOUNTER — TRANSITIONAL CARE MANAGEMENT (OUTPATIENT)
Dept: FAMILY MEDICINE CLINIC | Facility: CLINIC | Age: 86
End: 2021-06-15

## 2021-06-15 VITALS
BODY MASS INDEX: 22.79 KG/M2 | OXYGEN SATURATION: 95 % | RESPIRATION RATE: 18 BRPM | TEMPERATURE: 98.3 F | WEIGHT: 145.2 LBS | HEIGHT: 67 IN | DIASTOLIC BLOOD PRESSURE: 71 MMHG | HEART RATE: 76 BPM | SYSTOLIC BLOOD PRESSURE: 102 MMHG

## 2021-06-15 LAB
ANION GAP SERPL CALCULATED.3IONS-SCNC: 12 MMOL/L (ref 4–13)
BASOPHILS # BLD AUTO: 0.06 THOUSANDS/ΜL (ref 0–0.1)
BASOPHILS NFR BLD AUTO: 1 % (ref 0–1)
BUN SERPL-MCNC: 42 MG/DL (ref 5–25)
CALCIUM SERPL-MCNC: 8.4 MG/DL (ref 8.3–10.1)
CHLORIDE SERPL-SCNC: 105 MMOL/L (ref 100–108)
CO2 SERPL-SCNC: 20 MMOL/L (ref 21–32)
CREAT SERPL-MCNC: 1.4 MG/DL (ref 0.6–1.3)
EOSINOPHIL # BLD AUTO: 0.12 THOUSAND/ΜL (ref 0–0.61)
EOSINOPHIL NFR BLD AUTO: 2 % (ref 0–6)
ERYTHROCYTE [DISTWIDTH] IN BLOOD BY AUTOMATED COUNT: 13.4 % (ref 11.6–15.1)
GFR SERPL CREATININE-BSD FRML MDRD: 45 ML/MIN/1.73SQ M
GLUCOSE SERPL-MCNC: 165 MG/DL (ref 65–140)
GLUCOSE SERPL-MCNC: 192 MG/DL (ref 65–140)
GLUCOSE SERPL-MCNC: 372 MG/DL (ref 65–140)
HCT VFR BLD AUTO: 35.6 % (ref 36.5–49.3)
HGB BLD-MCNC: 11.4 G/DL (ref 12–17)
IMM GRANULOCYTES # BLD AUTO: 0.11 THOUSAND/UL (ref 0–0.2)
IMM GRANULOCYTES NFR BLD AUTO: 2 % (ref 0–2)
LYMPHOCYTES # BLD AUTO: 2.02 THOUSANDS/ΜL (ref 0.6–4.47)
LYMPHOCYTES NFR BLD AUTO: 27 % (ref 14–44)
MCH RBC QN AUTO: 31.1 PG (ref 26.8–34.3)
MCHC RBC AUTO-ENTMCNC: 32 G/DL (ref 31.4–37.4)
MCV RBC AUTO: 97 FL (ref 82–98)
MONOCYTES # BLD AUTO: 0.98 THOUSAND/ΜL (ref 0.17–1.22)
MONOCYTES NFR BLD AUTO: 13 % (ref 4–12)
NEUTROPHILS # BLD AUTO: 4.15 THOUSANDS/ΜL (ref 1.85–7.62)
NEUTS SEG NFR BLD AUTO: 55 % (ref 43–75)
NRBC BLD AUTO-RTO: 0 /100 WBCS
PLATELET # BLD AUTO: 234 THOUSANDS/UL (ref 149–390)
PMV BLD AUTO: 9.9 FL (ref 8.9–12.7)
POTASSIUM SERPL-SCNC: 4.6 MMOL/L (ref 3.5–5.3)
RBC # BLD AUTO: 3.67 MILLION/UL (ref 3.88–5.62)
SODIUM SERPL-SCNC: 137 MMOL/L (ref 136–145)
WBC # BLD AUTO: 7.44 THOUSAND/UL (ref 4.31–10.16)

## 2021-06-15 PROCEDURE — 99232 SBSQ HOSP IP/OBS MODERATE 35: CPT | Performed by: INTERNAL MEDICINE

## 2021-06-15 PROCEDURE — 97530 THERAPEUTIC ACTIVITIES: CPT

## 2021-06-15 PROCEDURE — 99239 HOSP IP/OBS DSCHRG MGMT >30: CPT | Performed by: PHYSICIAN ASSISTANT

## 2021-06-15 PROCEDURE — 82948 REAGENT STRIP/BLOOD GLUCOSE: CPT

## 2021-06-15 PROCEDURE — 97116 GAIT TRAINING THERAPY: CPT

## 2021-06-15 PROCEDURE — 85025 COMPLETE CBC W/AUTO DIFF WBC: CPT | Performed by: INTERNAL MEDICINE

## 2021-06-15 PROCEDURE — 80048 BASIC METABOLIC PNL TOTAL CA: CPT | Performed by: INTERNAL MEDICINE

## 2021-06-15 RX ORDER — METOPROLOL SUCCINATE 100 MG/1
50 TABLET, EXTENDED RELEASE ORAL DAILY
Qty: 15 TABLET | Refills: 0 | Status: SHIPPED | OUTPATIENT
Start: 2021-06-15 | End: 2022-03-03 | Stop reason: SDUPTHER

## 2021-06-15 RX ADMIN — APIXABAN 10 MG: 5 TABLET, FILM COATED ORAL at 10:10

## 2021-06-15 RX ADMIN — INSULIN LISPRO 3 UNITS: 100 INJECTION, SOLUTION INTRAVENOUS; SUBCUTANEOUS at 12:20

## 2021-06-15 RX ADMIN — POLYETHYLENE GLYCOL 3350 17 G: 17 POWDER, FOR SOLUTION ORAL at 10:09

## 2021-06-15 RX ADMIN — ATORVASTATIN CALCIUM 40 MG: 40 TABLET, FILM COATED ORAL at 10:10

## 2021-06-15 RX ADMIN — INSULIN LISPRO 1 UNITS: 100 INJECTION, SOLUTION INTRAVENOUS; SUBCUTANEOUS at 10:14

## 2021-06-15 RX ADMIN — PANTOPRAZOLE SODIUM 40 MG: 40 TABLET, DELAYED RELEASE ORAL at 05:57

## 2021-06-15 RX ADMIN — INSULIN LISPRO 4 UNITS: 100 INJECTION, SOLUTION INTRAVENOUS; SUBCUTANEOUS at 12:20

## 2021-06-15 RX ADMIN — INSULIN LISPRO 3 UNITS: 100 INJECTION, SOLUTION INTRAVENOUS; SUBCUTANEOUS at 10:14

## 2021-06-15 RX ADMIN — DOCUSATE SODIUM AND SENNOSIDES 2 TABLET: 8.6; 5 TABLET, FILM COATED ORAL at 10:10

## 2021-06-15 RX ADMIN — BRINZOLAMIDE/BRIMONIDINE TARTRATE 1 DROP: 10; 2 SUSPENSION/ DROPS OPHTHALMIC at 10:16

## 2021-06-15 NOTE — DISCHARGE SUMMARY
[facility]  Discharge- Mookie Topete 1933, 80 y o  male MRN: [id number]  Unit/Bed#: S -01 Encounter: [id number]  Primary Care Provider: Giorgio Lazo MD   Date and time admitted to hospital: 6/7/2021  8:42 PM    * Acute pulmonary embolism [facility])  Assessment & Plan  · Presented to PCP's office on 6/7 with right calf pain and noted to have 2+ edema  In the setting of left carpal tunnel surgery approximately 2 weeks prior  · RLE doppler revealed acute vs subacute DVT and he was sent to the ED  · CTA chest - "Left mainstem pulmonary artery emboli extending into the left lower lobe pulmonary arteries  Focal atelectasis versus early infarct within the superior portion of the left lower lobe  The calculated ratio of right ventricular to left ventricular diameter (RV/LV ratio) is 1 78  This is greater than 0 9, which is abnormal and indicates right heart strain  An abnormal RV/LV ratio has been shown to be associated with an increased risk of 30 day mortality in the setting of acute pulmonary embolism  Urgent consultation with the medical critical care team is recommended "  · No shortness of breath, chest pain  No hypoxia  · Was being bridged to Coumadin but developed recurrent rectal bleeding hence Coumadin held  Plan for EGD/colonoscopy on 6/14  Eliquis restarted 6/14 with no recurrence of bleeding  · It was learned that patient has Medicare and at the same time with [facility]; patient also is a  and goes to the [place] for medical care  Thus I spoke to our   Patient opted for DOAC treatment  Case management will give a 1 month free coupon and will eventually be referred to the [place] for succeeding prescriptions  · Six months of Eliquis recommended as well as ambulatory referral to pulmonology         Acute vs subacute deep vein thrombosis (DVT) of right lower extremity ([facility] [address] )  Assessment & Plan  · Patient presented to PCP with right calf pain as above  · RLE venous doppler - "Evidence of acute vs subacute deep vein thrombosis noted in the external iliac,  common femoral  superficial femoral, popliteal, and calf veins  Occlusive thrombus noted in the proximal-distal superficial femoral vein "  · Eliquis x6 months    GI bleeding  Assessment & Plan  · Episodes of rectal bleeding on 6/9 and 6/10  · Hb stable at present  · Colonoscopy 6/14 with internal hemorrhoids but no active bleeding  Cleared to resume anticoagulation  Moderate protein-calorie malnutrition ([address] )  Assessment & Plan  Malnutrition Findings:   Adult Malnutrition type: Acute illness  Adult Degree of Malnutrition: Malnutrition of moderate degree (related to inadequate intake to maintain weight)  BMI Findings: Body mass index is 22 74 kg/m²  · Ct nutrition supplements    S/P AVR (aortic valve replacement)  Assessment & Plan  · S/p bioprosthetic AVR     Hyperlipidemia  Assessment & Plan  · Continue Lipitor    Glaucoma  Assessment & Plan  · Ct home eye drops    Gastroesophageal reflux disease  Assessment & Plan  · Continue Protonix    Carpal tunnel syndrome  Assessment & Plan  · Left carpal tunnel surgery on 5/27/21  · Sutures removed by nursing 6/9 after discussing with his PCP     Coronary artery disease  Assessment & Plan  · Hx of CABG at [facility]   · Resume aspirin  · Ct BB(dose as above), statin    Essential hypertension  Assessment & Plan  · Was on Amlodipine 10 mg daily, Losartan 100 mg daily and Toprol  mg daily at home  · Meds were held from admission as BP on lower side  · Restart Toprol XL at lower dose of 50 mg once a day    · Given persistently low blood pressures would discontinue losartan/amlodipine  · Follow-up with PCP    Type 2 diabetes mellitus without complication, with long-term current use of insulin [facility])  Assessment & Plan  Lab Results   Component Value Date    HGBA1C 6 5 (H) 07/24/2020     Recent Labs     06/14/21  1714 06/14/21 2050 06/15/21  0711 06/15/21  1104   POCGLU 113 303* 165* 372*     Blood Sugar Average: Last 72 hrs:  (P) 357 [id number]   · Resume home medications    Stage 3 chronic kidney disease [facility])  Assessment & Plan  Lab Results   Component Value Date    EGFR 45 06/15/2021    EGFR 44 06/14/2021    EGFR 50 06/12/2021    CREATININE 1 40 (H) 06/15/2021    CREATININE 1 41 (H) 06/14/2021    CREATININE 1 27 06/12/2021     · Baseline creatinine 1 3 to 1 4  · Monitor     Discharging Physician / Practitioner: Lois Trujillo PA-C  PCP: Rosalinda Wylie MD  Admission Date:   Admission Orders (From admission, onward)     Ordered        06/08/21 0002  Inpatient Admission  Once                   Discharge Date: 06/15/21    Medical Problems     Resolved Problems  Date Reviewed: 6/14/2021    None                Consultations During Hospital Stay:  · GI  · Critical Care    Procedures Performed:   · Colonoscopy- no evidence of active bleeding  Internal hemorrhoids present  Significant Findings / Test Results:   · CTA chest:  Left mainstem pulmonary emboli into left lower lobe  Focal atelectasis versus infarct of left lower lobe with RV LV ratio of 1 78  · Acute DVT noted in right lower extremity    Incidental Findings:   · None      Test Results Pending at Discharge (will require follow up): · None      Outpatient Tests Requested:  · None     Complications:  Shortly after starting anticoagulation for DVT/PE the patient developed blood in his stool and subsequently underwent colonoscopy  His anticoagulation was temporarily held  Reason for Admission: DVT/PE    Hospital Course:     August Thomas is a 80 y o  male patient who originally presented to the hospital on 6/7/2021 due to calf pain after recent surgery  Please note that the patient had a very extended hospitalization and below is a brief summary of the patient's stay  Please refer to daily progress notes for full details of the patient's hospitalization      Patient was admitted secondary to right lower extremity edema and DVT found in the outpatient setting  He had recent carpal tunnel surgery thus provoked DVT  Patient was initially started on heparin infusion with plan to bridge to Coumadin  He underwent CTA chest which revealed pulmonary embolism with heart strain as above  He had normal echocardiogram   He was admitted under step-down level of care and subsequently transferred out of the ICU  While on the med surge floor the patient was noted to have some GI bleeding while on Coumadin  The patient was continued on IV heparin and underwent colonoscopy which revealed internal hemorrhoids but no active bleeding  He was cleared by GI to resume anticoagulation, and working with the [address] [place] [address] was cleared to start Eliquis as it was affordable to him  He did not have any signs of hemodynamic compromise  His antihypertensives were held throughout his stay due to hypotension, and throughout his stay his blood pressures were acceptable with only metoprolol  His amlodipine and lisinopril were discontinued  Prescriptions were sent for 6 months of Eliquis  The patient was instructed to have appointment set up with pulmonology and an ambulatory referral was sent  Please see above list of diagnoses and related plan for additional information  Condition at Discharge: stable     Discharge Day Visit / Exam:     Subjective:  Patient examined at bedside denies shortness of breath, chest pain  No events overnight per Nursing  No recurrence of GI bleeding this morning after restarting anticoagulation  Presently the patient is stable for discharge    Vitals: Blood Pressure: 102/71 (06/15/21 0708)  Pulse: 76 (06/15/21 0708)  Temperature: 98 3 °F (36 8 °C) (06/15/21 0708)  Temp Source: Oral (06/15/21 0708)  Respirations: 18 (06/15/21 0708)  Height: 5' 7" (170 2 cm) (06/09/21 0819)  Weight - Scale: 65 9 kg (145 lb 3 2 oz) (06/15/21 0600)  SpO2: 95 % (06/15/21 0708)  Exam:   Physical Exam  Vitals and nursing note reviewed  Constitutional:       General: He is not in acute distress  Appearance: Normal appearance  HENT:      Head: Normocephalic  Mouth/Throat:      Mouth: Mucous membranes are moist    Eyes:      Pupils: Pupils are equal, round, and reactive to light  Cardiovascular:      Rate and Rhythm: Normal rate and regular rhythm  Heart sounds: No murmur heard  Pulmonary:      Effort: Pulmonary effort is normal  No respiratory distress  Breath sounds: Normal breath sounds  No wheezing, rhonchi or rales  Abdominal:      General: Bowel sounds are normal  There is no distension  Palpations: Abdomen is soft  Tenderness: There is no abdominal tenderness  There is no guarding  Musculoskeletal:         General: No deformity  Cervical back: Normal range of motion  Right lower leg: No edema  Left lower leg: No edema  Skin:     Capillary Refill: Capillary refill takes less than 2 seconds  Neurological:      General: No focal deficit present  Mental Status: He is alert and oriented to person, place, and time  Mental status is at baseline  Discussion with Family: called daughter    Discharge instructions/Information to patient and family:   See after visit summary for information provided to patient and family  Provisions for Follow-Up Care:  See after visit summary for information related to follow-up care and any pertinent home health orders  Disposition:     Home with VNA Services (Reminder: Complete face to face encounter)    For Discharges to [facility] SNF:   · Not Applicable to this Patient - Not Applicable to this Patient    Planned Readmission: no     Discharge Statement:  I spent 45 minutes discharging the patient  This time was spent on the day of discharge  I had direct contact with the patient on the day of discharge   Greater than 50% of the total time was spent examining patient, answering all patient questions, arranging and discussing plan of care with patient as well as directly providing post-discharge instructions  Additional time then spent on discharge activities  Discharge Medications:  See after visit summary for reconciled discharge medications provided to patient and family        ** Please Note: This note has been constructed using a voice recognition system **

## 2021-06-15 NOTE — PLAN OF CARE
Problem: Potential for Falls  Goal: Patient will remain free of falls  Description: INTERVENTIONS:  - Assess patient frequently for physical needs  -  Identify cognitive and physical deficits and behaviors that affect risk of falls  -  [place] fall precautions as indicated by assessment   - Educate patient/family on patient safety including physical limitations  - Instruct patient to call for assistance with activity based on assessment  - Modify environment to reduce risk of injury  - Consider OT/PT consult to assist with strengthening/mobility  Outcome: Progressing     Problem: Prexisting or High Potential for Compromised Skin Integrity  Goal: Skin integrity is maintained or improved  Description: INTERVENTIONS:  - Identify patients at risk for skin breakdown  - Assess and monitor skin integrity  - Assess and monitor nutrition and hydration status  - Monitor labs   - Assess for incontinence   - Turn and reposition patient  - Assist with mobility/ambulation  - Relieve pressure over bony prominences  - Avoid friction and shearing  - Provide appropriate hygiene as needed including keeping skin clean and dry  - Evaluate need for skin moisturizer/barrier cream  - Collaborate with interdisciplinary team   - Patient/family teaching  - Consider wound care consult   Outcome: Progressing     Problem: Nutrition/Hydration-ADULT  Goal: Nutrient/Hydration intake appropriate for improving, restoring or maintaining nutritional needs  Description: Monitor and assess patient's nutrition/hydration status for malnutrition  Collaborate with interdisciplinary team and initiate plan and interventions as ordered  Monitor patient's weight and dietary intake as ordered or per policy  Utilize nutrition screening tool and intervene as necessary  Determine patient's food preferences and provide high-protein, high-caloric foods as appropriate       INTERVENTIONS:  - Monitor oral intake, urinary output, labs, and treatment plans  - Assess nutrition and hydration status and recommend course of action  - Evaluate amount of meals eaten  - Assist patient with eating if necessary   - Allow adequate time for meals  - Recommend/ encourage appropriate diets, oral nutritional supplements, and vitamin/mineral supplements  - Order, calculate, and assess calorie counts as needed  - Recommend, monitor, and adjust tube feedings and TPN/PPN based on assessed needs  - Assess need for intravenous fluids  - Provide specific nutrition/hydration education as appropriate  - Include patient/family/caregiver in decisions related to nutrition  Outcome: Progressing     Problem: PAIN - ADULT  Goal: Verbalizes/displays adequate comfort level or baseline comfort level  Description: Interventions:  - Encourage patient to monitor pain and request assistance  - Assess pain using appropriate pain scale  - Administer analgesics based on type and severity of pain and evaluate response  - Implement non-pharmacological measures as appropriate and evaluate response  - Consider cultural and social influences on pain and pain management  - Notify physician/advanced practitioner if interventions unsuccessful or patient reports new pain  Outcome: Progressing     Problem: INFECTION - ADULT  Goal: Absence or prevention of progression during hospitalization  Description: INTERVENTIONS:  - Assess and monitor for signs and symptoms of infection  - Monitor lab/diagnostic results  - Monitor all insertion sites, i e  indwelling lines, tubes, and drains  - Monitor endotracheal if appropriate and nasal secretions for changes in amount and color  - [place] appropriate cooling/warming therapies per order  - Administer medications as ordered  - Instruct and encourage patient and family to use good hand hygiene technique  - Identify and instruct in appropriate isolation precautions for identified infection/condition  Outcome: Progressing  Goal: Absence of fever/infection during neutropenic period  Description: INTERVENTIONS:  - Monitor WBC    Outcome: Progressing     Problem: SAFETY ADULT  Goal: Patient will remain free of falls  Description: INTERVENTIONS:  - Assess patient frequently for physical needs  -  Identify cognitive and physical deficits and behaviors that affect risk of falls    -  [place] fall precautions as indicated by assessment   - Educate patient/family on patient safety including physical limitations  - Instruct patient to call for assistance with activity based on assessment  - Modify environment to reduce risk of injury  - Consider OT/PT consult to assist with strengthening/mobility  Outcome: Progressing  Goal: Maintain or return to baseline ADL function  Description: INTERVENTIONS:  -  Assess patient's ability to carry out ADLs; assess patient's baseline for ADL function and identify physical deficits which impact ability to perform ADLs (bathing, care of mouth/teeth, toileting, grooming, dressing, etc )  - Assess/evaluate cause of self-care deficits   - Assess range of motion  - Assess patient's mobility; develop plan if impaired  - Assess patient's need for assistive devices and provide as appropriate  - Encourage maximum independence but intervene and supervise when necessary  - Involve family in performance of ADLs  - Assess for home care needs following discharge   - Consider OT consult to assist with ADL evaluation and planning for discharge  - Provide patient education as appropriate  Outcome: Progressing  Goal: Maintains/Returns to pre admission functional level  Description: INTERVENTIONS:  - Assess patient's baseline mobility status (ambulation, transfers, stairs, etc )    - Identify cognitive and physical deficits and behaviors that affect mobility  - Identify mobility aids required to assist with transfers and/or ambulation (gait belt, sit-to-stand, lift, walker, cane, etc )  - [place] fall precautions as indicated by assessment  - Record patient progress and toleration of activity level on Mobility SBAR; progress patient to next Phase/Stage  - Instruct patient to call for assistance with activity based on assessment  - Consider rehabilitation consult to assist with strengthening/weightbearing, etc   Outcome: Progressing     Problem: DISCHARGE PLANNING  Goal: Discharge to home or other facility with appropriate resources  Description: INTERVENTIONS:  - Identify barriers to discharge w/patient and caregiver  - Arrange for needed discharge resources and transportation as appropriate  - Identify discharge learning needs (meds, wound care, etc )  - Arrange for interpretive services to assist at discharge as needed  - Refer to Case Management Department for coordinating discharge planning if the patient needs post-hospital services based on physician/advanced practitioner order or complex needs related to functional status, cognitive ability, or social support system  Outcome: Progressing     Problem: Knowledge Deficit  Goal: Patient/family/caregiver demonstrates understanding of disease process, treatment plan, medications, and discharge instructions  Description: Complete learning assessment and assess knowledge base    Interventions:  - Provide teaching at level of understanding  - Provide teaching via preferred learning methods  Outcome: Progressing     Problem: MOBILITY - ADULT  Goal: Maintain or return to baseline ADL function  Description: INTERVENTIONS:  -  Assess patient's ability to carry out ADLs; assess patient's baseline for ADL function and identify physical deficits which impact ability to perform ADLs (bathing, care of mouth/teeth, toileting, grooming, dressing, etc )  - Assess/evaluate cause of self-care deficits   - Assess range of motion  - Assess patient's mobility; develop plan if impaired  - Assess patient's need for assistive devices and provide as appropriate  - Encourage maximum independence but intervene and supervise when necessary  - Involve family in performance of ADLs  - Assess for home care needs following discharge   - Consider OT consult to assist with ADL evaluation and planning for discharge  - Provide patient education as appropriate  Outcome: Progressing  Goal: Maintains/Returns to pre admission functional level  Description: INTERVENTIONS:  - Assess patient's baseline mobility status (ambulation, transfers, stairs, etc )    - Identify cognitive and physical deficits and behaviors that affect mobility  - Identify mobility aids required to assist with transfers and/or ambulation (gait belt, sit-to-stand, lift, walker, cane, etc )  - [place] fall precautions as indicated by assessment  - Record patient progress and toleration of activity level on Mobility SBAR; progress patient to next Phase/Stage  - Instruct patient to call for assistance with activity based on assessment  - Consider rehabilitation consult to assist with strengthening/weightbearing, etc   Outcome: Progressing

## 2021-06-15 NOTE — ASSESSMENT & PLAN NOTE
· Was on Amlodipine 10 mg daily, Losartan 100 mg daily and Toprol  mg daily at home  · Meds were held from admission as BP on lower side  · Restart Toprol XL at lower dose of 50 mg once a day    · Given persistently low blood pressures would discontinue losartan/amlodipine  · Follow-up with PCP

## 2021-06-15 NOTE — DISCHARGE INSTR - AVS FIRST PAGE
Dear Kings Pope,     It was our pleasure to care for you here at [facility]  It is our hope that we were always able to exceed the expected standards for your care during your stay  You were hospitalized due to pulmonary embolism  You were cared for on the 4th floor by Que Storey PA-C under the service of Grecia Trejo MD with the [facility] who covers for your primary care physician (PCP), Marlen Astorga MD, while you were hospitalized  If you have any questions or concerns related to this hospitalization, you may contact us at [phone]  For follow up as well as any medication refills, we recommend that you follow up with your primary care physician  A registered nurse will reach out to you by phone within a few days after your discharge to answer any additional questions that you may have after going home  However, at this time we provide for you here, the most important instructions / recommendations at discharge:     · Notable Medication Adjustments -   · Please start taking Eliquis as directed below  This is covered by the [place]  · Testing Required after Discharge -   · No further testing required  · Important follow up information -   · You should follow-up with your primary care provider as well as a pulmonologist upon discharge  A prescription has been sent to follow-up with a pulmonologist and you will be called to set up an appointment  · Other Instructions -   · Return to the emergency department with any large volume of bleeding in her stool or worsening shortness of breath/chest pain  · Please review this entire after visit summary as additional general instructions including medication list, appointments, activity, diet, any pertinent wound care, and other additional recommendations from your care team that may be provided for you        Sincerely,     Que Storey PA-C

## 2021-06-15 NOTE — PLAN OF CARE
Problem: PHYSICAL THERAPY ADULT  Goal: Performs mobility at highest level of function for planned discharge setting  See evaluation for individualized goals  Description: Treatment/Interventions: Functional transfer training, LE strengthening/ROM, Therapeutic exercise, Endurance training, Patient/family training, Bed mobility, Gait training, Elevations, Equipment eval/education  Equipment Recommended: Cane       See flowsheet documentation for full assessment, interventions and recommendations  Outcome: Progressing  Note: Prognosis: Fair  Problem List: Decreased strength, Decreased endurance, Impaired balance, Decreased mobility, Decreased safety awareness  Assessment: Patient agreeable and motivated to participate in therapy session  Patient deomnstrates progression with functional mobility sit<>stand transfers with modified independence  Pt able to ambulate increased gait distance with roller walker and supervision  Pt able to ascend and descend 1 stair with B UE support on roller walker and CGA x 3 trials with good technique and safety  Continue to focus on OOB mobility with progression of ambulation and stair training as appropriate and able  PT Discharge Recommendation: Home with home health rehabilitation          See flowsheet documentation for full assessment

## 2021-06-15 NOTE — ASSESSMENT & PLAN NOTE
Malnutrition Findings:   Adult Malnutrition type: Acute illness  Adult Degree of Malnutrition: Malnutrition of moderate degree (related to inadequate intake to maintain weight)  BMI Findings: Body mass index is 22 74 kg/m²       · Ct nutrition supplements

## 2021-06-15 NOTE — PROGRESS NOTES
Progress Note - Jinny Garcia 80 y o  male MRN: [id number]    Unit/Bed#: S -01 Encounter: [id number]    Assessment and Plan:   Principal Problem:    Acute pulmonary embolism ([facility] )  Active Problems:    Stage 3 chronic kidney disease ([facility] )    Type 2 diabetes mellitus without complication, with long-term current use of insulin (HCC)    Essential hypertension    Coronary artery disease    Carpal tunnel syndrome    Gastroesophageal reflux disease    Glaucoma    Hyperlipidemia    S/P AVR (aortic valve replacement)    Acute vs subacute deep vein thrombosis (DVT) of right lower extremity (HCC)    Moderate protein-calorie malnutrition (HCC)    GI bleeding    #1  Rectal bleeding: began after starting anticoagulation for PE, s/p colonoscopy yesterday which was completely normal aside from internal hemorrhoids, suspect hemorrhoidal bleeding, no further bleeding  -monitor, can resume anticoagulation  -GI will sign off, call with questions     ----------------------------------------------------------------------------------------------------------------    Subjective:     No bleeding, no abdominal pain, passing gas     Objective:     Vitals: Blood pressure 102/71, pulse 76, temperature 98 3 °F (36 8 °C), temperature source Oral, resp  rate 18, height 5' 7" (1 702 m), weight 65 9 kg (145 lb 3 2 oz), SpO2 95 %  ,Body mass index is 22 74 kg/m²        Intake/Output Summary (Last 24 hours) at 6/15/2021 0959  Last data filed at 6/15/2021 0900  Gross per 24 hour   Intake 250 ml   Output 400 ml   Net -150 ml       Physical Exam:     General Appearance: Alert, appears stated age and cooperative  Lungs: Clear to auscultation bilaterally, no rales or rhonchi, no labored breathing/accessory muscle use  Heart: Regular rate and rhythm, S1, S2 normal, no murmur, click, rub or gallop  Abdomen: Soft, non-tender, non-distended; bowel sounds normal; no masses or no organomegaly  Extremities: No cyanosis, clubbing, or edema    Invasive Devices Peripheral Intravenous Line            Peripheral IV 06/13/21 Right;Upper Arm 1 day                Lab Results:  Results from last 7 days   Lab Units 06/15/21  0430   WBC Thousand/uL 7 44   HEMOGLOBIN g/dL 11 4*   HEMATOCRIT % 35 6*   PLATELETS Thousands/uL 234   NEUTROS PCT % 55   LYMPHS PCT % 27   MONOS PCT % 13*   EOS PCT % 2     Results from last 7 days   Lab Units 06/15/21  0430   POTASSIUM mmol/L 4 6   CHLORIDE mmol/L 105   CO2 mmol/L 20*   BUN mg/dL 42*   CREATININE mg/dL 1 40*   CALCIUM mg/dL 8 4     Invalid input(s): BILI  Results from last 7 days   Lab Units 06/13/21  0431   INR  1 46*           Imaging Studies: I have personally reviewed pertinent imaging studies  CTA ED chest PE Study    Result Date: 6/7/2021  Impression: Left mainstem pulmonary artery emboli extending into the left lower lobe pulmonary arteries  Focal atelectasis versus early infarct within the superior portion of the left lower lobe  The calculated ratio of right ventricular to left ventricular diameter (RV/LV ratio) is 1 78 This is greater than 0 9, which is abnormal and indicates right heart strain  An abnormal RV/LV ratio has been shown to be associated with an increased risk of 30 day mortality in the setting of acute pulmonary embolism  Urgent consultation with the medical critical care team is recommended    I personally discussed this study with Dr Leigh Ann Coffey on 6/7/2021 at 10:58 PM  Workstation performed: [id number]

## 2021-06-15 NOTE — ASSESSMENT & PLAN NOTE
· Patient presented to PCP with right calf pain as above  · RLE venous doppler - "Evidence of acute vs subacute deep vein thrombosis noted in the external iliac,  common femoral  superficial femoral, popliteal, and calf veins   Occlusive thrombus noted in the proximal-distal superficial femoral vein "  · Eliquis x6 months

## 2021-06-15 NOTE — ASSESSMENT & PLAN NOTE
Lab Results   Component Value Date    EGFR 45 06/15/2021    EGFR 44 06/14/2021    EGFR 50 06/12/2021    CREATININE 1 40 (H) 06/15/2021    CREATININE 1 41 (H) 06/14/2021    CREATININE 1 27 06/12/2021     · Baseline creatinine 1 3 to 1 4  · Monitor

## 2021-06-15 NOTE — CASE MANAGEMENT
Cm received call from pt's daughter stating she is not able to get free 30 days of Eliquis at [facility] as the coupon is   Cm contacted [address] to provide numbers from a card that is not   Per pharmacist, the coupon was not working  Cm assumed that because the medication has been prescribed on 2 different scripts, the coupon would not be working  [facility] states they do not have starter packs for Eliquis  [facility] to review this with Yuan Silver in the pharmacy  She states when the starter pack is ordered, the free 30 days is able to be used  Cm will contact the provider and have him write for a started pack  Cm contacted [facility] (Thea Barrow) to request this  [place] put in order for starter pack for Eliquis  Cm contacted pt's daughter to inform her the starter pack will be filled and asked if she would be able to return to [facility] to get the medication as it would be filled here and the coupon would be able to be used  Daughter states she would be able to do this  Cm contacted Yuan Silver in [facility] to inform her pt's daughter will be coming to get the medication  She looked in the computer and stated the order has been put through

## 2021-06-15 NOTE — PLAN OF CARE
Problem: Potential for Falls  Goal: Patient will remain free of falls  Description: INTERVENTIONS:  - Assess patient frequently for physical needs  -  Identify cognitive and physical deficits and behaviors that affect risk of falls  -  [place] fall precautions as indicated by assessment   - Educate patient/family on patient safety including physical limitations  - Instruct patient to call for assistance with activity based on assessment  - Modify environment to reduce risk of injury  - Consider OT/PT consult to assist with strengthening/mobility  6/15/2021 1324 by Richard Garza RN  Outcome: Adequate for Discharge  6/15/2021 1027 by Richard Garza RN  Outcome: Progressing     Problem: Prexisting or High Potential for Compromised Skin Integrity  Goal: Skin integrity is maintained or improved  Description: INTERVENTIONS:  - Identify patients at risk for skin breakdown  - Assess and monitor skin integrity  - Assess and monitor nutrition and hydration status  - Monitor labs   - Assess for incontinence   - Turn and reposition patient  - Assist with mobility/ambulation  - Relieve pressure over bony prominences  - Avoid friction and shearing  - Provide appropriate hygiene as needed including keeping skin clean and dry  - Evaluate need for skin moisturizer/barrier cream  - Collaborate with interdisciplinary team   - Patient/family teaching  - Consider wound care consult   6/15/2021 1324 by Richard Garza RN  Outcome: Adequate for Discharge  6/15/2021 1027 by Richard Garza RN  Outcome: Progressing     Problem: Nutrition/Hydration-ADULT  Goal: Nutrient/Hydration intake appropriate for improving, restoring or maintaining nutritional needs  Description: Monitor and assess patient's nutrition/hydration status for malnutrition  Collaborate with interdisciplinary team and initiate plan and interventions as ordered  Monitor patient's weight and dietary intake as ordered or per policy   Utilize nutrition screening tool and intervene as necessary  Determine patient's food preferences and provide high-protein, high-caloric foods as appropriate       INTERVENTIONS:  - Monitor oral intake, urinary output, labs, and treatment plans  - Assess nutrition and hydration status and recommend course of action  - Evaluate amount of meals eaten  - Assist patient with eating if necessary   - Allow adequate time for meals  - Recommend/ encourage appropriate diets, oral nutritional supplements, and vitamin/mineral supplements  - Order, calculate, and assess calorie counts as needed  - Recommend, monitor, and adjust tube feedings and TPN/PPN based on assessed needs  - Assess need for intravenous fluids  - Provide specific nutrition/hydration education as appropriate  - Include patient/family/caregiver in decisions related to nutrition  6/15/2021 1324 by Lois Metz RN  Outcome: Adequate for Discharge  6/15/2021 1027 by Lois Metz RN  Outcome: Progressing     Problem: PAIN - ADULT  Goal: Verbalizes/displays adequate comfort level or baseline comfort level  Description: Interventions:  - Encourage patient to monitor pain and request assistance  - Assess pain using appropriate pain scale  - Administer analgesics based on type and severity of pain and evaluate response  - Implement non-pharmacological measures as appropriate and evaluate response  - Consider cultural and social influences on pain and pain management  - Notify physician/advanced practitioner if interventions unsuccessful or patient reports new pain  6/15/2021 1324 by Lois Metz RN  Outcome: Adequate for Discharge  6/15/2021 1027 by Lois Metz RN  Outcome: Progressing     Problem: INFECTION - ADULT  Goal: Absence or prevention of progression during hospitalization  Description: INTERVENTIONS:  - Assess and monitor for signs and symptoms of infection  - Monitor lab/diagnostic results  - Monitor all insertion sites, i e  indwelling lines, tubes, and drains  - Monitor endotracheal if appropriate and nasal secretions for changes in amount and color  - [place] appropriate cooling/warming therapies per order  - Administer medications as ordered  - Instruct and encourage patient and family to use good hand hygiene technique  - Identify and instruct in appropriate isolation precautions for identified infection/condition  6/15/2021 1324 by Karlie Newton RN  Outcome: Adequate for Discharge  6/15/2021 1027 by Karlie Newton RN  Outcome: Progressing  Goal: Absence of fever/infection during neutropenic period  Description: INTERVENTIONS:  - Monitor WBC    6/15/2021 1324 by Karlie Newton RN  Outcome: Adequate for Discharge  6/15/2021 1027 by Karlie Newton RN  Outcome: Progressing     Problem: SAFETY ADULT  Goal: Patient will remain free of falls  Description: INTERVENTIONS:  - Assess patient frequently for physical needs  -  Identify cognitive and physical deficits and behaviors that affect risk of falls    -  [place] fall precautions as indicated by assessment   - Educate patient/family on patient safety including physical limitations  - Instruct patient to call for assistance with activity based on assessment  - Modify environment to reduce risk of injury  - Consider OT/PT consult to assist with strengthening/mobility  6/15/2021 1324 by Karlie Newton RN  Outcome: Adequate for Discharge  6/15/2021 1027 by Karlie Newton RN  Outcome: Progressing  Goal: Maintain or return to baseline ADL function  Description: INTERVENTIONS:  -  Assess patient's ability to carry out ADLs; assess patient's baseline for ADL function and identify physical deficits which impact ability to perform ADLs (bathing, care of mouth/teeth, toileting, grooming, dressing, etc )  - Assess/evaluate cause of self-care deficits   - Assess range of motion  - Assess patient's mobility; develop plan if impaired  - Assess patient's need for assistive devices and provide as appropriate  - Encourage maximum independence but intervene and supervise when necessary  - Involve family in performance of ADLs  - Assess for home care needs following discharge   - Consider OT consult to assist with ADL evaluation and planning for discharge  - Provide patient education as appropriate  6/15/2021 1324 by Jeanine Pompa RN  Outcome: Adequate for Discharge  6/15/2021 1027 by Jeanine Pompa RN  Outcome: Progressing  Goal: Maintains/Returns to pre admission functional level  Description: INTERVENTIONS:  - Assess patient's baseline mobility status (ambulation, transfers, stairs, etc )    - Identify cognitive and physical deficits and behaviors that affect mobility  - Identify mobility aids required to assist with transfers and/or ambulation (gait belt, sit-to-stand, lift, walker, cane, etc )  - [place] fall precautions as indicated by assessment  - Record patient progress and toleration of activity level on Mobility SBAR; progress patient to next Phase/Stage  - Instruct patient to call for assistance with activity based on assessment  - Consider rehabilitation consult to assist with strengthening/weightbearing, etc   6/15/2021 1324 by Jeanine Pompa RN  Outcome: Adequate for Discharge  6/15/2021 1027 by Jeanine Pompa RN  Outcome: Progressing     Problem: DISCHARGE PLANNING  Goal: Discharge to home or other facility with appropriate resources  Description: INTERVENTIONS:  - Identify barriers to discharge w/patient and caregiver  - Arrange for needed discharge resources and transportation as appropriate  - Identify discharge learning needs (meds, wound care, etc )  - Arrange for interpretive services to assist at discharge as needed  - Refer to Case Management Department for coordinating discharge planning if the patient needs post-hospital services based on physician/advanced practitioner order or complex needs related to functional status, cognitive ability, or social support system  6/15/2021 1324 by Leanna Hdz Whitney Kaplan RN  Outcome: Adequate for Discharge  6/15/2021 1027 by Marlyn Rodriguez RN  Outcome: Progressing     Problem: Knowledge Deficit  Goal: Patient/family/caregiver demonstrates understanding of disease process, treatment plan, medications, and discharge instructions  Description: Complete learning assessment and assess knowledge base    Interventions:  - Provide teaching at level of understanding  - Provide teaching via preferred learning methods  6/15/2021 1324 by Marlyn Rodriguez RN  Outcome: Adequate for Discharge  6/15/2021 1027 by Marlyn Rodriguez RN  Outcome: Progressing     Problem: MOBILITY - ADULT  Goal: Maintain or return to baseline ADL function  Description: INTERVENTIONS:  -  Assess patient's ability to carry out ADLs; assess patient's baseline for ADL function and identify physical deficits which impact ability to perform ADLs (bathing, care of mouth/teeth, toileting, grooming, dressing, etc )  - Assess/evaluate cause of self-care deficits   - Assess range of motion  - Assess patient's mobility; develop plan if impaired  - Assess patient's need for assistive devices and provide as appropriate  - Encourage maximum independence but intervene and supervise when necessary  - Involve family in performance of ADLs  - Assess for home care needs following discharge   - Consider OT consult to assist with ADL evaluation and planning for discharge  - Provide patient education as appropriate  6/15/2021 1324 by Marlyn Rodriguez RN  Outcome: Adequate for Discharge  6/15/2021 1027 by Marlyn Rodriguez RN  Outcome: Progressing  Goal: Maintains/Returns to pre admission functional level  Description: INTERVENTIONS:  - Assess patient's baseline mobility status (ambulation, transfers, stairs, etc )    - Identify cognitive and physical deficits and behaviors that affect mobility  - Identify mobility aids required to assist with transfers and/or ambulation (gait belt, sit-to-stand, lift, walker, cane, etc )  - [place] fall precautions as indicated by assessment  - Record patient progress and toleration of activity level on Mobility SBAR; progress patient to next Phase/Stage  - Instruct patient to call for assistance with activity based on assessment  - Consider rehabilitation consult to assist with strengthening/weightbearing, etc   6/15/2021 1324 by General Bob RN  Outcome: Adequate for Discharge  6/15/2021 1027 by General Bob RN  Outcome: Progressing

## 2021-06-15 NOTE — ASSESSMENT & PLAN NOTE
· Episodes of rectal bleeding on 6/9 and 6/10  · Hb stable at present  · Colonoscopy 6/14 with internal hemorrhoids but no active bleeding  Cleared to resume anticoagulation

## 2021-06-15 NOTE — CASE MANAGEMENT
Oc received call from BODØ at the [place] requesting a call back  OC contacted her at [phone] and spoke with her about getting the cost of Eliquis for pt  She stated she would have the nurse, Poppy Done, contact OC as she would be able to assist with this

## 2021-06-15 NOTE — ASSESSMENT & PLAN NOTE
· Presented to PCP's office on 6/7 with right calf pain and noted to have 2+ edema  In the setting of left carpal tunnel surgery approximately 2 weeks prior  · RLE doppler revealed acute vs subacute DVT and he was sent to the ED  · CTA chest - "Left mainstem pulmonary artery emboli extending into the left lower lobe pulmonary arteries  Focal atelectasis versus early infarct within the superior portion of the left lower lobe  The calculated ratio of right ventricular to left ventricular diameter (RV/LV ratio) is 1 78  This is greater than 0 9, which is abnormal and indicates right heart strain  An abnormal RV/LV ratio has been shown to be associated with an increased risk of 30 day mortality in the setting of acute pulmonary embolism  Urgent consultation with the medical critical care team is recommended "  · No shortness of breath, chest pain  No hypoxia  · Was being bridged to Coumadin but developed recurrent rectal bleeding hence Coumadin held  Plan for EGD/colonoscopy on 6/14  Eliquis restarted 6/14 with no recurrence of bleeding  · It was learned that patient has Medicare and at the same time with [address]; patient also is a  and goes to the [facility] for medical care  Thus I spoke to our   Patient opted for DOAC treatment  Case management will give a 1 month free coupon and will eventually be referred to the [facility] for succeeding prescriptions  · Six months of Eliquis recommended as well as ambulatory referral to pulmonology

## 2021-06-15 NOTE — TELEPHONE ENCOUNTER
Patient's daughter wants to know how far apart the doses need to be for the medication eliquis the patient is taking since he was du=ischarged from the hospital      Pt# [phone]

## 2021-06-15 NOTE — CASE MANAGEMENT
Cm was able to speak with Andres Mcgrath at the [place] [address]  who stated pt's Eliquis will be covered and he would have the same co pay as the other medications he gets which would be approximately $9       She is requesting labs, AVS and script be faxed to her at [phone]  This clinical information has been faxed  Cm contacted pt's daughter to inform her of the above  Cm provided contact information for Sammy Leonardo at the [place] [address]  Daughter states she will be here to  pt at approximately 1330  Nurse and SLIM AP aware

## 2021-06-15 NOTE — ASSESSMENT & PLAN NOTE
Lab Results   Component Value Date    HGBA1C 6 5 (H) 07/24/2020     Recent Labs     06/14/21  1714 06/14/21 2050 06/15/21  0711 06/15/21  1104   POCGLU 113 303* 165* 372*     Blood Sugar Average: Last 72 hrs:  (P) 028 [id number]   · Resume home medications

## 2021-06-15 NOTE — CASE MANAGEMENT
Oc received call from Meryle Kanaris at the [facility] and she requested a call back at [phone]  Cm called this number x2 however a message could not be left and there was no answer  OC will continue to follow to assist with obtaining cost of Eliquis

## 2021-06-15 NOTE — PHYSICAL THERAPY NOTE
PHYSICAL THERAPY NOTE    Patient Name: Prince Hdz  Today's Date: 6/15/2021        06/15/21 0903   PT Last Visit   PT Visit Date 06/15/21   Note Type   Note Type Treatment   Pain Assessment   Pain Assessment Tool Pain Assessment not indicated - pt denies pain   Pain Score No Pain   Restrictions/Precautions   Weight Bearing Precautions Per Order No   Other Precautions Fall Risk   General   Family/Caregiver Present No   Subjective   Subjective Patient standing in bathroom with PCA present and is agreeable to participate in therapy session  Patient identifers obtained from name &   Bed Mobility   Supine to Sit Unable to assess   Sit to Supine Unable to assess   Additional Comments Pt seated OOB in recliner post session with call bell and belongings in reach   Transfers   Sit to Stand 6  Modified independent   Additional items Armrests   Stand to Sit 6  Modified independent   Additional items Armrests   Ambulation/Elevation   Gait pattern Decreased foot clearance; Short stride   Gait Assistance 5  Supervision   Additional items Assist x 1;Verbal cues   Assistive Device Rolling walker   Distance 280' x1   Stair Management Assistance 4  Minimal assist  (CGA)   Additional items Assist x 1;Verbal cues; Increased time required   Stair Management Technique Foreward; Step to pattern; With walker   Number of Stairs 1  (x 3 trials)   Balance   Static Sitting Good   Static Standing Fair +   Dynamic Standing Fair   Ambulatory Fair   Activity Tolerance   Activity Tolerance Patient tolerated treatment well   Nurse Made Aware Spoke to Man Gray RN    Assessment   Prognosis Fair   Problem List Decreased strength;Decreased endurance; Impaired balance;Decreased mobility; Decreased safety awareness   Assessment Patient agreeable and motivated to participate in therapy session   Patient deomnstrates progression with functional mobility sit<>stand transfers with modified independence  Pt able to ambulate increased gait distance with roller walker and supervision  Pt able to ascend and descend 1 stair with B UE support on roller walker and CGA x 3 trials with good technique and safety  Continue to focus on OOB mobility with progression of ambulation and stair training as appropriate and able  Goals   Patient Goals to go home   STG Expiration Date 06/17/21   PT Treatment Day 3   Plan   Treatment/Interventions Functional transfer training;LE strengthening/ROM; Therapeutic exercise; Endurance training;Elevations; Patient/family training;Equipment eval/education; Bed mobility;Gait training;Spoke to nursing   Progress Progressing toward goals   PT Frequency Other (Comment)  (3-5x/week)   Recommendation   PT Discharge Recommendation Home with home health rehabilitation   [phone]   Turning in Bed Without Bedrails 4   Lying on Back to Sitting on Edge of Flat Bed 4   Moving Bed to Chair 4   Standing Up From Chair 4   Walk in Room 3   Climb 3-5 Stairs 3   Basic Mobility Inpatient Raw Score 22   Basic Mobility Standardized Score 47 4       The patient's AM-PAC Basic Mobility Inpatient Short Form Raw Score is 22, Standardized Score is 47 4  A standardized score greater than 42 9 suggests the patient may benefit from discharge to home  Please also refer to the recommendation of the Physical Therapist for safe discharge planning      Char Funez, PTA

## 2021-06-22 ENCOUNTER — OFFICE VISIT (OUTPATIENT)
Dept: INTERNAL MEDICINE CLINIC | Facility: CLINIC | Age: 86
End: 2021-06-22
Payer: MEDICARE

## 2021-06-22 VITALS
OXYGEN SATURATION: 99 % | SYSTOLIC BLOOD PRESSURE: 120 MMHG | BODY MASS INDEX: 23.46 KG/M2 | HEART RATE: 74 BPM | HEIGHT: 66 IN | WEIGHT: 146 LBS | DIASTOLIC BLOOD PRESSURE: 78 MMHG | TEMPERATURE: 98.2 F

## 2021-06-22 DIAGNOSIS — N18.31 STAGE 3A CHRONIC KIDNEY DISEASE (HCC): ICD-10-CM

## 2021-06-22 DIAGNOSIS — M79.10 MYALGIA: ICD-10-CM

## 2021-06-22 DIAGNOSIS — I25.10 CORONARY ARTERY DISEASE INVOLVING NATIVE CORONARY ARTERY OF NATIVE HEART WITHOUT ANGINA PECTORIS: ICD-10-CM

## 2021-06-22 DIAGNOSIS — K21.9 GASTROESOPHAGEAL REFLUX DISEASE WITHOUT ESOPHAGITIS: ICD-10-CM

## 2021-06-22 DIAGNOSIS — Z76.89 ENCOUNTER FOR SUPPORT AND COORDINATION OF TRANSITION OF CARE: Primary | ICD-10-CM

## 2021-06-22 DIAGNOSIS — E11.9 TYPE 2 DIABETES MELLITUS WITHOUT COMPLICATION, WITH LONG-TERM CURRENT USE OF INSULIN (HCC): Chronic | ICD-10-CM

## 2021-06-22 DIAGNOSIS — Z79.4 TYPE 2 DIABETES MELLITUS WITHOUT COMPLICATION, WITH LONG-TERM CURRENT USE OF INSULIN (HCC): Chronic | ICD-10-CM

## 2021-06-22 DIAGNOSIS — I10 ESSENTIAL HYPERTENSION: Chronic | ICD-10-CM

## 2021-06-22 DIAGNOSIS — E78.2 MIXED HYPERLIPIDEMIA: ICD-10-CM

## 2021-06-22 DIAGNOSIS — D64.9 ANEMIA, UNSPECIFIED TYPE: ICD-10-CM

## 2021-06-22 DIAGNOSIS — I82.4Z1 ACUTE DEEP VEIN THROMBOSIS (DVT) OF DISTAL VEIN OF RIGHT LOWER EXTREMITY (HCC): ICD-10-CM

## 2021-06-22 DIAGNOSIS — I26.99 ACUTE PULMONARY EMBOLISM, UNSPECIFIED PULMONARY EMBOLISM TYPE, UNSPECIFIED WHETHER ACUTE COR PULMONALE PRESENT (HCC): ICD-10-CM

## 2021-06-22 DIAGNOSIS — K62.5 RECTAL BLEEDING: ICD-10-CM

## 2021-06-22 PROBLEM — I82.401 RIGHT LEG DVT (HCC): Status: ACTIVE | Noted: 2021-06-22

## 2021-06-22 PROCEDURE — 99495 TRANSJ CARE MGMT MOD F2F 14D: CPT | Performed by: INTERNAL MEDICINE

## 2021-06-22 NOTE — PATIENT INSTRUCTIONS
Patient advised to continue present medications discussed with the patient medications and laboratory data in detail  Follow-up with me in 3 months    If any blood test was ordered please do 1 week prior to next appointment  If you have any questions please call the office [phone]

## 2021-06-22 NOTE — PROGRESS NOTES
Assessment/Plan:    1  Encounter for support and coordination of transition of care  Assessment & Plan:  He was admitted at [facility]   He was admitted from June 7, 2021 to Chen 15, 2021  His medical record reviewed  2  Type 2 diabetes mellitus without complication, with long-term current use of insulin (HCC)  Assessment & Plan:    Lab Results   Component Value Date    HGBA1C 6 5 (H) 07/24/2020   Overall diabetes mellitus is controlled hemoglobin A1c 6 5 advised to continue present medications  Advised for 1800 calorie ADA diet  Orders:  -     Comprehensive metabolic panel; Future  -     Hemoglobin A1C; Future    3  Essential hypertension  Assessment & Plan:  Blood pressure well controlled today advised to continue present medications  Advised for low-salt diet  Orders:  -     Comprehensive metabolic panel; Future    4  Coronary artery disease involving native coronary artery of native heart without angina pectoris  Assessment & Plan:  Patient denies any chest pain or shortness for breath  Had echocardiogram   Has been followed by cardiologist       5  Acute pulmonary embolism, unspecified pulmonary embolism type, unspecified whether acute cor pulmonale present [facility])  Assessment & Plan:  Patient is on a m  apixaban  Denies any cough chest pain or shortness of breath  Will refer to  pulmonary specialist for further evaluation and recommendation  Discussed with the patient most likely will need at least 6 month apixaban  Orders:  -     Ambulatory referral to Pulmonology; Future    6  Stage 3a chronic kidney disease [facility])  Assessment & Plan:  Lab Results   Component Value Date    EGFR 45 06/15/2021    EGFR 44 06/14/2021    EGFR 50 06/12/2021    CREATININE 1 40 (H) 06/15/2021    CREATININE 1 41 (H) 06/14/2021    CREATININE 1 27 06/12/2021   Fairly stable  Will follow and observe and monitor  Orders:  -     Comprehensive metabolic panel; Future    7   Acute deep vein thrombosis (DVT) of distal vein of right lower extremity [facility])  Assessment & Plan:  Patient was started on apixaban  Advised to continue present medication  Right leg swelling and pain better  8  Rectal bleeding  Assessment & Plan:  Had a colonoscopy revealed internal hemorrhoid  No further bleeding will follow CBC    Orders:  -     CBC and differential; Future    9  Anemia, unspecified type  Assessment & Plan:  His hemoglobin was dropped from 13 6-11 4 most likely 2nd to rectal bleeding  No further rectal bleeding  Colonoscopy revealed internal hemorrhoid  Will follow CBC  Orders:  -     CBC and differential; Future    10  Myalgia  Assessment & Plan:  Patient has pain in both  arms and legs  resolved after he stopped taking atorvastatin  11  Mixed hyperlipidemia  Assessment & Plan:  His cholesterol was 139, triglyceride 83, LDL 80 4 March 2021 while he was on atorvastatin  Patient stop taking atorvastatin due to myalgia which is all better  Discussed with the patient to start cholesterol medicine injection form like a Repatha  He will check with the [facility] to see if he is able to get that medicine  Orders:  -     Lipid panel; Future    12  Gastroesophageal reflux disease without esophagitis  Assessment & Plan:  His symptoms are controlled on omeprazole 40 mg daily  Advised to watch diet and GE reflux precautions            Subjective:  Patient presents for transition of care management  Patient ID: Jackson Hebert is a 80 y o  male  HPI   59-year-old white male patient presents for transition of care management after he was hospitalized at [address] [place] [address] from June 7, 2021 to Chen 15, 2021 for her right leg DVT and pulmonary embolism  Patient denies any chest pain shortness of breath or cough  Denies any nausea vomiting diarrhea  Denies any blood in the stool or dark colored stool  Denies any further rectal bleeding    Patient states that he has muscle pain of arms and legs resolved after he stopped taking atorvastatin and does not want to go back on any cholesterol medicine like statin  Patient got his COVID-19 vaccination  The following portions of the patient's history were reviewed and updated as appropriate:     Past Medical History:  He has a past medical history of Anemia (6/22/2021), Bilateral carotid artery stenosis (1/4/2021), Bilateral carotid bruits, BMI 25 0-25 9,adult (4/6/2021), Coronary artery disease involving native coronary artery of native heart without angina pectoris (4/6/2021), CTS (carpal tunnel syndrome), Deviated septum, Diabetes ([facility] [address] ), 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[facility] [address] ), Dizziness, Double vision, Ear problems, Encounter for support and coordination of transition of care (6/22/2021), Fatigue, General weakness, GERD (gastroesophageal reflux disease), Hearing impaired person, bilateral, HL (hearing loss), Hypertension, Microscopic hematuria, Myalgia (4/6/2021), Pain in joints (4/6/2021), PVD (peripheral vascular disease) ([facility] [address] ) (4/2/2021), Rectal bleeding (6/22/2021), Renal calculi, Right leg DVT ([facility] [address] ) (6/22/2021), Right lumbar radiculopathy, S/P AVR (aortic valve replacement) (4/2/2021), SOB (shortness of breath) on exertion, Spinal stenosis in cervical region (4/2/2021), Tachycardia, Urinary frequency, and Vertigo (1/4/2021)  ,  _______________________________________________________________________  Past Surgical History:   has a past surgical history that includes Ureterectomy; Bypass Graft; Carpal tunnel release (Right); Hand surgery (Left); Cataract extraction, bilateral; Aortic valve replacement (09/2016); 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); and Colonoscopy (09/16/2015)  ,  _______________________________________________________________________  Family History:  Family history is unknown by patient  ,  _______________________________________________________________________  Social History:   reports that he has quit smoking   He has never used smokeless tobacco  He reports previous alcohol use  He reports that he does not use drugs  ,  _______________________________________________________________________  Allergies:  is allergic to lisinopril, penicillin g, and penicillins     _______________________________________________________________________  Current Outpatient Medications   Medication Sig Dispense Refill    acetaminophen (TYLENOL) 500 mg tablet Take 500 mg by mouth 2 (two) times a week      apixaban (ELIQUIS) 5 mg Take 2 tablets (10 mg total) by mouth 2 (two) times a day for 7 days, THEN 1 tablet (5 mg total) 2 (two) times a day for 23 days   74 tablet 0    aspirin (ECOTRIN LOW STRENGTH) 81 mg EC tablet Take 81 mg by mouth daily      atorvastatin (LIPITOR) 40 mg tablet Take 40 mg by mouth daily 1/2 tablet      Brinzolamide-Brimonidine (Simbrinza) 1-0 2 % SUSP Apply 1 drop to eye 2 (two) times a day      Cinnamon 500 MG capsule Take 500 mg by mouth 2 (two) times a day      glipiZIDE (GLUCOTROL) 5 mg tablet Take 1 tablet (5 mg total) by mouth daily in the early morning 90 tablet 1    insulin glargine (LANTUS) 100 units/mL subcutaneous injection Inject 22 Units under the skin daily at bedtime       latanoprost (XALATAN) 0 005 % ophthalmic solution Administer 1 drop to both eyes daily at bedtime      metFORMIN (GLUCOPHAGE) 500 mg tablet Take 1 tablet (500 mg total) by mouth 2 (two) times a day 180 tablet 1    metoprolol succinate (TOPROL-XL) 100 mg 24 hr tablet Take 0 5 tablets (50 mg total) by mouth daily 15 tablet 0    Multiple Vitamin (multivitamin) tablet Take 1 tablet by mouth daily      omeprazole (PriLOSEC) 20 mg delayed release capsule Take 20 mg by mouth daily      vitamin B-12 (VITAMIN B-12) 1,000 mcg tablet Take 1 tablet by mouth every other day       No current facility-administered medications for this visit      _______________________________________________________________________  Review of Systems Constitutional: Negative for chills and fever  HENT: Negative for congestion, ear pain, hearing loss, nosebleeds, sinus pain, sore throat and trouble swallowing  Eyes: Negative for discharge, redness and visual disturbance  Respiratory: Negative for cough, chest tightness and shortness of breath  Cardiovascular: Negative for chest pain and palpitations  Gastrointestinal: Negative for abdominal pain, blood in stool, constipation, diarrhea, nausea and vomiting  Genitourinary: Negative for dysuria, flank pain and hematuria  Musculoskeletal: Positive for arthralgias (Sometimes gets pain in the knees  )  Negative for myalgias and neck pain  Skin: Negative for color change and rash  Neurological: Negative for dizziness, speech difficulty, weakness and headaches  Hematological: Does not bruise/bleed easily  Psychiatric/Behavioral: Negative for agitation and behavioral problems  Objective:  Vitals:    06/22/21 1111   BP: 120/78   BP Location: Right arm   Patient Position: Sitting   Cuff Size: Adult   Pulse: 74   Temp: 98 2 °F (36 8 °C)   TempSrc: Tympanic   SpO2: 99%   Weight: 66 2 kg (146 lb)   Height: 5' 6" (1 676 m)     Body mass index is 23 57 kg/m²  Physical Exam  Vitals and nursing note reviewed  Constitutional:       General: He is not in acute distress  Appearance: Normal appearance  HENT:      Head: Normocephalic and atraumatic  Right Ear: Ear canal and external ear normal       Left Ear: Ear canal and external ear normal       Mouth/Throat:      Comments: Patient has a face mask on  Eyes:      General: No scleral icterus  Right eye: No discharge  Left eye: No discharge  Extraocular Movements: Extraocular movements intact  Conjunctiva/sclera: Conjunctivae normal    Cardiovascular:      Rate and Rhythm: Normal rate and regular rhythm  Pulses: Normal pulses  Heart sounds: No murmur heard       Pulmonary:      Effort: Pulmonary effort is normal       Breath sounds: Normal breath sounds  Abdominal:      General: Bowel sounds are normal       Palpations: Abdomen is soft  Tenderness: There is no abdominal tenderness  Musculoskeletal:         General: Normal range of motion  Cervical back: Normal range of motion and neck supple  No muscular tenderness  Right lower leg: Edema (Has very trace right lower extremity edema  Homans sign negative) present  Left lower leg: No edema  Skin:     General: Skin is warm  Findings: No rash  Neurological:      General: No focal deficit present  Mental Status: He is alert and oriented to person, place, and time     Psychiatric:         Mood and Affect: Mood normal          Behavior: Behavior normal

## 2021-06-23 NOTE — ASSESSMENT & PLAN NOTE
Blood pressure well controlled today advised to continue present medications  Advised for low-salt diet

## 2021-06-23 NOTE — ASSESSMENT & PLAN NOTE
Lab Results   Component Value Date    EGFR 45 06/15/2021    EGFR 44 06/14/2021    EGFR 50 06/12/2021    CREATININE 1 40 (H) 06/15/2021    CREATININE 1 41 (H) 06/14/2021    CREATININE 1 27 06/12/2021   Fairly stable  Will follow and observe and monitor

## 2021-06-23 NOTE — ASSESSMENT & PLAN NOTE
He was admitted at [address] [place] [address]   He was admitted from June 7, 2021 to Chen 15, 2021  His medical record reviewed

## 2021-06-23 NOTE — ASSESSMENT & PLAN NOTE
Lab Results   Component Value Date    HGBA1C 6 5 (H) 07/24/2020   Overall diabetes mellitus is controlled hemoglobin A1c 6 5 advised to continue present medications  Advised for 1800 calorie ADA diet

## 2021-06-23 NOTE — ASSESSMENT & PLAN NOTE
His cholesterol was 139, triglyceride 83, LDL 80 4 March 2021 while he was on atorvastatin  Patient stop taking atorvastatin due to myalgia which is all better  Discussed with the patient to start cholesterol medicine injection form like a Repatha  He will check with the [place] [facility] to see if he is able to get that medicine

## 2021-06-23 NOTE — ASSESSMENT & PLAN NOTE
Patient denies any chest pain or shortness for breath    Had echocardiogram   Has been followed by cardiologist

## 2021-06-23 NOTE — ASSESSMENT & PLAN NOTE
Patient was started on apixaban  Advised to continue present medication  Right leg swelling and pain better

## 2021-07-01 ENCOUNTER — CONSULT (OUTPATIENT)
Dept: PULMONOLOGY | Facility: CLINIC | Age: 86
End: 2021-07-01
Payer: MEDICARE

## 2021-07-01 VITALS
HEART RATE: 89 BPM | BODY MASS INDEX: 24.85 KG/M2 | OXYGEN SATURATION: 98 % | TEMPERATURE: 97.1 F | WEIGHT: 154.6 LBS | DIASTOLIC BLOOD PRESSURE: 80 MMHG | SYSTOLIC BLOOD PRESSURE: 128 MMHG | HEIGHT: 66 IN

## 2021-07-01 DIAGNOSIS — I26.99 ACUTE PULMONARY EMBOLISM, UNSPECIFIED PULMONARY EMBOLISM TYPE, UNSPECIFIED WHETHER ACUTE COR PULMONALE PRESENT (HCC): ICD-10-CM

## 2021-07-01 PROBLEM — Z86.711 HISTORY OF PULMONARY EMBOLISM: Status: ACTIVE | Noted: 2021-07-01

## 2021-07-01 PROCEDURE — 99204 OFFICE O/P NEW MOD 45 MIN: CPT | Performed by: INTERNAL MEDICINE

## 2021-07-01 NOTE — ASSESSMENT & PLAN NOTE
Pulmonary embolism and DVT developing earlier this month which would be considered unprovoked  By history this patient does not appear to have any predisposing conditions  Patient advised that in the literature there is some concern that the this diagnosis may be a precursor of the diagnosis of malignancy in the next 2 years  The risk of that however is quite low  In the absence of symptoms only routine screening is advocated  Obviously the patient recently has had a normal colonoscopy  Prostate cancer screening in his age range probably is required  I agree with recommendation continue with anticoagulation for 6 months because his risk of lifelong anticoagulation is fairly high based on his age and underlying diabetes  Continue with aspirin after he stops taking Eliquis

## 2021-07-01 NOTE — PROGRESS NOTES
Assessment/Plan:    History of pulmonary embolism  Pulmonary embolism and DVT developing earlier this month which would be considered unprovoked  By history this patient does not appear to have any predisposing conditions  Patient advised that in the literature there is some concern that the this diagnosis may be a precursor of the diagnosis of malignancy in the next 2 years  The risk of that however is quite low  In the absence of symptoms only routine screening is advocated  Obviously the patient recently has had a normal colonoscopy  Prostate cancer screening in his age range probably is required  I agree with recommendation continue with anticoagulation for 6 months because his risk of lifelong anticoagulation is fairly high based on his age and underlying diabetes  Continue with aspirin after he stops taking Eliquis  Diagnoses and all orders for this visit:    Acute pulmonary embolism, unspecified pulmonary embolism type, unspecified whether acute cor pulmonale present [facility])  -     Ambulatory referral to Pulmonology          Subjective:      Patient ID: Lamonte Henry is a 80 y o  male  This patient developed asymptomatic it DVT in the right leg earlier this month  By history he did not have any of the usual predisposing conditions such as an inflammatory disease (connective tissue disorders or inflammatory bowel disease), surgery or leg injury, underlying myelodysplastic disease, or cancer  No previous DVT recognized  As far as I can tell there is no family history of DVT  Patient was diagnosed with an asymptomatic pulmonary embolus at the same time  Currently with no respiratory symptoms  After being placed on the anticoagulation patient developed rectal bleeding and this was attributed internal hemorrhoid bleeding  Colonoscopy otherwise normal   Patient did have left-sided carpal tunnel syndrome surgery late in May which seems not very relevant to this diagnosis    Currently taken Eliquis and aspirin because of prior history of coronary disease  Patient appetite episode of apparent significant weakness lasting for several weeks possibly related in timing to influenza vaccine in November 2020  As Far as I can tell a clear diagnosis of Guillain-Alturas syndrome was never made  There was recommendation for further study after patient discharged but this never took place  If patient was considerably immobilized at that time there is some chance of an asymptomatic DVT but this is basically a shot in the dark at this point  Insignificant smoking history  Worked is a an [employer]    Echocardiogram done while hospitalized showed no convincing evidence of pulmonary hypertension  Aortic valve bioprosthesis in place  The following portions of the patient's history were reviewed and updated as appropriate: allergies, current medications, past family history, past medical history, past social history, past surgical history and problem list     Review of Systems   Constitutional: Positive for unexpected weight change (Loss of 30 lb since November but gain of 10 lb since earlier this month)  Negative for activity change  HENT: Negative for congestion, sinus pressure and sneezing  Eyes: Negative for pain  Respiratory: Negative for cough and shortness of breath  Cardiovascular: Negative for chest pain, palpitations and leg swelling ( not currently)  Gastrointestinal: Negative for abdominal pain  Genitourinary: Negative for difficulty urinating and hematuria  Musculoskeletal: Negative for arthralgias  Allergic/Immunologic: Negative for environmental allergies  Neurological: Negative  Hematological: Negative for adenopathy           Objective:      /80 (BP Location: Left arm, Patient Position: Sitting, Cuff Size: Standard)   Pulse 89   Temp (!) 97 1 °F (36 2 °C) (Tympanic)   Ht 5' 6" (1 676 m)   Wt 70 1 kg (154 lb 9 6 oz)   SpO2 98%   BMI 24 95 kg/m²          Physical Exam  Vitals reviewed  Constitutional:       Appearance: He is normal weight  He is not ill-appearing  HENT:      Right Ear: Tympanic membrane normal       Left Ear: Tympanic membrane normal       Nose: Nose normal       Mouth/Throat:      Mouth: Mucous membranes are moist       Pharynx: Oropharynx is clear  Eyes:      General: No scleral icterus  Conjunctiva/sclera: Conjunctivae normal    Neck:      Vascular: No JVD  Cardiovascular:      Rate and Rhythm: Normal rate and regular rhythm  Pulses:           Radial pulses are 1+ on the right side and 2+ on the left side  Heart sounds: Murmur heard  Crescendo decrescendo systolic murmur is present with a grade of 2/6  No gallop  Comments: No pulmonary hypertension apparent  Pulmonary:      Effort: Pulmonary effort is normal       Breath sounds: Normal breath sounds  No wheezing or rhonchi  Abdominal:      General: Abdomen is flat  Bowel sounds are normal  There is no distension  Palpations: There is no hepatomegaly or splenomegaly  Musculoskeletal:         General: No swelling  Cervical back: No rigidity or tenderness  Right lower leg: No edema  Left lower leg: No edema  Lymphadenopathy:      Cervical: No cervical adenopathy  Skin:     General: Skin is warm and dry  Neurological:      Mental Status: He is alert and oriented to person, place, and time     Psychiatric:         Mood and Affect: Mood normal          Behavior: Behavior normal

## 2021-07-07 ENCOUNTER — TELEPHONE (OUTPATIENT)
Dept: INTERNAL MEDICINE CLINIC | Facility: CLINIC | Age: 86
End: 2021-07-07

## 2021-07-07 DIAGNOSIS — E11.9 TYPE 2 DIABETES MELLITUS WITHOUT COMPLICATION, WITH LONG-TERM CURRENT USE OF INSULIN (HCC): Primary | ICD-10-CM

## 2021-07-07 DIAGNOSIS — Z79.4 TYPE 2 DIABETES MELLITUS WITHOUT COMPLICATION, WITH LONG-TERM CURRENT USE OF INSULIN (HCC): Primary | ICD-10-CM

## 2021-07-07 DIAGNOSIS — I10 ESSENTIAL HYPERTENSION: ICD-10-CM

## 2021-07-08 ENCOUNTER — APPOINTMENT (OUTPATIENT)
Dept: LAB | Facility: CLINIC | Age: 86
End: 2021-07-08
Payer: MEDICARE

## 2021-07-08 DIAGNOSIS — I10 ESSENTIAL HYPERTENSION: Chronic | ICD-10-CM

## 2021-07-08 DIAGNOSIS — E78.2 MIXED HYPERLIPIDEMIA: ICD-10-CM

## 2021-07-08 DIAGNOSIS — K62.5 RECTAL BLEEDING: ICD-10-CM

## 2021-07-08 DIAGNOSIS — D64.9 ANEMIA, UNSPECIFIED TYPE: ICD-10-CM

## 2021-07-08 DIAGNOSIS — E11.9 TYPE 2 DIABETES MELLITUS WITHOUT COMPLICATION, WITH LONG-TERM CURRENT USE OF INSULIN (HCC): Chronic | ICD-10-CM

## 2021-07-08 DIAGNOSIS — Z79.4 TYPE 2 DIABETES MELLITUS WITHOUT COMPLICATION, WITH LONG-TERM CURRENT USE OF INSULIN (HCC): Chronic | ICD-10-CM

## 2021-07-08 DIAGNOSIS — N18.31 STAGE 3A CHRONIC KIDNEY DISEASE (HCC): ICD-10-CM

## 2021-07-08 LAB
ALBUMIN SERPL BCP-MCNC: 3.4 G/DL (ref 3.5–5)
ALP SERPL-CCNC: 61 U/L (ref 46–116)
ALT SERPL W P-5'-P-CCNC: 22 U/L (ref 12–78)
ANION GAP SERPL CALCULATED.3IONS-SCNC: 6 MMOL/L (ref 4–13)
AST SERPL W P-5'-P-CCNC: 12 U/L (ref 5–45)
BACTERIA UR QL AUTO: NORMAL /HPF
BASOPHILS # BLD AUTO: 0.06 THOUSANDS/ΜL (ref 0–0.1)
BASOPHILS NFR BLD AUTO: 1 % (ref 0–1)
BILIRUB SERPL-MCNC: 0.39 MG/DL (ref 0.2–1)
BILIRUB UR QL STRIP: NEGATIVE
BUN SERPL-MCNC: 29 MG/DL (ref 5–25)
CALCIUM ALBUM COR SERPL-MCNC: 9.5 MG/DL (ref 8.3–10.1)
CALCIUM SERPL-MCNC: 9 MG/DL (ref 8.3–10.1)
CHLORIDE SERPL-SCNC: 109 MMOL/L (ref 100–108)
CHOLEST SERPL-MCNC: 220 MG/DL (ref 50–200)
CLARITY UR: CLEAR
CO2 SERPL-SCNC: 25 MMOL/L (ref 21–32)
COLOR UR: YELLOW
CREAT SERPL-MCNC: 1.25 MG/DL (ref 0.6–1.3)
EOSINOPHIL # BLD AUTO: 0.15 THOUSAND/ΜL (ref 0–0.61)
EOSINOPHIL NFR BLD AUTO: 3 % (ref 0–6)
ERYTHROCYTE [DISTWIDTH] IN BLOOD BY AUTOMATED COUNT: 13.6 % (ref 11.6–15.1)
EST. AVERAGE GLUCOSE BLD GHB EST-MCNC: 140 MG/DL
GFR SERPL CREATININE-BSD FRML MDRD: 51 ML/MIN/1.73SQ M
GLUCOSE P FAST SERPL-MCNC: 80 MG/DL (ref 65–99)
GLUCOSE UR STRIP-MCNC: NEGATIVE MG/DL
HBA1C MFR BLD: 6.5 %
HCT VFR BLD AUTO: 44 % (ref 36.5–49.3)
HDLC SERPL-MCNC: 48 MG/DL
HGB BLD-MCNC: 14 G/DL (ref 12–17)
HGB UR QL STRIP.AUTO: NEGATIVE
HYALINE CASTS #/AREA URNS LPF: NORMAL /LPF
IMM GRANULOCYTES # BLD AUTO: 0.01 THOUSAND/UL (ref 0–0.2)
IMM GRANULOCYTES NFR BLD AUTO: 0 % (ref 0–2)
KETONES UR STRIP-MCNC: NEGATIVE MG/DL
LDLC SERPL CALC-MCNC: 160 MG/DL (ref 0–100)
LEUKOCYTE ESTERASE UR QL STRIP: ABNORMAL
LYMPHOCYTES # BLD AUTO: 1.8 THOUSANDS/ΜL (ref 0.6–4.47)
LYMPHOCYTES NFR BLD AUTO: 33 % (ref 14–44)
MCH RBC QN AUTO: 31.3 PG (ref 26.8–34.3)
MCHC RBC AUTO-ENTMCNC: 31.8 G/DL (ref 31.4–37.4)
MCV RBC AUTO: 98 FL (ref 82–98)
MONOCYTES # BLD AUTO: 0.58 THOUSAND/ΜL (ref 0.17–1.22)
MONOCYTES NFR BLD AUTO: 11 % (ref 4–12)
NEUTROPHILS # BLD AUTO: 2.89 THOUSANDS/ΜL (ref 1.85–7.62)
NEUTS SEG NFR BLD AUTO: 52 % (ref 43–75)
NITRITE UR QL STRIP: NEGATIVE
NON-SQ EPI CELLS URNS QL MICRO: NORMAL /HPF
NONHDLC SERPL-MCNC: 172 MG/DL
NRBC BLD AUTO-RTO: 0 /100 WBCS
PH UR STRIP.AUTO: 6 [PH]
PLATELET # BLD AUTO: 226 THOUSANDS/UL (ref 149–390)
PMV BLD AUTO: 10.6 FL (ref 8.9–12.7)
POTASSIUM SERPL-SCNC: 4 MMOL/L (ref 3.5–5.3)
PROT SERPL-MCNC: 7.4 G/DL (ref 6.4–8.2)
PROT UR STRIP-MCNC: NEGATIVE MG/DL
RBC # BLD AUTO: 4.48 MILLION/UL (ref 3.88–5.62)
RBC #/AREA URNS AUTO: NORMAL /HPF
SODIUM SERPL-SCNC: 140 MMOL/L (ref 136–145)
SP GR UR STRIP.AUTO: 1.01 (ref 1–1.03)
TRIGL SERPL-MCNC: 60 MG/DL
UROBILINOGEN UR QL STRIP.AUTO: 0.2 E.U./DL
WBC # BLD AUTO: 5.49 THOUSAND/UL (ref 4.31–10.16)
WBC #/AREA URNS AUTO: NORMAL /HPF

## 2021-07-08 PROCEDURE — 36415 COLL VENOUS BLD VENIPUNCTURE: CPT

## 2021-07-08 PROCEDURE — 81001 URINALYSIS AUTO W/SCOPE: CPT | Performed by: INTERNAL MEDICINE

## 2021-07-08 PROCEDURE — 80053 COMPREHEN METABOLIC PANEL: CPT

## 2021-07-08 PROCEDURE — 83036 HEMOGLOBIN GLYCOSYLATED A1C: CPT

## 2021-07-08 PROCEDURE — 85025 COMPLETE CBC W/AUTO DIFF WBC: CPT

## 2021-07-08 PROCEDURE — 80061 LIPID PANEL: CPT

## 2021-07-09 ENCOUNTER — TELEPHONE (OUTPATIENT)
Dept: PULMONOLOGY | Facility: CLINIC | Age: 86
End: 2021-07-09

## 2021-07-09 NOTE — TELEPHONE ENCOUNTER
Called patient and spoke to wife to follow up from appt on 7/1 with Dr Vincent Villalobos  Wife stated no questions at this time and the appointment went well

## 2021-08-31 ENCOUNTER — OFFICE VISIT (OUTPATIENT)
Dept: INTERNAL MEDICINE CLINIC | Facility: CLINIC | Age: 86
End: 2021-08-31
Payer: MEDICARE

## 2021-08-31 VITALS
WEIGHT: 151 LBS | OXYGEN SATURATION: 98 % | HEART RATE: 65 BPM | HEIGHT: 65 IN | TEMPERATURE: 98.2 F | BODY MASS INDEX: 25.16 KG/M2 | DIASTOLIC BLOOD PRESSURE: 94 MMHG | SYSTOLIC BLOOD PRESSURE: 142 MMHG

## 2021-08-31 DIAGNOSIS — E11.9 TYPE 2 DIABETES MELLITUS WITHOUT COMPLICATION, WITH LONG-TERM CURRENT USE OF INSULIN (HCC): ICD-10-CM

## 2021-08-31 DIAGNOSIS — R80.9 MICROALBUMINURIA DUE TO TYPE 2 DIABETES MELLITUS (HCC): ICD-10-CM

## 2021-08-31 DIAGNOSIS — E11.29 MICROALBUMINURIA DUE TO TYPE 2 DIABETES MELLITUS (HCC): ICD-10-CM

## 2021-08-31 DIAGNOSIS — I10 ESSENTIAL HYPERTENSION: Chronic | ICD-10-CM

## 2021-08-31 DIAGNOSIS — I26.99 ACUTE PULMONARY EMBOLISM (HCC): ICD-10-CM

## 2021-08-31 DIAGNOSIS — Z95.2 S/P AVR (AORTIC VALVE REPLACEMENT): ICD-10-CM

## 2021-08-31 DIAGNOSIS — Z00.00 MEDICARE ANNUAL WELLNESS VISIT, SUBSEQUENT: Primary | ICD-10-CM

## 2021-08-31 DIAGNOSIS — N18.31 STAGE 3A CHRONIC KIDNEY DISEASE (HCC): ICD-10-CM

## 2021-08-31 DIAGNOSIS — K21.9 GASTROESOPHAGEAL REFLUX DISEASE WITHOUT ESOPHAGITIS: ICD-10-CM

## 2021-08-31 DIAGNOSIS — Z23 NEED FOR PROPHYLACTIC VACCINATION AND INOCULATION AGAINST INFLUENZA: ICD-10-CM

## 2021-08-31 DIAGNOSIS — Z79.4 TYPE 2 DIABETES MELLITUS WITHOUT COMPLICATION, WITH LONG-TERM CURRENT USE OF INSULIN (HCC): ICD-10-CM

## 2021-08-31 DIAGNOSIS — I25.10 CORONARY ARTERY DISEASE INVOLVING NATIVE CORONARY ARTERY OF NATIVE HEART WITHOUT ANGINA PECTORIS: ICD-10-CM

## 2021-08-31 DIAGNOSIS — E78.2 MIXED HYPERLIPIDEMIA: ICD-10-CM

## 2021-08-31 PROCEDURE — 1123F ACP DISCUSS/DSCN MKR DOCD: CPT | Performed by: INTERNAL MEDICINE

## 2021-08-31 PROCEDURE — G0008 ADMIN INFLUENZA VIRUS VAC: HCPCS | Performed by: INTERNAL MEDICINE

## 2021-08-31 PROCEDURE — G0439 PPPS, SUBSEQ VISIT: HCPCS | Performed by: INTERNAL MEDICINE

## 2021-08-31 PROCEDURE — 90662 IIV NO PRSV INCREASED AG IM: CPT | Performed by: INTERNAL MEDICINE

## 2021-08-31 PROCEDURE — 99213 OFFICE O/P EST LOW 20 MIN: CPT | Performed by: INTERNAL MEDICINE

## 2021-08-31 NOTE — PATIENT INSTRUCTIONS

## 2021-08-31 NOTE — PROGRESS NOTES
Assessment and Plan:     Problem List Items Addressed This Visit     None           Preventive health issues were discussed with patient, and age appropriate screening tests were ordered as noted in patient's After Visit Summary  Personalized health advice and appropriate referrals for health education or preventive services given if needed, as noted in patient's After Visit Summary       History of Present Illness:     Patient presents for Medicare Annual Wellness visit    Patient Care Team:  Balwinder Garcia MD as PCP - General (Internal Medicine)     Problem List:     Patient Active Problem List   Diagnosis    Stage 3 chronic kidney disease ([facility]    Generalized weakness    Type 2 diabetes mellitus without complication, with long-term current use of insulin ([facility] )    Essential hypertension    Left shoulder pain    Coronary artery disease    Acquired hallux rigidus    Age-related macular degeneration    Allergic rhinitis    Carpal tunnel syndrome    Choroidal nevus    Diaphragmatic hernia    Gastroesophageal reflux disease    Glaucoma    Hearing loss    History of coronary artery bypass surgery    Hyperlipidemia    Low back pain    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 ([facility]    Mitral valve disease    Vertigo    [facility]proliferative [facility] retinopathy[facility]    Chronic renal failure syndrome    Bilateral carotid artery stenosis    S/P AVR (aortic valve replacement)    Spinal stenosis in cervical region    PVD (peripheral vascular disease) ([facility]    Coronary artery disease involving native coronary artery of native heart without angina pectoris    Pain in joints    Myalgia    BMI 24 0-24 9, adult    Acute pulmonary embolism (HCC)    Acute vs subacute deep vein thrombosis (DVT) of right lower extremity (HCC)    Moderate protein-calorie malnutrition ([facility] )    GI bleeding    Encounter for support and coordination of transition of care    Right leg DVT ([facility]    Rectal bleeding    Anemia    History of pulmonary embolism      Past Medical and Surgical History:     Past Medical History:   Diagnosis Date    Anemia 6/22/2021    Bilateral carotid artery stenosis 1/4/2021    <50% on doppler 7/27/2018   No change from Aug/2016    Bilateral carotid bruits     BMI 25 0-25 9,adult 4/6/2021    Coronary artery disease involving native coronary artery of native heart without angina pectoris 4/6/2021    CTS (carpal tunnel syndrome)     Deviated septum     Diabetes [facility]     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[facility]     Dizziness     Double vision     Ear problems     Encounter for support and coordination of transition of care 6/22/2021    Fatigue     General weakness     GERD (gastroesophageal reflux disease)     Hearing impaired person, bilateral     HL (hearing loss)     Hypertension     Microscopic hematuria     Myalgia 4/6/2021    Pain in joints 4/6/2021    PVD (peripheral vascular disease) ([facility] ) 4/2/2021    Rectal bleeding 6/22/2021    Renal calculi     Right leg DVT ([facility] ) 6/22/2021    Right lumbar radiculopathy     S/P AVR (aortic valve replacement) 4/2/2021    SOB (shortness of breath) on exertion     Spinal stenosis in cervical region 4/2/2021    Tachycardia     Urinary frequency     Vertigo 1/4/2021     Past Surgical History:   Procedure Laterality Date    AORTIC VALVE REPLACEMENT  09/2016    BYPASS GRAFT      CARPAL TUNNEL RELEASE Right     by Dr Aditya Trevino EXTRACTION, BILATERAL      COLONOSCOPY  09/16/2015    CORONARY ARTERY BYPASS GRAFT  09/2016    x1     HAND SURGERY Left     URETERECTOMY        Family History:     Family History   Family history unknown: Yes      Social History:     Social History     Socioeconomic History    Marital status: /Civil Union     Spouse name: None    Number of children: None    Years of education: None    Highest education level: None   Occupational History    None   Tobacco Use    Smoking status: Former Smoker    Smokeless tobacco: Never Used    Tobacco comment: Never smoker - As per [employer]   Vaping Use    Vaping Use: Never used   Substance and Sexual Activity    Alcohol use: Not Currently    Drug use: Never    Sexual activity: None   Other Topics Concern    None   Social History Narrative    Monthly foot exam: Sees podiatrist [id number], Dr Erika Dillon    Annual eye exam: Sees ophthal [id number], Dr Bubba Sheets    PSA: [address] [facility]    Annual dental checkup: Sees dentist    - As per [employer]     Social Determinants of Health     Financial Resource Strain:     Difficulty of Paying Living Expenses:    Food Insecurity:     Worried About Running Out of Food in the Last Year:     [address] in the Last Year:    Transportation Needs:     Lack of Transportation (Medical):      Lack of Transportation (Non-Medical):    Physical Activity:     Days of Exercise per Week:     Minutes of Exercise per Session:    Stress:     Feeling of Stress :    Social Connections:     Frequency of Communication with Friends and Family:     Frequency of Social Gatherings with Friends and Family:     Attends [employer] Services:     Active Member of Clubs or Organizations:     Attends Club or Organization Meetings:     Marital Status:    Intimate Partner Violence:     Fear of Current or Ex-Partner:     Emotionally Abused:     Physically Abused:     Sexually Abused:       Medications and Allergies:     Current Outpatient Medications   Medication Sig Dispense Refill    acetaminophen (TYLENOL) 500 mg tablet Take 500 mg by mouth 2 (two) times a week       aspirin (ECOTRIN LOW STRENGTH) 81 mg EC tablet Take 81 mg by mouth daily      atorvastatin (LIPITOR) 40 mg tablet Take 40 mg by mouth daily 1/2 tablet      Brinzolamide-Brimonidine (Simbrinza) 1-0 2 % SUSP Apply 1 drop to eye 2 (two) times a day      Cinnamon 500 MG capsule Take 500 mg by mouth 2 (two) times a day      glipiZIDE (GLUCOTROL) 5 mg tablet Take 1 tablet (5 mg total) by mouth daily in the early morning 90 tablet 1    insulin glargine (LANTUS) 100 units/mL subcutaneous injection Inject 22 Units under the skin daily at bedtime       latanoprost (XALATAN) 0 005 % ophthalmic solution Administer 1 drop to both eyes daily at bedtime      metFORMIN (GLUCOPHAGE) 500 mg tablet Take 1 tablet (500 mg total) by mouth 2 (two) times a day 180 tablet 1    Multiple Vitamin (multivitamin) tablet Take 1 tablet by mouth daily      omeprazole (PriLOSEC) 20 mg delayed release capsule Take 20 mg by mouth daily      vitamin B-12 (VITAMIN B-12) 1,000 mcg tablet Take 1 tablet by mouth every other day      apixaban (ELIQUIS) 5 mg Take 2 tablets (10 mg total) by mouth 2 (two) times a day for 7 days, THEN 1 tablet (5 mg total) 2 (two) times a day for 23 days  74 tablet 0    metoprolol succinate (TOPROL-XL) 100 mg 24 hr tablet Take 0 5 tablets (50 mg total) by mouth daily 15 tablet 0     No current facility-administered medications for this visit  Allergies   Allergen Reactions    Lisinopril Cough    Penicillin G Hives    Penicillins       Immunizations:     Immunization History   Administered Date(s) Administered    SARS-CoV-2 / COVID-19 mRNA IM (Moderna) 01/25/2021, 02/21/2021      Health Maintenance: There are no preventive care reminders to display for this patient  Topic Date Due    Pneumococcal Vaccine: 65+ Years (2 of 4 - PPSV23) 08/05/2016    Influenza Vaccine (1) 09/01/2021      Medicare Health Risk Assessment:     /80 (BP Location: Right arm, Patient Position: Sitting, Cuff Size: Adult)   Pulse 65   Temp 98 2 °F (36 8 °C) (Tympanic)   Ht 5' 5" (1 651 m)   Wt 68 5 kg (151 lb)   SpO2 98%   BMI 25 13 kg/m²      Salena Velasco is here for his Subsequent Wellness visit  Health Risk Assessment:   Patient rates overall health as good  Patient feels that their physical health rating is same  Patient is satisfied with their life  Eyesight was rated as same  Hearing was rated as slightly worse  Patient feels that their emotional and mental health rating is slightly better  Patients states they are never, rarely angry  Patient states they are never, rarely unusually tired/fatigued  Pain experienced in the last 7 days has been some  Patient's pain rating has been 3/10  Patient states that he has experienced weight loss or gain in last 6 months  Depression Screening:   PHQ-2 Score: 0      Fall Risk Screening: In the past year, patient has experienced: no history of falling in past year      Home Safety:  Patient does not have trouble with stairs inside or outside of their home  Patient has working smoke alarms and has working carbon monoxide detector  Home safety hazards include: none  Nutrition:   Current diet is Low Saturated Fat, Low Cholesterol and No Added Salt  Medications:   Patient is currently taking over-the-counter supplements  OTC medications include: see medication list  Patient is able to manage medications  Activities of Daily Living (ADLs)/Instrumental Activities of Daily Living (IADLs):   Walk and transfer into and out of bed and chair?: Yes  Dress and groom yourself?: Yes    Bathe or shower yourself?: Yes    Feed yourself? Yes  Do your laundry/housekeeping?: Yes  Manage your money, pay your bills and track your expenses?: Yes  Make your own meals?: Yes    Do your own shopping?: Yes    Previous Hospitalizations:   Any hospitalizations or ED visits within the last 12 months?: Yes      Hospitalization Comments: He was admitted at [facility] June [facility]    [address]:   Living will: Yes    Durable POA for healthcare:  Yes    Advanced directive: Yes    Advanced directive counseling given: Yes    Five wishes given: Yes    Patient declined ACP directive: No    End of Life Decisions reviewed with patient: Yes      Comments: His wafe juliet and his daughter Chava Childers diane is his POA    Cognitive Screening:   Provider or family/friend/caregiver concerned regarding cognition?: No    PREVENTIVE SCREENINGS      Cardiovascular Screening:    General: History Lipid Disorder and Screening Current      Diabetes Screening:     General: History Diabetes and Screening Current      Colorectal Cancer Screening:     General: Screening Current      Prostate Cancer Screening:    General: Screening Not Indicated      Osteoporosis Screening:    General: Risks and Benefits Discussed      Abdominal Aortic Aneurysm (AAA) Screening:    Risk factors include: tobacco use        General: Screening Not Indicated      Lung Cancer Screening:     General: Screening Not Indicated    Screening, Brief Intervention, and Referral to Treatment (SBIRT)    Screening  Typical number of drinks in a day: 0  Typical number of drinks in a week: 0  Interpretation: Low risk drinking behavior  Brief Intervention  Alcohol & drug use screenings were reviewed  No concerns regarding substance use disorder identified  Other Counseling Topics:   Car/seat belt/driving safety, skin self-exam, sunscreen and regular weightbearing exercise         Dre Urrutia MD

## 2021-08-31 NOTE — ASSESSMENT & PLAN NOTE
Blood pressure elevated  Is off amlodipine and losartan since hospitalization so advised to restart losartan 100 mg daily continue other medications  Advised for low-salt diet

## 2021-08-31 NOTE — ASSESSMENT & PLAN NOTE
Lab Results   Component Value Date    HGBA1C 6 5 (H) 07/08/2021     After hospitalization his losartan was discontinued  due to low blood pressure    Now blood pressure is elevated so advised to restart losartan 100 mg daily

## 2021-08-31 NOTE — ASSESSMENT & PLAN NOTE
Cholesterol increased from 141  To 220 LDL increased from  since he is off atorvastatin  Atorvastatin was discontinued due to myalgia  Patient states after his saw  his cholesterol increased his restarted taking his atorvastatin 20 mg daily for last 2 weeks and has no further myalgia so will continue   Advised for low-cholesterol low saturated fat diet

## 2021-08-31 NOTE — PROGRESS NOTES
Assessment/Plan:    1  Medicare annual wellness visit, subsequent  Assessment & Plan:    He was advised to follow up with an ophthalmologist due to diabetes mellitus and hypertension  2  Acute pulmonary embolism [facility])  Assessment & Plan:   Patient was seen by pulmonary specialist   On apixaban  Was advised to continue for  6 months  3  Essential hypertension  Assessment & Plan:   Blood pressure elevated  Is off amlodipine and losartan since hospitalization so advised to restart losartan 100 mg daily continue other medications  Advised for low-salt diet  Orders:  -     Comprehensive metabolic panel; Future    4  Coronary artery disease involving native coronary artery of native heart without angina pectoris  Assessment & Plan:    Presently stable and asymptomatic  Patient has been followed by cardiologist     Orders:  -     Lipid panel; Future    5  Mixed hyperlipidemia  Assessment & Plan:    Cholesterol increased from 141  To 220 LDL increased from  since he is off atorvastatin  Atorvastatin was discontinued due to myalgia  Patient states after his saw  his cholesterol increased his restarted taking his atorvastatin 20 mg daily for last 2 weeks and has no further myalgia so will continue  Advised for low-cholesterol low saturated fat diet    Orders:  -     Lipid panel; Future    6  S/P AVR (aortic valve replacement)  Assessment & Plan:    Presently stable and asymptomatic  Patient has been followed by cardiologist       7  Gastroesophageal reflux disease without esophagitis  Assessment & Plan:    Symptoms are controlled on omeprazole  Advised to watch diet and reflux precautions  8  Stage 3a chronic kidney disease [facility])  Assessment & Plan:  Lab Results   Component Value Date    EGFR 51 07/08/2021    EGFR 45 06/15/2021    EGFR 44 06/14/2021    CREATININE 1 25 07/08/2021    CREATININE 1 40 (H) 06/15/2021    CREATININE 1 41 (H) 06/14/2021     CKD fairly stable    Will monitor and follow renal function  Orders:  -     Comprehensive metabolic panel; Future    9  BMI 25 0-25 9,adult    10  Type 2 diabetes mellitus without complication, with long-term current use of insulin [facility])  Assessment & Plan:    Lab Results   Component Value Date    HGBA1C 6 5 (H) 07/08/2021     Diabetes mellitus well controlled  Advised to continue present medications  Advised for 1800 calorie ADA diet  No hypoglycemia  Orders:  -     Comprehensive metabolic panel; Future    11  Need for prophylactic vaccination and inoculation against influenza  -     influenza vaccine, high-dose, PF 0 7 mL (FLUZONE HIGH-DOSE)    12  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 ([facility] )  Assessment & Plan:    Lab Results   Component Value Date    HGBA1C 6 5 (H) 07/08/2021     After hospitalization his losartan was discontinued  due to low blood pressure  Now blood pressure is elevated so advised to restart losartan 100 mg daily            Subjective:   Patient presents for annual wellness exam as well as follow-up of his medical problems  Patient ID: Hilton Peoples is a 80 y o  male  HPI     79-year-old white male patient presents for annual wellness exam as well as follow-up of his medical problems  He denies any chest pain, shortness of breath, pain in abdomen  He denies any cough, fever, chills  Denies any nausea, vomiting, diarrhea  He got his COVID-19 vaccination      The following portions of the patient's history were reviewed and updated as appropriate:     Past Medical History:  He has a past medical history of Anemia (6/22/2021), Bilateral carotid artery stenosis (1/4/2021), Bilateral carotid bruits, BMI 25 0-25 9,adult (4/6/2021), BMI 25 0-25 9,adult (8/31/2021), BMI 26 0-26 9,adult (8/31/2021), Coronary artery disease involving native coronary artery of native heart without angina pectoris (4/6/2021), CTS (carpal tunnel syndrome), Deviated septum, Diabetes ([facility] ), 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[facility] ), Dizziness, Double vision, Ear problems, Encounter for support and coordination of transition of care (6/22/2021), Fatigue, General weakness, GERD (gastroesophageal reflux disease), Hearing impaired person, bilateral, HL (hearing loss), Hypertension, Medicare annual wellness visit, subsequent (8/31/2021), Microscopic hematuria, Myalgia (4/6/2021), Pain in joints (4/6/2021), PVD (peripheral vascular disease) ([facility] ) (4/2/2021), Rectal bleeding (6/22/2021), Renal calculi, Right leg DVT ([facility] ) (6/22/2021), Right lumbar radiculopathy, S/P AVR (aortic valve replacement) (4/2/2021), SOB (shortness of breath) on exertion, Spinal stenosis in cervical region (4/2/2021), Tachycardia, Urinary frequency, and Vertigo (1/4/2021)  ,  _______________________________________________________________________  Past Surgical History:   has a past surgical history that includes Ureterectomy; Bypass Graft; Carpal tunnel release (Right); Hand surgery (Left); Cataract extraction, bilateral; Aortic valve replacement (09/2016); 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); and Colonoscopy (09/16/2015)  ,  _______________________________________________________________________  Family History:  Family history is unknown by patient  ,  _______________________________________________________________________  Social History:   reports that he has quit smoking  He has never used smokeless tobacco  He reports previous alcohol use  He reports that he does not use drugs  ,  _______________________________________________________________________  Allergies:  is allergic to lisinopril, penicillin g, and penicillins     _______________________________________________________________________  Current Outpatient Medications   Medication Sig Dispense Refill    acetaminophen (TYLENOL) 500 mg tablet Take 500 mg by mouth 2 (two) times a week       aspirin (ECOTRIN LOW STRENGTH) 81 mg EC tablet Take 81 mg by mouth daily      atorvastatin (LIPITOR) 40 mg tablet Take 20 mg by mouth daily 1/2 tablet      Brinzolamide-Brimonidine (Simbrinza) 1-0 2 % SUSP Apply 1 drop to eye 2 (two) times a day      Cinnamon 500 MG capsule Take 500 mg by mouth 2 (two) times a day      glipiZIDE (GLUCOTROL) 5 mg tablet Take 1 tablet (5 mg total) by mouth daily in the early morning 90 tablet 1    insulin glargine (LANTUS) 100 units/mL subcutaneous injection Inject 23 Units under the skin daily at bedtime      latanoprost (XALATAN) 0 005 % ophthalmic solution Administer 1 drop to both eyes daily at bedtime      metFORMIN (GLUCOPHAGE) 500 mg tablet Take 1 tablet (500 mg total) by mouth 2 (two) times a day 180 tablet 1    Multiple Vitamin (multivitamin) tablet Take 1 tablet by mouth daily      omeprazole (PriLOSEC) 20 mg delayed release capsule Take 20 mg by mouth daily      vitamin B-12 (VITAMIN B-12) 1,000 mcg tablet Take 1 tablet by mouth every other day      apixaban (ELIQUIS) 5 mg Take 2 tablets (10 mg total) by mouth 2 (two) times a day for 7 days, THEN 1 tablet (5 mg total) 2 (two) times a day for 23 days  74 tablet 0    metoprolol succinate (TOPROL-XL) 100 mg 24 hr tablet Take 0 5 tablets (50 mg total) by mouth daily 15 tablet 0     No current facility-administered medications for this visit      _______________________________________________________________________  Review of Systems   Constitutional: Negative for chills and fever  HENT: Negative for congestion, ear pain, hearing loss, nosebleeds, sinus pain, sore throat and trouble swallowing  Eyes: Negative for discharge, redness and visual disturbance  Respiratory: Negative for cough, chest tightness and shortness of breath  Cardiovascular: Negative for chest pain and palpitations  Gastrointestinal: Negative for abdominal pain, blood in stool, constipation, diarrhea, nausea and vomiting  Genitourinary: Negative for dysuria, flank pain, frequency and hematuria     Musculoskeletal: Negative for arthralgias, myalgias and neck pain    Skin: Negative for color change and rash  Neurological: Negative for dizziness, speech difficulty, weakness and headaches  Hematological: Does not bruise/bleed easily  Psychiatric/Behavioral: Negative for agitation and behavioral problems  Objective:  Vitals:    08/31/21 1309   BP: 142/94   BP Location: Right arm   Patient Position: Sitting   Cuff Size: Adult   Pulse: 65   Temp: 98 2 °F (36 8 °C)   TempSrc: Tympanic   SpO2: 98%   Weight: 68 5 kg (151 lb)   Height: 5' 5" (1 651 m)     Body mass index is 25 13 kg/m²  Physical Exam  Vitals and nursing note reviewed  Constitutional:       General: He is not in acute distress  Appearance: Normal appearance  HENT:      Head: Normocephalic and atraumatic  Right Ear: Ear canal and external ear normal       Left Ear: Ear canal and external ear normal       Mouth/Throat:      Comments: Patient has a face mask on  Eyes:      General: No scleral icterus  Extraocular Movements: Extraocular movements intact  Conjunctiva/sclera: Conjunctivae normal    Cardiovascular:      Rate and Rhythm: Normal rate and regular rhythm  Pulses: Normal pulses  Heart sounds: Murmur heard  Pulmonary:      Effort: Pulmonary effort is normal       Breath sounds: Normal breath sounds  Abdominal:      General: Bowel sounds are normal       Palpations: Abdomen is soft  Tenderness: There is no abdominal tenderness  Musculoskeletal:         General: Normal range of motion  Cervical back: Normal range of motion and neck supple  No muscular tenderness  Right lower leg: No edema  Left lower leg: No edema  Skin:     General: Skin is warm  Findings: No rash  Neurological:      General: No focal deficit present  Mental Status: He is alert and oriented to person, place, and time     Psychiatric:         Mood and Affect: Mood normal          Behavior: Behavior normal

## 2021-08-31 NOTE — ASSESSMENT & PLAN NOTE
Lab Results   Component Value Date    HGBA1C 6 5 (H) 07/08/2021     Diabetes mellitus well controlled  Advised to continue present medications  Advised for 1800 calorie ADA diet  No hypoglycemia

## 2021-08-31 NOTE — ASSESSMENT & PLAN NOTE
Lab Results   Component Value Date    EGFR 51 07/08/2021    EGFR 45 06/15/2021    EGFR 44 06/14/2021    CREATININE 1 25 07/08/2021    CREATININE 1 40 (H) 06/15/2021    CREATININE 1 41 (H) 06/14/2021     CKD fairly stable  Will monitor and follow renal function

## 2021-11-30 ENCOUNTER — OFFICE VISIT (OUTPATIENT)
Dept: INTERNAL MEDICINE CLINIC | Facility: CLINIC | Age: 86
End: 2021-11-30
Payer: MEDICARE

## 2021-11-30 VITALS
OXYGEN SATURATION: 98 % | BODY MASS INDEX: 25.83 KG/M2 | TEMPERATURE: 98.7 F | WEIGHT: 155 LBS | DIASTOLIC BLOOD PRESSURE: 84 MMHG | HEART RATE: 84 BPM | SYSTOLIC BLOOD PRESSURE: 138 MMHG | HEIGHT: 65 IN

## 2021-11-30 DIAGNOSIS — E78.2 MIXED HYPERLIPIDEMIA: ICD-10-CM

## 2021-11-30 DIAGNOSIS — E11.9 TYPE 2 DIABETES MELLITUS WITHOUT COMPLICATION, WITH LONG-TERM CURRENT USE OF INSULIN (HCC): Primary | Chronic | ICD-10-CM

## 2021-11-30 DIAGNOSIS — I65.23 BILATERAL CAROTID ARTERY STENOSIS: Chronic | ICD-10-CM

## 2021-11-30 DIAGNOSIS — K21.9 GASTROESOPHAGEAL REFLUX DISEASE WITHOUT ESOPHAGITIS: ICD-10-CM

## 2021-11-30 DIAGNOSIS — I25.10 CORONARY ARTERY DISEASE INVOLVING NATIVE CORONARY ARTERY OF NATIVE HEART WITHOUT ANGINA PECTORIS: ICD-10-CM

## 2021-11-30 DIAGNOSIS — N18.31 STAGE 3A CHRONIC KIDNEY DISEASE (HCC): ICD-10-CM

## 2021-11-30 DIAGNOSIS — I10 ESSENTIAL HYPERTENSION: Chronic | ICD-10-CM

## 2021-11-30 DIAGNOSIS — I26.99 ACUTE PULMONARY EMBOLISM, UNSPECIFIED PULMONARY EMBOLISM TYPE, UNSPECIFIED WHETHER ACUTE COR PULMONALE PRESENT (HCC): ICD-10-CM

## 2021-11-30 DIAGNOSIS — Z79.4 TYPE 2 DIABETES MELLITUS WITHOUT COMPLICATION, WITH LONG-TERM CURRENT USE OF INSULIN (HCC): Primary | Chronic | ICD-10-CM

## 2021-11-30 DIAGNOSIS — Z95.2 S/P AVR (AORTIC VALVE REPLACEMENT): ICD-10-CM

## 2021-11-30 PROCEDURE — 99214 OFFICE O/P EST MOD 30 MIN: CPT | Performed by: INTERNAL MEDICINE

## 2021-11-30 RX ORDER — LOSARTAN POTASSIUM 100 MG/1
100 TABLET ORAL DAILY
COMMUNITY

## 2021-12-03 ENCOUNTER — APPOINTMENT (OUTPATIENT)
Dept: LAB | Facility: CLINIC | Age: 86
End: 2021-12-03
Payer: MEDICARE

## 2021-12-03 DIAGNOSIS — I25.10 CORONARY ARTERY DISEASE INVOLVING NATIVE CORONARY ARTERY OF NATIVE HEART WITHOUT ANGINA PECTORIS: ICD-10-CM

## 2021-12-03 DIAGNOSIS — I26.99 ACUTE PULMONARY EMBOLISM, UNSPECIFIED PULMONARY EMBOLISM TYPE, UNSPECIFIED WHETHER ACUTE COR PULMONALE PRESENT (HCC): ICD-10-CM

## 2021-12-03 DIAGNOSIS — I10 ESSENTIAL HYPERTENSION: Chronic | ICD-10-CM

## 2021-12-03 DIAGNOSIS — Z79.4 TYPE 2 DIABETES MELLITUS WITHOUT COMPLICATION, WITH LONG-TERM CURRENT USE OF INSULIN (HCC): Chronic | ICD-10-CM

## 2021-12-03 DIAGNOSIS — E78.2 MIXED HYPERLIPIDEMIA: ICD-10-CM

## 2021-12-03 DIAGNOSIS — E11.9 TYPE 2 DIABETES MELLITUS WITHOUT COMPLICATION, WITH LONG-TERM CURRENT USE OF INSULIN (HCC): Chronic | ICD-10-CM

## 2021-12-03 DIAGNOSIS — N18.31 STAGE 3A CHRONIC KIDNEY DISEASE (HCC): ICD-10-CM

## 2021-12-03 LAB
ALBUMIN SERPL BCP-MCNC: 3.4 G/DL (ref 3.5–5)
ALP SERPL-CCNC: 64 U/L (ref 46–116)
ALT SERPL W P-5'-P-CCNC: 27 U/L (ref 12–78)
ANION GAP SERPL CALCULATED.3IONS-SCNC: 4 MMOL/L (ref 4–13)
AST SERPL W P-5'-P-CCNC: 17 U/L (ref 5–45)
BASOPHILS # BLD AUTO: 0.09 THOUSANDS/ΜL (ref 0–0.1)
BASOPHILS NFR BLD AUTO: 2 % (ref 0–1)
BILIRUB SERPL-MCNC: 0.51 MG/DL (ref 0.2–1)
BUN SERPL-MCNC: 26 MG/DL (ref 5–25)
CALCIUM ALBUM COR SERPL-MCNC: 9.6 MG/DL (ref 8.3–10.1)
CALCIUM SERPL-MCNC: 9.1 MG/DL (ref 8.3–10.1)
CHLORIDE SERPL-SCNC: 112 MMOL/L (ref 100–108)
CHOLEST SERPL-MCNC: 128 MG/DL
CO2 SERPL-SCNC: 25 MMOL/L (ref 21–32)
CREAT SERPL-MCNC: 1.48 MG/DL (ref 0.6–1.3)
EOSINOPHIL # BLD AUTO: 0.23 THOUSAND/ΜL (ref 0–0.61)
EOSINOPHIL NFR BLD AUTO: 4 % (ref 0–6)
ERYTHROCYTE [DISTWIDTH] IN BLOOD BY AUTOMATED COUNT: 13.8 % (ref 11.6–15.1)
GFR SERPL CREATININE-BSD FRML MDRD: 42 ML/MIN/1.73SQ M
GLUCOSE P FAST SERPL-MCNC: 91 MG/DL (ref 65–99)
HCT VFR BLD AUTO: 39.9 % (ref 36.5–49.3)
HDLC SERPL-MCNC: 46 MG/DL
HGB BLD-MCNC: 12.5 G/DL (ref 12–17)
IMM GRANULOCYTES # BLD AUTO: 0.01 THOUSAND/UL (ref 0–0.2)
IMM GRANULOCYTES NFR BLD AUTO: 0 % (ref 0–2)
LDLC SERPL CALC-MCNC: 72 MG/DL (ref 0–100)
LYMPHOCYTES # BLD AUTO: 1.93 THOUSANDS/ΜL (ref 0.6–4.47)
LYMPHOCYTES NFR BLD AUTO: 31 % (ref 14–44)
MCH RBC QN AUTO: 30.3 PG (ref 26.8–34.3)
MCHC RBC AUTO-ENTMCNC: 31.3 G/DL (ref 31.4–37.4)
MCV RBC AUTO: 97 FL (ref 82–98)
MONOCYTES # BLD AUTO: 0.71 THOUSAND/ΜL (ref 0.17–1.22)
MONOCYTES NFR BLD AUTO: 12 % (ref 4–12)
NEUTROPHILS # BLD AUTO: 3.22 THOUSANDS/ΜL (ref 1.85–7.62)
NEUTS SEG NFR BLD AUTO: 51 % (ref 43–75)
NONHDLC SERPL-MCNC: 82 MG/DL
NRBC BLD AUTO-RTO: 0 /100 WBCS
PLATELET # BLD AUTO: 187 THOUSANDS/UL (ref 149–390)
PMV BLD AUTO: 11.2 FL (ref 8.9–12.7)
POTASSIUM SERPL-SCNC: 4.3 MMOL/L (ref 3.5–5.3)
PROT SERPL-MCNC: 7.3 G/DL (ref 6.4–8.2)
RBC # BLD AUTO: 4.13 MILLION/UL (ref 3.88–5.62)
SODIUM SERPL-SCNC: 141 MMOL/L (ref 136–145)
TRIGL SERPL-MCNC: 52 MG/DL
WBC # BLD AUTO: 6.19 THOUSAND/UL (ref 4.31–10.16)

## 2021-12-03 PROCEDURE — 85025 COMPLETE CBC W/AUTO DIFF WBC: CPT

## 2021-12-03 PROCEDURE — 80061 LIPID PANEL: CPT

## 2021-12-03 PROCEDURE — 80053 COMPREHEN METABOLIC PANEL: CPT

## 2021-12-03 PROCEDURE — 36415 COLL VENOUS BLD VENIPUNCTURE: CPT

## 2021-12-09 ENCOUNTER — HOSPITAL ENCOUNTER (OUTPATIENT)
Dept: VASCULAR ULTRASOUND | Facility: HOSPITAL | Age: 86
Discharge: HOME | End: 2021-12-09
Attending: INTERNAL MEDICINE
Payer: MEDICARE

## 2021-12-09 DIAGNOSIS — I25.10 CORONARY ARTERY DISEASE INVOLVING NATIVE CORONARY ARTERY OF NATIVE HEART WITHOUT ANGINA PECTORIS: ICD-10-CM

## 2021-12-09 DIAGNOSIS — E78.2 MIXED HYPERLIPIDEMIA: ICD-10-CM

## 2021-12-09 DIAGNOSIS — I65.23 BILATERAL CAROTID ARTERY STENOSIS: Chronic | ICD-10-CM

## 2021-12-09 PROCEDURE — 93880 EXTRACRANIAL BILAT STUDY: CPT

## 2021-12-09 PROCEDURE — 93880 EXTRACRANIAL BILAT STUDY: CPT | Performed by: SURGERY

## 2022-01-03 ENCOUNTER — OFFICE VISIT (OUTPATIENT)
Dept: PULMONOLOGY | Facility: CLINIC | Age: 87
End: 2022-01-03
Payer: MEDICARE

## 2022-01-03 VITALS
TEMPERATURE: 97.2 F | DIASTOLIC BLOOD PRESSURE: 76 MMHG | HEIGHT: 65 IN | OXYGEN SATURATION: 98 % | WEIGHT: 156.5 LBS | HEART RATE: 68 BPM | SYSTOLIC BLOOD PRESSURE: 132 MMHG | BODY MASS INDEX: 26.08 KG/M2

## 2022-01-03 DIAGNOSIS — I26.99 OTHER ACUTE PULMONARY EMBOLISM WITHOUT ACUTE COR PULMONALE (HCC): Primary | ICD-10-CM

## 2022-01-03 PROBLEM — I82.401 ACUTE DEEP VEIN THROMBOSIS (DVT) OF RIGHT LOWER EXTREMITY (HCC): Status: RESOLVED | Noted: 2021-06-08 | Resolved: 2022-01-03

## 2022-01-03 PROCEDURE — 99213 OFFICE O/P EST LOW 20 MIN: CPT | Performed by: INTERNAL MEDICINE

## 2022-01-03 NOTE — PROGRESS NOTES
Assessment/Plan:    Acute pulmonary embolism (HCC)  No relapse of symptoms of PE or DVT  Now off anticoagulation as was the long-term plan  Continues on aspirin which she already took for his coronary disease  Aspirin is somewhat effective in reducing long-term risk of recurrent DVT  Advised to keep an eye on the symptoms as he originally presented with leg problems without respiratory symptoms  Main issue moving forward is a risk of GI bleeding on chronic aspirin therapy  Patient did take a plane ride to the [facility] this past fall but intends not to take anymore long vacation trips  Diagnoses and all orders for this visit:    Other acute pulmonary embolism without acute cor pulmonale (HCC)          Subjective:      Patient ID: Princess Sanders is a 80 y o  male  This patient returns for evaluation of unprovoked pulmonary embolism in DVT of the right leg  This occurred in June 2021  Stopped his the systemic anticoagulation as of middle of last month which I agree with  Continues with aspirin therapy which he was taking for his coronary disease  No current symptoms of leg problems or dyspnea  No chest pain  Patient not aware of any preceding illness before the DVT  Has a background of aortic valve replacement  No background of a GI bleed or gastrointestinal upset on aspirin other than the fact that he had bleeding hemorrhoids after the institution of anticoagulation  The following portions of the patient's history were reviewed and updated as appropriate: allergies, current medications, past family history, past medical history, past social history, past surgical history and problem list     Review of Systems   Constitutional: Negative for activity change and unexpected weight change  Respiratory: Negative for cough, shortness of breath and wheezing  Cardiovascular: Negative for chest pain, palpitations and leg swelling  Gastrointestinal: Negative for abdominal pain     Hematological: Does not bruise/bleed easily  Objective:      /76 (BP Location: Left arm, Patient Position: Sitting, Cuff Size: Adult)   Pulse 68   Temp (!) 97 2 °F (36 2 °C) (Tympanic)   Ht 5' 5" (1 651 m)   Wt 71 kg (156 lb 8 oz)   SpO2 98%   BMI 26 04 kg/m²          Physical Exam  Vitals reviewed  Constitutional:       Appearance: He is normal weight  He is not ill-appearing  Neck:      Vascular: No JVD  Cardiovascular:      Rate and Rhythm: Normal rate and regular rhythm  Pulses:           Radial pulses are 2+ on the right side and 2+ on the left side  Heart sounds: Normal heart sounds  Comments: No pulmonary hypertension  Pulmonary:      Effort: Pulmonary effort is normal       Breath sounds: Normal breath sounds  Musculoskeletal:         General: No swelling  Cervical back: No rigidity or tenderness  Right lower leg: No edema  Left lower leg: No edema  Lymphadenopathy:      Cervical: No cervical adenopathy  Skin:     General: Skin is warm and dry  Neurological:      Mental Status: He is alert and oriented to person, place, and time     Psychiatric:         Mood and Affect: Mood normal

## 2022-01-03 NOTE — ASSESSMENT & PLAN NOTE
No relapse of symptoms of PE or DVT  Now off anticoagulation as was the long-term plan  Continues on aspirin which she already took for his coronary disease  Aspirin is somewhat effective in reducing long-term risk of recurrent DVT  Advised to keep an eye on the symptoms as he originally presented with leg problems without respiratory symptoms  Main issue moving forward is a risk of GI bleeding on chronic aspirin therapy  Patient did take a plane ride to the [facility] this past fall but intends not to take anymore long vacation trips  Return to [facility] only if problems or questions

## 2022-02-01 ENCOUNTER — TELEPHONE (OUTPATIENT)
Dept: INTERNAL MEDICINE CLINIC | Facility: CLINIC | Age: 87
End: 2022-02-01

## 2022-02-01 DIAGNOSIS — Z79.4 TYPE 2 DIABETES MELLITUS WITHOUT COMPLICATION, WITH LONG-TERM CURRENT USE OF INSULIN (HCC): Chronic | ICD-10-CM

## 2022-02-01 DIAGNOSIS — E11.9 TYPE 2 DIABETES MELLITUS WITHOUT COMPLICATION, WITH LONG-TERM CURRENT USE OF INSULIN (HCC): Chronic | ICD-10-CM

## 2022-02-01 RX ORDER — GLIPIZIDE 5 MG/1
5 TABLET ORAL
Qty: 90 TABLET | Refills: 1 | Status: SHIPPED | OUTPATIENT
Start: 2022-02-01 | End: 2022-06-20 | Stop reason: SDUPTHER

## 2022-02-01 NOTE — TELEPHONE ENCOUNTER
glipiZIDE (GLUCOTROL) 5 mg tablet   Sig: Take 1 tablet (5 mg total) by mouth daily in the early morning    metFORMIN (GLUCOPHAGE) 500 mg tablet   Sig: Take 1 tablet (500 mg total) by mouth 2 (two) times a day    Send to [facility] in [facility]

## 2022-02-24 ENCOUNTER — TELEPHONE (OUTPATIENT)
Dept: INTERNAL MEDICINE CLINIC | Facility: CLINIC | Age: 87
End: 2022-02-24

## 2022-02-24 NOTE — TELEPHONE ENCOUNTER
Vomitted last night and this AM temp was 103 4 - just gave him ibuprofen and will check his temp in an hour  Cece is in the hospital - went in for low temp 92  Now her temp is 100 - he has been going to see her everyday  Juliengarima Short said last week she had a stomach bug and a fever for a couple of days    Could it just be that or should he be evaluated in the ER?

## 2022-03-03 ENCOUNTER — OFFICE VISIT (OUTPATIENT)
Dept: INTERNAL MEDICINE CLINIC | Facility: CLINIC | Age: 87
End: 2022-03-03
Payer: MEDICARE

## 2022-03-03 VITALS
SYSTOLIC BLOOD PRESSURE: 150 MMHG | HEIGHT: 67 IN | HEART RATE: 80 BPM | DIASTOLIC BLOOD PRESSURE: 90 MMHG | WEIGHT: 154 LBS | TEMPERATURE: 97.6 F | BODY MASS INDEX: 24.17 KG/M2

## 2022-03-03 DIAGNOSIS — K21.9 GASTROESOPHAGEAL REFLUX DISEASE WITHOUT ESOPHAGITIS: ICD-10-CM

## 2022-03-03 DIAGNOSIS — N18.31 STAGE 3A CHRONIC KIDNEY DISEASE (HCC): ICD-10-CM

## 2022-03-03 DIAGNOSIS — R80.9 MICROALBUMINURIA DUE TO TYPE 2 DIABETES MELLITUS (HCC): ICD-10-CM

## 2022-03-03 DIAGNOSIS — Z95.2 S/P AVR (AORTIC VALVE REPLACEMENT): ICD-10-CM

## 2022-03-03 DIAGNOSIS — I10 ESSENTIAL HYPERTENSION: Chronic | ICD-10-CM

## 2022-03-03 DIAGNOSIS — Z79.4 TYPE 2 DIABETES MELLITUS WITHOUT COMPLICATION, WITH LONG-TERM CURRENT USE OF INSULIN (HCC): Primary | Chronic | ICD-10-CM

## 2022-03-03 DIAGNOSIS — E11.9 TYPE 2 DIABETES MELLITUS WITHOUT COMPLICATION, WITH LONG-TERM CURRENT USE OF INSULIN (HCC): Primary | Chronic | ICD-10-CM

## 2022-03-03 DIAGNOSIS — E78.2 MIXED HYPERLIPIDEMIA: ICD-10-CM

## 2022-03-03 DIAGNOSIS — E11.29 MICROALBUMINURIA DUE TO TYPE 2 DIABETES MELLITUS (HCC): ICD-10-CM

## 2022-03-03 DIAGNOSIS — I26.99 OTHER ACUTE PULMONARY EMBOLISM WITHOUT ACUTE COR PULMONALE (HCC): ICD-10-CM

## 2022-03-03 DIAGNOSIS — I25.10 CORONARY ARTERY DISEASE INVOLVING NATIVE CORONARY ARTERY OF NATIVE HEART WITHOUT ANGINA PECTORIS: ICD-10-CM

## 2022-03-03 PROCEDURE — 99214 OFFICE O/P EST MOD 30 MIN: CPT | Performed by: INTERNAL MEDICINE

## 2022-03-03 RX ORDER — METOPROLOL SUCCINATE 100 MG/1
100 TABLET, EXTENDED RELEASE ORAL DAILY
COMMUNITY

## 2022-03-03 NOTE — ASSESSMENT & PLAN NOTE
Lab Results   Component Value Date    EGFR 42 12/03/2021    EGFR 51 07/08/2021    EGFR 45 06/15/2021    CREATININE 1 48 (H) 12/03/2021    CREATININE 1 25 07/08/2021    CREATININE 1 40 (H) 06/15/2021   Patient advised to maintain hydration  Will continue present medications  Will follow renal function  Fairly stable

## 2022-03-03 NOTE — PATIENT INSTRUCTIONS
Patient was advised to continue present medications  discussed with the patient medications and laboratory data in detail  Follow-up with me in 3 months or as advised  If any blood test was ordered please do 1 week prior to next appointment unless advise to get earlier    If you have any questions please call the office [phone]

## 2022-03-03 NOTE — ASSESSMENT & PLAN NOTE
His blood pressure elevated  At home his blood pressure also elevated  He used to take amlodipine in the past which was discontinued due to low blood pressure  Restart amlodipine 5 milligram daily continue metoprolol and losartan as prescribed  Advised for law salt diet

## 2022-03-03 NOTE — ASSESSMENT & PLAN NOTE
Symptoms are well controlled on present dose of omeprazole  Needs omeprazole daily to control his symptoms    Advised to watch diet and reflux precautions

## 2022-03-03 NOTE — PROGRESS NOTES
Assessment/Plan:    1  Type 2 diabetes mellitus without complication, with long-term current use of insulin ([facility])  Assessment & Plan:    Lab Results   Component Value Date    HGBA1C 6 5 (H) 07/08/2021   His last hemoglobin A1c was 6 5  Patient has been watching diet very closely  No hypoglycemia  He  had  blood test couple days ago for [facility] visit next week  He will bring a copy of his blood test results from the [place] clinic  Call if blood sugar less than 80 continue present medications  Pt  states that he stopped his glipizide but his sugar was going into 200s so he  started again  glipizide  Now blood sugar in 130s  Orders:  -     Basic metabolic panel; Future  -     Hemoglobin A1C; Future    2  Essential hypertension  Assessment & Plan:  His blood pressure elevated  At home his blood pressure also elevated  He used to take amlodipine in the past which was discontinued due to low blood pressure  Restart amlodipine 5 milligram daily continue metoprolol and losartan as prescribed  Advised for law salt diet  Orders:  -     Basic metabolic panel; Future    3  Coronary artery disease involving native coronary artery of native heart without angina pectoris  Assessment & Plan:  Patient is stable and asymptomatic  Patient has been followed by cardiologist      4  Mixed hyperlipidemia  Assessment & Plan:  July 2021 his cholesterol was elevated to 220  But he was off atorvastatin  His is back on atorvastatin 20 mg daily  Patient had a blood test lipid panel result pending  [facility]cholesterol low saturated diet  Orders:  -     Lipid panel; Future    5  Gastroesophageal reflux disease without esophagitis  Assessment & Plan:  Symptoms are well controlled on present dose of omeprazole  Needs omeprazole daily to control his symptoms  Advised to watch diet and reflux precautions      6   Stage 3a chronic kidney disease [facility])  Assessment & Plan:  Lab Results   Component Value Date    EGFR 42 12/03/2021    EGFR 51 07/08/2021    EGFR 45 06/15/2021    CREATININE 1 48 (H) 12/03/2021    CREATININE 1 25 07/08/2021    CREATININE 1 40 (H) 06/15/2021   Patient advised to maintain hydration  Will continue present medications  Will follow renal function  Fairly stable  Orders:  -     Basic metabolic panel; Future    7  Other acute pulmonary embolism without acute cor pulmonale ([facility] )  Assessment & Plan:  He is off Eliquis  Was seen by Pulmonary couple weeks ago  Presently asymptomatic now he is on baby aspirin  8  S/P AVR (aortic valve replacement)  Assessment & Plan:  Patient advised to follow with cardiologist       9  BMI 24 0-24 9, adult    10  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 [facility])  Assessment & Plan:    He is on losartan  Subjective:  Patient presents for follow-up  Patient ID: Mary Ambriz is a 80 y o  male  HPI   80-year-old white male patient presents for follow-up of his medical problems  He denies any chest pain, shortness of breath, pain in abdomen  Denies any cough, fever, chills  Denies any nausea, vomiting, diarrhea      The following portions of the patient's history were reviewed and updated as appropriate:     Past Medical History:  He has a past medical history of Anemia (6/22/2021), Bilateral carotid artery stenosis (1/4/2021), Bilateral carotid bruits, BMI 25 0-25 9,adult (4/6/2021), BMI 25 0-25 9,adult (8/31/2021), BMI 26 0-26 9,adult (8/31/2021), Coronary artery disease involving native coronary artery of native heart without angina pectoris (4/6/2021), CTS (carpal tunnel syndrome), Deviated septum, Diabetes ([facility] ), 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 ), Dizziness, Double vision, Ear problems, Encounter for support and coordination of transition of care (6/22/2021), Fatigue, General weakness, GERD (gastroesophageal reflux disease), Hearing impaired person, bilateral, HL (hearing loss), Hypertension, Medicare annual wellness visit, subsequent (8/31/2021), Microscopic hematuria, Myalgia (4/6/2021), Pain in joints (4/6/2021), PVD (peripheral vascular disease) ([facility] ) (4/2/2021), Rectal bleeding (6/22/2021), Renal calculi, Right [facility] ) (6/22/2021), Right lumbar radiculopathy, S/P AVR (aortic valve replacement) (4/2/2021), SOB (shortness of breath) on exertion, Spinal stenosis in cervical region (4/2/2021), Tachycardia, Urinary frequency, and Vertigo (1/4/2021)  ,  _______________________________________________________________________  Past Surgical History:   has a past surgical history that includes Ureterectomy; Bypass Graft; Carpal tunnel release (Right); Hand surgery (Left); Cataract extraction, bilateral; Aortic valve replacement (09/2016); 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); and Colonoscopy (09/16/2015)  ,  _______________________________________________________________________  Family History:  Family history is unknown by patient  ,  _______________________________________________________________________  Social History:   reports that he has quit smoking  He has never used smokeless tobacco  He reports previous alcohol use  He reports that he does not use drugs  ,  _______________________________________________________________________  Allergies:  is allergic to lisinopril, penicillin g, and penicillins     _______________________________________________________________________  Current Outpatient Medications   Medication Sig Dispense Refill    metoprolol succinate (TOPROL-XL) 100 mg 24 hr tablet Take 100 mg by mouth daily      acetaminophen (TYLENOL) 500 mg tablet Take 500 mg by mouth 2 (two) times a week       aspirin (ECOTRIN LOW STRENGTH) 81 mg EC tablet Take 81 mg by mouth daily      atorvastatin (LIPITOR) 40 mg tablet Take 20 mg by mouth daily 1/2 tablet      Brinzolamide-Brimonidine (Simbrinza) 1-0 2 % SUSP Apply 1 drop to eye 2 (two) times a day      Cinnamon 500 MG capsule Take 500 mg by mouth 2 (two) times a day      glipiZIDE (GLUCOTROL) 5 mg tablet Take 1 tablet (5 mg total) by mouth daily in the early morning 90 tablet 1    insulin glargine (LANTUS) 100 units/mL subcutaneous injection Inject 22 Units under the skin daily at bedtime       latanoprost (XALATAN) 0 005 % ophthalmic solution Administer 1 drop to both eyes daily at bedtime      losartan (COZAAR) 100 MG tablet Take 100 mg by mouth daily       metFORMIN (GLUCOPHAGE) 500 mg tablet Take 1 tablet (500 mg total) by mouth 2 (two) times a day 180 tablet 1    Multiple Vitamin (multivitamin) tablet Take 1 tablet by mouth daily      omeprazole (PriLOSEC) 20 mg delayed release capsule Take 20 mg by mouth daily      vitamin B-12 (VITAMIN B-12) 1,000 mcg tablet Take 1 tablet by mouth every other day       No current facility-administered medications for this visit      _______________________________________________________________________  Review of Systems   Constitutional: Negative for chills and fever  HENT: Negative for congestion, ear pain, hearing loss, nosebleeds, sinus pain, sore throat and trouble swallowing  Eyes: Negative for discharge, redness and visual disturbance  Respiratory: Negative for cough, chest tightness and shortness of breath  Cardiovascular: Negative for chest pain and palpitations  Gastrointestinal: Negative for abdominal pain, blood in stool, constipation, diarrhea, nausea and vomiting  Genitourinary: Negative for dysuria, flank pain and hematuria  Musculoskeletal: Positive for arthralgias (Sometimes gets pain in the knees)  Negative for myalgias and neck pain  Skin: Negative for color change and rash  Neurological: Negative for dizziness, speech difficulty, weakness and headaches  Hematological: Does not bruise/bleed easily  Psychiatric/Behavioral: Negative for agitation and behavioral problems             Objective:  Vitals:    03/03/22 1307   BP: 150/90   Pulse: 80   Temp: 97 6 °F (36 4 °C)   Weight: 69 9 kg (154 lb)   Height: 5' 7" (1 702 m)     Body mass index is 24 12 kg/m²  Physical Exam  Vitals and nursing note reviewed  Constitutional:       General: He is not in acute distress  Appearance: Normal appearance  He is normal weight  HENT:      Head: Normocephalic and atraumatic  Right Ear: Ear canal and external ear normal       Left Ear: Ear canal and external ear normal       Nose:      Comments: Patient has a face mask on     Mouth/Throat:      Comments: Patient has a face mask on  Eyes:      General: No scleral icterus  Right eye: No discharge  Left eye: No discharge  Extraocular Movements: Extraocular movements intact  Conjunctiva/sclera: Conjunctivae normal    Cardiovascular:      Rate and Rhythm: Normal rate and regular rhythm  Pulses:           Dorsalis pedis pulses are 1+ on the right side and 1+ on the left side  Posterior tibial pulses are 1+ on the right side and 1+ on the left side  Heart sounds: Murmur (Aortic , left sternal border and apex area ) heard  Pulmonary:      Effort: Pulmonary effort is normal  No respiratory distress  Breath sounds: Normal breath sounds  Abdominal:      General: Bowel sounds are normal       Palpations: Abdomen is soft  Tenderness: There is no abdominal tenderness  Musculoskeletal:         General: Normal range of motion  Cervical back: Normal range of motion and neck supple  Right lower leg: No edema  Left lower leg: No edema  Comments: He ambulates with cane   Feet:      Right foot:      Skin integrity: No ulcer, skin breakdown, erythema, warmth, callus or dry skin  Left foot:      Skin integrity: No ulcer, skin breakdown, erythema, warmth, callus or dry skin  Skin:     General: Skin is warm  Findings: No rash  Neurological:      General: No focal deficit present        Mental Status: He is alert and oriented to person, place, and time  Motor: No weakness  Psychiatric:         Mood and Affect: Mood normal          Behavior: Behavior normal        Patient's shoes and socks removed  Right Foot/Ankle   Right Foot Inspection  Skin Exam: skin normal and skin intact  No dry skin, no warmth, no callus, no erythema, no maceration, no abnormal color, no pre-ulcer, no ulcer and no callus  Toe Exam: ROM and strength within normal limits  Sensory   Vibration: intact  Proprioception: intact  Monofilament testing: intact    Vascular  The right DP pulse is 1+  The right PT pulse is 1+  Left Foot/Ankle  Left Foot Inspection  Skin Exam: skin normal and skin intact  No dry skin, no warmth, no erythema, no maceration, normal color, no pre-ulcer, no ulcer and no callus  Toe Exam: ROM and strength within normal limits  Sensory   Vibration: intact  Proprioception: intact  Monofilament testing: intact    Vascular  The left DP pulse is 1+  The left PT pulse is 1+  I spent 30 minutes with the patient today    More than 50% time spent for reviewing of external notes, reviewing of the results of diagnostics test, management of care, patient education and ordering of test

## 2022-03-03 NOTE — ASSESSMENT & PLAN NOTE
July 2021 his cholesterol was elevated to 220  But he was off atorvastatin  His is back on atorvastatin 20 mg daily  Patient had a blood test lipid panel result pending  [facility] low-cholesterol low saturated diet

## 2022-03-03 NOTE — ASSESSMENT & PLAN NOTE
He is off Eliquis  Was seen by Pulmonary couple weeks ago  Presently asymptomatic now he is on baby aspirin

## 2022-03-03 NOTE — ASSESSMENT & PLAN NOTE
Lab Results   Component Value Date    HGBA1C 6 5 (H) 07/08/2021   His last hemoglobin A1c was 6 5  Patient has been watching diet very closely  No hypoglycemia  He  had  blood test couple days ago for [facility] visit next week  He will bring a copy of his blood test results from the [facility]  Call if blood sugar less than 80 continue present medications  Pt  states that he stopped his glipizide but his sugar was going into 200s so he  started again  glipizide  Now blood sugar in 130s

## 2022-05-17 LAB
LEFT EYE DIABETIC RETINOPATHY: NORMAL
RIGHT EYE DIABETIC RETINOPATHY: NORMAL

## 2022-06-02 ENCOUNTER — OFFICE VISIT (OUTPATIENT)
Dept: INTERNAL MEDICINE CLINIC | Facility: CLINIC | Age: 87
End: 2022-06-02
Payer: MEDICARE

## 2022-06-02 VITALS
RESPIRATION RATE: 14 BRPM | HEART RATE: 80 BPM | OXYGEN SATURATION: 99 % | HEIGHT: 65 IN | DIASTOLIC BLOOD PRESSURE: 76 MMHG | SYSTOLIC BLOOD PRESSURE: 122 MMHG | TEMPERATURE: 98.7 F | BODY MASS INDEX: 25.83 KG/M2 | WEIGHT: 155 LBS

## 2022-06-02 DIAGNOSIS — I25.10 CORONARY ARTERY DISEASE INVOLVING NATIVE CORONARY ARTERY OF NATIVE HEART WITHOUT ANGINA PECTORIS: ICD-10-CM

## 2022-06-02 DIAGNOSIS — Z79.4 TYPE 2 DIABETES MELLITUS WITHOUT COMPLICATION, WITH LONG-TERM CURRENT USE OF INSULIN (HCC): Chronic | ICD-10-CM

## 2022-06-02 DIAGNOSIS — Z79.899 HIGH RISK MEDICATION USE: ICD-10-CM

## 2022-06-02 DIAGNOSIS — E78.2 MIXED HYPERLIPIDEMIA: ICD-10-CM

## 2022-06-02 DIAGNOSIS — I10 ESSENTIAL HYPERTENSION: Primary | Chronic | ICD-10-CM

## 2022-06-02 DIAGNOSIS — K21.9 GASTROESOPHAGEAL REFLUX DISEASE WITHOUT ESOPHAGITIS: ICD-10-CM

## 2022-06-02 DIAGNOSIS — R80.9 MICROALBUMINURIA DUE TO TYPE 2 DIABETES MELLITUS (HCC): ICD-10-CM

## 2022-06-02 DIAGNOSIS — E11.29 MICROALBUMINURIA DUE TO TYPE 2 DIABETES MELLITUS (HCC): ICD-10-CM

## 2022-06-02 DIAGNOSIS — N18.31 STAGE 3A CHRONIC KIDNEY DISEASE (HCC): ICD-10-CM

## 2022-06-02 DIAGNOSIS — D64.9 ANEMIA, UNSPECIFIED TYPE: ICD-10-CM

## 2022-06-02 DIAGNOSIS — E11.9 TYPE 2 DIABETES MELLITUS WITHOUT COMPLICATION, WITH LONG-TERM CURRENT USE OF INSULIN (HCC): Chronic | ICD-10-CM

## 2022-06-02 PROCEDURE — 99214 OFFICE O/P EST MOD 30 MIN: CPT | Performed by: INTERNAL MEDICINE

## 2022-06-02 NOTE — PROGRESS NOTES
Assessment/Plan:    1  Essential hypertension  Assessment & Plan:  Blood pressure well controlled  Advised to continue present medication  Advised for low-salt diet  Orders:  -     Comprehensive metabolic panel; Future    2  Type 2 diabetes mellitus without complication, with long-term current use of insulin [facility])  Assessment & Plan:    Lab Results   Component Value Date    HGBA1C 8 1 03/02/2022   His last A1c was elevated  Patient state he has been watching diet  His blood sugar in the morning around 110-120 evening blood sugar around 120-125  Advised to continue present medications  Advised for 1800 calorie ADA diet  Will follow hemoglobin A1c  No hypoglycemia  Orders:  -     Comprehensive metabolic panel; Future  -     Hemoglobin A1C; Future  -     metFORMIN (GLUCOPHAGE) 500 mg tablet; Take 1 tablet (500 mg total) by mouth 2 (two) times a day    3  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 [facility])  Assessment & Plan:    Lab Results   Component Value Date    HGBA1C 8 1 03/02/2022   Is on losartan  Renal function stable  Will follow renal function  4  Gastroesophageal reflux disease without esophagitis  Assessment & Plan:  His symptoms are well controlled on omeprazole  he needs omeprazole daily to control his symptomsAdvised to watch diet for spicy foods, caffeinated beverages, alcoholic beverages, soda, citrus fluids and foods  Advised for reflux precautions  5  Coronary artery disease involving native coronary artery of native heart without angina pectoris  Assessment & Plan:  Presently asymptomatic and stable  Patient advised to follow with cardiologist       6  Stage 3a chronic kidney disease [facility])  Assessment & Plan:  Lab Results   Component Value Date    EGFR 42 12/03/2021    EGFR 51 07/08/2021    EGFR 45 06/15/2021    CREATININE 1 48 (H) 12/03/2021    CREATININE 1 25 07/08/2021    CREATININE 1 40 (H) 06/15/2021   His renal function stable  Advised to maintain hydration  Will follow electrolytes and renal function    Orders:  -     Comprehensive metabolic panel; Future    7  Mixed hyperlipidemia  Assessment & Plan:  His last cholesterol 173, triglyceride 72, HDL 45,   He is on atorvastatin 20 mg daily  He had a myalgia from the statin therapy in the past so will avoid increasing the dose of medication  Since he is able to tolerate 20 mg without any side effect  Advised to watch diet for cholesterol saturated fat  Orders:  -     CK; Future  -     Lipid panel; Future    8  BMI 25 0-25 9,adult    9  Anemia, unspecified type  Assessment & Plan:  His hemoglobin improved from 11 4-13  1  No symptoms of GI bleed  Will observe and follow-up  Orders:  -     CBC and differential; Future    10  High risk medication use  -     Comprehensive metabolic panel; Future  -     CK; Future    BMI Counseling: Body mass index is 25 79 kg/m²  The BMI is above normal  Nutrition recommendations include decreasing portion sizes, decreasing fast food intake, consuming healthier snacks, limiting drinks that contain sugar, moderation in carbohydrate intake, reducing intake of saturated and trans fat and reducing intake of cholesterol  Exercise recommendations include exercising 3-5 times per week  No pharmacotherapy was ordered  Rationale for BMI follow-up plan is due to patient being overweight or obese  Subjective:  Patient presents for follow-up of his medical problems      Patient ID: Jewell Ba is a 80 y o  male  HPI   59-year-old white male patient presents for follow-up of his medical problems  He denies any chest pain, shortness a of breath, pain in abdomen  Denies any cough, fever, chills  Denies any nausea, vomiting, diarrhea  He has some cough and  sore throat but is all resolved  He checked his COVID-19 test at home which was negative      The following portions of the patient's history were reviewed and updated as appropriate:     Past Medical History:  He has a past medical history of Anemia (6/22/2021), Bilateral carotid artery stenosis (1/4/2021), Bilateral carotid bruits, BMI 25 0-25 9,adult (4/6/2021), BMI 25 0-25 9,adult (8/31/2021), BMI 26 0-26 9,adult (8/31/2021), Coronary artery disease involving native coronary artery of native heart without angina pectoris (4/6/2021), CTS (carpal tunnel syndrome), Deviated septum, Diabetes ([facility] [address] 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[facility] [address] ), Dizziness, Double vision, Ear problems, Encounter for support and coordination of transition of care (6/22/2021), Fatigue, General weakness, GERD (gastroesophageal reflux disease), Hearing impaired person, bilateral, HL (hearing loss), Hypertension, Medicare annual wellness visit, subsequent (8/31/2021), Microscopic hematuria, Myalgia (4/6/2021), Pain in joints (4/6/2021), PVD (peripheral vascular disease) ([facility] [address] ) (4/2/2021), Rectal bleeding (6/22/2021), Renal calculi, Right leg DVT ([facility] [address] ) (6/22/2021), Right lumbar radiculopathy, S/P AVR (aortic valve replacement) (4/2/2021), SOB (shortness of breath) on exertion, Spinal stenosis in cervical region (4/2/2021), Tachycardia, Urinary frequency, and Vertigo (1/4/2021)  ,  _______________________________________________________________________  Past Surgical History:   has a past surgical history that includes Ureterectomy; Bypass Graft; Carpal tunnel release (Right); Hand surgery (Left); Cataract extraction, bilateral; Aortic valve replacement (09/2016); 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); and Colonoscopy (09/16/2015)  ,  _______________________________________________________________________  Family History:  Family history is unknown by patient  ,  _______________________________________________________________________  Social History:   reports that he has quit smoking  He has never used smokeless tobacco  He reports previous alcohol use   He reports that he does not use drugs ,  _______________________________________________________________________  Allergies:  is allergic to lisinopril, penicillin g, and penicillins     _______________________________________________________________________  Current Outpatient Medications   Medication Sig Dispense Refill    acetaminophen (TYLENOL) 500 mg tablet Take 500 mg by mouth 2 (two) times a week       aspirin (ECOTRIN LOW STRENGTH) 81 mg EC tablet Take 81 mg by mouth daily      atorvastatin (LIPITOR) 40 mg tablet Take 20 mg by mouth daily 1/2 tablet      Brinzolamide-Brimonidine (Simbrinza) 1-0 2 % SUSP Apply 1 drop to eye 2 (two) times a day      Cinnamon 500 MG capsule Take 500 mg by mouth 2 (two) times a day      glipiZIDE (GLUCOTROL) 5 mg tablet Take 1 tablet (5 mg total) by mouth daily in the early morning 90 tablet 1    insulin glargine (LANTUS) 100 units/mL subcutaneous injection Inject 22 Units under the skin daily at bedtime       latanoprost (XALATAN) 0 005 % ophthalmic solution Administer 1 drop to both eyes daily at bedtime      losartan (COZAAR) 100 MG tablet Take 100 mg by mouth daily       metFORMIN (GLUCOPHAGE) 500 mg tablet Take 1 tablet (500 mg total) by mouth 2 (two) times a day 180 tablet 1    metoprolol succinate (TOPROL-XL) 100 mg 24 hr tablet Take 100 mg by mouth daily      Multiple Vitamin (multivitamin) tablet Take 1 tablet by mouth daily      omeprazole (PriLOSEC) 20 mg delayed release capsule Take 20 mg by mouth daily      vitamin B-12 (VITAMIN B-12) 1,000 mcg tablet Take 1 tablet by mouth every other day       No current facility-administered medications for this visit      _______________________________________________________________________  Review of Systems   Constitutional: Negative for chills and fever  HENT: Negative for congestion, ear pain, hearing loss, nosebleeds, sinus pain, sore throat and trouble swallowing  Eyes: Negative for discharge, redness and visual disturbance  Respiratory: Negative for cough, chest tightness and shortness of breath  Cardiovascular: Negative for chest pain and palpitations  Gastrointestinal: Negative for abdominal pain, blood in stool, constipation, diarrhea, nausea and vomiting  Genitourinary: Negative for dysuria, flank pain and hematuria  Musculoskeletal: Positive for arthralgias ( sometimes gets pain in the knees  )  Negative for myalgias and neck pain  Skin: Negative for color change and rash  Neurological: Negative for dizziness, speech difficulty, weakness and headaches  Hematological: Does not bruise/bleed easily  Psychiatric/Behavioral: Negative for agitation and behavioral problems  Objective:  Vitals:    06/02/22 1301   BP: 122/76   BP Location: Left arm   Patient Position: Sitting   Cuff Size: Adult   Pulse: 80   Resp: 14   Temp: 98 7 °F (37 1 °C)   SpO2: 99%   Weight: 70 3 kg (155 lb)   Height: 5' 5" (1 651 m)     Body mass index is 25 79 kg/m²  Physical Exam  Vitals and nursing note reviewed  Constitutional:       General: He is not in acute distress  Appearance: Normal appearance  HENT:      Head: Normocephalic and atraumatic  Right Ear: Ear canal and external ear normal       Left Ear: Ear canal and external ear normal       Nose: Nose normal       Mouth/Throat:      Mouth: Mucous membranes are moist    Eyes:      General: No scleral icterus  Right eye: No discharge  Left eye: No discharge  Extraocular Movements: Extraocular movements intact  Conjunctiva/sclera: Conjunctivae normal    Cardiovascular:      Rate and Rhythm: Normal rate and regular rhythm  Pulses: Pulses are weak  Dorsalis pedis pulses are 1+ on the right side and 1+ on the left side  Posterior tibial pulses are 1+ on the right side and 1+ on the left side  Heart sounds: Murmur heard  Pulmonary:      Effort: Pulmonary effort is normal  No respiratory distress        Breath sounds: Normal breath sounds  Abdominal:      General: Bowel sounds are normal       Palpations: Abdomen is soft  Tenderness: There is no abdominal tenderness  Musculoskeletal:         General: Normal range of motion  Cervical back: Normal range of motion and neck supple  No muscular tenderness  Right lower leg: No edema  Left lower leg: No edema  Feet:      Right foot:      Skin integrity: No ulcer, skin breakdown, erythema, warmth, callus or dry skin  Left foot:      Skin integrity: No ulcer, skin breakdown, erythema, warmth, callus or dry skin  Skin:     General: Skin is warm  Findings: No rash  Neurological:      General: No focal deficit present  Mental Status: He is alert and oriented to person, place, and time  Motor: No weakness  Psychiatric:         Mood and Affect: Mood normal          Behavior: Behavior normal         Patient's shoes and socks removed  Right Foot/Ankle   Right Foot Inspection  Skin Exam: skin normal and skin intact  No dry skin, no warmth, no callus, no erythema, no maceration, no abnormal color, no pre-ulcer, no ulcer and no callus  Toe Exam: ROM and strength within normal limits  Sensory   Vibration: intact  Proprioception: intact  Monofilament testing: intact    Vascular  The right DP pulse is 1+  The right PT pulse is 1+  Left Foot/Ankle  Left Foot Inspection  Skin Exam: skin normal and skin intact  No dry skin, no warmth, no erythema, no maceration, normal color, no pre-ulcer, no ulcer and no callus  Toe Exam: ROM and strength within normal limits  Sensory   Vibration: intact  Proprioception: intact  Monofilament testing: intact    Vascular  The left DP pulse is 1+  The left PT pulse is 1+  Assign Risk Category  Weak pulses    I spent 30 minutes with the patient today    More than 50% time spent for reviewing of external notes, reviewing of the results of diagnostics test, management of care, patient education and ordering of test

## 2022-06-02 NOTE — PATIENT INSTRUCTIONS
Patient was advised to continue present medications  discussed with the patient medications and laboratory data in detail  Follow-up with me in 3 months or as advised  If any blood test was ordered please do 1 week prior to next appointment unless advise to get earlier  If you have any questions please call the office [phone]   10% - bad control"> 10% - bad control,Hemoglobin A1c (HbA1c) greater than 10% indicating poor diabetic control,Haemoglobin A1c greater than 10% indicating poor diabetic control">   Diabetes Mellitus Type 2 in Adults, Ambulatory Care   GENERAL INFORMATION:   Diabetes mellitus type 2  is a disease that affects how your body uses glucose (sugar)  Insulin helps move sugar out of the blood so it can be used for energy  Normally, when the blood sugar level increases, the pancreas makes more insulin  Type 2 diabetes develops because either the body cannot make enough insulin, or it cannot use the insulin correctly  After many years, your pancreas may stop making insulin  Common symptoms include the following:   · More hunger or thirst than usual     · Frequent urination     · Weight loss without trying     · Blurred vision  Seek immediate care for the following symptoms:   · Severe abdominal pain, or pain that spreads to your back  You may also be vomiting  · Trouble staying awake or focusing    · Shaking or sweating    · Blurred or double vision    · Breath has a fruity, sweet smell    · Breathing is deep and labored, or rapid and shallow    · Heartbeat is fast and weak  Treatment for diabetes mellitus type 2  includes keeping your blood sugar at a normal level  You must eat the right foods, and exercise regularly  You may also need medicine if you cannot control your blood sugar level with nutrition and exercise  Manage diabetes mellitus type 2:   · Check your blood sugar level  You will be taught how to check a small drop of blood in a glucose monitor   Ask your healthcare provider when and how often to check during the day  Ask your healthcare provider what your blood sugar levels should be when you check them  · Keep track of carbohydrates (sugar and starchy foods)  Your blood sugar level can get too high if you eat too many carbohydrates  Your dietitian will help you plan meals and snacks that have the right amount of carbohydrates  · Eat low-fat foods  Some examples are skinless chicken and low-fat milk  · Eat less sodium (salt)  Some examples of high-sodium foods to limit are soy sauce, potato chips, and soup  Do not add salt to food you cook  Limit your use of table salt  · Eat high-fiber foods  Foods that are a good source of fiber include vegetables, whole grain bread, and beans  · Limit alcohol  Alcohol affects your blood sugar level and can make it harder to manage your diabetes  Women should limit alcohol to 1 drink a day  Men should limit alcohol to 2 drinks a day  A drink of alcohol is 12 ounces of beer, 5 ounces of wine, or 1½ ounces of liquor  · Get regular exercise  Exercise can help keep your blood sugar level steady, decrease your risk of heart disease, and help you lose weight  Exercise for at least 30 minutes, 5 days a week  Include muscle strengthening activities 2 days each week  Work with your healthcare provider to create an exercise plan  · Check your feet each day  for injuries or open sores  Ask your healthcare provider for activities you can do if you have an open sore  · Quit smoking  If you smoke, it is never too late to quit  Smoking can worsen the problems that may occur with diabetes  Ask your healthcare provider for information about how to stop smoking if you are having trouble quitting  · Ask about your weight:  Ask healthcare providers if you need to lose weight, and how much to lose  Ask them to help you with a weight loss program  Even a 10 to 15 pound weight loss can help you manage your blood sugar level  · Carry medical alert identification  Wear medical alert jewelry or carry a card that says you have diabetes  Ask your healthcare provider where to get these items  · Ask about vaccines  Diabetes puts you at risk of serious illness if you get the flu, pneumonia, or hepatitis  Ask your healthcare provider if you should get a flu, pneumonia, or hepatitis B vaccine, and when to get the vaccine  Follow up with your healthcare provider as directed:  Write down your questions so you remember to ask them during your visits  CARE AGREEMENT:   You have the right to help plan your care  Learn about your health condition and how it may be treated  Discuss treatment options with your caregivers to decide what care you want to receive  You always have the right to refuse treatment  The above information is an  only  It is not intended as medical advice for individual conditions or treatments  Talk to your doctor, nurse or pharmacist before following any medical regimen to see if it is safe and effective for you  © 2014 [address] is for End User's use only and may not be sold, redistributed or otherwise used for commercial purposes  All illustrations and images included in CareNotes® are the copyrighted property of [employer]  or [employer]  10% - bad control"> 10% - bad control,Hemoglobin A1c (HbA1c) greater than 10% indicating poor diabetic control,Haemoglobin A1c greater than 10% indicating poor diabetic control">   Diabetes Mellitus Type 2 in Adults, Ambulatory Care   GENERAL INFORMATION:   Diabetes mellitus type 2  is a disease that affects how your body uses glucose (sugar)  Insulin helps move sugar out of the blood so it can be used for energy  Normally, when the blood sugar level increases, the pancreas makes more insulin  Type 2 diabetes develops because either the body cannot make enough insulin, or it cannot use the insulin correctly   After many years, your pancreas may stop making insulin  Common symptoms include the following:   · More hunger or thirst than usual     · Frequent urination     · Weight loss without trying     · Blurred vision  Seek immediate care for the following symptoms:   · Severe abdominal pain, or pain that spreads to your back  You may also be vomiting  · Trouble staying awake or focusing    · Shaking or sweating    · Blurred or double vision    · Breath has a fruity, sweet smell    · Breathing is deep and labored, or rapid and shallow    · Heartbeat is fast and weak  Treatment for diabetes mellitus type 2  includes keeping your blood sugar at a normal level  You must eat the right foods, and exercise regularly  You may also need medicine if you cannot control your blood sugar level with nutrition and exercise  Manage diabetes mellitus type 2:   · Check your blood sugar level  You will be taught how to check a small drop of blood in a glucose monitor  Ask your healthcare provider when and how often to check during the day  Ask your healthcare provider what your blood sugar levels should be when you check them  · Keep track of carbohydrates (sugar and starchy foods)  Your blood sugar level can get too high if you eat too many carbohydrates  Your dietitian will help you plan meals and snacks that have the right amount of carbohydrates  · Eat low-fat foods  Some examples are skinless chicken and low-fat milk  · Eat less sodium (salt)  Some examples of high-sodium foods to limit are soy sauce, potato chips, and soup  Do not add salt to food you cook  Limit your use of table salt  · Eat high-fiber foods  Foods that are a good source of fiber include vegetables, whole grain bread, and beans  · Limit alcohol  Alcohol affects your blood sugar level and can make it harder to manage your diabetes  Women should limit alcohol to 1 drink a day  Men should limit alcohol to 2 drinks a day   A drink of alcohol is 12 ounces of beer, 5 ounces of wine, or 1½ ounces of liquor  · Get regular exercise  Exercise can help keep your blood sugar level steady, decrease your risk of heart disease, and help you lose weight  Exercise for at least 30 minutes, 5 days a week  Include muscle strengthening activities 2 days each week  Work with your healthcare provider to create an exercise plan  · Check your feet each day  for injuries or open sores  Ask your healthcare provider for activities you can do if you have an open sore  · Quit smoking  If you smoke, it is never too late to quit  Smoking can worsen the problems that may occur with diabetes  Ask your healthcare provider for information about how to stop smoking if you are having trouble quitting  · Ask about your weight:  Ask healthcare providers if you need to lose weight, and how much to lose  Ask them to help you with a weight loss program  Even a 10 to 15 pound weight loss can help you manage your blood sugar level  · Carry medical alert identification  Wear medical alert jewelry or carry a card that says you have diabetes  Ask your healthcare provider where to get these items  · Ask about vaccines  Diabetes puts you at risk of serious illness if you get the flu, pneumonia, or hepatitis  Ask your healthcare provider if you should get a flu, pneumonia, or hepatitis B vaccine, and when to get the vaccine  Follow up with your healthcare provider as directed:  Write down your questions so you remember to ask them during your visits  CARE AGREEMENT:   You have the right to help plan your care  Learn about your health condition and how it may be treated  Discuss treatment options with your caregivers to decide what care you want to receive  You always have the right to refuse treatment  The above information is an  only  It is not intended as medical advice for individual conditions or treatments   Talk to your doctor, nurse or pharmacist before following any medical regimen to see if it is safe and effective for you  © 2014 [address] is for End User's use only and may not be sold, redistributed or otherwise used for commercial purposes  All illustrations and images included in CareNotes® are the copyrighted property of [employer]  or [employer]

## 2022-06-03 NOTE — ASSESSMENT & PLAN NOTE
Lab Results   Component Value Date    EGFR 42 12/03/2021    EGFR 51 07/08/2021    EGFR 45 06/15/2021    CREATININE 1 48 (H) 12/03/2021    CREATININE 1 25 07/08/2021    CREATININE 1 40 (H) 06/15/2021   His renal function stable  Advised to maintain hydration    Will follow electrolytes and renal function

## 2022-06-03 NOTE — ASSESSMENT & PLAN NOTE
Lab Results   Component Value Date    HGBA1C 8 1 03/02/2022   His last A1c was elevated  Patient state he has been watching diet  His blood sugar in the morning around 110-120 evening blood sugar around 120-125  Advised to continue present medications  Advised for 1800 calorie ADA diet  Will follow hemoglobin A1c  No hypoglycemia

## 2022-06-03 NOTE — ASSESSMENT & PLAN NOTE
His last cholesterol 173, triglyceride 72, HDL 45,   He is on atorvastatin 20 mg daily  He had a myalgia from the statin therapy in the past so will avoid increasing the dose of medication  Since he is able to tolerate 20 mg without any side effect  Advised to watch diet for cholesterol saturated fat

## 2022-06-03 NOTE — ASSESSMENT & PLAN NOTE
His symptoms are well controlled on omeprazole  he needs omeprazole daily to control his symptomsAdvised to watch diet for spicy foods, caffeinated beverages, alcoholic beverages, soda, citrus fluids and foods  Advised for reflux precautions

## 2022-06-03 NOTE — ASSESSMENT & PLAN NOTE
Lab Results   Component Value Date    HGBA1C 8 1 03/02/2022   Is on losartan  Renal function stable  Will follow renal function

## 2022-06-20 DIAGNOSIS — Z79.4 TYPE 2 DIABETES MELLITUS WITHOUT COMPLICATION, WITH LONG-TERM CURRENT USE OF INSULIN (HCC): Chronic | ICD-10-CM

## 2022-06-20 DIAGNOSIS — E11.9 TYPE 2 DIABETES MELLITUS WITHOUT COMPLICATION, WITH LONG-TERM CURRENT USE OF INSULIN (HCC): Chronic | ICD-10-CM

## 2022-06-20 RX ORDER — GLIPIZIDE 5 MG/1
5 TABLET ORAL
Qty: 90 TABLET | Refills: 1 | Status: SHIPPED | OUTPATIENT
Start: 2022-06-20

## 2022-09-07 LAB — HBA1C MFR BLD HPLC: 7.3 %

## 2022-09-08 ENCOUNTER — OFFICE VISIT (OUTPATIENT)
Dept: INTERNAL MEDICINE CLINIC | Facility: CLINIC | Age: 87
End: 2022-09-08
Payer: MEDICARE

## 2022-09-08 VITALS
HEART RATE: 65 BPM | SYSTOLIC BLOOD PRESSURE: 126 MMHG | HEIGHT: 65 IN | BODY MASS INDEX: 26.82 KG/M2 | OXYGEN SATURATION: 97 % | WEIGHT: 161 LBS | TEMPERATURE: 98.8 F | RESPIRATION RATE: 12 BRPM | DIASTOLIC BLOOD PRESSURE: 76 MMHG

## 2022-09-08 DIAGNOSIS — I10 ESSENTIAL HYPERTENSION: Chronic | ICD-10-CM

## 2022-09-08 DIAGNOSIS — K21.9 GASTROESOPHAGEAL REFLUX DISEASE WITHOUT ESOPHAGITIS: ICD-10-CM

## 2022-09-08 DIAGNOSIS — R80.9 MICROALBUMINURIA DUE TO TYPE 2 DIABETES MELLITUS (HCC): ICD-10-CM

## 2022-09-08 DIAGNOSIS — Z79.4 TYPE 2 DIABETES MELLITUS WITH STAGE 3A CHRONIC KIDNEY DISEASE, WITH LONG-TERM CURRENT USE OF INSULIN (HCC): ICD-10-CM

## 2022-09-08 DIAGNOSIS — Z00.00 MEDICARE ANNUAL WELLNESS VISIT, SUBSEQUENT: Primary | ICD-10-CM

## 2022-09-08 DIAGNOSIS — I65.23 BILATERAL CAROTID ARTERY STENOSIS: Chronic | ICD-10-CM

## 2022-09-08 DIAGNOSIS — E11.22 TYPE 2 DIABETES MELLITUS WITH STAGE 3A CHRONIC KIDNEY DISEASE, WITH LONG-TERM CURRENT USE OF INSULIN (HCC): ICD-10-CM

## 2022-09-08 DIAGNOSIS — Z95.2 S/P AVR (AORTIC VALVE REPLACEMENT): ICD-10-CM

## 2022-09-08 DIAGNOSIS — E78.2 MIXED HYPERLIPIDEMIA: ICD-10-CM

## 2022-09-08 DIAGNOSIS — N18.31 TYPE 2 DIABETES MELLITUS WITH STAGE 3A CHRONIC KIDNEY DISEASE, WITH LONG-TERM CURRENT USE OF INSULIN (HCC): ICD-10-CM

## 2022-09-08 DIAGNOSIS — I25.10 CORONARY ARTERY DISEASE INVOLVING NATIVE CORONARY ARTERY OF NATIVE HEART WITHOUT ANGINA PECTORIS: ICD-10-CM

## 2022-09-08 DIAGNOSIS — N18.31 STAGE 3A CHRONIC KIDNEY DISEASE (HCC): ICD-10-CM

## 2022-09-08 DIAGNOSIS — E11.29 MICROALBUMINURIA DUE TO TYPE 2 DIABETES MELLITUS (HCC): ICD-10-CM

## 2022-09-08 PROBLEM — E11.9 TYPE 2 DIABETES MELLITUS, WITHOUT LONG-TERM CURRENT USE OF INSULIN (HCC): Status: RESOLVED | Noted: 2022-09-08 | Resolved: 2022-09-08

## 2022-09-08 PROBLEM — E11.9 TYPE 2 DIABETES MELLITUS, WITHOUT LONG-TERM CURRENT USE OF INSULIN (HCC): Status: ACTIVE | Noted: 2022-09-08

## 2022-09-08 PROBLEM — E11.9 TYPE 2 DIABETES MELLITUS WITHOUT COMPLICATION, WITH LONG-TERM CURRENT USE OF INSULIN (HCC): Chronic | Status: RESOLVED | Noted: 2020-11-23 | Resolved: 2022-09-08

## 2022-09-08 PROCEDURE — 99213 OFFICE O/P EST LOW 20 MIN: CPT | Performed by: INTERNAL MEDICINE

## 2022-09-08 PROCEDURE — G0439 PPPS, SUBSEQ VISIT: HCPCS | Performed by: INTERNAL MEDICINE

## 2022-09-08 NOTE — ASSESSMENT & PLAN NOTE
Lab Results   Component Value Date    EGFR 42 12/03/2021    EGFR 51 07/08/2021    EGFR 45 06/15/2021    CREATININE 1 48 (H) 12/03/2021    CREATININE 1 25 07/08/2021    CREATININE 1 40 (H) 06/15/2021   Renal function stable  Advised to maintain hydration  Will follow electrolytes and renal function  Continue present medications

## 2022-09-08 NOTE — ASSESSMENT & PLAN NOTE
Lab Results   Component Value Date    HGBA1C 8 1 03/02/2022   He is on losartan  Renal function stable  Continue present medication

## 2022-09-08 NOTE — PROGRESS NOTES
Assessment/Plan:    1  Medicare annual wellness visit, subsequent    2  Essential hypertension  Assessment & Plan:  Blood pressure well controlled  Advised to continue present medication  Advised for low-salt diet  3  Coronary artery disease involving native coronary artery of native heart without angina pectoris  Assessment & Plan:  Presently stable and asymptomatic  Patient advised to follow with cardiologist   Continue present medications  4  Stage 3a chronic kidney disease [facility])  Assessment & Plan:  Lab Results   Component Value Date    EGFR 42 12/03/2021    EGFR 51 07/08/2021    EGFR 45 06/15/2021    CREATININE 1 48 (H) 12/03/2021    CREATININE 1 25 07/08/2021    CREATININE 1 40 (H) 06/15/2021   Renal function stable  Advised to maintain hydration  Will follow electrolytes and renal function  Continue present medications  5  BMI 26 0-26 9,adult  Assessment & Plan:  Patient  was advised to decrease portion size  Advised to decrease carb, sugar, cholesterol intake  Try to exercise as much as he can tolerate  Advised to lose weight  6  Mixed hyperlipidemia  Assessment & Plan:  Last cholesterol 173, triglyceride 72, HDL 45,   He is on atorvastatin 10 mg daily  He had a myalgia from the statin therapy in the past so will avoid to increase dose of medication  He had a blood drawn yesterday at [facility] he will bring the copy of the blood test result tomorrow  Continue low-cholesterol low saturated diet  7  Gastroesophageal reflux disease without esophagitis  Assessment & Plan:  Symptoms are well controlled on omeprazole  Advised to watch diet and reflux precautions  8  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 [facility])  Assessment & Plan:    Lab Results   Component Value Date    HGBA1C 8 1 03/02/2022   He is on losartan  Renal function stable  Continue present medication        9  Bilateral carotid artery stenosis  Assessment & Plan:  Had a last carotid artery Doppler test on December 2021 no new changes  Presently asymptomatic  On baby aspirin  10  S/P AVR (aortic valve replacement)  Assessment & Plan:  Presently asymptomatic and stable  Advised to follow with cardiologist       11  Type 2 diabetes mellitus with stage 3a chronic kidney disease, with long-term current use of insulin [facility])  Assessment & Plan:    Lab Results   Component Value Date    HGBA1C 8 1 03/02/2022   His A1c was elevated last time  He had a blood drawn yesterday at [facility] he will bring a copy of the blood test and will see his hemoglobin A1c  Denies any hypoglycemia  Blood sugar around 120 is at home per patient  Sometime 140s  Continue present medications  Continue 1800 ADA diet  Subjective:  Patient presents for annual wellness exam as well as follow-up of his medical problems  Patient ID: Wilman Arredondo is a 80 y o  male  HPI   80-year-old white male patient presents for annual wellness exam as well as follow-up of his medical problems  He denies any chest pain, shortness of breath, pain in abdomen  Denies any cough, fever, chills denies any nausea vomiting, diarrhea  He has been followed by eye doctor as well as foot doctor  He had a carotid artery Doppler December 2021  He had a colonoscopy last year      The following portions of the patient's history were reviewed and updated as appropriate:     Past Medical History:  He has a past medical history of Anemia (6/22/2021), Bilateral carotid artery stenosis (1/4/2021), Bilateral carotid bruits, BMI 25 0-25 9,adult (4/6/2021), BMI 25 0-25 9,adult (8/31/2021), BMI 26 0-26 9,adult (8/31/2021), Coronary artery disease involving native coronary artery of native heart without angina pectoris (4/6/2021), CTS (carpal tunnel syndrome), Deviated septum, Diabetes ([address] ), 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[facility] [address] ), Dizziness, Double vision, Ear problems, Encounter for support and coordination of transition of care (6/22/2021), Fatigue, General weakness, GERD (gastroesophageal reflux disease), Hearing impaired person, bilateral, HL (hearing loss), Hypertension, Medicare annual wellness visit, subsequent (8/31/2021), Microscopic hematuria, Myalgia (4/6/2021), Pain in joints (4/6/2021), PVD (peripheral vascular disease) ([facility] [address] ) (4/2/2021), Rectal bleeding (6/22/2021), Renal calculi, Right leg D[facility] ([facility] ) (6/22/2021), Right lumbar radiculopathy, S/P AVR (aortic valve replacement) (4/2/2021), SOB (shortness of breath) on exertion, Spinal stenosis in cervical region (4/2/2021), Tachycardia, Type 2 diabetes mellitus with stage 3a chronic kidney disease, with long-term current use of insulin ([facility] ) (9/8/2022), Type 2 diabetes mellitus, without long-term current use of insulin ([facility] ) (9/8/2022), Urinary frequency, and Vertigo (1/4/2021)  ,  _______________________________________________________________________  Past Surgical History:   has a past surgical history that includes Ureterectomy; Bypass Graft; Carpal tunnel release (Right); Hand surgery (Left); Cataract extraction, bilateral; Aortic valve replacement (09/2016); 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); and Colonoscopy (09/16/2015)  ,  _______________________________________________________________________  Family History:  Family history is unknown by patient  ,  _______________________________________________________________________  Social History:   reports that he has quit smoking  He has never used smokeless tobacco  He reports previous alcohol use  He reports that he does not use drugs  ,  _______________________________________________________________________  Allergies:  is allergic to lisinopril, penicillin g, and penicillins     _______________________________________________________________________  Current Outpatient Medications   Medication Sig Dispense Refill    aspirin (ECOTRIN LOW STRENGTH) 81 mg EC tablet Take 81 mg by mouth daily      atorvastatin (LIPITOR) 40 mg tablet Take 20 mg by mouth daily 1/2 tablet      Brinzolamide-Brimonidine (Simbrinza) 1-0 2 % SUSP Apply 1 drop to eye 2 (two) times a day      Cinnamon 500 MG capsule Take 500 mg by mouth 2 (two) times a day      glipiZIDE (GLUCOTROL) 5 mg tablet Take 1 tablet (5 mg total) by mouth daily in the early morning 90 tablet 1    insulin glargine (LANTUS) 100 units/mL subcutaneous injection Inject 22 Units under the skin daily at bedtime       latanoprost (XALATAN) 0 005 % ophthalmic solution Administer 1 drop to both eyes daily at bedtime      losartan (COZAAR) 100 MG tablet Take 100 mg by mouth daily       metFORMIN (GLUCOPHAGE) 500 mg tablet Take 1 tablet (500 mg total) by mouth 2 (two) times a day 180 tablet 1    metoprolol succinate (TOPROL-XL) 100 mg 24 hr tablet Take 100 mg by mouth daily      Multiple Vitamin (multivitamin) tablet Take 1 tablet by mouth daily      omeprazole (PriLOSEC) 20 mg delayed release capsule Take 20 mg by mouth daily      vitamin B-12 (VITAMIN B-12) 1,000 mcg tablet Take 1 tablet by mouth every other day      acetaminophen (TYLENOL) 500 mg tablet Take 500 mg by mouth 2 (two) times a week  (Patient not taking: Reported on 9/8/2022)       No current facility-administered medications for this visit      _______________________________________________________________________  Review of Systems   Constitutional: Negative for chills and fever  HENT: Negative for congestion, ear pain, hearing loss, nosebleeds, sinus pain, sore throat and trouble swallowing  Eyes: Negative for discharge, redness and visual disturbance  Respiratory: Negative for cough, chest tightness and shortness of breath  Cardiovascular: Negative for chest pain and palpitations  Gastrointestinal: Negative for abdominal pain, blood in stool, constipation, diarrhea, nausea and vomiting  Genitourinary: Negative for dysuria, flank pain and hematuria  Musculoskeletal: Negative for arthralgias, myalgias and neck pain  Skin: Negative for color change and rash  Neurological: Negative for dizziness, speech difficulty, weakness and headaches  Hematological: Does not bruise/bleed easily  Psychiatric/Behavioral: Negative for agitation and behavioral problems  Objective:  Vitals:    09/08/22 1303   BP: 126/76   BP Location: Left arm   Patient Position: Sitting   Cuff Size: Adult   Pulse: 65   Resp: 12   Temp: 98 8 °F (37 1 °C)   TempSrc: Tympanic   SpO2: 97%   Weight: 73 kg (161 lb)   Height: 5' 5" (1 651 m)     Body mass index is 26 79 kg/m²  Physical Exam  Vitals and nursing note reviewed  Constitutional:       General: He is not in acute distress  Appearance: Normal appearance  HENT:      Head: Normocephalic and atraumatic  Right Ear: Tympanic membrane, ear canal and external ear normal       Left Ear: Tympanic membrane, ear canal and external ear normal       Nose: Nose normal       Mouth/Throat:      Mouth: Mucous membranes are moist    Eyes:      General: No scleral icterus  Right eye: No discharge  Left eye: No discharge  Extraocular Movements: Extraocular movements intact  Conjunctiva/sclera: Conjunctivae normal    Cardiovascular:      Rate and Rhythm: Normal rate and regular rhythm  Pulses: no weak pulses          Dorsalis pedis pulses are 1+ on the right side and 1+ on the left side  Posterior tibial pulses are 1+ on the right side and 1+ on the left side  Heart sounds: Murmur (Has a murmur aortic area left sternal border and apex area) heard  Pulmonary:      Effort: Pulmonary effort is normal  No respiratory distress  Breath sounds: Normal breath sounds  Abdominal:      General: Bowel sounds are normal       Palpations: Abdomen is soft  Tenderness: There is no abdominal tenderness  Musculoskeletal:         General: Normal range of motion  Cervical back: Normal range of motion and neck supple  No muscular tenderness        Right lower leg: No edema  Left lower leg: No edema  Feet:      Right foot:      Skin integrity: No ulcer, skin breakdown, erythema, warmth or dry skin  Left foot:      Skin integrity: No ulcer, skin breakdown, erythema, warmth or dry skin  Skin:     General: Skin is warm  Findings: No rash  Neurological:      General: No focal deficit present  Mental Status: He is alert and oriented to person, place, and time  Motor: No weakness  Coordination: Coordination normal    Psychiatric:         Mood and Affect: Mood normal          Behavior: Behavior normal        Patient's shoes and socks removed  Right Foot/Ankle   Right Foot Inspection  Skin Exam: skin normal and skin intact  No dry skin, no warmth, no erythema, no maceration, no abnormal color, no pre-ulcer and no ulcer  Toe Exam: ROM and strength within normal limits  Sensory   Monofilament testing: intact    Vascular  The right DP pulse is 1+  The right PT pulse is 1+  Left Foot/Ankle  Left Foot Inspection  Skin Exam: skin normal and skin intact  No dry skin, no warmth, no erythema, no maceration, normal color, no pre-ulcer and no ulcer  Toe Exam: ROM and strength within normal limits  Sensory   Monofilament testing: intact    Vascular  The left DP pulse is 1+  The left PT pulse is 1+       Assign Risk Category  No deformity present  No loss of protective sensation  No weak pulses  Risk: 0

## 2022-09-08 NOTE — PATIENT INSTRUCTIONS
Medicare Preventive Visit Patient Instructions  Thank you for completing your Welcome to Medicare Visit or Medicare Annual Wellness Visit today  Your next wellness visit will be due in one year (9/9/2023)  The screening/preventive services that you may require over the next 5-10 years are detailed below  Some tests may not apply to you based off risk factors and/or age  Screening tests ordered at today's visit but not completed yet may show as past due  Also, please note that scanned in results may not display below  Preventive Screenings:  Service Recommendations Previous Testing/Comments   Colorectal Cancer Screening  Colonoscopy    Fecal Occult Blood Test (FOBT)/Fecal Immunochemical Test (FIT)  Fecal DNA/Cologuard Test  Flexible Sigmoidoscopy Age: 39-70 years old   Colonoscopy: every 10 years (May be performed more frequently if at higher risk)  OR  FOBT/FIT: every 1 year  OR  Cologuard: every 3 years  OR  Sigmoidoscopy: every 5 years  Screening may be recommended earlier than age 39 if at higher risk for colorectal cancer  Also, an individualized decision between you and your healthcare provider will decide whether screening between the ages of 74-80 would be appropriate   Colonoscopy: 06/14/2021  FOBT/FIT: Not on file  Cologuard: Not on file  Sigmoidoscopy: Not on file    Screening Not Indicated     Prostate Cancer Screening Individualized decision between patient and health care provider in men between ages of 53-78   Medicare will cover every 12 months beginning on the day after your 50th birthday PSA: No results in last 5 years     Screening Not Indicated     Hepatitis C Screening Once for adults born between 1945 and 1965  More frequently in patients at high risk for Hepatitis C Hep C Antibody: Not on file        Diabetes Screening 1-2 times per year if you're at risk for diabetes or have pre-diabetes Fasting glucose: 91 mg/dL (12/3/2021)  A1C: 8 1 (3/2/2022)  Screening Not Indicated  History Diabetes Cholesterol Screening Once every 5 years if you don't have a lipid disorder  May order more often based on risk factors  Lipid panel: 12/03/2021  Screening Not Indicated  History Lipid Disorder      Other Preventive Screenings Covered by Medicare:  Abdominal Aortic Aneurysm (AAA) Screening: covered once if your at risk  You're considered to be at risk if you have a family history of AAA or a male between the age of 73-68 who smoking at least 100 cigarettes in your lifetime  Lung Cancer Screening: covers low dose CT scan once per year if you meet all of the following conditions: (1) Age 50-69; (2) No signs or symptoms of lung cancer; (3) Current smoker or have quit smoking within the last 15 years; (4) You have a tobacco smoking history of at least 20 pack years (packs per day x number of years you smoked); (5) You get a written order from a healthcare provider  Glaucoma Screening: covered annually if you're considered high risk: (1) You have diabetes OR (2) Family history of glaucoma OR (3)  aged 48 and older OR (3)  American aged 72 and older  Osteoporosis Screening: covered every 2 years if you meet one of the following conditions: (1) Have a vertebral abnormality; (2) On glucocorticoid therapy for more than 3 months; (3) Have primary hyperparathyroidism; (4) On osteoporosis medications and need to assess response to drug therapy  HIV Screening: covered annually if you're between the age of 12-76  Also covered annually if you are younger than 13 and older than 72 with risk factors for HIV infection  For pregnant patients, it is covered up to 3 times per pregnancy      Immunizations:  Immunization Recommendations   Influenza Vaccine Annual influenza vaccination during flu season is recommended for all persons aged >= 6 months who do not have contraindications   Pneumococcal Vaccine   * Pneumococcal conjugate vaccine = PCV13 (Prevnar 13), PCV15 (Vaxneuvance), PCV20 (Prevnar 20)  * Pneumococcal polysaccharide vaccine = PPSV23 (Pneumovax) Adults 2364 years old: 1-3 doses may be recommended based on certain risk factors  Adults 72 years old: 1-2 doses may be recommended based off what pneumonia vaccine you previously received   Hepatitis B Vaccine 3 dose series if at intermediate or high risk (ex: diabetes, end stage renal disease, liver disease)   Tetanus (Td) Vaccine - COST NOT COVERED BY MEDICARE PART B Following completion of primary series, a booster dose should be given every 10 years to maintain immunity against tetanus  Td may also be given as tetanus wound prophylaxis  Tdap Vaccine - COST NOT COVERED BY MEDICARE PART B Recommended at least once for all adults  For pregnant patients, recommended with each pregnancy  Shingles Vaccine (Shingrix) - COST NOT COVERED BY MEDICARE PART B  2 shot series recommended in those aged 48 and above     Health Maintenance Due:  There are no preventive care reminders to display for this patient  Immunizations Due:      Topic Date Due    Pneumococcal Vaccine: 65+ Years (2 - PPSV23 or PCV20) 06/10/2017    COVID-19 Vaccine (4 - Booster for Moderna series) 04/03/2022    Influenza Vaccine (1) 09/01/2022     Advance Directives   What are advance directives? Advance directives are legal documents that state your wishes and plans for medical care  These plans are made ahead of time in case you lose your ability to make decisions for yourself  Advance directives can apply to any medical decision, such as the treatments you want, and if you want to donate organs  What are the types of advance directives? There are many types of advance directives, and each state has rules about how to use them  You may choose a combination of any of the following:  Living will: This is a written record of the treatment you want  You can also choose which treatments you do not want, which to limit, and which to stop at a certain time   This includes surgery, medicine, IV fluid, and tube feedings  Durable power of  for healthcare [facility]): This is a written record that states who you want to make healthcare choices for you when you are unable to make them for yourself  This person, called a proxy, is usually a family member or a friend  You may choose more than 1 proxy  Do not resuscitate (DNR) order:  A DNR order is used in case your heart stops beating or you stop breathing  It is a request not to have certain forms of treatment, such as CPR  A DNR order may be included in other types of advance directives  Medical directive: This covers the care that you want if you are in a coma, near death, or unable to make decisions for yourself  You can list the treatments you want for each condition  Treatment may include pain medicine, surgery, blood transfusions, dialysis, IV or tube feedings, and a ventilator (breathing machine)  Values history: This document has questions about your views, beliefs, and how you feel and think about life  This information can help others choose the care that you would choose  Why are advance directives important? An advance directive helps you control your care  Although spoken wishes may be used, it is better to have your wishes written down  Spoken wishes can be misunderstood, or not followed  Treatments may be given even if you do not want them  An advance directive may make it easier for your family to make difficult choices about your care  Weight Management   Why it is important to manage your weight:  Being overweight increases your risk of health conditions such as heart disease, high blood pressure, type 2 diabetes, and certain types of cancer  It can also increase your risk for osteoarthritis, sleep apnea, and other respiratory problems  Aim for a slow, steady weight loss  Even a small amount of weight loss can lower your risk of health problems    How to lose weight safely:  A safe and healthy way to lose weight is to eat fewer calories and get regular exercise  You can lose up about 1 pound a week by decreasing the number of calories you eat by 500 calories each day  Healthy meal plan for weight management:  A healthy meal plan includes a variety of foods, contains fewer calories, and helps you stay healthy  A healthy meal plan includes the following:  Eat whole-grain foods more often  A healthy meal plan should contain fiber  Fiber is the part of grains, fruits, and vegetables that is not broken down by your body  Whole-grain foods are healthy and provide extra fiber in your diet  Some examples of whole-grain foods are whole-wheat breads and pastas, oatmeal, brown rice, and bulgur  Eat a variety of vegetables every day  Include dark, leafy greens such as spinach, kale, jamie greens, and mustard greens  Eat yellow and orange vegetables such as carrots, sweet potatoes, and winter squash  Eat a variety of fruits every day  Choose fresh or canned fruit (canned in its own juice or light syrup) instead of juice  Fruit juice has very little or no fiber  Eat low-fat dairy foods  Drink fat-free (skim) milk or 1% milk  Eat fat-free yogurt and low-fat cottage cheese  Try low-fat cheeses such as mozzarella and other reduced-fat cheeses  Choose meat and other protein foods that are low in fat  Choose beans or other legumes such as split peas or lentils  Choose fish, skinless poultry (chicken or turkey), or lean cuts of red meat (beef or pork)  Before you cook meat or poultry, cut off any visible fat  Use less fat and oil  Try baking foods instead of frying them  Add less fat, such as margarine, sour cream, regular salad dressing and mayonnaise to foods  Eat fewer high-fat foods  Some examples of high-fat foods include french fries, doughnuts, ice cream, and cakes  Eat fewer sweets  Limit foods and drinks that are high in sugar  This includes candy, cookies, regular soda, and sweetened drinks    Exercise:  Exercise at least 30 minutes per day on most days of the week  Some examples of exercise include walking, biking, dancing, and swimming  You can also fit in more physical activity by taking the stairs instead of the elevator or parking farther away from stores  Ask your healthcare provider about the best exercise plan for you  © Copyright [employer] 2018 Information is for End User's use only and may not be sold, redistributed or otherwise used for commercial purposes  All illustrations and images included in CareNotes® are the copyrighted property of [employer]  or [employer] [place] was advised to continue present medications  discussed with the patient medications and laboratory data in detail  Follow-up with me in 3 months or as advised  If any blood test was ordered please do 1 week prior to next appointment unless advise to get earlier    If you have any questions please call the office [phone]

## 2022-09-08 NOTE — ASSESSMENT & PLAN NOTE
Lab Results   Component Value Date    HGBA1C 8 1 03/02/2022   His A1c was elevated last time  He had a blood drawn yesterday at [facility] he will bring a copy of the blood test and will see his hemoglobin A1c  Denies any hypoglycemia  Blood sugar around 120 is at home per patient  Sometime 140s  Continue present medications  Continue 1800 ADA diet

## 2022-09-08 NOTE — ASSESSMENT & PLAN NOTE
Last cholesterol 173, triglyceride 72, HDL 45,   He is on atorvastatin 10 mg daily  He had a myalgia from the statin therapy in the past so will avoid to increase dose of medication  He had a [facility] drawn yesterday [facility] he will bring the copy of the blood test result tomorrow  Continue low-cholesterol low saturated diet

## 2022-09-08 NOTE — PROGRESS NOTES
Assessment and Plan:     Problem List Items Addressed This Visit    None          Preventive health issues were discussed with patient, and age appropriate screening tests were ordered as noted in patient's After Visit Summary  Personalized health advice and appropriate referrals for health education or preventive services given if needed, as noted in patient's After Visit Summary       History of Present Illness:     Patient presents for a Medicare Wellness Visit    HPI   Patient Care Team:  Kirsty Abad MD as PCP - General (Internal Medicine)     Review of Systems:     Review of Systems     Problem List:     Patient Active Problem List   Diagnosis    Stage 3 chronic kidney disease ([facility]    Generalized weakness    Type 2 diabetes mellitus without complication, with long-term current use of insulin ([facility] )    Essential hypertension    Left shoulder pain    Coronary artery disease    Acquired hallux rigidus    Age-related macular degeneration    Allergic rhinitis    Carpal tunnel syndrome    Choroidal nevus    Diaphragmatic hernia    Gastroesophageal reflux disease    Glaucoma    Hearing loss    History of coronary artery bypass surgery    Hyperlipidemia    Low back pain    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 ([facility] )    Mitral valve disease    Vertigo    NPDR (nonproliferative diabetic retinopathy) [facility]    Chronic renal failure syndrome    Bilateral carotid artery stenosis    S/P AVR (aortic valve replacement)    Spinal stenosis in cervical region    PVD (peripheral vascular disease) ([facility]    Coronary artery disease involving native coronary artery of native heart without angina pectoris    Pain in joints    Myalgia    BMI 24 0-24 9, adult    Acute pulmonary embolism (HCC)    Moderate protein-calorie malnutrition ([facility] )    GI bleeding    Encounter for support and coordination of transition of care    Right leg DVT ([facility]    Rectal bleeding    Anemia    History of pulmonary embolism    Medicare annual wellness visit, subsequent    BMI 26 0-26 9,adult    BMI 25 0-25 9,adult      Past Medical and Surgical History:     Past Medical History:   Diagnosis Date    Anemia 6/22/2021    Bilateral carotid artery stenosis 1/4/2021    <50% on doppler 7/27/2018   No change from Aug/2016    Bilateral carotid bruits     BMI 25 0-25 9,adult 4/6/2021    BMI 25 0-25 9,adult 8/31/2021    BMI 26 0-26 9,adult 8/31/2021    Coronary artery disease involving native coronary artery of native heart without angina pectoris 4/6/2021    CTS (carpal tunnel syndrome)     Deviated septum     Diabetes ([facility] )     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[facility] )     Dizziness     Double vision     Ear problems     Encounter for support and coordination of transition of care 6/22/2021    Fatigue     General weakness     GERD (gastroesophageal reflux disease)     Hearing impaired person, bilateral     HL (hearing loss)     Hypertension     Medicare annual wellness visit, subsequent 8/31/2021    Microscopic hematuria     Myalgia 4/6/2021    Pain in joints 4/6/2021    PVD (peripheral vascular disease) ([facility] ) 4/2/2021    Rectal bleeding 6/22/2021    Renal calculi     Right leg DVT ([facility] ) 6/22/2021    Right lumbar radiculopathy     S/P AVR (aortic valve replacement) 4/2/2021    SOB (shortness of breath) on exertion     Spinal stenosis in cervical region 4/2/2021    Tachycardia     Urinary frequency     Vertigo 1/4/2021     Past Surgical History:   Procedure Laterality Date    AORTIC VALVE REPLACEMENT  09/2016    BYPASS GRAFT      CARPAL TUNNEL RELEASE Right     by Dr Gwen Bonner EXTRACTION, BILATERAL      COLONOSCOPY  09/16/2015    CORONARY ARTERY BYPASS GRAFT  09/2016    x1     HAND SURGERY Left     URETERECTOMY        Family History:     Family History   Family history unknown: Yes      Social History:     Social History     Socioeconomic History    Marital status: /Civil Union     Spouse name: None    Number of children: None    Years of education: None    Highest education level: None   Occupational History    None   Tobacco Use    Smoking status: Former Smoker    Smokeless tobacco: Never Used    Tobacco comment: Never smoker - As per [employer]   Vaping Use    Vaping Use: Never used   Substance and Sexual Activity    Alcohol use: Not Currently    Drug use: Never    Sexual activity: Not Currently   Other Topics Concern    None   Social History Narrative    Monthly foot exam: Sees podiatrist [id number], Dr Mohit Guzman    Annual eye exam: Sees ophthal [id number], Dr Alda Addison    PSA: [facility]    Annual dental checkup: Sees dentist    - As per [employer]     Social Determinants of Health     Financial Resource Strain: Low Risk     Difficulty of Paying Living Expenses: Not very hard   Food Insecurity: Not on file   Transportation Needs: No Transportation Needs    Lack of Transportation (Medical): No    Lack of Transportation (Non-Medical):  No   Physical Activity: Not on file   Stress: Not on file   Social Connections: Not on file   Intimate Partner Violence: Not on file   Housing Stability: Not on file      Medications and Allergies:     Current Outpatient Medications   Medication Sig Dispense Refill    aspirin (ECOTRIN LOW STRENGTH) 81 mg EC tablet Take 81 mg by mouth daily      atorvastatin (LIPITOR) 40 mg tablet Take 20 mg by mouth daily 1/2 tablet      Brinzolamide-Brimonidine (Simbrinza) 1-0 2 % SUSP Apply 1 drop to eye 2 (two) times a day      Cinnamon 500 MG capsule Take 500 mg by mouth 2 (two) times a day      glipiZIDE (GLUCOTROL) 5 mg tablet Take 1 tablet (5 mg total) by mouth daily in the early morning 90 tablet 1    insulin glargine (LANTUS) 100 units/mL subcutaneous injection Inject 22 Units under the skin daily at bedtime       latanoprost (XALATAN) 0 005 % ophthalmic solution Administer 1 drop to both eyes daily at bedtime      losartan (COZAAR) 100 MG tablet Take 100 mg by mouth daily       metFORMIN (GLUCOPHAGE) 500 mg tablet Take 1 tablet (500 mg total) by mouth 2 (two) times a day 180 tablet 1    metoprolol succinate (TOPROL-XL) 100 mg 24 hr tablet Take 100 mg by mouth daily      Multiple Vitamin (multivitamin) tablet Take 1 tablet by mouth daily      omeprazole (PriLOSEC) 20 mg delayed release capsule Take 20 mg by mouth daily      vitamin B-12 (VITAMIN B-12) 1,000 mcg tablet Take 1 tablet by mouth every other day      acetaminophen (TYLENOL) 500 mg tablet Take 500 mg by mouth 2 (two) times a week  (Patient not taking: Reported on 9/8/2022)       No current facility-administered medications for this visit  Allergies   Allergen Reactions    Lisinopril Cough    Penicillin G Hives    Penicillins       Immunizations:     Immunization History   Administered Date(s) Administered    COVID-19 MODERNA VACC 0 5 ML IM 01/25/2021, 02/21/2021, 12/03/2021    Influenza, high dose seasonal 0 7 mL 08/31/2021      Health Maintenance: There are no preventive care reminders to display for this patient  Topic Date Due    Pneumococcal Vaccine: 65+ Years (2 - PPSV23 or PCV20) 06/10/2017    COVID-19 Vaccine (4 - Booster for Moderna series) 04/03/2022    Influenza Vaccine (1) 09/01/2022      Medicare Screening Tests and Risk Assessments:     Luna Ann is here for his Subsequent Wellness visit  Last Medicare Wellness visit information reviewed, patient interviewed and updates made to the record today  Health Risk Assessment:   Patient rates overall health as good  Patient feels that their physical health rating is same  Patient is satisfied with their life  Eyesight was rated as same  Hearing was rated as same  Patient feels that their emotional and mental health rating is same  Patients states they are never, rarely angry  Patient states they are never, rarely unusually tired/fatigued   Pain experienced in the last 7 days has been none  Patient states that he has experienced no weight loss or gain in last 6 months  Depression Screening:   PHQ-2 Score: 0      Fall Risk Screening: In the past year, patient has experienced: no history of falling in past year      Home Safety:  Patient does not have trouble with stairs inside or outside of their home  Patient has working smoke alarms and has working carbon monoxide detector  Home safety hazards include: none  Nutrition:   Current diet is Diabetic, Low Cholesterol, Low Saturated Fat and No Added Salt  Medications:   Patient is currently taking over-the-counter supplements  OTC medications include: see medication list  Patient is able to manage medications  Activities of Daily Living (ADLs)/Instrumental Activities of Daily Living (IADLs):   Walk and transfer into and out of bed and chair?: Yes  Dress and groom yourself?: Yes    Bathe or shower yourself?: Yes    Feed yourself? Yes  Do your laundry/housekeeping?: Yes  Manage your money, pay your bills and track your expenses?: Yes  Make your own meals?: Yes    Do your own shopping?: Yes    Previous Hospitalizations:   Any hospitalizations or ED visits within the last 12 months?: No      Advance Care Planning:   Living will: Yes    Durable POA for healthcare:  Yes    Advanced directive: Yes    Advanced directive counseling given: Yes    Five wishes given: Yes    Patient declined ACP directive: No      Comments: His wife Amari Solo and his daughter Mauri Llanos are his power-of-    Cognitive Screening:   Provider or family/friend/caregiver concerned regarding cognition?: No    PREVENTIVE SCREENINGS      Cardiovascular Screening:    General: History Lipid Disorder and Screening Current      Diabetes Screening:     General: History Diabetes and Screening Current      Colorectal Cancer Screening:     General: Screening Current      Prostate Cancer Screening:    General: Screening Not Indicated      Osteoporosis Screening:    General: Risks and Benefits Discussed      Abdominal Aortic Aneurysm (AAA) Screening:    Risk factors include: tobacco use        General: Screening Not Indicated      Lung Cancer Screening:     General: Screening Not Indicated      Hepatitis C Screening:    General: Risks and Benefits Discussed    Screening, Brief Intervention, and Referral to Treatment (SBIRT)    Screening  Typical number of drinks in a day: 0  Typical number of drinks in a week: 0  Interpretation: Low risk drinking behavior  Brief Intervention  Alcohol & drug use screenings were reviewed  No concerns regarding substance use disorder identified  Other Counseling Topics:   Car/seat belt/driving safety, skin self-exam, sunscreen and calcium and vitamin D intake and regular weightbearing exercise       No exam data present     Physical Exam:     /76 (BP Location: Left arm, Patient Position: Sitting, Cuff Size: Adult)   Pulse 65   Temp 98 8 °F (37 1 °C) (Tympanic)   Resp 12   Ht 5' 5" (1 651 m)   Wt 73 kg (161 lb)   SpO2 97%   BMI 26 79 kg/m²     Physical Exam     Trung Campbell MD

## 2022-09-08 NOTE — ASSESSMENT & PLAN NOTE
Presently stable and asymptomatic  Patient advised to follow with cardiologist   Continue present medications

## 2022-09-08 NOTE — ASSESSMENT & PLAN NOTE
Had a last carotid artery Doppler test on December 2021 no new changes  Presently asymptomatic  On baby aspirin

## 2022-09-08 NOTE — ASSESSMENT & PLAN NOTE
Patient  was advised to decrease portion size  Advised to decrease carb, sugar, cholesterol intake  Try to exercise as much as he can tolerate  Advised to lose weight

## 2022-10-06 ENCOUNTER — IMMUNIZATIONS (OUTPATIENT)
Dept: INTERNAL MEDICINE CLINIC | Facility: CLINIC | Age: 87
End: 2022-10-06
Payer: MEDICARE

## 2022-10-06 DIAGNOSIS — Z23 NEED FOR PROPHYLACTIC VACCINATION AND INOCULATION AGAINST INFLUENZA: Primary | ICD-10-CM

## 2022-10-06 PROCEDURE — 90662 IIV NO PRSV INCREASED AG IM: CPT | Performed by: INTERNAL MEDICINE

## 2022-10-06 PROCEDURE — G0008 ADMIN INFLUENZA VIRUS VAC: HCPCS | Performed by: INTERNAL MEDICINE

## 2022-10-12 PROBLEM — Z00.00 MEDICARE ANNUAL WELLNESS VISIT, SUBSEQUENT: Status: RESOLVED | Noted: 2021-08-31 | Resolved: 2022-10-12

## 2022-11-22 NOTE — ASSESSMENT & PLAN NOTE
Patient is on a m  apixaban  Denies any cough chest pain or shortness of breath  Will refer to  pulmonary specialist for further evaluation and recommendation  Discussed with the patient most likely will need at least 6 month apixaban  Topical Sulfur Applications Pregnancy And Lactation Text: This medication is Pregnancy Category C and has an unknown safety profile during pregnancy. It is unknown if this topical medication is excreted in breast milk.

## 2022-12-13 ENCOUNTER — OFFICE VISIT (OUTPATIENT)
Dept: INTERNAL MEDICINE CLINIC | Facility: CLINIC | Age: 87
End: 2022-12-13

## 2022-12-13 VITALS
HEIGHT: 65 IN | RESPIRATION RATE: 18 BRPM | HEART RATE: 82 BPM | OXYGEN SATURATION: 98 % | BODY MASS INDEX: 27.32 KG/M2 | DIASTOLIC BLOOD PRESSURE: 80 MMHG | TEMPERATURE: 98.2 F | WEIGHT: 164 LBS | SYSTOLIC BLOOD PRESSURE: 132 MMHG

## 2022-12-13 DIAGNOSIS — I25.10 CORONARY ARTERY DISEASE INVOLVING NATIVE CORONARY ARTERY OF NATIVE HEART WITHOUT ANGINA PECTORIS: ICD-10-CM

## 2022-12-13 DIAGNOSIS — D63.8 ANEMIA IN OTHER CHRONIC DISEASES CLASSIFIED ELSEWHERE: ICD-10-CM

## 2022-12-13 DIAGNOSIS — Z95.2 S/P AVR (AORTIC VALVE REPLACEMENT): ICD-10-CM

## 2022-12-13 DIAGNOSIS — E78.2 MIXED HYPERLIPIDEMIA: ICD-10-CM

## 2022-12-13 DIAGNOSIS — Z79.4 TYPE 2 DIABETES MELLITUS WITH STAGE 3A CHRONIC KIDNEY DISEASE, WITH LONG-TERM CURRENT USE OF INSULIN (HCC): Primary | ICD-10-CM

## 2022-12-13 DIAGNOSIS — E11.29 MICROALBUMINURIA DUE TO TYPE 2 DIABETES MELLITUS (HCC): ICD-10-CM

## 2022-12-13 DIAGNOSIS — R13.19 OTHER DYSPHAGIA: ICD-10-CM

## 2022-12-13 DIAGNOSIS — E11.22 TYPE 2 DIABETES MELLITUS WITH STAGE 3A CHRONIC KIDNEY DISEASE, WITH LONG-TERM CURRENT USE OF INSULIN (HCC): Primary | ICD-10-CM

## 2022-12-13 DIAGNOSIS — N18.31 TYPE 2 DIABETES MELLITUS WITH STAGE 3A CHRONIC KIDNEY DISEASE, WITH LONG-TERM CURRENT USE OF INSULIN (HCC): Primary | ICD-10-CM

## 2022-12-13 DIAGNOSIS — K21.9 GASTROESOPHAGEAL REFLUX DISEASE WITHOUT ESOPHAGITIS: ICD-10-CM

## 2022-12-13 DIAGNOSIS — R80.9 MICROALBUMINURIA DUE TO TYPE 2 DIABETES MELLITUS (HCC): ICD-10-CM

## 2022-12-13 DIAGNOSIS — R21 SKIN RASH: ICD-10-CM

## 2022-12-13 DIAGNOSIS — I10 ESSENTIAL HYPERTENSION: Chronic | ICD-10-CM

## 2022-12-13 DIAGNOSIS — N18.31 STAGE 3A CHRONIC KIDNEY DISEASE (HCC): ICD-10-CM

## 2022-12-13 PROBLEM — D64.89 OTHER SPECIFIED ANEMIAS: Status: ACTIVE | Noted: 2022-12-13

## 2022-12-13 RX ORDER — CLOTRIMAZOLE AND BETAMETHASONE DIPROPIONATE 10; .64 MG/G; MG/G
CREAM TOPICAL 2 TIMES DAILY
Qty: 30 G | Refills: 0 | Status: SHIPPED | OUTPATIENT
Start: 2022-12-13

## 2022-12-13 NOTE — PROGRESS NOTES
Assessment/Plan:    1  Type 2 diabetes mellitus with stage 3a chronic kidney disease, with long-term current use of insulin [facility])  Assessment & Plan:    Lab Results   Component Value Date    HGBA1C 7 3 09/07/2022   His hemoglobin A1c increased from  6 5-7 3  Discussed with the patient to watch diet very closely for diabetes mellitus  We will presently continue same medications and avoid increasing any medications  Patient denies any hypoglycemia  His blood sugar in the morning usually 1 10-1 20  He is not checking blood sugar afternoon  If hemoglobin A1c persistently elevated will adjust medication    Orders:  -     Comprehensive metabolic panel; Future  -     Hemoglobin A1C; Future    2  Essential hypertension  Assessment & Plan:  Blood pressure well controlled  Advised to continue present medication  Advised for low-salt diet  Orders:  -     Comprehensive metabolic panel; Future    3  Coronary artery disease involving native coronary artery of native heart without angina pectoris  Assessment & Plan:  Presently asymptomatic and stable  Has been followed by cardiologist   Beatriz Ngo to continue present medications  4  Stage 3a chronic kidney disease [facility])  Assessment & Plan:  Lab Results   Component Value Date    EGFR 42 12/03/2021    EGFR 51 07/08/2021    EGFR 45 06/15/2021    CREATININE 1 48 (H) 12/03/2021    CREATININE 1 25 07/08/2021    CREATININE 1 40 (H) 06/15/2021   Initial renal function stable  Advised to maintain hydration and continue present medication  Will follow renal function  Orders:  -     CBC and differential; Future  -     Comprehensive metabolic panel; Future    5  Mixed hyperlipidemia  Assessment & Plan:  Well-controlled  His cholesterol 144, triglyceride 66, HDL 46, LDL 83 advised to continue present medication and low-cholesterol diet  Orders:  -     Lipid panel; Future    6  BMI 27 0-27 9,adult  Assessment & Plan:  Patient  was advised to decrease portion size  Advised to decrease carb, sugar, cholesterol intake  Advised to exercise 3-5 times per week  Advised to lose weight  7  Gastroesophageal reflux disease without esophagitis  Assessment & Plan:  His symptoms are well controlled on omeprazole  Patient has been watching his diet  Orders:  -     Ambulatory Referral to Gastroenterology; Future    8  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 [facility])  Assessment & Plan:    Lab Results   Component Value Date    HGBA1C 7 3 09/07/2022   He is on losartan  Renal function stable  Will follow yearly microalbumin  9  Anemia in other chronic diseases classified elsewhere  Assessment & Plan:  His hemoglobin 13 1 which is stable  Denies any blood in stool dark-colored stool  Will observe and follow-up with    Orders:  -     CBC and differential; Future    10  S/P AVR (aortic valve replacement)  Assessment & Plan:  Patient is stable asymptomatic  Has been followed by cardiologist       11  Other dysphagia  Assessment & Plan:  Patient complains of difficulty in swallowing mainly when he eats meats  Will refer to GI specialist for EGD patient has been taking his omeprazole daily for his GE reflux  Orders:  -     Ambulatory Referral to Gastroenterology; Future    12  Skin rash  Assessment & Plan:  Has a some dry scaly rash dorsal aspect of the left hand between thumb and index finger area  No discharge no vesicles  Complaint of itching  Possible dermatitis and/or bruise , as has slight bluish discoloration  Will prescribe Lotrisone cream if not better patient to call    Orders:  -     clotrimazole-betamethasone (LOTRISONE) 1-0 05 % cream; Apply topically 2 (two) times a day          Subjective: Patient presents follow-up his medical problems  Patient ID: Erica Motta is a 80 y o  male  HPI   20-year-old white male patient presents for follow-up his medical problems  He denies any chest pain, shortness of breath, pain in the abdomen    Denies any cough, fever, chills  Denies any nausea, vomiting, diarrhea  Complain of difficulty in swallowing foods mainly when he eats meats  The following portions of the patient's history were reviewed and updated as appropriate:     Past Medical History:  He has a past medical history of Anemia (6/22/2021), Bilateral carotid artery stenosis (1/4/2021), Bilateral carotid bruits, BMI 25 0-25 9,adult (4/6/2021), BMI 25 0-25 9,adult (8/31/2021), BMI 26 0-26 9,adult (8/31/2021), BMI 27 0-27 9,adult (12/13/2022), Coronary artery disease involving native coronary artery of native heart without angina pectoris (4/6/2021), CTS (carpal tunnel syndrome), Deviated septum, Diabetes ([address] ), 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[facility] ), Dizziness, Double vision, Ear problems, Encounter for support and coordination of transition of care (6/22/2021), Fatigue, General weakness, GERD (gastroesophageal reflux disease), Hearing impaired person, bilateral, HL (hearing loss), Hypertension, Medicare annual wellness visit, subsequent (8/31/2021), Microscopic hematuria, Myalgia (4/6/2021), Other dysphagia (12/13/2022), Other specified anemias (12/13/2022), Pain in joints (4/6/2021), PVD (peripheral vascular disease) ([address] ) (4/2/2021), Rectal bleeding (6/22/2021), Renal calculi, Right leg DVT ([address] ) (6/22/2021), Right lumbar radiculopathy, S/P AVR (aortic valve replacement) (4/2/2021), Skin rash (12/13/2022), SOB (shortness of breath) on exertion, Spinal stenosis in cervical region (4/2/2021), Tachycardia, Type 2 diabetes mellitus with stage 3a chronic kidney disease, with long-term current use of insulin ([address] ) (9/8/2022), Type 2 diabetes mellitus, without long-term current use of insulin ([address] ) (9/8/2022), Urinary frequency, and Vertigo (1/4/2021)  ,  _______________________________________________________________________  Past Surgical History:   has a past surgical history that includes Ureterectomy; Bypass Graft; Carpal tunnel release (Right);  Hand surgery (Left); Cataract extraction, bilateral; Aortic valve replacement (09/2016); 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); and Colonoscopy (09/16/2015)  ,  _______________________________________________________________________  Family History:  Family history is unknown by patient  ,  _______________________________________________________________________  Social History:   reports that he has quit smoking  He has never used smokeless tobacco  He reports that he does not currently use alcohol  He reports that he does not use drugs  ,  _______________________________________________________________________  Allergies:  is allergic to lisinopril, penicillin g, and penicillins     _______________________________________________________________________  Current Outpatient Medications   Medication Sig Dispense Refill   • aspirin (ECOTRIN LOW STRENGTH) 81 mg EC tablet Take 81 mg by mouth daily     • atorvastatin (LIPITOR) 40 mg tablet Take 20 mg by mouth daily 1/2 tablet     • Brinzolamide-Brimonidine (Simbrinza) 1-0 2 % SUSP Apply 1 drop to eye 2 (two) times a day     • Cinnamon 500 MG capsule Take 500 mg by mouth 2 (two) times a day     • clotrimazole-betamethasone (LOTRISONE) 1-0 05 % cream Apply topically 2 (two) times a day 30 g 0   • glipiZIDE (GLUCOTROL) 5 mg tablet Take 1 tablet (5 mg total) by mouth daily in the early morning 90 tablet 1   • insulin glargine (LANTUS) 100 units/mL subcutaneous injection Inject 23 Units under the skin daily at bedtime     • latanoprost (XALATAN) 0 005 % ophthalmic solution Administer 1 drop to both eyes daily at bedtime     • losartan (COZAAR) 100 MG tablet Take 100 mg by mouth daily      • metFORMIN (GLUCOPHAGE) 500 mg tablet Take 1 tablet by mouth twice daily 180 tablet 1   • metoprolol succinate (TOPROL-XL) 100 mg 24 hr tablet Take 100 mg by mouth daily     • Multiple Vitamin (multivitamin) tablet Take 1 tablet by mouth daily     • omeprazole (PriLOSEC) 20 mg delayed release capsule Take 20 mg by mouth daily     • vitamin B-12 (VITAMIN B-12) 1,000 mcg tablet Take 1 tablet by mouth every other day     • acetaminophen (TYLENOL) 500 mg tablet Take 500 mg by mouth 2 (two) times a week  (Patient not taking: Reported on 9/8/2022)       No current facility-administered medications for this visit      _______________________________________________________________________  Review of Systems   Constitutional: Negative for chills and fever  HENT: Negative for congestion, ear pain, hearing loss, nosebleeds, sinus pain, sore throat and trouble swallowing  Eyes: Negative for discharge, redness and visual disturbance  Respiratory: Negative for cough, chest tightness and shortness of breath  Cardiovascular: Negative for chest pain and palpitations  Gastrointestinal: Negative for abdominal pain, blood in stool, constipation, diarrhea, nausea and vomiting  Genitourinary: Negative for dysuria, flank pain and hematuria  Musculoskeletal: Negative for arthralgias, myalgias and neck pain  Skin: Negative for color change and rash  Neurological: Negative for dizziness, speech difficulty, weakness and headaches  Hematological: Does not bruise/bleed easily  Psychiatric/Behavioral: Negative for agitation and behavioral problems  Objective:  Vitals:    12/13/22 1226   BP: 132/80   BP Location: Left arm   Patient Position: Sitting   Cuff Size: Adult   Pulse: 82   Resp: 18   Temp: 98 2 °F (36 8 °C)   TempSrc: Tympanic   SpO2: 98%   Weight: 74 4 kg (164 lb)   Height: 5' 5" (1 651 m)     Body mass index is 27 29 kg/m²  Physical Exam  Vitals and nursing note reviewed  Constitutional:       General: He is not in acute distress  Appearance: Normal appearance  HENT:      Head: Normocephalic and atraumatic        Right Ear: Ear canal and external ear normal       Left Ear: Ear canal and external ear normal       Nose: Nose normal       Mouth/Throat:      Mouth: Mucous membranes are moist    Eyes:      General: No scleral icterus  Right eye: No discharge  Left eye: No discharge  Extraocular Movements: Extraocular movements intact  Conjunctiva/sclera: Conjunctivae normal    Cardiovascular:      Rate and Rhythm: Normal rate and regular rhythm  Pulses: Normal pulses  Heart sounds: Murmur heard  Pulmonary:      Effort: Pulmonary effort is normal  No respiratory distress  Breath sounds: Normal breath sounds  Abdominal:      General: Bowel sounds are normal       Palpations: Abdomen is soft  Tenderness: There is no abdominal tenderness  Musculoskeletal:         General: Normal range of motion  Cervical back: Normal range of motion and neck supple  No muscular tenderness  Right lower leg: No edema  Left lower leg: No edema  Skin:     General: Skin is warm  Findings: No rash  Neurological:      General: No focal deficit present  Mental Status: He is alert and oriented to person, place, and time  Motor: No weakness  Psychiatric:         Mood and Affect: Mood normal          Behavior: Behavior normal        I spent 30 minutes with the patient today    More than 50% time spent for reviewing of external notes, reviewing of the results of diagnostics test, management of care, patient education and ordering of test

## 2022-12-13 NOTE — PATIENT INSTRUCTIONS
Patient was advised to continue present medications  discussed with the patient medications and laboratory data in detail  Follow-up with me in 3 months or as advised  If any blood test was ordered please do 1 week prior to next appointment unless advise to get earlier    If you have any questions please call the office [phone]

## 2022-12-14 NOTE — ASSESSMENT & PLAN NOTE
Well-controlled  His cholesterol 144, triglyceride 66, HDL 46, LDL 83 advised to continue present medication and low-cholesterol diet

## 2022-12-14 NOTE — ASSESSMENT & PLAN NOTE
Patient complains of difficulty in swallowing mainly when he eats meats  Will refer to GI specialist for EGD patient has been taking his omeprazole daily for his GE reflux  Split-Thickness Skin Graft Text: The defect edges were debeveled with a #15 scalpel blade.  Given the location of the defect, shape of the defect and the proximity to free margins a split thickness skin graft was deemed most appropriate.  Using a sterile surgical marker, the primary defect shape was transferred to the donor site. The split thickness graft was then harvested.  The skin graft was then placed in the primary defect and oriented appropriately.

## 2022-12-14 NOTE — ASSESSMENT & PLAN NOTE
His hemoglobin 13 1 which is stable  Denies any blood in stool dark-colored stool    Will observe and follow-up with

## 2022-12-14 NOTE — ASSESSMENT & PLAN NOTE
Lab Results   Component Value Date    HGBA1C 7 3 09/07/2022   He is on losartan  Renal function stable  Will follow yearly microalbumin

## 2022-12-14 NOTE — ASSESSMENT & PLAN NOTE
Lab Results   Component Value Date    HGBA1C 7 3 09/07/2022   His hemoglobin A1c increased from  6 5-7 3  Discussed with the patient to watch diet very closely for diabetes mellitus  We will presently continue same medications and avoid increasing any medications  Patient denies any hypoglycemia  His blood sugar in the morning usually 1 10-1 20  He is not checking blood sugar afternoon    If hemoglobin A1c persistently elevated will adjust medication

## 2022-12-14 NOTE — ASSESSMENT & PLAN NOTE
Has a some dry scaly rash dorsal aspect of the left hand between thumb and index finger area  No discharge no vesicles  Complaint of itching  Possible dermatitis and/or bruise , as has slight bluish discoloration    Will prescribe Lotrisone cream if not better patient to call

## 2022-12-14 NOTE — ASSESSMENT & PLAN NOTE
Presently asymptomatic and stable  Has been followed by cardiologist   Sigrid Barton to continue present medications

## 2022-12-14 NOTE — ASSESSMENT & PLAN NOTE
Lab Results   Component Value Date    EGFR 42 12/03/2021    EGFR 51 07/08/2021    EGFR 45 06/15/2021    CREATININE 1 48 (H) 12/03/2021    CREATININE 1 25 07/08/2021    CREATININE 1 40 (H) 06/15/2021   Initial renal function stable  Advised to maintain hydration and continue present medication  Will follow renal function

## 2023-01-04 ENCOUNTER — OFFICE VISIT (OUTPATIENT)
Dept: GASTROENTEROLOGY | Facility: CLINIC | Age: 88
End: 2023-01-04

## 2023-01-04 VITALS
HEIGHT: 65 IN | DIASTOLIC BLOOD PRESSURE: 63 MMHG | HEART RATE: 57 BPM | BODY MASS INDEX: 27.32 KG/M2 | SYSTOLIC BLOOD PRESSURE: 129 MMHG | WEIGHT: 164 LBS

## 2023-01-04 DIAGNOSIS — K21.9 GASTROESOPHAGEAL REFLUX DISEASE WITHOUT ESOPHAGITIS: ICD-10-CM

## 2023-01-04 DIAGNOSIS — R13.19 OTHER DYSPHAGIA: ICD-10-CM

## 2023-01-04 NOTE — PROGRESS NOTES
[facility] - Outpatient Consultation  Purnima Sosa 80 y o  male MRN: [id number]  Encounter: [id number]          ASSESSMENT AND PLAN:      1  Gastroesophageal reflux disease without esophagitis    History of intermittent solid food dysphagia upper esophageal area  Occasionally has to vomit up food  Had EGD with dilatation several years ago  Takes omeprazole 20 mg daily  No melena bright red blood per rectum  No significant weight loss  - Ambulatory Referral to Gastroenterology    2  Other dysphagia    As above  He will otherwise follow-up in 4 months  - Ambulatory Referral to Gastroenterology  - EGD; Future    ______________________________________________________________________    HPI:    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 occasionally requiring no vomiting  Describes point of impaction is upper esophagus  Does have a history of chronic reflux  Had EGD some 4 years ago with dilatation for similar issue  Records not available  Colon cancer screening up-to-date had a colonoscopy in 2021  No family history of esophageal cancer neoplasia of the gastrointestinal tract celiac disease IBD hepatitis pancreatitis  Had a  Pig valve placed several years ago approximately 8  REVIEW OF SYSTEMS:    CONSTITUTIONAL: Denies any fever, chills, rigors, and weight loss  HEENT: No earache or tinnitus  Denies hearing loss or visual disturbances  CARDIOVASCULAR: No chest pain or palpitations  RESPIRATORY: Denies any cough, hemoptysis, shortness of breath or dyspnea on exertion  GASTROINTESTINAL: As noted in the History of Present Illness  GENITOURINARY: No problems with urination  Denies any hematuria or dysuria  NEUROLOGIC: No dizziness or vertigo, denies headaches  MUSCULOSKELETAL: Denies any muscle or joint pain  SKIN: Denies skin rashes or itching     ENDOCRINE: Denies excessive thirst  Denies intolerance to heat or cold   PSYCHOSOCIAL: Denies depression or anxiety  Denies any recent memory loss  Historical Information   Past Medical History:   Diagnosis Date   • Anemia 6/22/2021   • Bilateral carotid artery stenosis 1/4/2021    <50% on doppler 7/27/2018   No change from Aug/2016   • Bilateral carotid bruits    • BMI 25 0-25 9,adult 4/6/2021   • BMI 25 0-25 9,adult 8/31/2021   • BMI 26 0-26 9,adult 8/31/2021   • BMI 27 0-27 9,adult 12/13/2022   • Coronary artery disease involving native coronary artery of native heart without angina pectoris 4/6/2021   • CTS (carpal tunnel syndrome)    • Deviated septum    • Diabetes (HCC)    • 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HCC)    • Dizziness    • Double vision    • Ear problems    • Encounter for support and coordination of transition of care 6/22/2021   • Fatigue    • General weakness    • GERD (gastroesophageal reflux disease)    • Hearing impaired person, bilateral    • HL (hearing loss)    • Hypertension    • Medicare annual wellness visit, subsequent 8/31/2021   • Microscopic hematuria    • Myalgia 4/6/2021   • Other dysphagia 12/13/2022   • Other specified anemias 12/13/2022   • Pain in joints 4/6/2021   • PVD (peripheral vascular disease) ([address] ) 4/2/2021   • Rectal bleeding 6/22/2021   • Renal calculi    • Right leg DVT ([address] ) 6/22/2021   • Right lumbar radiculopathy    • S/P AVR (aortic valve replacement) 4/2/2021   • Skin rash 12/13/2022   • SOB (shortness of breath) on exertion    • Spinal stenosis in cervical region 4/2/2021   • Tachycardia    • Type 2 diabetes mellitus with stage 3a chronic kidney disease, with long-term current use of insulin ([address] ) 9/8/2022   • Type 2 diabetes mellitus, without long-term current use of insulin ([address] ) 9/8/2022   • Urinary frequency    • Vertigo 1/4/2021     Past Surgical History:   Procedure Laterality Date   • AORTIC VALVE REPLACEMENT  09/2016   • BYPASS GRAFT     • CARPAL TUNNEL RELEASE Right     by Dr Kathie Yeboah   • CATARACT EXTRACTION, BILATERAL     • COLONOSCOPY  09/16/2015   • CORONARY ARTERY BYPASS GRAFT  09/2016    x1    • HAND SURGERY Left    • URETERECTOMY       Social History   Social History     Substance and Sexual Activity   Alcohol Use Not Currently     Social History     Substance and Sexual Activity   Drug Use Never     Social History     Tobacco Use   Smoking Status Former   Smokeless Tobacco Never   Tobacco Comments    Never smoker - As per [employer]     Family History   Family history unknown: Yes       Meds/Allergies       Current Outpatient Medications:   •  acetaminophen (TYLENOL) 500 mg tablet  •  aspirin (ECOTRIN LOW STRENGTH) 81 mg EC tablet  •  atorvastatin (LIPITOR) 40 mg tablet  •  Brinzolamide-Brimonidine (Simbrinza) 1-0 2 % SUSP  •  Cinnamon 500 MG capsule  •  clotrimazole-betamethasone (LOTRISONE) 1-0 05 % cream  •  glipiZIDE (GLUCOTROL) 5 mg tablet  •  insulin glargine (LANTUS) 100 units/mL subcutaneous injection  •  latanoprost (XALATAN) 0 005 % ophthalmic solution  •  losartan (COZAAR) 100 MG tablet  •  metFORMIN (GLUCOPHAGE) 500 mg tablet  •  metoprolol succinate (TOPROL-XL) 100 mg 24 hr tablet  •  Multiple Vitamin (multivitamin) tablet  •  omeprazole (PriLOSEC) 20 mg delayed release capsule  •  vitamin B-12 (VITAMIN B-12) 1,000 mcg tablet    Allergies   Allergen Reactions   • Lisinopril Cough   • Penicillin G Hives   • Penicillins            Objective     Blood pressure 129/63, pulse 57, height 5' 5" (1 651 m), weight 74 4 kg (164 lb)  Body mass index is 27 29 kg/m²  PHYSICAL EXAM:      General Appearance:   Alert, cooperative, no distress   HEENT:   Normocephalic, atraumatic, anicteric      Neck:  Supple, symmetrical, trachea midline   Lungs:   Clear to auscultation bilaterally; no rales, rhonchi or wheezing; respirations unlabored    Heart[de-identified]   Regular rate and rhythm; 3/6 murmur, no rub, or gallop     Abdomen:   Soft, non-tender, non-distended; normal bowel sounds; no masses, no organomegaly  Genitalia:   Deferred    Rectal:   Deferred    Extremities:  No cyanosis, clubbing or edema    Pulses:  2+ and symmetric    Skin:  No jaundice, rashes, or lesions    Lymph nodes:  No palpable cervical lymphadenopathy        Lab Results:   No visits with results within 1 Day(s) from this visit  Latest known visit with results is:   Orders Only on 09/07/2022   Component Date Value   • Hemoglobin A1C 09/07/2022 7 3          Radiology Results:   No results found  Answers for HPI/ROS submitted by the patient on 1/3/2023  Progression since onset: resolved  anorexia: No  arthralgias: Yes  belching: Yes  constipation: No  diarrhea: No  dysuria: No  fever: No  flatus: No  frequency: No  headaches: No  hematochezia: No  hematuria: No  melena: No  myalgias: No  nausea:  No  weight loss: No  vomiting: No  Aggravated by: nothing  Relieved by: nothing

## 2023-01-04 NOTE — H&P (VIEW-ONLY)
[facility] - Outpatient Consultation  Venora Sides 80 y o  male MRN: [id number]  Encounter: [id number]          ASSESSMENT AND PLAN:      1  Gastroesophageal reflux disease without esophagitis    History of intermittent solid food dysphagia upper esophageal area  Occasionally has to vomit up food  Had EGD with dilatation several years ago  Takes omeprazole 20 mg daily  No melena bright red blood per rectum  No significant weight loss  - Ambulatory Referral to Gastroenterology    2  Other dysphagia    As above  He will otherwise follow-up in 4 months  - Ambulatory Referral to Gastroenterology  - EGD; Future    ______________________________________________________________________    HPI:    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 requiring no vomiting  Describes point of impaction is upper esophagus  Does have a history of chronic reflux  Had EGD some 4 years ago with dilatation for similar issue  Records not available  Colon cancer screening up-to-date had a colonoscopy in 2021  No family history of esophageal cancer neoplasia of the gastrointestinal tract celiac disease IBD hepatitis pancreatitis  Had a  Pig valve placed several years ago approximately 8  REVIEW OF SYSTEMS:    CONSTITUTIONAL: Denies any fever, chills, rigors, and weight loss  HEENT: No earache or tinnitus  Denies hearing loss or visual disturbances  CARDIOVASCULAR: No chest pain or palpitations  RESPIRATORY: Denies any cough, hemoptysis, shortness of breath or dyspnea on exertion  GASTROINTESTINAL: As noted in the History of Present Illness  GENITOURINARY: No problems with urination  Denies any hematuria or dysuria  NEUROLOGIC: No dizziness or vertigo, denies headaches  MUSCULOSKELETAL: Denies any muscle or joint pain  SKIN: Denies skin rashes or itching     ENDOCRINE: Denies excessive thirst  Denies intolerance to heat or cold   PSYCHOSOCIAL: Denies depression or anxiety  Denies any recent memory loss  Historical Information   Past Medical History:   Diagnosis Date   • Anemia 6/22/2021   • Bilateral carotid artery stenosis 1/4/2021    <50% on doppler 7/27/2018   No change from Aug/2016   • Bilateral carotid bruits    • BMI 25 0-25 9,adult 4/6/2021   • BMI 25 0-25 9,adult 8/31/2021   • BMI 26 0-26 9,adult 8/31/2021   • BMI 27 0-27 9,adult 12/13/2022   • Coronary artery disease involving native coronary artery of native heart without angina pectoris 4/6/2021   • CTS (carpal tunnel syndrome)    • Deviated septum    • Diabetes (HCC)    • 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HCC)    • Dizziness    • Double vision    • Ear problems    • Encounter for support and coordination of transition of care 6/22/2021   • Fatigue    • General weakness    • GERD (gastroesophageal reflux disease)    • Hearing impaired person, bilateral    • HL (hearing loss)    • Hypertension    • Medicare annual wellness visit, subsequent 8/31/2021   • Microscopic hematuria    • Myalgia 4/6/2021   • Other dysphagia 12/13/2022   • Other specified anemias 12/13/2022   • Pain in joints 4/6/2021   • PVD (peripheral vascular disease) ([address] ) 4/2/2021   • Rectal bleeding 6/22/2021   • Renal calculi    • Right leg DVT ([address] ) 6/22/2021   • Right lumbar radiculopathy    • S/P AVR (aortic valve replacement) 4/2/2021   • Skin rash 12/13/2022   • SOB (shortness of breath) on exertion    • Spinal stenosis in cervical region 4/2/2021   • Tachycardia    • Type 2 diabetes mellitus with stage 3a chronic kidney disease, with long-term current use of insulin ([address] ) 9/8/2022   • Type 2 diabetes mellitus, without long-term current use of insulin ([address] ) 9/8/2022   • Urinary frequency    • Vertigo 1/4/2021     Past Surgical History:   Procedure Laterality Date   • AORTIC VALVE REPLACEMENT  09/2016   • BYPASS GRAFT     • CARPAL TUNNEL RELEASE Right     by Dr Sree Ramos   • CATARACT EXTRACTION, BILATERAL     • COLONOSCOPY  09/16/2015   • CORONARY ARTERY BYPASS GRAFT  09/2016    x1    • HAND SURGERY Left    • URETERECTOMY       Social History   Social History     Substance and Sexual Activity   Alcohol Use Not Currently     Social History     Substance and Sexual Activity   Drug Use Never     Social History     Tobacco Use   Smoking Status Former   Smokeless Tobacco Never   Tobacco Comments    Never smoker - As per [employer]     Family History   Family history unknown: Yes       Meds/Allergies       Current Outpatient Medications:   •  acetaminophen (TYLENOL) 500 mg tablet  •  aspirin (ECOTRIN LOW STRENGTH) 81 mg EC tablet  •  atorvastatin (LIPITOR) 40 mg tablet  •  Brinzolamide-Brimonidine (Simbrinza) 1-0 2 % SUSP  •  Cinnamon 500 MG capsule  •  clotrimazole-betamethasone (LOTRISONE) 1-0 05 % cream  •  glipiZIDE (GLUCOTROL) 5 mg tablet  •  insulin glargine (LANTUS) 100 units/mL subcutaneous injection  •  latanoprost (XALATAN) 0 005 % ophthalmic solution  •  losartan (COZAAR) 100 MG tablet  •  metFORMIN (GLUCOPHAGE) 500 mg tablet  •  metoprolol succinate (TOPROL-XL) 100 mg 24 hr tablet  •  Multiple Vitamin (multivitamin) tablet  •  omeprazole (PriLOSEC) 20 mg delayed release capsule  •  vitamin B-12 (VITAMIN B-12) 1,000 mcg tablet    Allergies   Allergen Reactions   • Lisinopril Cough   • Penicillin G Hives   • Penicillins            Objective     Blood pressure 129/63, pulse 57, height 5' 5" (1 651 m), weight 74 4 kg (164 lb)  Body mass index is 27 29 kg/m²  PHYSICAL EXAM:      General Appearance:   Alert, cooperative, no distress   HEENT:   Normocephalic, atraumatic, anicteric      Neck:  Supple, symmetrical, trachea midline   Lungs:   Clear to auscultation bilaterally; no rales, rhonchi or wheezing; respirations unlabored    Heart[de-identified]   Regular rate and rhythm; 3/6 murmur, no rub, or gallop     Abdomen:   Soft, non-tender, non-distended; normal bowel sounds; no masses, no organomegaly  Genitalia:   Deferred    Rectal:   Deferred    Extremities:  No cyanosis, clubbing or edema    Pulses:  2+ and symmetric    Skin:  No jaundice, rashes, or lesions    Lymph nodes:  No palpable cervical lymphadenopathy        Lab Results:   No visits with results within 1 Day(s) from this visit  Latest known visit with results is:   Orders Only on 09/07/2022   Component Date Value   • Hemoglobin A1C 09/07/2022 7 3          Radiology Results:   No results found  Answers for HPI/ROS submitted by the patient on 1/3/2023  Progression since onset: resolved  anorexia: No  arthralgias: Yes  belching: Yes  constipation: No  diarrhea: No  dysuria: No  fever: No  flatus: No  frequency: No  headaches: No  hematochezia: No  hematuria: No  melena: No  myalgias: No  nausea:  No  weight loss: No  vomiting: No  Aggravated by: nothing  Relieved by: nothing

## 2023-01-05 ENCOUNTER — TELEPHONE (OUTPATIENT)
Dept: GASTROENTEROLOGY | Facility: CLINIC | Age: 88
End: 2023-01-05

## 2023-01-05 NOTE — TELEPHONE ENCOUNTER
Scheduled date of EGD(as of today): 1/31/2023    Physician performing EGD: Dr Cristiano West    Location of EGD: [facility]    Clearances: N/A

## 2023-01-09 ENCOUNTER — TELEPHONE (OUTPATIENT)
Dept: INTERNAL MEDICINE CLINIC | Facility: CLINIC | Age: 88
End: 2023-01-09

## 2023-01-23 ENCOUNTER — TELEPHONE (OUTPATIENT)
Dept: OTHER | Facility: OTHER | Age: 88
End: 2023-01-23

## 2023-01-23 ENCOUNTER — TELEPHONE (OUTPATIENT)
Dept: GASTROENTEROLOGY | Facility: CLINIC | Age: 88
End: 2023-01-23

## 2023-01-23 NOTE — TELEPHONE ENCOUNTER
Left message on daughter's phone(Lovely) reminding her of fathers EGD on 1/31/23 at [facility] with Dr Margie Batista  Asked her to call if he didn't get his instructions and reminded he will need a ride

## 2023-01-23 NOTE — TELEPHONE ENCOUNTER
Pt daughter and has questions about her father medications can he take before his procedure   Please call back to advise

## 2023-01-24 NOTE — TELEPHONE ENCOUNTER
Called and spoke with daughter  I told her when the hospital calls they will let her know what medications he is to take in the am  It will probably be the two blood pressure medications  She will make sure she rechecks with them

## 2023-01-28 RX ORDER — SODIUM CHLORIDE, SODIUM LACTATE, POTASSIUM CHLORIDE, CALCIUM CHLORIDE 600; 310; 30; 20 MG/100ML; MG/100ML; MG/100ML; MG/100ML
100 INJECTION, SOLUTION INTRAVENOUS CONTINUOUS
Status: CANCELLED | OUTPATIENT
Start: 2023-01-28

## 2023-01-31 ENCOUNTER — HOSPITAL ENCOUNTER (OUTPATIENT)
Dept: GASTROENTEROLOGY | Facility: HOSPITAL | Age: 88
Setting detail: OUTPATIENT SURGERY
Discharge: HOME | End: 2023-01-31
Attending: INTERNAL MEDICINE | Admitting: INTERNAL MEDICINE

## 2023-01-31 ENCOUNTER — ANESTHESIA (OUTPATIENT)
Dept: GASTROENTEROLOGY | Facility: HOSPITAL | Age: 88
End: 2023-01-31

## 2023-01-31 ENCOUNTER — ANESTHESIA EVENT (OUTPATIENT)
Dept: GASTROENTEROLOGY | Facility: HOSPITAL | Age: 88
End: 2023-01-31

## 2023-01-31 VITALS
SYSTOLIC BLOOD PRESSURE: 113 MMHG | DIASTOLIC BLOOD PRESSURE: 60 MMHG | BODY MASS INDEX: 26.6 KG/M2 | HEART RATE: 48 BPM | OXYGEN SATURATION: 100 % | TEMPERATURE: 97.4 F | HEIGHT: 66 IN | WEIGHT: 165.5 LBS | RESPIRATION RATE: 14 BRPM

## 2023-01-31 DIAGNOSIS — R13.19 OTHER DYSPHAGIA: ICD-10-CM

## 2023-01-31 DIAGNOSIS — K29.60 EROSIVE GASTRITIS: Primary | ICD-10-CM

## 2023-01-31 DIAGNOSIS — K22.4 ESOPHAGEAL DYSMOTILITY: ICD-10-CM

## 2023-01-31 LAB — GLUCOSE SERPL-MCNC: 162 MG/DL (ref 65–140)

## 2023-01-31 RX ORDER — PROPOFOL 10 MG/ML
INJECTION, EMULSION INTRAVENOUS AS NEEDED
Status: DISCONTINUED | OUTPATIENT
Start: 2023-01-31 | End: 2023-01-31

## 2023-01-31 RX ORDER — SUCRALFATE ORAL 1 G/10ML
1 SUSPENSION ORAL 4 TIMES DAILY
Qty: 414 ML | Refills: 1 | Status: SHIPPED | OUTPATIENT
Start: 2023-01-31

## 2023-01-31 RX ORDER — SODIUM CHLORIDE, SODIUM LACTATE, POTASSIUM CHLORIDE, CALCIUM CHLORIDE 600; 310; 30; 20 MG/100ML; MG/100ML; MG/100ML; MG/100ML
INJECTION, SOLUTION INTRAVENOUS CONTINUOUS PRN
Status: DISCONTINUED | OUTPATIENT
Start: 2023-01-31 | End: 2023-01-31

## 2023-01-31 RX ORDER — ONDANSETRON 2 MG/ML
4 INJECTION INTRAMUSCULAR; INTRAVENOUS ONCE AS NEEDED
Status: CANCELLED | OUTPATIENT
Start: 2023-01-31

## 2023-01-31 RX ORDER — LIDOCAINE HYDROCHLORIDE 10 MG/ML
INJECTION, SOLUTION EPIDURAL; INFILTRATION; INTRACAUDAL; PERINEURAL AS NEEDED
Status: DISCONTINUED | OUTPATIENT
Start: 2023-01-31 | End: 2023-01-31

## 2023-01-31 RX ADMIN — PROPOFOL 20 MG: 10 INJECTION, EMULSION INTRAVENOUS at 07:45

## 2023-01-31 RX ADMIN — PROPOFOL 20 MG: 10 INJECTION, EMULSION INTRAVENOUS at 07:39

## 2023-01-31 RX ADMIN — Medication 40 MG: at 07:37

## 2023-01-31 RX ADMIN — LIDOCAINE HYDROCHLORIDE 100 MG: 10 INJECTION, SOLUTION EPIDURAL; INFILTRATION; INTRACAUDAL at 07:34

## 2023-01-31 RX ADMIN — SODIUM CHLORIDE, SODIUM LACTATE, POTASSIUM CHLORIDE, AND CALCIUM CHLORIDE: .6; .31; .03; .02 INJECTION, SOLUTION INTRAVENOUS at 07:29

## 2023-01-31 RX ADMIN — PROPOFOL 100 MG: 10 INJECTION, EMULSION INTRAVENOUS at 07:34

## 2023-01-31 NOTE — ANESTHESIA PREPROCEDURE EVALUATION
Procedure:  EGD    Relevant Problems   ANESTHESIA (within normal limits)      CARDIO   (+) Coronary artery disease   (+) Coronary artery disease involving native coronary artery of native heart without angina pectoris   (+) Essential hypertension   (+) History of coronary artery bypass surgery   (+) Hyperlipidemia   (+) Right leg DVT (HCC)   (+) S/P AVR (aortic valve replacement)      ENDO   (+) Type 2 diabetes mellitus with stage 3a chronic kidney disease, with long-term current use of insulin (HCC)      GI/HEPATIC   (+) GI bleeding   (+) Gastroesophageal reflux disease   (+) Other dysphagia   (+) Rectal bleeding      /RENAL   (+) Chronic renal failure syndrome   (+) 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)   (+) Stage 3 chronic kidney disease (HCC)      HEMATOLOGY   (+) Anemia   (+) Other specified anemias      MUSCULOSKELETAL   (+) Diaphragmatic hernia   (+) Low back pain      NEURO/PSYCH   (+) History of pulmonary embolism      PULMONARY (within normal limits)        Physical Exam    Airway    Mallampati score: II  TM Distance: >3 FB  Neck ROM: full     Dental   Comment: Denies loose ,     Cardiovascular  Rhythm: regular, Rate: normal, Murmur,     Pulmonary  Breath sounds clear to auscultation,     Other Findings        Anesthesia Plan  ASA Score- 3     Anesthesia Type- IV sedation with anesthesia with ASA Monitors  Additional Monitors:   Airway Plan:           Plan Factors-Exercise tolerance (METS): >4 METS  Chart reviewed  EKG reviewed  Existing labs reviewed  Patient summary reviewed  Induction- intravenous  Postoperative Plan-     Informed Consent- Anesthetic plan and risks discussed with patient  I personally reviewed this patient with the CRNA  Discussed and agreed on the Anesthesia Plan with the CRNA  Avinash Arana

## 2023-01-31 NOTE — INTERVAL H&P NOTE
H&P reviewed  After examining the patient I find no changes in the patients condition since the H&P had been written      Vitals:    01/31/23 0702   BP: 128/70   Pulse: 59   Resp: 17   Temp: 97 5 °F (36 4 °C)   SpO2: 97%

## 2023-01-31 NOTE — ANESTHESIA POSTPROCEDURE EVALUATION
Post-Op Assessment Note    CV Status:  Stable  Pain Score: 0    Pain management: adequate     Mental Status:  Sleepy   Hydration Status:  Euvolemic   PONV Controlled:  Controlled   Airway Patency:  Patent      Post Op Vitals Reviewed: Yes      Staff: Anesthesiologist, CRNA         No notable events documented  BP 93/52 (01/31/23 0754)    Temp 97 5 °F (36 4 °C) (01/31/23 0754)    Pulse (!) 53 (CRNA at bedside and aware   No new orders as pt takes metoprolol) (01/31/23 0754)   Resp 20 (01/31/23 0754)    SpO2 99 % (01/31/23 0754)

## 2023-02-07 NOTE — RESULT ENCOUNTER NOTE
Patient was informed via my chart biopsies were benign  Repeat EGD in 3 months to ensure ulcer healing additionally repeat biopsies in a year due to intestinal metaplasia of the antrum

## 2023-03-02 LAB — HBA1C MFR BLD HPLC: 7.5 %

## 2023-03-07 ENCOUNTER — RA CDI HCC (OUTPATIENT)
Dept: OTHER | Facility: HOSPITAL | Age: 88
End: 2023-03-07

## 2023-03-07 NOTE — PROGRESS NOTES
Jackie [facility]  coding opportunities          Chart Reviewed number of suggestions sent to Provider: 5     Patients Insurance     Medicare Insurance: [employer]        [id number]  [id number]  [id number]  [id number]  [id number]

## 2023-03-14 ENCOUNTER — OFFICE VISIT (OUTPATIENT)
Dept: INTERNAL MEDICINE CLINIC | Facility: CLINIC | Age: 88
End: 2023-03-14

## 2023-03-14 ENCOUNTER — HOSPITAL ENCOUNTER (OUTPATIENT)
Dept: CT IMAGING | Facility: HOSPITAL | Age: 88
Discharge: HOME | End: 2023-03-14
Attending: INTERNAL MEDICINE

## 2023-03-14 VITALS
DIASTOLIC BLOOD PRESSURE: 80 MMHG | HEIGHT: 67 IN | RESPIRATION RATE: 16 BRPM | TEMPERATURE: 98.2 F | HEART RATE: 64 BPM | WEIGHT: 162 LBS | SYSTOLIC BLOOD PRESSURE: 130 MMHG | BODY MASS INDEX: 25.43 KG/M2 | OXYGEN SATURATION: 98 %

## 2023-03-14 DIAGNOSIS — D64.9 ANEMIA, UNSPECIFIED TYPE: ICD-10-CM

## 2023-03-14 DIAGNOSIS — I10 ESSENTIAL HYPERTENSION: Chronic | ICD-10-CM

## 2023-03-14 DIAGNOSIS — K21.9 GASTROESOPHAGEAL REFLUX DISEASE, UNSPECIFIED WHETHER ESOPHAGITIS PRESENT: ICD-10-CM

## 2023-03-14 DIAGNOSIS — N18.31 STAGE 3A CHRONIC KIDNEY DISEASE (HCC): ICD-10-CM

## 2023-03-14 DIAGNOSIS — E78.2 MIXED HYPERLIPIDEMIA: ICD-10-CM

## 2023-03-14 DIAGNOSIS — G44.52 NEW DAILY PERSISTENT HEADACHE: Primary | ICD-10-CM

## 2023-03-14 DIAGNOSIS — I25.10 CORONARY ARTERY DISEASE INVOLVING NATIVE CORONARY ARTERY OF NATIVE HEART WITHOUT ANGINA PECTORIS: ICD-10-CM

## 2023-03-14 DIAGNOSIS — R80.9 MICROALBUMINURIA DUE TO TYPE 2 DIABETES MELLITUS (HCC): ICD-10-CM

## 2023-03-14 DIAGNOSIS — E11.9 TYPE 2 DIABETES MELLITUS WITHOUT COMPLICATION, WITH LONG-TERM CURRENT USE OF INSULIN (HCC): Chronic | ICD-10-CM

## 2023-03-14 DIAGNOSIS — Z95.2 S/P AVR (AORTIC VALVE REPLACEMENT): ICD-10-CM

## 2023-03-14 DIAGNOSIS — R13.19 OTHER DYSPHAGIA: ICD-10-CM

## 2023-03-14 DIAGNOSIS — G44.52 NEW DAILY PERSISTENT HEADACHE: ICD-10-CM

## 2023-03-14 DIAGNOSIS — I65.23 BILATERAL CAROTID ARTERY STENOSIS: Chronic | ICD-10-CM

## 2023-03-14 DIAGNOSIS — E11.29 MICROALBUMINURIA DUE TO TYPE 2 DIABETES MELLITUS (HCC): ICD-10-CM

## 2023-03-14 DIAGNOSIS — Z79.4 TYPE 2 DIABETES MELLITUS WITHOUT COMPLICATION, WITH LONG-TERM CURRENT USE OF INSULIN (HCC): Chronic | ICD-10-CM

## 2023-03-14 PROBLEM — G44.89 OTHER HEADACHE SYNDROME: Status: RESOLVED | Noted: 2023-03-14 | Resolved: 2023-03-14

## 2023-03-14 PROBLEM — G44.89 OTHER HEADACHE SYNDROME: Status: ACTIVE | Noted: 2023-03-14

## 2023-03-14 RX ORDER — GLIPIZIDE 5 MG/1
5 TABLET ORAL EVERY MORNING
Qty: 90 TABLET | Refills: 1 | Status: SHIPPED | OUTPATIENT
Start: 2023-03-14

## 2023-03-14 NOTE — ASSESSMENT & PLAN NOTE
Last cholesterol 144, LDL 83 HDL 46 triglycerides 66  Advised to continue present medication and low-cholesterol low carbs diet  Patient has  lipid panel blood test done at the [facility] will bring copy

## 2023-03-14 NOTE — ASSESSMENT & PLAN NOTE
Lab Results   Component Value Date    HGBA1C 7 3 09/07/2022   Is on losartan  We will follow renal function

## 2023-03-14 NOTE — ASSESSMENT & PLAN NOTE
Lab Results   Component Value Date    EGFR 42 12/03/2021    EGFR 51 07/08/2021    EGFR 45 06/15/2021    CREATININE 1 48 (H) 12/03/2021    CREATININE 1 25 07/08/2021    CREATININE 1 40 (H) 06/15/2021   Renal function stable  Advised to continue present medications  We will follow renal function

## 2023-03-14 NOTE — ASSESSMENT & PLAN NOTE
Presently asymptomatic  He is on aspirin and statin therapy  Last Doppler test December 2021  Advised For follow-up carotid artery Doppler

## 2023-03-14 NOTE — ASSESSMENT & PLAN NOTE
Complain of headache on the left side of head for  the last 2 weeks  Almost every day and it  last for 2 to 3 hours  Denies any head injury or trauma  He is concerned about this new onset of headache  Will order CT of the brain to rule out any intracranial problem presently neuro nonfocal   Takes acetaminophen as needed for headache  Advised not to take any NSAIDs due to CKD

## 2023-03-14 NOTE — PROGRESS NOTES
Assessment/Plan:    1  New daily persistent headache  Assessment & Plan:  Complain of headache on the left side of head for  the last 2 weeks  Almost every day and it  last for 2 to 3 hours  Denies any head injury or trauma  He is concerned about this new onset of headache  Will order CT of the brain to rule out any intracranial problem presently neuro nonfocal   Takes acetaminophen as needed for headache  Advised not to take any NSAIDs due to CKD  Orders:  -     CT head wo contrast; Future; Expected date: 03/14/2023    2  Gastroesophageal reflux disease, unspecified whether esophagitis present  Assessment & Plan:  He had a EGD January 31, 2023  On omeprazole and Carafate  Has been watching diet  EGD  reviewed  He was advised to have repeat EGD after 3 months by GI  Follow-up with GI       3  Other dysphagia  Assessment & Plan:  Had a CT January 31, 2023  Since after EGD no dyspepsia  See as above  4  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 [facility])  Assessment & Plan:    Lab Results   Component Value Date    HGBA1C 7 3 09/07/2022   Is on losartan  We will follow renal function  5  Coronary artery disease involving native coronary artery of native heart without angina pectoris  Assessment & Plan:  Presently stable and asymptomatic  Has been followed by cardiologist   Continue present medications  6  Essential hypertension  Assessment & Plan:  Blood pressure well controlled  Advised to continue present medication  Advised for low-salt diet  Orders:  -     CT head wo contrast; Future; Expected date: 03/14/2023    7  S/P AVR (aortic valve replacement)  Assessment & Plan:  Presently stable asymptomatic    Has been followed by cardiologist       8  Stage 3a chronic kidney disease [facility])  Assessment & Plan:  Lab Results   Component Value Date    EGFR 42 12/03/2021    EGFR 51 07/08/2021    EGFR 45 06/15/2021    CREATININE 1 48 (H) 12/03/2021    CREATININE 1 25 07/08/2021 CREATININE 1 40 (H) 06/15/2021   Renal function stable  Advised to continue present medications  We will follow renal function  9  Anemia, unspecified type  Assessment & Plan:  Last hemoglobin 13 1,  Stable  We will follow CBC  10  Type 2 diabetes mellitus without complication, with long-term current use of insulin [facility])  Assessment & Plan:    Lab Results   Component Value Date    HGBA1C 7 3 09/07/2022   Denies any hypoglycemia  He had a blood test at the [facility] clinic  He is going to see physician at via clinic and will bring the copy of his blood test     Orders:  -     glipiZIDE (GLUCOTROL) 5 mg tablet; Take 1 tablet (5 mg total) by mouth every morning  -     VAS carotid complete study; Future; Expected date: 03/14/2023    11  Bilateral carotid artery stenosis  Assessment & Plan:  Presently asymptomatic  He is on aspirin and statin therapy  Last Doppler test December 2021  Advised For follow-up carotid artery Doppler  Orders:  -     VAS carotid complete study; Future; Expected date: 03/14/2023    12  Mixed hyperlipidemia  Assessment & Plan:  Last cholesterol 144, LDL 83 HDL 46 triglycerides 66  Advised to continue present medication and low-cholesterol low carbs diet  Patient has  lipid panel blood test done at the [facility] clinic will bring copy  Orders:  -     VAS carotid complete study; Future; Expected date: 03/14/2023    13  BMI 25 0-25 9,adult  Assessment & Plan:  Patient  was advised to decrease portion size  Advised to decrease carb, sugar, cholesterol intake  Advised to exercise 3-5 times per week  Advised to lose weight  Subjective: Patient presents for follow-up of his medical problems and complaint of a headache  Patient ID: Jennifer Flores is a 80 y o  male  HPI   51-year-old white male patient presents for follow-up of medical problems  Complain of headache on the left side of the head for last 2 weeks  He gets almost headache daily    He takes acetaminophen as needed for headache  Headache lasts for 2 to 3 hours  Denies any head injury or fall  No new medications  Denies any focal weakness  Denies any speech impairment  Denies any cough, fever, chills  Denies chest pain shortness of breath  Denies nausea vomiting diarrhea      The following portions of the patient's history were reviewed and updated as appropriate:     Past Medical History:  He has a past medical history of Anemia (6/22/2021), Bilateral carotid artery stenosis (1/4/2021), Bilateral carotid bruits, BMI 25 0-25 9,adult (4/6/2021), BMI 25 0-25 9,adult (8/31/2021), BMI 26 0-26 9,adult (8/31/2021), BMI 27 0-27 9,adult (12/13/2022), Coronary artery disease involving native coronary artery of native heart without angina pectoris (4/6/2021), CTS (carpal tunnel syndrome), Deviated septum, Diabetes ([address] ), 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[address] ), Dizziness, Double vision, Ear problems, Encounter for support and coordination of transition of care (6/22/2021), Fatigue, General weakness, GERD (gastroesophageal reflux disease), Hearing impaired person, bilateral, HL (hearing loss), Hypertension, Medicare annual wellness visit, subsequent (8/31/2021), Microscopic hematuria, Myalgia (4/6/2021), New daily persistent headache (3/14/2023), Other dysphagia (12/13/2022), Other headache syndrome (3/14/2023), Other specified anemias (12/13/2022), Pain in joints (4/6/2021), PVD (peripheral vascular disease) ([address] ) (4/2/2021), Rectal bleeding (6/22/2021), Renal calculi, Right leg DVT ([address] ) (6/22/2021), Right lumbar radiculopathy, S/P AVR (aortic valve replacement) (4/2/2021), Skin rash (12/13/2022), SOB (shortness of breath) on exertion, Spinal stenosis in cervical region (4/2/2021), Tachycardia, Type 2 diabetes mellitus with stage 3a chronic kidney disease, with long-term current use of insulin ([facility] ) (9/8/2022), Type 2 diabetes mellitus, without long-term current use of insulin ([facility] ) (9/8/2022), Urinary frequency, and Vertigo (1/4/2021)  ,  _______________________________________________________________________  Past Surgical History:   has a past surgical history that includes Ureterectomy; Bypass Graft; Carpal tunnel release (Right); Hand surgery (Left); Cataract extraction, bilateral; Aortic valve replacement (09/2016); 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); and Colonoscopy (09/16/2015)  ,  _______________________________________________________________________  Family History:  Family history is unknown by patient  ,  _______________________________________________________________________  Social History:   reports that he has quit smoking  He has never used smokeless tobacco  He reports that he does not currently use alcohol  He reports that he does not use drugs  ,  _______________________________________________________________________  Allergies:  is allergic to lisinopril, penicillin g, and penicillins     _______________________________________________________________________  Current Outpatient Medications   Medication Sig Dispense Refill   • acetaminophen (TYLENOL) 500 mg tablet Take 500 mg by mouth 2 (two) times a week     • aspirin (ECOTRIN LOW STRENGTH) 81 mg EC tablet Take 81 mg by mouth daily     • atorvastatin (LIPITOR) 40 mg tablet Take 20 mg by mouth daily 1/2 tablet     • Brinzolamide-Brimonidine (Simbrinza) 1-0 2 % SUSP Apply 1 drop to eye 2 (two) times a day     • Cinnamon 500 MG capsule Take 500 mg by mouth 2 (two) times a day     • clotrimazole-betamethasone (LOTRISONE) 1-0 05 % cream Apply topically 2 (two) times a day 30 g 0   • glipiZIDE (GLUCOTROL) 5 mg tablet Take 1 tablet (5 mg total) by mouth every morning 90 tablet 1   • insulin glargine (LANTUS) 100 units/mL subcutaneous injection Inject 23 Units under the skin daily at bedtime     • latanoprost (XALATAN) 0 005 % ophthalmic solution Administer 1 drop to both eyes daily at bedtime     • losartan (COZAAR) 100 MG tablet Take 100 mg by mouth daily      • metFORMIN (GLUCOPHAGE) 500 mg tablet Take 1 tablet by mouth twice daily 180 tablet 1   • metoprolol succinate (TOPROL-XL) 100 mg 24 hr tablet Take 100 mg by mouth daily     • Multiple Vitamin (multivitamin) tablet Take 1 tablet by mouth daily     • omeprazole (PriLOSEC) 20 mg delayed release capsule Take 20 mg by mouth daily     • sucralfate (CARAFATE) 1 g/10 mL suspension Take 10 mL (1 g total) by mouth 4 (four) times a day 414 mL 1   • vitamin B-12 (VITAMIN B-12) 1,000 mcg tablet Take 1 tablet by mouth every other day       No current facility-administered medications for this visit      _______________________________________________________________________  Review of Systems   Constitutional: Negative for chills and fever  HENT: Negative for congestion, ear pain, hearing loss, nosebleeds, sinus pain, sore throat and trouble swallowing  Eyes: Negative for discharge, redness and visual disturbance  Respiratory: Negative for cough, chest tightness and shortness of breath  Cardiovascular: Negative for chest pain and palpitations  Gastrointestinal: Negative for abdominal pain, blood in stool, constipation, diarrhea, nausea and vomiting  Genitourinary: Negative for dysuria, flank pain and hematuria  Musculoskeletal: Negative for arthralgias, myalgias and neck pain  Skin: Negative for color change and rash  Neurological: Positive for headaches  Negative for dizziness, syncope, facial asymmetry, speech difficulty and weakness  Hematological: Does not bruise/bleed easily  Psychiatric/Behavioral: Negative for agitation and behavioral problems  Objective:  Vitals:    03/14/23 1127   BP: 130/80   BP Location: Left arm   Patient Position: Sitting   Cuff Size: Adult   Pulse: 64   Resp: 16   Temp: 98 2 °F (36 8 °C)   TempSrc: Tympanic   SpO2: 98%   Weight: 73 5 kg (162 lb)   Height: 5' 7" (1 702 m)     Body mass index is 25 37 kg/m²  Physical Exam  Vitals and nursing note reviewed  Constitutional:       General: He is not in acute distress  Appearance: Normal appearance  HENT:      Head: Normocephalic and atraumatic  Right Ear: Ear canal and external ear normal       Left Ear: Ear canal and external ear normal       Nose: Nose normal       Mouth/Throat:      Mouth: Mucous membranes are moist    Eyes:      General: No scleral icterus  Right eye: No discharge  Left eye: No discharge  Extraocular Movements: Extraocular movements intact  Conjunctiva/sclera: Conjunctivae normal    Cardiovascular:      Rate and Rhythm: Normal rate and regular rhythm  Pulses: Normal pulses  Heart sounds: Murmur heard  Pulmonary:      Effort: Pulmonary effort is normal  No respiratory distress  Breath sounds: Normal breath sounds  No wheezing  Abdominal:      General: Bowel sounds are normal       Palpations: Abdomen is soft  Tenderness: There is no abdominal tenderness  Musculoskeletal:         General: Normal range of motion  Cervical back: Normal range of motion and neck supple  No muscular tenderness  Right lower leg: No edema  Left lower leg: No edema  Skin:     General: Skin is warm  Findings: No rash  Neurological:      General: No focal deficit present  Mental Status: He is alert and oriented to person, place, and time  Motor: No weakness  Coordination: Coordination normal    Psychiatric:         Mood and Affect: Mood normal          Behavior: Behavior normal          I spent 30 minutes with the patient today    More than 50% time spent for reviewing of external notes, reviewing of the results of diagnostics test, management of care, patient education and ordering of test

## 2023-03-14 NOTE — PATIENT INSTRUCTIONS
Patient was advised to continue present medications  discussed with the patient medications and laboratory data in detail  Follow-up with me in 3 months or as advised  If any blood test was ordered please do 1 week prior to next appointment unless advise to get earlier    If you have any questions please call the office [phone]

## 2023-03-14 NOTE — ASSESSMENT & PLAN NOTE
Presently stable and asymptomatic  Has been followed by cardiologist   Continue present medications

## 2023-03-14 NOTE — ASSESSMENT & PLAN NOTE
He had a EGD January 31, 2023  On omeprazole and Carafate  Has been watching diet  EGD  reviewed  He was advised to have repeat EGD after 3 months by GI    Follow-up with GI

## 2023-03-14 NOTE — ASSESSMENT & PLAN NOTE
Lab Results   Component Value Date    HGBA1C 7 3 09/07/2022   Denies any hypoglycemia  He had a blood test at the [facility]    He is going to see physician at via clinic and will bring the copy of his blood test

## 2023-03-29 NOTE — ASSESSMENT & PLAN NOTE
Lab Results   Component Value Date    EGFR 51 06/10/2021    EGFR 52 06/09/2021    EGFR 65 06/08/2021    CREATININE 1 25 06/10/2021    CREATININE 1 24 06/09/2021    CREATININE 1 03 06/08/2021     Creatinine is at baseline Nursing home

## 2023-04-07 ENCOUNTER — HOSPITAL ENCOUNTER (OUTPATIENT)
Dept: NON INVASIVE DIAGNOSTICS | Facility: CLINIC | Age: 88
Discharge: HOME | End: 2023-04-07

## 2023-04-07 DIAGNOSIS — I65.23 BILATERAL CAROTID ARTERY STENOSIS: Chronic | ICD-10-CM

## 2023-04-07 DIAGNOSIS — E11.9 TYPE 2 DIABETES MELLITUS WITHOUT COMPLICATION, WITH LONG-TERM CURRENT USE OF INSULIN (HCC): Chronic | ICD-10-CM

## 2023-04-07 DIAGNOSIS — E78.2 MIXED HYPERLIPIDEMIA: ICD-10-CM

## 2023-04-07 DIAGNOSIS — Z79.4 TYPE 2 DIABETES MELLITUS WITHOUT COMPLICATION, WITH LONG-TERM CURRENT USE OF INSULIN (HCC): Chronic | ICD-10-CM

## 2023-05-25 ENCOUNTER — TELEPHONE (OUTPATIENT)
Dept: INTERNAL MEDICINE CLINIC | Facility: CLINIC | Age: 88
End: 2023-05-25

## 2023-05-25 NOTE — TELEPHONE ENCOUNTER
pts wife called the office in regards for fax forms that were suppose to be faxed back to the  med department, but patient said we never faxed over anything as she spoke with a  med member  -- spoke with [facility] and she stating they faxed over forms to be filled out for the patient diabetic testing stuff  Forms were refaxed to the paper fax number

## 2023-05-26 NOTE — TELEPHONE ENCOUNTER
[place] [facility] [place] been trying to fax over forms to be sign by the doctor for the patient diabetic supplies  Arun Root had review these fax forms and stated the patient is requesting test strips and lancets , but not glucose monitor  Arun Root would like to discuss further more at patient upcoming appointment if requesting a new device  Spoke with patient wife in regards for this and is aware

## 2023-06-01 ENCOUNTER — HOSPITAL ENCOUNTER (OUTPATIENT)
Dept: RADIOLOGY | Facility: HOSPITAL | Age: 88
Discharge: HOME | End: 2023-06-01
Attending: INTERNAL MEDICINE

## 2023-06-01 DIAGNOSIS — K22.4 ESOPHAGEAL DYSMOTILITY: ICD-10-CM

## 2023-06-01 DIAGNOSIS — R13.19 OTHER DYSPHAGIA: ICD-10-CM

## 2023-06-02 ENCOUNTER — TELEPHONE (OUTPATIENT)
Dept: GASTROENTEROLOGY | Facility: CLINIC | Age: 88
End: 2023-06-02

## 2023-06-02 NOTE — RESULT ENCOUNTER NOTE
Please call the patient regarding his abnormal result  Esophageal motility disorder    Schedule follow-up office visit

## 2023-06-02 NOTE — TELEPHONE ENCOUNTER
----- Message from Timothy Mejia MD sent at 6/2/2023 12:33 PM EDT -----  Please call the patient regarding his abnormal result  Esophageal motility disorder    Schedule follow-up office visit

## 2023-06-06 ENCOUNTER — OFFICE VISIT (OUTPATIENT)
Dept: INTERNAL MEDICINE CLINIC | Facility: CLINIC | Age: 88
End: 2023-06-06
Payer: MEDICARE

## 2023-06-06 VITALS
TEMPERATURE: 98.5 F | DIASTOLIC BLOOD PRESSURE: 64 MMHG | SYSTOLIC BLOOD PRESSURE: 120 MMHG | OXYGEN SATURATION: 98 % | HEART RATE: 60 BPM | WEIGHT: 162.2 LBS | BODY MASS INDEX: 25.46 KG/M2 | HEIGHT: 67 IN | RESPIRATION RATE: 18 BRPM

## 2023-06-06 DIAGNOSIS — I25.10 CORONARY ARTERY DISEASE INVOLVING NATIVE CORONARY ARTERY OF NATIVE HEART WITHOUT ANGINA PECTORIS: ICD-10-CM

## 2023-06-06 DIAGNOSIS — Z79.4 TYPE 2 DIABETES MELLITUS WITHOUT COMPLICATION, WITH LONG-TERM CURRENT USE OF INSULIN (HCC): Chronic | ICD-10-CM

## 2023-06-06 DIAGNOSIS — K22.4 ESOPHAGEAL DYSMOTILITY: ICD-10-CM

## 2023-06-06 DIAGNOSIS — K21.9 GASTROESOPHAGEAL REFLUX DISEASE, UNSPECIFIED WHETHER ESOPHAGITIS PRESENT: ICD-10-CM

## 2023-06-06 DIAGNOSIS — I65.23 BILATERAL CAROTID ARTERY STENOSIS: Chronic | ICD-10-CM

## 2023-06-06 DIAGNOSIS — E78.2 MIXED HYPERLIPIDEMIA: ICD-10-CM

## 2023-06-06 DIAGNOSIS — E11.29 MICROALBUMINURIA DUE TO TYPE 2 DIABETES MELLITUS (HCC): ICD-10-CM

## 2023-06-06 DIAGNOSIS — R80.9 MICROALBUMINURIA DUE TO TYPE 2 DIABETES MELLITUS (HCC): ICD-10-CM

## 2023-06-06 DIAGNOSIS — R10.12 LEFT UPPER QUADRANT PAIN: Primary | ICD-10-CM

## 2023-06-06 DIAGNOSIS — E11.9 TYPE 2 DIABETES MELLITUS WITHOUT COMPLICATION, WITH LONG-TERM CURRENT USE OF INSULIN (HCC): Chronic | ICD-10-CM

## 2023-06-06 DIAGNOSIS — I10 ESSENTIAL HYPERTENSION: Chronic | ICD-10-CM

## 2023-06-06 DIAGNOSIS — N18.31 STAGE 3A CHRONIC KIDNEY DISEASE (HCC): ICD-10-CM

## 2023-06-06 PROCEDURE — 93000 ELECTROCARDIOGRAM COMPLETE: CPT | Performed by: INTERNAL MEDICINE

## 2023-06-06 PROCEDURE — 99214 OFFICE O/P EST MOD 30 MIN: CPT | Performed by: INTERNAL MEDICINE

## 2023-06-06 NOTE — ASSESSMENT & PLAN NOTE
Follow-up with cardiology  Last cholesterol 144, LDL 83  HDL 46 triglycerides 66 advised to continue atorvastatin and low-cholesterol diet  Patient is to bring the blood test result from [place] clinic will review and advise accordingly

## 2023-06-06 NOTE — ASSESSMENT & PLAN NOTE
Lab Results   Component Value Date    CREATININE 1 48 (H) 12/03/2021    CREATININE 1 25 07/08/2021    CREATININE 1 40 (H) 06/15/2021    EGFR 42 12/03/2021    EGFR 51 07/08/2021    EGFR 45 06/15/2021   He is renal function stable  Advised to maintain hydration  Patient gets blood tests at [facility] he will bring the copy of his blood test   Continue present medications

## 2023-06-06 NOTE — ASSESSMENT & PLAN NOTE
He started having the left upper abdominal pain below left rib cage since yesterday on and off intermittent sharp without nausea vomiting diarrhea or constipation  Denies any shortness of breath cough fever chills  Denies chest pain  EKG today was done which was unchanged  Doubt cardiac  Will order ultrasound of the abdomen  Had EGD January 2023  Had a barium swallow study recently    Will be going to see GI specialist

## 2023-06-06 NOTE — PROGRESS NOTES
Assessment/Plan:    1  Left upper quadrant pain  Assessment & Plan:  He started having the left upper abdominal pain below left rib cage since yesterday on and off intermittent sharp without nausea vomiting diarrhea or constipation  Denies any shortness of breath cough fever chills  Denies chest pain  EKG today was done which was unchanged  Doubt cardiac  Will order ultrasound of the abdomen  Had EGD January 2023  Had a barium swallow study recently  Will be going to see GI specialist     Orders:  -     POCT ECG  -     Ambulatory Referral to Gastroenterology; Future  -     US abdomen complete; Future; Expected date: 06/06/2023    2  Type 2 diabetes mellitus without complication, with long-term current use of insulin [facility])  Assessment & Plan:    Lab Results   Component Value Date    HGBA1C 7 3 09/07/2022   He takes metformin and insulin  He has been checking blood sugar about 3 times per day  He had 2 episodes of hypoglycemia in last 3 months  Patient said he is having difficulty pricking his fingers and sometimes he has to squeeze his fingers many times to get blood but it does not come out so he has to prick  3 times to get blood to check blood sugar  He would like to order continuous glucose monitoring system so I will order all the supplies he needs for see cgm  Call if blood sugar less than 80  Continue present medications  Continue 1800-calorie ADA diet  3  Gastroesophageal reflux disease, unspecified whether esophagitis present  Assessment & Plan:  He had EGD January 2023 and also recent barium swallow study  Patient was advised to follow-up with GI specialist due to abnormal barium swallow study  He takes omeprazole daily  He stopped taking Carafate  Advised to watch diet and reflux precautions  Orders:  -     Ambulatory Referral to Gastroenterology; Future    4   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 [facility])  Assessment & Plan:    Lab Results   Component Value Date    HGBA1C 7 3 09/07/2022   He is on losartan  Continue present medication  He gets blood test and urine test at [facility]  Patient will bring the copy of his blood and urine test       5  Coronary artery disease involving native coronary artery of native heart without angina pectoris  Assessment & Plan:  EKG was done today in the office as he is having a left upper quadrant pain below her left rib cage intermittent sharp since yesterday without any associated shortness of breath, sweating, chest pain, cough, nausea, vomiting, diarrhea, constipation  EKG revealed normal sinus rhythm, no arrhythmia 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 which is unchanged from prior EKG of June 2021  Patient has been followed by cardiologist   Ravin Camilo to continue present medications  Doubt his symptoms are from cardiac  Orders:  -     POCT ECG    6  Essential hypertension  Assessment & Plan:  Blood pressure well controlled  Advised to continue present medication  Advised for low-salt diet  7  Stage 3a chronic kidney disease ([facility] [address] )  Assessment & Plan:  Lab Results   Component Value Date    CREATININE 1 48 (H) 12/03/2021    CREATININE 1 25 07/08/2021    CREATININE 1 40 (H) 06/15/2021    EGFR 42 12/03/2021    EGFR 51 07/08/2021    EGFR 45 06/15/2021   He is renal function stable  Advised to maintain hydration  Patient gets blood tests at [facility] he will bring the copy of his blood test   Continue present medications  Orders:  -     US abdomen complete; Future; Expected date: 06/06/2023    8  BMI 25 0-25 9,adult    9  Mixed hyperlipidemia  Assessment & Plan:  Follow-up with cardiology  Last cholesterol 144, LDL 83  HDL 46 triglycerides 66 advised to continue atorvastatin and low-cholesterol diet  Patient is to bring the blood test result from [facility] will review and advise accordingly        10  Bilateral carotid artery stenosis  Assessment & Plan:  Doppler test 4/7/2023 revealed no new changes has a less than 50% stenosis bilaterally  He has been on statin, aspirin therapy  Presently asymptomatic  11  Esophageal dysmotility  Assessment & Plan:  He had a barium swallow study revealed esophageal dysmotility  Discussed with the patient however there is a GI specialist Dr Love De Guzman he will schedule  Orders:  -     Ambulatory Referral to Gastroenterology; Future          Subjective: Patient presents for follow-up  Patient ID: Alexia Penaloza is a 80 y o  male  HPI   63-year-old white male patient presents to follow-up his medical problems  Patient will having left upper abdominal pain since yesterday on and off like sharp intermittent without any associated nausea vomiting, diarrhea, constipation  Denies any chest pain shortness of breath cough fever chills  Pain is mainly under her left rib cage  Denies any injury or fall  Denies skin rash  No new medications  Recently had a barium swallow study      The following portions of the patient's history were reviewed and updated as appropriate:     Past Medical History:  He has a past medical history of Anemia (6/22/2021), Bilateral carotid artery stenosis (1/4/2021), Bilateral carotid bruits, BMI 25 0-25 9,adult (4/6/2021), BMI 25 0-25 9,adult (8/31/2021), BMI 26 0-26 9,adult (8/31/2021), BMI 27 0-27 9,adult (12/13/2022), Coronary artery disease involving native coronary artery of native heart without angina pectoris (4/6/2021), CTS (carpal tunnel syndrome), Deviated septum, Diabetes ([facility] [address] ), 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[facility] [address] ), Dizziness, Double vision, Ear problems, Encounter for support and coordination of transition of care (6/22/2021), Esophageal dysmotility (6/6/2023), Fatigue, General weakness, GERD (gastroesophageal reflux disease), Hearing impaired person, bilateral, HL (hearing loss), Hypertension, Left upper quadrant pain (6/6/2023), Medicare annual wellness visit, subsequent (8/31/2021), Microscopic hematuria, Myalgia (4/6/2021), New daily persistent headache (3/14/2023), Other dysphagia (12/13/2022), Other headache syndrome (3/14/2023), Other specified anemias (12/13/2022), Pain in joints (4/6/2021), PVD (peripheral vascular disease) ([facility] [address] ) (4/2/2021), Rectal bleeding (6/22/2021), Renal calculi, Right leg DVT ([facility] [address] ) (6/22/2021), Right lumbar radiculopathy, S/P AVR (aortic valve replacement) (4/2/2021), Skin rash (12/13/2022), SOB (shortness of breath) on exertion, Spinal stenosis in cervical region (4/2/2021), Tachycardia, Type 2 diabetes mellitus with stage 3a chronic kidney disease, with long-term current use of insulin ([facility] [address] ) (9/8/2022), Type 2 diabetes mellitus, without long-term current use of insulin [facility] ) (9/8/2022), Urinary frequency, and Vertigo (1/4/2021)  ,  _______________________________________________________________________  Past Surgical History:   has a past surgical history that includes Ureterectomy; Bypass Graft; Carpal tunnel release (Right); Hand surgery (Left); Cataract extraction, bilateral; Aortic valve replacement (09/2016); 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); and Colonoscopy (09/16/2015)  ,  _______________________________________________________________________  Family History:  Family history is unknown by patient  ,  _______________________________________________________________________  Social History:   reports that he has quit smoking  He has never used smokeless tobacco  He reports that he does not currently use alcohol  He reports that he does not use drugs  ,  _______________________________________________________________________  Allergies:  is allergic to lisinopril, penicillin g, and penicillins     _______________________________________________________________________  Current Outpatient Medications   Medication Sig Dispense Refill   • acetaminophen (TYLENOL) 500 mg tablet Take 500 mg by mouth 2 (two) times a week     • aspirin (ECOTRIN LOW STRENGTH) 81 mg EC tablet Take 81 mg by mouth daily     • atorvastatin (LIPITOR) 40 mg tablet Take 20 mg by mouth daily 1/2 tablet     • Brinzolamide-Brimonidine (Simbrinza) 1-0 2 % SUSP Apply 1 drop to eye 2 (two) times a day     • Cinnamon 500 MG capsule Take 500 mg by mouth 2 (two) times a day     • clotrimazole-betamethasone (LOTRISONE) 1-0 05 % cream Apply topically 2 (two) times a day 30 g 0   • glipiZIDE (GLUCOTROL) 5 mg tablet Take 1 tablet (5 mg total) by mouth every morning 90 tablet 1   • insulin glargine (LANTUS) 100 units/mL subcutaneous injection Inject 23 Units under the skin daily at bedtime     • metFORMIN (GLUCOPHAGE) 500 mg tablet Take 1 tablet by mouth twice daily 180 tablet 1   • metoprolol succinate (TOPROL-XL) 100 mg 24 hr tablet Take 100 mg by mouth daily     • Multiple Vitamin (multivitamin) tablet Take 1 tablet by mouth daily     • omeprazole (PriLOSEC) 20 mg delayed release capsule Take 20 mg by mouth daily     • vitamin B-12 (VITAMIN B-12) 1,000 mcg tablet Take 1 tablet by mouth every other day     • latanoprost (XALATAN) 0 005 % ophthalmic solution Administer 1 drop to both eyes daily at bedtime     • losartan (COZAAR) 100 MG tablet Take 100 mg by mouth daily        No current facility-administered medications for this visit      _______________________________________________________________________  Review of Systems   Constitutional: Negative for chills and fever  HENT: Negative for congestion, ear pain, nosebleeds, sinus pain, sore throat and trouble swallowing  Eyes: Negative for discharge, redness and visual disturbance  Respiratory: Negative for cough, chest tightness and shortness of breath  Cardiovascular: Negative for chest pain and palpitations  Gastrointestinal: Positive for abdominal pain ( Left upper abdominal pain)  Negative for blood in stool, constipation, diarrhea, nausea and vomiting  Genitourinary: Negative for dysuria, flank pain and hematuria  Musculoskeletal: Negative for arthralgias, myalgias and neck pain     Skin: Negative for color "change and rash  Neurological: Negative for dizziness, speech difficulty, weakness and headaches  Hematological: Does not bruise/bleed easily  Psychiatric/Behavioral: Negative for agitation and behavioral problems  Objective:  Vitals:    06/06/23 1409   BP: 120/64   BP Location: Right arm   Patient Position: Sitting   Cuff Size: Adult   Pulse: 60   Resp: 18   Temp: 98 5 °F (36 9 °C)   TempSrc: Temporal   SpO2: 98%   Weight: 73 6 kg (162 lb 3 2 oz)   Height: 5' 7\" (1 702 m)     Body mass index is 25 4 kg/m²  Physical Exam  Vitals and nursing note reviewed  Constitutional:       General: He is not in acute distress  Appearance: Normal appearance  HENT:      Head: Normocephalic and atraumatic  Nose: Nose normal       Mouth/Throat:      Mouth: Mucous membranes are moist    Eyes:      General: No scleral icterus  Right eye: No discharge  Left eye: No discharge  Extraocular Movements: Extraocular movements intact  Conjunctiva/sclera: Conjunctivae normal    Cardiovascular:      Rate and Rhythm: Normal rate and regular rhythm  Pulses: Normal pulses  Heart sounds: Murmur heard  Pulmonary:      Effort: Pulmonary effort is normal  No respiratory distress  Breath sounds: Normal breath sounds  No wheezing, rhonchi or rales  Abdominal:      General: Bowel sounds are normal  There is no distension  Palpations: Abdomen is soft  Tenderness: There is abdominal tenderness ( Has some tenderness in the left upper abdomen  )  There is no guarding or rebound  Musculoskeletal:         General: Normal range of motion  Cervical back: Normal range of motion and neck supple  No muscular tenderness  Right lower leg: No edema  Left lower leg: No edema  Skin:     General: Skin is warm  Findings: No rash  Neurological:      General: No focal deficit present  Mental Status: He is alert and oriented to person, place, and time        " Motor: No weakness  Psychiatric:         Mood and Affect: Mood normal          Behavior: Behavior normal        I spent 30 minutes with the patient today    More than 50% time spent for reviewing of external notes, reviewing of the results of diagnostics test, management of care, patient education and ordering of test

## 2023-06-06 NOTE — ASSESSMENT & PLAN NOTE
He had EGD January 2023 and also recent barium swallow study  Patient was advised to follow-up with GI specialist due to abnormal barium swallow study  He takes omeprazole daily  He stopped taking Carafate  Advised to watch diet and reflux precautions

## 2023-06-06 NOTE — PATIENT INSTRUCTIONS
Patient was advised to continue present medications  discussed with the patient medications and laboratory data in detail  Follow-up with me in 3 months or as advised  If any blood test was ordered please do 1 week prior to next appointment unless advise to get earlier    If you have any questions please call the office [phone]

## 2023-06-06 NOTE — ASSESSMENT & PLAN NOTE
Doppler test 4/7/2023 revealed no new changes has a less than 50% stenosis bilaterally  He has been on statin, aspirin therapy  Presently asymptomatic

## 2023-06-06 NOTE — ASSESSMENT & PLAN NOTE
He had a barium swallow study revealed esophageal dysmotility  Discussed with the patient however there is a GI specialist Dr Melyssa Bain he will schedule

## 2023-06-06 NOTE — ASSESSMENT & PLAN NOTE
Lab Results   Component Value Date    HGBA1C 7 3 09/07/2022   He is on losartan  Continue present medication  He gets blood test and urine test at [facility]    Patient will bring the copy of his blood and urine test  Yes...

## 2023-06-06 NOTE — ASSESSMENT & PLAN NOTE
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

## 2023-06-06 NOTE — ASSESSMENT & PLAN NOTE
Lab Results   Component Value Date    HGBA1C 7 3 09/07/2022   He takes metformin and insulin  He has been checking blood sugar about 3 times per day  He had 2 episodes of hypoglycemia in last 3 months  Patient said he is having difficulty pricking his fingers and sometimes he has to squeeze his fingers many times to get blood but it does not come out so he has to prick  3 times to get blood to check blood sugar  He would like to order continuous glucose monitoring system so I will order all the supplies he needs for see cgm  Call if blood sugar less than 80  Continue present medications  Continue 1800-calorie ADA diet

## 2023-06-08 DIAGNOSIS — E11.9 TYPE 2 DIABETES MELLITUS WITHOUT COMPLICATION, WITH LONG-TERM CURRENT USE OF INSULIN (HCC): Chronic | ICD-10-CM

## 2023-06-08 DIAGNOSIS — Z79.4 TYPE 2 DIABETES MELLITUS WITHOUT COMPLICATION, WITH LONG-TERM CURRENT USE OF INSULIN (HCC): Chronic | ICD-10-CM

## 2023-06-15 ENCOUNTER — HOSPITAL ENCOUNTER (OUTPATIENT)
Dept: ULTRASOUND IMAGING | Facility: HOSPITAL | Age: 88
End: 2023-06-15
Attending: INTERNAL MEDICINE
Payer: MEDICARE

## 2023-06-15 DIAGNOSIS — R10.12 LEFT UPPER QUADRANT PAIN: ICD-10-CM

## 2023-06-15 DIAGNOSIS — N18.31 STAGE 3A CHRONIC KIDNEY DISEASE (HCC): ICD-10-CM

## 2023-06-15 PROCEDURE — 76700 US EXAM ABDOM COMPLETE: CPT

## 2023-06-20 ENCOUNTER — TELEPHONE (OUTPATIENT)
Dept: INTERNAL MEDICINE CLINIC | Facility: CLINIC | Age: 88
End: 2023-06-20

## 2023-06-20 NOTE — TELEPHONE ENCOUNTER
----- Message from Karthikeyan Crow MD sent at 6/20/2023  4:32 PM EDT -----  Please call patient that ultrasound of the abdomen is okay except that he has a cyst in the kidney but that should not cause any problem    If he has a persistent pain in his left side of abdomen then will refer to GI specialist

## 2023-07-09 ENCOUNTER — APPOINTMENT (EMERGENCY)
Dept: CT IMAGING | Facility: HOSPITAL | Age: 88
DRG: 661 | End: 2023-07-09
Payer: MEDICARE

## 2023-07-09 ENCOUNTER — HOSPITAL ENCOUNTER (INPATIENT)
Facility: HOSPITAL | Age: 88
LOS: 1 days | Discharge: HOME | DRG: 661 | End: 2023-07-11
Attending: EMERGENCY MEDICINE | Admitting: INTERNAL MEDICINE
Payer: MEDICARE

## 2023-07-09 DIAGNOSIS — N20.1 URETERAL STONE: Primary | ICD-10-CM

## 2023-07-09 DIAGNOSIS — N20.0 NEPHROLITHIASIS: ICD-10-CM

## 2023-07-09 DIAGNOSIS — N13.30 HYDRONEPHROSIS: ICD-10-CM

## 2023-07-09 DIAGNOSIS — N40.1 BENIGN LOCALIZED PROSTATIC HYPERPLASIA WITH LOWER URINARY TRACT SYMPTOMS (LUTS): ICD-10-CM

## 2023-07-09 DIAGNOSIS — N13.2 URETERAL STONE WITH HYDRONEPHROSIS: ICD-10-CM

## 2023-07-09 PROBLEM — N17.9 ACUTE KIDNEY INJURY SUPERIMPOSED ON CHRONIC KIDNEY DISEASE (HCC): Status: ACTIVE | Noted: 2023-07-09

## 2023-07-09 PROBLEM — N18.9 ACUTE KIDNEY INJURY SUPERIMPOSED ON CHRONIC KIDNEY DISEASE (HCC): Status: ACTIVE | Noted: 2023-07-09

## 2023-07-09 LAB
ALBUMIN SERPL BCP-MCNC: 4.2 G/DL (ref 3.5–5)
ALP SERPL-CCNC: 61 U/L (ref 34–104)
ALT SERPL W P-5'-P-CCNC: 18 U/L (ref 7–52)
ANION GAP SERPL CALCULATED.3IONS-SCNC: 10 MMOL/L
AST SERPL W P-5'-P-CCNC: 15 U/L (ref 13–39)
BACTERIA UR QL AUTO: ABNORMAL /HPF
BASOPHILS # BLD AUTO: 0.06 THOUSANDS/ÂΜL (ref 0–0.1)
BASOPHILS NFR BLD AUTO: 1 % (ref 0–1)
BILIRUB SERPL-MCNC: 0.56 MG/DL (ref 0.2–1)
BILIRUB UR QL STRIP: NEGATIVE
BUN SERPL-MCNC: 43 MG/DL (ref 5–25)
CALCIUM SERPL-MCNC: 9.1 MG/DL (ref 8.4–10.2)
CHLORIDE SERPL-SCNC: 107 MMOL/L (ref 96–108)
CLARITY UR: CLEAR
CO2 SERPL-SCNC: 24 MMOL/L (ref 21–32)
COLOR UR: ABNORMAL
CREAT SERPL-MCNC: 1.62 MG/DL (ref 0.6–1.3)
EOSINOPHIL # BLD AUTO: 0.2 THOUSAND/ÂΜL (ref 0–0.61)
EOSINOPHIL NFR BLD AUTO: 2 % (ref 0–6)
ERYTHROCYTE [DISTWIDTH] IN BLOOD BY AUTOMATED COUNT: 12.6 % (ref 11.6–15.1)
GFR SERPL CREATININE-BSD FRML MDRD: 37 ML/MIN/1.73SQ M
GLUCOSE SERPL-MCNC: 165 MG/DL (ref 65–140)
GLUCOSE SERPL-MCNC: 168 MG/DL (ref 65–140)
GLUCOSE SERPL-MCNC: 175 MG/DL (ref 65–140)
GLUCOSE UR STRIP-MCNC: NEGATIVE MG/DL
HCT VFR BLD AUTO: 39.1 % (ref 36.5–49.3)
HGB BLD-MCNC: 12.7 G/DL (ref 12–17)
HGB UR QL STRIP.AUTO: ABNORMAL
IMM GRANULOCYTES # BLD AUTO: 0.06 THOUSAND/UL (ref 0–0.2)
IMM GRANULOCYTES NFR BLD AUTO: 1 % (ref 0–2)
KETONES UR STRIP-MCNC: NEGATIVE MG/DL
LEUKOCYTE ESTERASE UR QL STRIP: NEGATIVE
LYMPHOCYTES # BLD AUTO: 2.15 THOUSANDS/ÂΜL (ref 0.6–4.47)
LYMPHOCYTES NFR BLD AUTO: 21 % (ref 14–44)
MCH RBC QN AUTO: 30.6 PG (ref 26.8–34.3)
MCHC RBC AUTO-ENTMCNC: 32.5 G/DL (ref 31.4–37.4)
MCV RBC AUTO: 94 FL (ref 82–98)
MONOCYTES # BLD AUTO: 0.78 THOUSAND/ÂΜL (ref 0.17–1.22)
MONOCYTES NFR BLD AUTO: 8 % (ref 4–12)
NEUTROPHILS # BLD AUTO: 6.97 THOUSANDS/ÂΜL (ref 1.85–7.62)
NEUTS SEG NFR BLD AUTO: 67 % (ref 43–75)
NITRITE UR QL STRIP: NEGATIVE
NON-SQ EPI CELLS URNS QL MICRO: ABNORMAL /HPF
NRBC BLD AUTO-RTO: 0 /100 WBCS
PH UR STRIP.AUTO: 5.5 [PH]
PLATELET # BLD AUTO: 157 THOUSANDS/UL (ref 149–390)
PLATELET # BLD AUTO: 160 THOUSANDS/UL (ref 149–390)
PMV BLD AUTO: 10 FL (ref 8.9–12.7)
PMV BLD AUTO: 9.9 FL (ref 8.9–12.7)
POTASSIUM SERPL-SCNC: 4.4 MMOL/L (ref 3.5–5.3)
PROT SERPL-MCNC: 7.4 G/DL (ref 6.4–8.4)
PROT UR STRIP-MCNC: ABNORMAL MG/DL
RBC # BLD AUTO: 4.15 MILLION/UL (ref 3.88–5.62)
RBC #/AREA URNS AUTO: ABNORMAL /HPF
SODIUM SERPL-SCNC: 141 MMOL/L (ref 135–147)
SP GR UR STRIP.AUTO: 1.02 (ref 1–1.03)
UROBILINOGEN UR STRIP-ACNC: <2 MG/DL
WBC # BLD AUTO: 10.22 THOUSAND/UL (ref 4.31–10.16)
WBC #/AREA URNS AUTO: ABNORMAL /HPF

## 2023-07-09 PROCEDURE — 96361 HYDRATE IV INFUSION ADD-ON: CPT

## 2023-07-09 PROCEDURE — 96374 THER/PROPH/DIAG INJ IV PUSH: CPT

## 2023-07-09 PROCEDURE — 74176 CT ABD & PELVIS W/O CONTRAST: CPT

## 2023-07-09 PROCEDURE — 99285 EMERGENCY DEPT VISIT HI MDM: CPT

## 2023-07-09 PROCEDURE — 36415 COLL VENOUS BLD VENIPUNCTURE: CPT | Performed by: EMERGENCY MEDICINE

## 2023-07-09 PROCEDURE — 96376 TX/PRO/DX INJ SAME DRUG ADON: CPT

## 2023-07-09 PROCEDURE — 85025 COMPLETE CBC W/AUTO DIFF WBC: CPT | Performed by: EMERGENCY MEDICINE

## 2023-07-09 PROCEDURE — 82948 REAGENT STRIP/BLOOD GLUCOSE: CPT

## 2023-07-09 PROCEDURE — 85049 AUTOMATED PLATELET COUNT: CPT | Performed by: INTERNAL MEDICINE

## 2023-07-09 PROCEDURE — 96375 TX/PRO/DX INJ NEW DRUG ADDON: CPT

## 2023-07-09 PROCEDURE — 80053 COMPREHEN METABOLIC PANEL: CPT | Performed by: EMERGENCY MEDICINE

## 2023-07-09 PROCEDURE — G1004 CDSM NDSC: HCPCS

## 2023-07-09 PROCEDURE — 81001 URINALYSIS AUTO W/SCOPE: CPT | Performed by: EMERGENCY MEDICINE

## 2023-07-09 PROCEDURE — 99223 1ST HOSP IP/OBS HIGH 75: CPT | Performed by: INTERNAL MEDICINE

## 2023-07-09 RX ORDER — PANTOPRAZOLE SODIUM 40 MG/1
40 TABLET, DELAYED RELEASE ORAL
Status: DISCONTINUED | OUTPATIENT
Start: 2023-07-10 | End: 2023-07-11 | Stop reason: HOSPADM

## 2023-07-09 RX ORDER — BRINZOLAMIDE 10 MG/ML
1 SUSPENSION/ DROPS OPHTHALMIC EVERY 8 HOURS SCHEDULED
Status: DISCONTINUED | OUTPATIENT
Start: 2023-07-09 | End: 2023-07-11 | Stop reason: HOSPADM

## 2023-07-09 RX ORDER — ATORVASTATIN CALCIUM 20 MG/1
20 TABLET, FILM COATED ORAL DAILY
Status: DISCONTINUED | OUTPATIENT
Start: 2023-07-10 | End: 2023-07-11 | Stop reason: HOSPADM

## 2023-07-09 RX ORDER — HYDROMORPHONE HCL IN WATER/PF 6 MG/30 ML
0.2 PATIENT CONTROLLED ANALGESIA SYRINGE INTRAVENOUS
Status: DISCONTINUED | OUTPATIENT
Start: 2023-07-09 | End: 2023-07-09

## 2023-07-09 RX ORDER — HEPARIN SODIUM 5000 [USP'U]/ML
5000 INJECTION, SOLUTION INTRAVENOUS; SUBCUTANEOUS EVERY 8 HOURS SCHEDULED
Status: DISCONTINUED | OUTPATIENT
Start: 2023-07-09 | End: 2023-07-11 | Stop reason: HOSPADM

## 2023-07-09 RX ORDER — ONDANSETRON 2 MG/ML
4 INJECTION INTRAMUSCULAR; INTRAVENOUS EVERY 4 HOURS PRN
Status: DISCONTINUED | OUTPATIENT
Start: 2023-07-09 | End: 2023-07-09 | Stop reason: SDUPTHER

## 2023-07-09 RX ORDER — OXYCODONE HYDROCHLORIDE 5 MG/1
5 TABLET ORAL EVERY 4 HOURS PRN
Status: DISCONTINUED | OUTPATIENT
Start: 2023-07-09 | End: 2023-07-11 | Stop reason: HOSPADM

## 2023-07-09 RX ORDER — HYDROMORPHONE HCL IN WATER/PF 6 MG/30 ML
0.2 PATIENT CONTROLLED ANALGESIA SYRINGE INTRAVENOUS ONCE
Status: COMPLETED | OUTPATIENT
Start: 2023-07-09 | End: 2023-07-09

## 2023-07-09 RX ORDER — HYDROMORPHONE HCL/PF 1 MG/ML
0.5 SYRINGE (ML) INJECTION
Status: DISCONTINUED | OUTPATIENT
Start: 2023-07-09 | End: 2023-07-11 | Stop reason: HOSPADM

## 2023-07-09 RX ORDER — INSULIN LISPRO 100 [IU]/ML
1-5 INJECTION, SOLUTION INTRAVENOUS; SUBCUTANEOUS
Status: DISCONTINUED | OUTPATIENT
Start: 2023-07-09 | End: 2023-07-11 | Stop reason: HOSPADM

## 2023-07-09 RX ORDER — INSULIN GLARGINE 100 [IU]/ML
15 INJECTION, SOLUTION SUBCUTANEOUS
Status: DISCONTINUED | OUTPATIENT
Start: 2023-07-09 | End: 2023-07-11 | Stop reason: HOSPADM

## 2023-07-09 RX ORDER — BRIMONIDINE TARTRATE 2 MG/ML
1 SOLUTION/ DROPS OPHTHALMIC EVERY 8 HOURS SCHEDULED
Status: DISCONTINUED | OUTPATIENT
Start: 2023-07-09 | End: 2023-07-11 | Stop reason: HOSPADM

## 2023-07-09 RX ORDER — SODIUM CHLORIDE 9 MG/ML
100 INJECTION, SOLUTION INTRAVENOUS CONTINUOUS
Status: DISCONTINUED | OUTPATIENT
Start: 2023-07-09 | End: 2023-07-11

## 2023-07-09 RX ORDER — ACETAMINOPHEN 325 MG/1
650 TABLET ORAL EVERY 6 HOURS PRN
Status: DISCONTINUED | OUTPATIENT
Start: 2023-07-09 | End: 2023-07-11

## 2023-07-09 RX ORDER — ONDANSETRON 2 MG/ML
4 INJECTION INTRAMUSCULAR; INTRAVENOUS EVERY 4 HOURS PRN
Status: DISCONTINUED | OUTPATIENT
Start: 2023-07-09 | End: 2023-07-11 | Stop reason: HOSPADM

## 2023-07-09 RX ORDER — METOPROLOL SUCCINATE 100 MG/1
100 TABLET, EXTENDED RELEASE ORAL DAILY
Status: DISCONTINUED | OUTPATIENT
Start: 2023-07-10 | End: 2023-07-11 | Stop reason: HOSPADM

## 2023-07-09 RX ORDER — ONDANSETRON 2 MG/ML
4 INJECTION INTRAMUSCULAR; INTRAVENOUS ONCE
Status: COMPLETED | OUTPATIENT
Start: 2023-07-09 | End: 2023-07-09

## 2023-07-09 RX ORDER — LATANOPROST 50 UG/ML
1 SOLUTION/ DROPS OPHTHALMIC
Status: DISCONTINUED | OUTPATIENT
Start: 2023-07-09 | End: 2023-07-11 | Stop reason: HOSPADM

## 2023-07-09 RX ORDER — MORPHINE SULFATE 4 MG/ML
4 INJECTION, SOLUTION INTRAMUSCULAR; INTRAVENOUS ONCE
Status: DISCONTINUED | OUTPATIENT
Start: 2023-07-09 | End: 2023-07-09

## 2023-07-09 RX ADMIN — SODIUM CHLORIDE 500 ML: 0.9 INJECTION, SOLUTION INTRAVENOUS at 10:25

## 2023-07-09 RX ADMIN — HYDROMORPHONE HYDROCHLORIDE 0.2 MG: 0.2 INJECTION, SOLUTION INTRAMUSCULAR; INTRAVENOUS; SUBCUTANEOUS at 09:32

## 2023-07-09 RX ADMIN — HEPARIN SODIUM 5000 UNITS: 5000 INJECTION INTRAVENOUS; SUBCUTANEOUS at 18:16

## 2023-07-09 RX ADMIN — HEPARIN SODIUM 5000 UNITS: 5000 INJECTION INTRAVENOUS; SUBCUTANEOUS at 22:14

## 2023-07-09 RX ADMIN — BRIMONIDINE TARTRATE 1 DROP: 2 SOLUTION/ DROPS OPHTHALMIC at 18:16

## 2023-07-09 RX ADMIN — BRINZOLAMIDE 1 DROP: 10 SUSPENSION/ DROPS OPHTHALMIC at 18:16

## 2023-07-09 RX ADMIN — HYDROMORPHONE HYDROCHLORIDE 0.5 MG: 1 INJECTION, SOLUTION INTRAMUSCULAR; INTRAVENOUS; SUBCUTANEOUS at 18:36

## 2023-07-09 RX ADMIN — LATANOPROST 1 DROP: 50 SOLUTION OPHTHALMIC at 22:16

## 2023-07-09 RX ADMIN — HYDROMORPHONE HYDROCHLORIDE 0.2 MG: 0.2 INJECTION, SOLUTION INTRAMUSCULAR; INTRAVENOUS; SUBCUTANEOUS at 11:31

## 2023-07-09 RX ADMIN — INSULIN LISPRO 1 UNITS: 100 INJECTION, SOLUTION INTRAVENOUS; SUBCUTANEOUS at 18:16

## 2023-07-09 RX ADMIN — ONDANSETRON 4 MG: 2 INJECTION INTRAMUSCULAR; INTRAVENOUS at 09:31

## 2023-07-09 RX ADMIN — HYDROMORPHONE HYDROCHLORIDE 0.2 MG: 0.2 INJECTION, SOLUTION INTRAMUSCULAR; INTRAVENOUS; SUBCUTANEOUS at 14:41

## 2023-07-09 RX ADMIN — ONDANSETRON 4 MG: 2 INJECTION INTRAMUSCULAR; INTRAVENOUS at 17:01

## 2023-07-09 RX ADMIN — INSULIN GLARGINE 15 UNITS: 100 INJECTION, SOLUTION SUBCUTANEOUS at 22:20

## 2023-07-09 RX ADMIN — SODIUM CHLORIDE 100 ML/HR: 0.9 INJECTION, SOLUTION INTRAVENOUS at 14:41

## 2023-07-09 RX ADMIN — HYDROMORPHONE HYDROCHLORIDE 0.5 MG: 1 INJECTION, SOLUTION INTRAMUSCULAR; INTRAVENOUS; SUBCUTANEOUS at 22:24

## 2023-07-09 NOTE — ASSESSMENT & PLAN NOTE
Lab Results   Component Value Date    EGFR 37 07/09/2023    EGFR 42 12/03/2021    EGFR 51 07/08/2021    CREATININE 1.62 (H) 07/09/2023    CREATININE 1.48 (H) 12/03/2021    CREATININE 1.25 07/08/2021   Prerenal 2/2 poor intake and vomiting and obstructive due to stone  IVF.  Anticipate improvement after urological intervention

## 2023-07-09 NOTE — ASSESSMENT & PLAN NOTE
· Acute right flank pain with nausea and vomiting this morning  · CT abdomen: Mild to moderate right hydroureteronephrosis due to an obstructing calculus in the proximal ureter measuring 0.5 cm. · IVF/pain control/supportive care. Urine clear  · Urology consulted in ED. Rec NPO post MN.     ·

## 2023-07-09 NOTE — PLAN OF CARE
Problem: PAIN - ADULT  Goal: Verbalizes/displays adequate comfort level or baseline comfort level  Description: Interventions:  - Encourage patient to monitor pain and request assistance  - Assess pain using appropriate pain scale  - Administer analgesics based on type and severity of pain and evaluate response  - Implement non-pharmacological measures as appropriate and evaluate response  - Consider cultural and social influences on pain and pain management  - Notify physician/advanced practitioner if interventions unsuccessful or patient reports new pain  Outcome: Progressing     Problem: INFECTION - ADULT  Goal: Absence or prevention of progression during hospitalization  Description: INTERVENTIONS:  - Assess and monitor for signs and symptoms of infection  - Monitor lab/diagnostic results  - Monitor all insertion sites, i.e. indwelling lines, tubes, and drains  - Monitor endotracheal if appropriate and nasal secretions for changes in amount and color  - [place] appropriate cooling/warming therapies per order  - Administer medications as ordered  - Instruct and encourage patient and family to use good hand hygiene technique  - Identify and instruct in appropriate isolation precautions for identified infection/condition  Outcome: Progressing  Goal: Absence of fever/infection during neutropenic period  Description: INTERVENTIONS:  - Monitor WBC    Outcome: Progressing     Problem: SAFETY ADULT  Goal: Patient will remain free of falls  Description: INTERVENTIONS:  - Educate patient/family on patient safety including physical limitations  - Instruct patient to call for assistance with activity   - Consult OT/PT to assist with strengthening/mobility   - Keep Call bell within reach  - Keep bed low and locked with side rails adjusted as appropriate  - Keep care items and personal belongings within reach  - Initiate and maintain comfort rounds  - Make Fall Risk Sign visible to staff  - Offer Toileting every  Hours, in advance of need  - Initiate/Maintain alarm  - Obtain necessary fall risk management equipment:   - Apply yellow socks and bracelet for high fall risk patients  - Consider moving patient to room near nurses station  Outcome: Progressing  Goal: Maintain or return to baseline ADL function  Description: INTERVENTIONS:  -  Assess patient's ability to carry out ADLs; assess patient's baseline for ADL function and identify physical deficits which impact ability to perform ADLs (bathing, care of mouth/teeth, toileting, grooming, dressing, etc.)  - Assess/evaluate cause of self-care deficits   - Assess range of motion  - Assess patient's mobility; develop plan if impaired  - Assess patient's need for assistive devices and provide as appropriate  - Encourage maximum independence but intervene and supervise when necessary  - Involve family in performance of ADLs  - Assess for home care needs following discharge   - Consider OT consult to assist with ADL evaluation and planning for discharge  - Provide patient education as appropriate  Outcome: Progressing  Goal: Maintains/Returns to pre admission functional level  Description: INTERVENTIONS:  - Perform BMAT or MOVE assessment daily.   - Set and communicate daily mobility goal to care team and patient/family/caregiver. - Collaborate with rehabilitation services on mobility goals if consulted  - Perform Range of Motion  times a day. - Reposition patient every  hours.   - Dangle patient  times a day  - Stand patient  times a day  - Ambulate patient  times a day  - Out of bed to chair  times a day   - Out of bed for meals times a day  - Out of bed for toileting  - Record patient progress and toleration of activity level   Outcome: Progressing     Problem: DISCHARGE PLANNING  Goal: Discharge to home or other facility with appropriate resources  Description: INTERVENTIONS:  - Identify barriers to discharge w/patient and caregiver  - Arrange for needed discharge resources and transportation as appropriate  - Identify discharge learning needs (meds, wound care, etc.)  - Arrange for interpretive services to assist at discharge as needed  - Refer to Case Management Department for coordinating discharge planning if the patient needs post-hospital services based on physician/advanced practitioner order or complex needs related to functional status, cognitive ability, or social support system  Outcome: Progressing     Problem: Knowledge Deficit  Goal: Patient/family/caregiver demonstrates understanding of disease process, treatment plan, medications, and discharge instructions  Description: Complete learning assessment and assess knowledge base.   Interventions:  - Provide teaching at level of understanding  - Provide teaching via preferred learning methods  Outcome: Progressing

## 2023-07-09 NOTE — ED ATTENDING ATTESTATION
7/9/2023  IVida MD, saw and evaluated the patient. I have discussed the patient with the resident/non-physician practitioner and agree with the resident's/non-physician practitioner's findings, Plan of Care, and MDM as documented in the resident's/non-physician practitioner's note, except where noted. All available labs and Radiology studies were reviewed. I was present for key portions of any procedure(s) performed by the resident/non-physician practitioner and I was immediately available to provide assistance. At this point I agree with the current assessment done in the Emergency Department. I have conducted an independent evaluation of this patient a history and physical is as follows:    80year-old presenting to the ER with right-sided flank pain. History of kidney stones. Feels similar. Nausea. No fevers. No chest pain or shortness of breath. Agree with plan, likely kidney stone    Patient needed multiple pain medications.   Will be admitted to the hospital        ED Course  ED Course as of 07/09/23 1355   Sun Jul 09, 2023   1041 Bilirubin, UA: Negative         Critical Care Time  Procedures

## 2023-07-09 NOTE — ED PROVIDER NOTES
Called Dr La Kimbrough answering service regarding oncology consult. History  Chief Complaint   Patient presents with   • Flank Pain     Pt presents to the ED for R sided flank pain that radiates into the abdomen starting this morning. Patient states he has a history of kidney stones and this feels similar. +nausea. Denies fevers or urinary symptoms     Pearl Doherty is a 80 y.o. male who identifies as male    They presented to the emergency department on July 9, 2023. Patient presents with:  Flank Pain: Pt presents to the ED for R sided flank pain that radiates into the abdomen starting this morning. Patient states he has a history of kidney stones and this feels similar. +nausea. Denies fevers or urinary symptoms. The patient states that he has a past medical history including nephrolithiasis and today after breakfast had a bowel movement and went to get dressed at which point he developed right-sided flank pain that began radiating into his right groin. Patient states that he has not had any urinary symptoms including dysuria, hematuria. Patient notes that due to the pain he was feeling nauseous, however has not had any true emesis. Patient denies fever, chills, chest pain, shortness of breath, rash, leg swelling, or any other complaint at this time. Allergies include:  -- Lisinopril -- Cough  -- Penicillin G -- Hives  -- Penicillins       Immunizations:    Immunization History  Administered            Date(s) Administered   COVID-19 MODERNA VACC 0.5 ML IM                         01/25/2021 02/21/2021 12/03/2021     Influenza, high dose seasonal 0.7 mL                         08/31/2021  10/06/2022    Immunizations Reviewed. Prior to Admission Medications   Prescriptions Last Dose Informant Patient Reported? Taking?    Brinzolamide-Brimonidine (Simbrinza) 1-0.2 % SUSP  Self Yes No   Sig: Apply 1 drop to eye 2 (two) times a day   Cinnamon 500 MG capsule  Self Yes No   Sig: Take 500 mg by mouth 2 (two) times a day   Multiple Vitamin (multivitamin) tablet  Self Yes No   Sig: Take 1 tablet by mouth daily   acetaminophen (TYLENOL) 500 mg tablet  Self Yes No   Sig: Take 500 mg by mouth 2 (two) times a week   aspirin (ECOTRIN LOW STRENGTH) 81 mg EC tablet  Self Yes No   Sig: Take 81 mg by mouth daily   atorvastatin (LIPITOR) 40 mg tablet  Self Yes No   Sig: Take 20 mg by mouth daily 1/2 tablet   clotrimazole-betamethasone (LOTRISONE) 1-0.05 % cream  Self No No   Sig: Apply topically 2 (two) times a day   glipiZIDE (GLUCOTROL) 5 mg tablet   No No   Sig: Take 1 tablet (5 mg total) by mouth every morning   insulin glargine (LANTUS) 100 units/mL subcutaneous injection  Self Yes No   Sig: Inject 23 Units under the skin daily at bedtime   latanoprost (XALATAN) 0.005 % ophthalmic solution  Self Yes No   Sig: Administer 1 drop to both eyes daily at bedtime   losartan (COZAAR) 100 MG tablet  Self Yes No   Sig: Take 100 mg by mouth daily    metFORMIN (GLUCOPHAGE) 500 mg tablet   No No   Sig: Take 1 tablet by mouth twice daily   metoprolol succinate (TOPROL-XL) 100 mg 24 hr tablet  Self Yes No   Sig: Take 100 mg by mouth daily   omeprazole (PriLOSEC) 20 mg delayed release capsule  Self Yes No   Sig: Take 20 mg by mouth daily   vitamin B-12 (VITAMIN B-12) 1,000 mcg tablet  Self Yes No   Sig: Take 1 tablet by mouth every other day      Facility-Administered Medications: None       Past Medical History:   Diagnosis Date   • Anemia 6/22/2021   • Bilateral carotid artery stenosis 1/4/2021    <50% on doppler 7/27/2018.  No change from Aug/2016   • Bilateral carotid bruits    • BMI 25.0-25.9,adult 4/6/2021   • BMI 25.0-25.9,adult 8/31/2021   • BMI 26.0-26.9,adult 8/31/2021   • BMI 27.0-27.9,adult 12/13/2022   • Coronary artery disease involving native coronary artery of native heart without angina pectoris 4/6/2021   • CTS (carpal tunnel syndrome)    • Deviated septum    • Diabetes ([address])    • 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HCC)    • Dizziness    • Double vision    • Ear problems • Encounter for support and coordination of transition of care 6/22/2021   • Esophageal dysmotility 6/6/2023   • Fatigue    • General weakness    • GERD (gastroesophageal reflux disease)    • Hearing impaired person, bilateral    • HL (hearing loss)    • Hypertension    • Left upper quadrant pain 6/6/2023   • Medicare annual wellness visit, subsequent 8/31/2021   • Microscopic hematuria    • Myalgia 4/6/2021   • New daily persistent headache 3/14/2023   • Other dysphagia 12/13/2022   • Other headache syndrome 3/14/2023   • Other specified anemias 12/13/2022   • Pain in joints 4/6/2021   • PVD (peripheral vascular disease) ([address]) 4/2/2021   • Rectal bleeding 6/22/2021   • Renal calculi    • Right leg DVT ([address]) 6/22/2021   • Right lumbar radiculopathy    • S/P AVR (aortic valve replacement) 4/2/2021   • Skin rash 12/13/2022   • SOB (shortness of breath) on exertion    • Spinal stenosis in cervical region 4/2/2021   • Tachycardia    • Type 2 diabetes mellitus with stage 3a chronic kidney disease, with long-term current use of insulin ([address]) 9/8/2022   • Type 2 diabetes mellitus, without long-term current use of insulin ([address]) 9/8/2022   • Urinary frequency    • Vertigo 1/4/2021       Past Surgical History:   Procedure Laterality Date   • AORTIC VALVE REPLACEMENT  09/2016   • BYPASS GRAFT     • CARPAL TUNNEL RELEASE Right     by Dr. Jeanie Tabor   • CATARACT EXTRACTION, BILATERAL     • COLONOSCOPY  09/16/2015   • CORONARY ARTERY BYPASS GRAFT  09/2016    x1    • HAND SURGERY Left    • URETERECTOMY         Family History   Family history unknown: Yes     I have reviewed and agree with the history as documented.     E-Cigarette/Vaping   • E-Cigarette Use Never User      E-Cigarette/Vaping Substances     Social History     Tobacco Use   • Smoking status: Former   • Smokeless tobacco: Never   • Tobacco comments:     Never smoker - As per [employer]   Vaping Use   • Vaping Use: Never used   Substance Use Topics   • Alcohol use: Not Currently   • Drug use: Never        Review of Systems   Constitutional: Negative for chills and fever. HENT: Negative for ear pain and sore throat. Eyes: Negative for pain and visual disturbance. Respiratory: Negative for cough and shortness of breath. Cardiovascular: Negative for chest pain and palpitations. Gastrointestinal: Positive for nausea. Negative for abdominal pain and vomiting. Genitourinary: Positive for flank pain. Negative for dysuria and hematuria. Musculoskeletal: Negative for arthralgias and back pain. Skin: Negative for color change and rash. Neurological: Negative for seizures and syncope. All other systems reviewed and are negative. Physical Exam  ED Triage Vitals [07/09/23 0853]   Temperature Pulse Respirations Blood Pressure SpO2   97.8 °F (36.6 °C) (!) 52 21 118/63 98 %      Temp Source Heart Rate Source Patient Position - Orthostatic VS BP Location FiO2 (%)   Oral Monitor Sitting Right arm --      Pain Score       10 - Worst Possible Pain             Orthostatic Vital Signs  Vitals:    07/09/23 0853   BP: 118/63   Pulse: (!) 52   Patient Position - Orthostatic VS: Sitting       Physical Exam  Vitals and nursing note reviewed. Constitutional:       General: He is not in acute distress. Appearance: He is well-developed. HENT:      Head: Normocephalic and atraumatic. Right Ear: External ear normal.      Left Ear: External ear normal.      Nose: Nose normal.      Mouth/Throat:      Mouth: Mucous membranes are moist.   Eyes:      Conjunctiva/sclera: Conjunctivae normal.   Cardiovascular:      Rate and Rhythm: Regular rhythm. Bradycardia present. Comments: Systolic ejection click appreciated  Pulmonary:      Effort: Pulmonary effort is normal. No respiratory distress. Breath sounds: Normal breath sounds. Abdominal:      General: Abdomen is flat. Bowel sounds are normal.      Palpations: Abdomen is soft. Tenderness:  There is no abdominal tenderness. There is right CVA tenderness (mild). There is no guarding or rebound. Musculoskeletal:         General: No swelling. Cervical back: Neck supple. Skin:     General: Skin is warm and dry. Capillary Refill: Capillary refill takes less than 2 seconds. Neurological:      Mental Status: He is alert.    Psychiatric:         Mood and Affect: Mood normal.         ED Medications  Medications   ondansetron (ZOFRAN) injection 4 mg (4 mg Intravenous Given 7/9/23 0931)   HYDROmorphone HCl (DILAUDID) injection 0.2 mg (0.2 mg Intravenous Given 7/9/23 0932)   sodium chloride 0.9 % bolus 500 mL (500 mL Intravenous New Bag 7/9/23 1025)   HYDROmorphone HCl (DILAUDID) injection 0.2 mg (0.2 mg Intravenous Given 7/9/23 1131)       Diagnostic Studies  Results Reviewed     Procedure Component Value Units Date/Time    Urine Microscopic [[id number]]  (Abnormal) Collected: 07/09/23 0935    Lab Status: Final result Specimen: Urine, Clean Catch Updated: 07/09/23 1032     RBC, UA 10-20 /hpf      WBC, UA None Seen /hpf      Epithelial Cells Occasional /hpf      Bacteria, UA None Seen /hpf     UA w Reflex to Microscopic w Reflex to Culture [[id number]]  (Abnormal) Collected: 07/09/23 0935    Lab Status: Final result Specimen: Urine, Clean Catch Updated: 07/09/23 1002     Color, UA Light Yellow     Clarity, UA Clear     Specific Gravity, UA 1.016     pH, UA 5.5     Leukocytes, UA Negative     Nitrite, UA Negative     Protein, UA Trace mg/dl      Glucose, UA Negative mg/dl      Ketones, UA Negative mg/dl      Urobilinogen, UA <2.0 mg/dl      Bilirubin, UA Negative     Occult Blood, UA Trace    Comprehensive metabolic panel [[id number]]  (Abnormal) Collected: 07/09/23 0935    Lab Status: Final result Specimen: Blood from Arm, Right Updated: 07/09/23 1002     Sodium 141 mmol/L      Potassium 4.4 mmol/L      Chloride 107 mmol/L      CO2 24 mmol/L      ANION GAP 10 mmol/L      BUN 43 mg/dL      Creatinine 1.62 mg/dL      Glucose 168 mg/dL      Calcium 9.1 mg/dL      AST 15 U/L      ALT 18 U/L      Alkaline Phosphatase 61 U/L      Total Protein 7.4 g/dL      Albumin 4.2 g/dL      Total Bilirubin 0.56 mg/dL      eGFR 37 ml/min/1.73sq m     Narrative:      [place] guidelines for Chronic Kidney Disease (CKD):   •  Stage 1 with normal or high GFR (GFR > 90 mL/min/1.73 square meters)  •  Stage 2 Mild CKD (GFR = 60-89 mL/min/1.73 square meters)  •  Stage 3A Moderate CKD (GFR = 45-59 mL/min/1.73 square meters)  •  Stage 3B Moderate CKD (GFR = 30-44 mL/min/1.73 square meters)  •  Stage 4 Severe CKD (GFR = 15-29 mL/min/1.73 square meters)  •  Stage 5 End Stage CKD (GFR <15 mL/min/1.73 square meters)  Note: GFR calculation is accurate only with a steady state creatinine    CBC and differential [[id number]]  (Abnormal) Collected: 07/09/23 0935    Lab Status: Final result Specimen: Blood from Arm, Right Updated: 07/09/23 0947     WBC 10.22 Thousand/uL      RBC 4.15 Million/uL      Hemoglobin 12.7 g/dL      Hematocrit 39.1 %      MCV 94 fL      MCH 30.6 pg      MCHC 32.5 g/dL      RDW 12.6 %      MPV 9.9 fL      Platelets 745 Thousands/uL      nRBC 0 /100 WBCs      Neutrophils Relative 67 %      Immat GRANS % 1 %      Lymphocytes Relative 21 %      Monocytes Relative 8 %      Eosinophils Relative 2 %      Basophils Relative 1 %      Neutrophils Absolute 6.97 Thousands/µL      Immature Grans Absolute 0.06 Thousand/uL      Lymphocytes Absolute 2.15 Thousands/µL      Monocytes Absolute 0.78 Thousand/µL      Eosinophils Absolute 0.20 Thousand/µL      Basophils Absolute 0.06 Thousands/µL                  CT abdomen pelvis wo contrast   Final Result by William Romano MD (07/09 1039)      1. Mild to moderate right hydroureteronephrosis due to an obstructing calculus in the proximal ureter measuring 0.5 cm.      2. Bladder calculi measuring 1.9 cm and 0.3 cm.      3. Large hiatal hernia enlarged from prior.          The study was marked in [employer] for immediate notification. Workstation performed: [id number]               Procedures  Procedures      ED Course                             SBIRT 22yo+    Flowsheet Row Most Recent Value   Initial Alcohol Screen: US AUDIT-C     1. How often do you have a drink containing alcohol? 0 Filed at: 07/09/2023 0855   2. How many drinks containing alcohol do you have on a typical day you are drinking? 0 Filed at: 07/09/2023 0855   3a. Male UNDER 65: How often do you have five or more drinks on one occasion? 0 Filed at: 07/09/2023 0855   3b. FEMALE Any Age, or MALE 65+: How often do you have 4 or more drinks on one occassion? 0 Filed at: 07/09/2023 0855   Audit-C Score 0 Filed at: 07/09/2023 5383   DEE DEE: How many times in the past year have you. .. Used an illegal drug or used a prescription medication for non-medical reasons? Never Filed at: 07/09/2023 4376                Medical Decision Making  Patient presents with:  Flank Pain: Pt presents to the ED for R sided flank pain that radiates into the abdomen starting this morning. Patient states he has a history of kidney stones and this feels similar. +nausea. Denies fevers or urinary symptoms      Patient seen and examined noted to have mild right CVA tenderness, abdomen soft nontender to palpation without guarding or rebound, clear lungs to auscultation bilaterally, regular rate and rhythm. Temp:  (97.8 °F (36.6 °C)) 97.8 °F (36.6 °C)  HR:  (52) 52  Resp:  (21) 21  BP: (118)/(63) 118/63    Differential diagnosis includes but is not limited to UTI, nephrolithiasis, musculoskeletal strain.       Due to patient's history and presentation the following laboratory evidence was collected:  Recent Results (from the past 12 hour(s))  -UA w Reflex to Microscopic w Reflex to Culture:   Collection Time: 07/09/23  9:35 AM  Specimen: Urine, Clean Catch       Result                      Value             Ref Range           Color, UA                   Light Yellow                          Clarity, UA                 Clear                                 Specific [place], UA        1.016             1.003 - 1.030       pH, UA                      5.5               4.5, 5.0, 5.*       Leukocytes, UA              Negative          Negative            Nitrite, UA                 Negative          Negative            Protein, UA                 Trace (A)         Negative mg/*       Glucose, UA                 Negative          Negative mg/*       Ketones, UA                 Negative          Negative mg/*       Urobilinogen, UA            <2.0              <2.0 mg/dl m*       Bilirubin, UA               Negative          Negative            Occult Blood, UA            Trace (A)         Negative       -CBC and differential:   Collection Time: 07/09/23  9:35 AM       Result                      Value             Ref Range           WBC                         10.22 (H)         4.31 - 10.16*       RBC                         4.15              3.88 - 5.62 *       Hemoglobin                  12.7              12.0 - 17.0 *       Hematocrit                  39.1              36.5 - 49.3 %       MCV                         94                82 - 98 fL          MCH                         30.6              26.8 - 34.3 *       MCHC                        32.5              31.4 - 37.4 *       RDW                         12.6              11.6 - 15.1 %       MPV                         9.9               8.9 - 12.7 fL       Platelets                   160               149 - 390 Th*       nRBC                        0                 /100 WBCs           Neutrophils Relative        67                43 - 75 %           Immat GRANS %               1                 0 - 2 %             Lymphocytes Relative        21                14 - 44 %           Monocytes Relative          8                 4 - 12 %            Eosinophils Relative        2                 0 - 6 % Basophils Relative          1                 0 - 1 %             Neutrophils Absolute        6.97              1.85 - 7.62 *       Immature Grans Absolute     0.06              0.00 - 0.20 *       Lymphocytes Absolute        2.15              0.60 - 4.47 *       Monocytes Absolute          0.78              0.17 - 1.22 *       Eosinophils Absolute        0.20              0.00 - 0.61 *       Basophils Absolute          0.06              0.00 - 0.10 *  -Comprehensive metabolic panel:   Collection Time: 07/09/23  9:35 AM       Result                      Value             Ref Range           Sodium                      141               135 - 147 mm*       Potassium                   4.4               3.5 - 5.3 mm*       Chloride                    107               96 - 108 mmo*       CO2                         24                21 - 32 mmol*       ANION GAP                   10                mmol/L              BUN                         43 (H)            5 - 25 mg/dL        Creatinine                  1.62 (H)          0.60 - 1.30 *       Glucose                     168 (H)           65 - 140 mg/*       Calcium                     9.1               8.4 - 10.2 m*       AST                         15                13 - 39 U/L         ALT                         18                7 - 52 U/L          Alkaline Phosphatase        61                34 - 104 U/L        Total Protein               7.4               6.4 - 8.4 g/*       Albumin                     4.2               3.5 - 5.0 g/*       Total Bilirubin             0.56              0.20 - 1.00 *       eGFR                        37                ml/min/1.73s*  -Urine Microscopic:   Collection Time: 07/09/23  9:35 AM       Result                      Value             Ref Range           RBC, UA                     10-20 (A)         None Seen, 0*       WBC, UA                     None Seen         None Seen, 0*       Epithelial Cells            Occasional None Seen, O*       Bacteria, UA                None Seen         None Seen, O*    Due to patient's history and presentation the following imaging was collected:  CT abdomen pelvis wo contrast   Final Result        1. Mild to moderate right hydroureteronephrosis due to an obstructing calculus in the proximal ureter measuring 0.5 cm.        2. Bladder calculi measuring 1.9 cm and 0.3 cm.        3. Large hiatal hernia enlarged from prior. The study was marked in [facility] for immediate notification. Workstation performed: [id number]         No results found. Patient was administered:      Medication Administration - last 24 hours from 07/08/2023 1225 to   07/09/2023 1225       Date/Time Order Dose Route Action Action by     07/09/2023 0931 EDT ondansetron (ZOFRAN) injection 4 mg 4 mg Intravenous   Given Josue Eckert RN     07/09/2023 0932 EDT HYDROmorphone HCl (DILAUDID) injection 0.2 mg 0.2 mg   Intravenous Given Josue Eckert RN     07/09/2023 1025 EDT sodium chloride 0.9 % bolus 500 mL 500 mL   Intravenous [address] Josue Eckert RN     07/09/2023 1131 EDT HYDROmorphone HCl (DILAUDID) injection 0.2 mg 0.2 mg   Intravenous Given Josue Eckert RN      Patient required multiple rounds of hemodynamic, slightly decrease in renal function as compared to patient's baseline. Discussed with urology who recommends admission at this time with likely intervention in the morning. Discussed patient's case with Dr[facility]) regarding admission who accepted the patient for further evaluation and management. Hydronephrosis: acute illness or injury  Nephrolithiasis: acute illness or injury  Amount and/or Complexity of Data Reviewed  Labs: ordered. Radiology: ordered. Risk  Prescription drug management. Decision regarding hospitalization.             Disposition  Final diagnoses:   Nephrolithiasis   Hydronephrosis     Time reflects when diagnosis was documented in both MDM as applicable and the Disposition within this note     Time User Action Codes Description Comment    7/9/2023 12:18 PM Margaret Ped Add [N20.1] Ureteral stone     7/9/2023 12:25 PM de Artelia Kinder Add [N20.0] Nephrolithiasis     7/9/2023 12:25 PM de Artelia Kinder Add [N13.30] Hydronephrosis       ED Disposition     ED Disposition   Admit    Condition   Stable    Date/Time   Sun Jul 9, 2023 12:23 PM    Comment   Case was discussed with Dr. Jarrod Sevilla and the patient's admission status was agreed to be Admission Status: observation status to the service of Dr. Jarrod Sevilla. Follow-up Information    None         Patient's Medications   Discharge Prescriptions    No medications on file     No discharge procedures on file. PDMP Review     None           ED Provider  Attending physically available and evaluated Vee Colvin. I managed the patient along with the ED Attending.     Electronically Signed by         Mirian Acuna MD  07/09/23 7376

## 2023-07-09 NOTE — ASSESSMENT & PLAN NOTE
Lab Results   Component Value Date    HGBA1C 7.5 03/02/2023       No results for input(s): "POCGLU" in the last 72 hours. Blood Sugar Average: Last 72 hrs:  Hold oral hypoglycemics.  Continue Lantus

## 2023-07-09 NOTE — H&P
[facility]  H&P  Name: Aparna Chatman 80 y.o. male I MRN: [id number]  Unit/Bed#: W -01 I Date of Admission: 7/9/2023   Date of Service: 7/9/2023 I Hospital Day: 0      Assessment/Plan   * 0.5 cm proximal reteral stone with R hydronephrosis  Assessment & Plan  · Acute right flank pain with nausea and vomiting this morning  · CT abdomen: Mild to moderate right hydroureteronephrosis due to an obstructing calculus in the proximal ureter measuring 0.5 cm. · IVF/pain control/supportive care. Urine clear  · Urology consulted in ED. Rec NPO post MN. ·     Type 2 diabetes mellitus with stage 3a chronic kidney disease, with long-term current use of insulin ([address])  Assessment & Plan  Lab Results   Component Value Date    HGBA1C 7.5 03/02/2023       No results for input(s): "POCGLU" in the last 72 hours. Blood Sugar Average: Last 72 hrs:  Hold oral hypoglycemics. Continue Lantus     History of pulmonary embolism and DVT  Assessment & Plan  History of pulmonary embolism and DVT in 2021. He completed 6 months course of AC. DVT prophalaxis    S/P AVR bioprothetic   Assessment & Plan  Bioprosthetic AVR    Acute kidney injury superimposed on chronic kidney disease [facility])  Assessment & Plan  Lab Results   Component Value Date    EGFR 37 07/09/2023    EGFR 42 12/03/2021    EGFR 51 07/08/2021    CREATININE 1.62 (H) 07/09/2023    CREATININE 1.48 (H) 12/03/2021    CREATININE 1.25 07/08/2021   Prerenal 2/2 poor intake and vomiting and obstructive due to stone  IVF. Anticipate improvement after urological intervention         VTE Prophylaxis: Heparin  / sequential compression device   Code Status: Full code  POLST: POLST form is not discussed and not completed at this time. Anticipated Length of Stay:  Patient will be admitted on an Observation basis with an anticipated length of stay of  < 2 midnights.    Justification for Hospital Stay: Above    Chief Complaint:   Right flank pain     History of Present Illness:    Trevin Sanchez is a 80 y.o. male with PMHx of bioprosthetic AVR, type II DM, Prior PE/DVT, CAD and HTN who presents with intractable right flank pain radiating to the right groin area with associated nausea and vomiting. CT abd showed a 0.5 cm proximal ureteral stone with R hydro. Urology contacted by ED and recommended NPO post MN and possible intervention in AM. Denies CP/dyspnea    Review of Systems:    Review of Systems   Constitutional: Negative for fatigue and fever. Cardiovascular: Negative for palpitations and leg swelling. Gastrointestinal: Negative for blood in stool. Genitourinary: Positive for flank pain. Negative for dysuria. All other systems reviewed and are negative. Past Medical and Surgical History:     Past Medical History:   Diagnosis Date   • Anemia 6/22/2021   • Bilateral carotid artery stenosis 1/4/2021    <50% on doppler 7/27/2018.  No change from Aug/2016   • Bilateral carotid bruits    • BMI 25.0-25.9,adult 4/6/2021   • BMI 25.0-25.9,adult 8/31/2021   • BMI 26.0-26.9,adult 8/31/2021   • BMI 27.0-27.9,adult 12/13/2022   • Coronary artery disease involving native coronary artery of native heart without angina pectoris 4/6/2021   • CTS (carpal tunnel syndrome)    • Deviated septum    • Diabetes ([address])    • 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HCC)    • Dizziness    • Double vision    • Ear problems    • Encounter for support and coordination of transition of care 6/22/2021   • Esophageal dysmotility 6/6/2023   • Fatigue    • General weakness    • GERD (gastroesophageal reflux disease)    • Hearing impaired person, bilateral    • HL (hearing loss)    • Hypertension    • Left upper quadrant pain 6/6/2023   • Medicare annual wellness visit, subsequent 8/31/2021   • Microscopic hematuria    • Myalgia 4/6/2021   • New daily persistent headache 3/14/2023   • Other dysphagia 12/13/2022   • Other headache syndrome 3/14/2023   • Other specified anemias 12/13/2022   • Pain in joints 4/6/2021   • PVD (peripheral vascular disease) ([address]) 4/2/2021   • Rectal bleeding 6/22/2021   • Renal calculi    • Right leg DVT (HCC) 6/22/2021   • Right lumbar radiculopathy    • S/P AVR (aortic valve replacement) 4/2/2021   • Skin rash 12/13/2022   • SOB (shortness of breath) on exertion    • Spinal stenosis in cervical region 4/2/2021   • Tachycardia    • Type 2 diabetes mellitus with stage 3a chronic kidney disease, with long-term current use of insulin ([address]) 9/8/2022   • Type 2 diabetes mellitus without complication, with long-term current use of insulin ([address]) 3/14/2023   • Type 2 diabetes mellitus, without long-term current use of insulin ([address]) 9/8/2022   • Urinary frequency    • Vertigo 1/4/2021       Past Surgical History:   Procedure Laterality Date   • AORTIC VALVE REPLACEMENT  09/2016   • BYPASS GRAFT     • CARPAL TUNNEL RELEASE Right     by Dr. Satya Ramírez   • CATARACT EXTRACTION, BILATERAL     • COLONOSCOPY  09/16/2015   • CORONARY ARTERY BYPASS GRAFT  09/2016    x1    • HAND SURGERY Left    • URETERECTOMY         Meds/Allergies:    Prior to Admission medications    Medication Sig Start Date End Date Taking?  Authorizing Provider   acetaminophen (TYLENOL) 500 mg tablet Take 500 mg by mouth 2 (two) times a week   Yes Historical Provider, MD   aspirin (ECOTRIN LOW STRENGTH) 81 mg EC tablet Take 81 mg by mouth daily   Yes Historical Provider, MD   atorvastatin (LIPITOR) 40 mg tablet Take 20 mg by mouth daily 1/2 tablet   Yes Historical Provider, MD   Brinzolamide-Brimonidine (Simbrinza) 1-0.2 % SUSP Apply 1 drop to eye 2 (two) times a day   Yes Historical Provider, MD   Cinnamon 500 MG capsule Take 500 mg by mouth 2 (two) times a day   Yes Historical Provider, MD   clotrimazole-betamethasone (LOTRISONE) 1-0.05 % cream Apply topically 2 (two) times a day 12/13/22  Yes Tammy Adkins MD   glipiZIDE (GLUCOTROL) 5 mg tablet Take 1 tablet (5 mg total) by mouth every morning 3/14/23  Yes Sindhu Carvalho Shahnaz Radford MD   insulin glargine (LANTUS) 100 units/mL subcutaneous injection Inject 23 Units under the skin daily at bedtime   Yes Historical Provider, MD   latanoprost (XALATAN) 0.005 % ophthalmic solution Administer 1 drop to both eyes daily at bedtime   Yes Historical Provider, MD   losartan (COZAAR) 100 MG tablet Take 100 mg by mouth daily    Yes Historical Provider, MD   metFORMIN (GLUCOPHAGE) 500 mg tablet Take 1 tablet by mouth twice daily 6/8/23  Yes Fritz Patient, MD   metoprolol succinate (TOPROL-XL) 100 mg 24 hr tablet Take 100 mg by mouth daily   Yes Historical Provider, MD   Multiple Vitamin (multivitamin) tablet Take 1 tablet by mouth daily   Yes Historical Provider, MD   omeprazole (PriLOSEC) 20 mg delayed release capsule Take 20 mg by mouth daily   Yes Historical Provider, MD   vitamin B-12 (VITAMIN B-12) 1,000 mcg tablet Take 1 tablet by mouth every other day   Yes Historical Provider, MD         Allergies: Allergies   Allergen Reactions   • Lisinopril Cough   • Penicillin G Hives   • Penicillins        Social History:     Marital Status:   Occupation:   Patient Pre-hospital Living Situation: Home     Social History     Substance and Sexual Activity   Alcohol Use Not Currently     Social History     Tobacco Use   Smoking Status Former   Smokeless Tobacco Never   Tobacco Comments    Never smoker - As per [employer]     Social History     Substance and Sexual Activity   Drug Use Never       Family History:    Family History   Family history unknown: Yes       Physical Exam:     Vitals:   Blood Pressure: 160/94 (07/09/23 1509)  Pulse: 77 (07/09/23 1509)  Temperature: 98 °F (36.7 °C) (07/09/23 1509)  Temp Source: Oral (07/09/23 0853)  Respirations: 17 (07/09/23 1509)  Weight - Scale: 72.6 kg (160 lb 0.9 oz) (07/09/23 0853)  SpO2: 96 % (07/09/23 1509)    Physical Exam  Constitutional:       General: He is not in acute distress.      Appearance: He is not ill-appearing, toxic-appearing or diaphoretic. Eyes:      General:         Right eye: No discharge. Left eye: No discharge. Cardiovascular:      Rate and Rhythm: Normal rate and regular rhythm. Pulses: Normal pulses. Pulmonary:      Effort: Pulmonary effort is normal. No respiratory distress. Abdominal:      General: There is no distension. Palpations: Abdomen is soft. Tenderness: There is abdominal tenderness. There is no guarding. Musculoskeletal:         General: No swelling. Skin:     General: Skin is warm. Neurological:      Mental Status: He is oriented to person, place, and time. Additional Data:     Lab Results:     Results from last 7 days   Lab Units 07/09/23  0935   WBC Thousand/uL 10.22*   HEMOGLOBIN g/dL 12.7   HEMATOCRIT % 39.1   PLATELETS Thousands/uL 160   NEUTROS PCT % 67   LYMPHS PCT % 21   MONOS PCT % 8   EOS PCT % 2     Results from last 7 days   Lab Units 07/09/23  0935   SODIUM mmol/L 141   POTASSIUM mmol/L 4.4   CHLORIDE mmol/L 107   CO2 mmol/L 24   BUN mg/dL 43*   CREATININE mg/dL 1.62*   ANION GAP mmol/L 10   CALCIUM mg/dL 9.1   ALBUMIN g/dL 4.2   TOTAL BILIRUBIN mg/dL 0.56   ALK PHOS U/L 61   ALT U/L 18   AST U/L 15   GLUCOSE RANDOM mg/dL 168*         Results from last 7 days   Lab Units 07/09/23  1630   POC GLUCOSE mg/dl 165*               Imaging:     CT abdomen pelvis wo contrast   Final Result by Yoshi Whitmore MD (07/09 1039)      1. Mild to moderate right hydroureteronephrosis due to an obstructing calculus in the proximal ureter measuring 0.5 cm.      2. Bladder calculi measuring 1.9 cm and 0.3 cm.      3. Large hiatal hernia enlarged from prior. The study was marked in [facility] for immediate notification. Workstation performed: [id number]             [employer] / [employer] Records Reviewed: Yes     ** Please Note: This note has been constructed using a voice recognition system.  **

## 2023-07-10 ENCOUNTER — ANESTHESIA (OUTPATIENT)
Dept: PERIOP | Facility: HOSPITAL | Age: 88
DRG: 661 | End: 2023-07-10
Payer: MEDICARE

## 2023-07-10 ENCOUNTER — TELEPHONE (OUTPATIENT)
Dept: UROLOGY | Facility: CLINIC | Age: 88
End: 2023-07-10

## 2023-07-10 ENCOUNTER — APPOINTMENT (OUTPATIENT)
Dept: RADIOLOGY | Facility: HOSPITAL | Age: 88
DRG: 661 | End: 2023-07-10
Payer: MEDICARE

## 2023-07-10 ENCOUNTER — ANESTHESIA EVENT (OUTPATIENT)
Dept: PERIOP | Facility: HOSPITAL | Age: 88
DRG: 661 | End: 2023-07-10
Payer: MEDICARE

## 2023-07-10 PROBLEM — E11.9 DM2 (DIABETES MELLITUS, TYPE 2) (HCC): Status: ACTIVE | Noted: 2022-09-08

## 2023-07-10 LAB
ANION GAP SERPL CALCULATED.3IONS-SCNC: 9 MMOL/L
BASOPHILS # BLD AUTO: 0.03 THOUSANDS/ÂΜL (ref 0–0.1)
BASOPHILS NFR BLD AUTO: 0 % (ref 0–1)
BUN SERPL-MCNC: 33 MG/DL (ref 5–25)
CALCIUM SERPL-MCNC: 8.2 MG/DL (ref 8.4–10.2)
CHLORIDE SERPL-SCNC: 109 MMOL/L (ref 96–108)
CO2 SERPL-SCNC: 20 MMOL/L (ref 21–32)
CREAT SERPL-MCNC: 1.7 MG/DL (ref 0.6–1.3)
EOSINOPHIL # BLD AUTO: 0.11 THOUSAND/ÂΜL (ref 0–0.61)
EOSINOPHIL NFR BLD AUTO: 1 % (ref 0–6)
ERYTHROCYTE [DISTWIDTH] IN BLOOD BY AUTOMATED COUNT: 12.7 % (ref 11.6–15.1)
GFR SERPL CREATININE-BSD FRML MDRD: 34 ML/MIN/1.73SQ M
GLUCOSE P FAST SERPL-MCNC: 136 MG/DL (ref 65–99)
GLUCOSE SERPL-MCNC: 115 MG/DL (ref 65–140)
GLUCOSE SERPL-MCNC: 119 MG/DL (ref 65–140)
GLUCOSE SERPL-MCNC: 124 MG/DL (ref 65–140)
GLUCOSE SERPL-MCNC: 136 MG/DL (ref 65–140)
HCT VFR BLD AUTO: 37.6 % (ref 36.5–49.3)
HGB BLD-MCNC: 12.5 G/DL (ref 12–17)
IMM GRANULOCYTES # BLD AUTO: 0.02 THOUSAND/UL (ref 0–0.2)
IMM GRANULOCYTES NFR BLD AUTO: 0 % (ref 0–2)
LYMPHOCYTES # BLD AUTO: 1.71 THOUSANDS/ÂΜL (ref 0.6–4.47)
LYMPHOCYTES NFR BLD AUTO: 20 % (ref 14–44)
MCH RBC QN AUTO: 31.4 PG (ref 26.8–34.3)
MCHC RBC AUTO-ENTMCNC: 33.2 G/DL (ref 31.4–37.4)
MCV RBC AUTO: 95 FL (ref 82–98)
MONOCYTES # BLD AUTO: 0.97 THOUSAND/ÂΜL (ref 0.17–1.22)
MONOCYTES NFR BLD AUTO: 11 % (ref 4–12)
NEUTROPHILS # BLD AUTO: 5.76 THOUSANDS/ÂΜL (ref 1.85–7.62)
NEUTS SEG NFR BLD AUTO: 68 % (ref 43–75)
NRBC BLD AUTO-RTO: 0 /100 WBCS
PLATELET # BLD AUTO: 147 THOUSANDS/UL (ref 149–390)
PMV BLD AUTO: 10 FL (ref 8.9–12.7)
POTASSIUM SERPL-SCNC: 4 MMOL/L (ref 3.5–5.3)
RBC # BLD AUTO: 3.98 MILLION/UL (ref 3.88–5.62)
SODIUM SERPL-SCNC: 138 MMOL/L (ref 135–147)
WBC # BLD AUTO: 8.6 THOUSAND/UL (ref 4.31–10.16)

## 2023-07-10 PROCEDURE — 82948 REAGENT STRIP/BLOOD GLUCOSE: CPT

## 2023-07-10 PROCEDURE — 80048 BASIC METABOLIC PNL TOTAL CA: CPT | Performed by: INTERNAL MEDICINE

## 2023-07-10 PROCEDURE — 82360 CALCULUS ASSAY QUANT: CPT | Performed by: UROLOGY

## 2023-07-10 PROCEDURE — 74420 UROGRAPHY RTRGR +-KUB: CPT

## 2023-07-10 PROCEDURE — C1747 URETEROSCOPE DIGITAL FLEX SNGL USE RVS DEFLECTION APTRA: HCPCS | Performed by: UROLOGY

## 2023-07-10 PROCEDURE — 0T768DZ DILATION OF RIGHT URETER WITH INTRALUMINAL DEVICE, VIA NATURAL OR ARTIFICIAL OPENING ENDOSCOPIC: ICD-10-PCS | Performed by: UROLOGY

## 2023-07-10 PROCEDURE — 52356 CYSTO/URETERO W/LITHOTRIPSY: CPT | Performed by: UROLOGY

## 2023-07-10 PROCEDURE — 0TC68ZZ EXTIRPATION OF MATTER FROM RIGHT URETER, VIA NATURAL OR ARTIFICIAL OPENING ENDOSCOPIC: ICD-10-PCS | Performed by: UROLOGY

## 2023-07-10 PROCEDURE — 99232 SBSQ HOSP IP/OBS MODERATE 35: CPT | Performed by: PHYSICIAN ASSISTANT

## 2023-07-10 PROCEDURE — C1894 INTRO/SHEATH, NON-LASER: HCPCS | Performed by: UROLOGY

## 2023-07-10 PROCEDURE — 99205 OFFICE O/P NEW HI 60 MIN: CPT | Performed by: UROLOGY

## 2023-07-10 PROCEDURE — C2617 STENT, NON-COR, TEM W/O DEL: HCPCS | Performed by: UROLOGY

## 2023-07-10 PROCEDURE — 83036 HEMOGLOBIN GLYCOSYLATED A1C: CPT | Performed by: INTERNAL MEDICINE

## 2023-07-10 PROCEDURE — BT1D1ZZ FLUOROSCOPY OF RIGHT KIDNEY, URETER AND BLADDER USING LOW OSMOLAR CONTRAST: ICD-10-PCS | Performed by: RADIOLOGY

## 2023-07-10 PROCEDURE — 85025 COMPLETE CBC W/AUTO DIFF WBC: CPT | Performed by: INTERNAL MEDICINE

## 2023-07-10 PROCEDURE — C1758 CATHETER, URETERAL: HCPCS | Performed by: UROLOGY

## 2023-07-10 PROCEDURE — C1769 GUIDE WIRE: HCPCS | Performed by: UROLOGY

## 2023-07-10 DEVICE — STENT URETERAL 6FR 22CM INLAY OPTIMA W/NITINOL GDWR: Type: IMPLANTABLE DEVICE | Site: URETER | Status: FUNCTIONAL

## 2023-07-10 RX ORDER — PROPOFOL 10 MG/ML
INJECTION, EMULSION INTRAVENOUS CONTINUOUS PRN
Status: DISCONTINUED | OUTPATIENT
Start: 2023-07-10 | End: 2023-07-10

## 2023-07-10 RX ORDER — PROPOFOL 10 MG/ML
INJECTION, EMULSION INTRAVENOUS AS NEEDED
Status: DISCONTINUED | OUTPATIENT
Start: 2023-07-10 | End: 2023-07-10

## 2023-07-10 RX ORDER — FENTANYL CITRATE 50 UG/ML
INJECTION, SOLUTION INTRAMUSCULAR; INTRAVENOUS AS NEEDED
Status: DISCONTINUED | OUTPATIENT
Start: 2023-07-10 | End: 2023-07-10

## 2023-07-10 RX ORDER — SODIUM CHLORIDE, SODIUM LACTATE, POTASSIUM CHLORIDE, CALCIUM CHLORIDE 600; 310; 30; 20 MG/100ML; MG/100ML; MG/100ML; MG/100ML
75 INJECTION, SOLUTION INTRAVENOUS CONTINUOUS
Status: DISCONTINUED | OUTPATIENT
Start: 2023-07-10 | End: 2023-07-11

## 2023-07-10 RX ORDER — ONDANSETRON 2 MG/ML
INJECTION INTRAMUSCULAR; INTRAVENOUS AS NEEDED
Status: DISCONTINUED | OUTPATIENT
Start: 2023-07-10 | End: 2023-07-10

## 2023-07-10 RX ORDER — SODIUM CHLORIDE, SODIUM LACTATE, POTASSIUM CHLORIDE, CALCIUM CHLORIDE 600; 310; 30; 20 MG/100ML; MG/100ML; MG/100ML; MG/100ML
INJECTION, SOLUTION INTRAVENOUS CONTINUOUS PRN
Status: DISCONTINUED | OUTPATIENT
Start: 2023-07-10 | End: 2023-07-10

## 2023-07-10 RX ORDER — CIPROFLOXACIN 2 MG/ML
400 INJECTION, SOLUTION INTRAVENOUS EVERY 12 HOURS
Status: DISCONTINUED | OUTPATIENT
Start: 2023-07-10 | End: 2023-07-11

## 2023-07-10 RX ORDER — MAGNESIUM HYDROXIDE 1200 MG/15ML
LIQUID ORAL AS NEEDED
Status: DISCONTINUED | OUTPATIENT
Start: 2023-07-10 | End: 2023-07-10 | Stop reason: HOSPADM

## 2023-07-10 RX ORDER — HYDRALAZINE HYDROCHLORIDE 20 MG/ML
5 INJECTION INTRAMUSCULAR; INTRAVENOUS EVERY 6 HOURS PRN
Status: DISCONTINUED | OUTPATIENT
Start: 2023-07-10 | End: 2023-07-11 | Stop reason: HOSPADM

## 2023-07-10 RX ORDER — FENTANYL CITRATE/PF 50 MCG/ML
25 SYRINGE (ML) INJECTION
Status: DISCONTINUED | OUTPATIENT
Start: 2023-07-10 | End: 2023-07-10 | Stop reason: HOSPADM

## 2023-07-10 RX ORDER — TAMSULOSIN HYDROCHLORIDE 0.4 MG/1
0.4 CAPSULE ORAL
Status: DISCONTINUED | OUTPATIENT
Start: 2023-07-10 | End: 2023-07-11 | Stop reason: HOSPADM

## 2023-07-10 RX ORDER — ACETAMINOPHEN 500 MG
500 TABLET ORAL EVERY 6 HOURS
Qty: 20 TABLET | Refills: 0 | Status: SHIPPED | OUTPATIENT
Start: 2023-07-10 | End: 2023-07-11

## 2023-07-10 RX ORDER — LIDOCAINE HYDROCHLORIDE 10 MG/ML
INJECTION, SOLUTION EPIDURAL; INFILTRATION; INTRACAUDAL; PERINEURAL AS NEEDED
Status: DISCONTINUED | OUTPATIENT
Start: 2023-07-10 | End: 2023-07-10

## 2023-07-10 RX ORDER — FINASTERIDE 5 MG/1
5 TABLET, FILM COATED ORAL DAILY
Qty: 90 TABLET | Refills: 3 | Status: SHIPPED | OUTPATIENT
Start: 2023-07-10 | End: 2023-07-11 | Stop reason: SDUPTHER

## 2023-07-10 RX ORDER — SULFAMETHOXAZOLE AND TRIMETHOPRIM 400; 80 MG/1; MG/1
1 TABLET ORAL EVERY 12 HOURS SCHEDULED
Qty: 6 TABLET | Refills: 0 | Status: SHIPPED | OUTPATIENT
Start: 2023-07-10 | End: 2023-07-11

## 2023-07-10 RX ORDER — ONDANSETRON 2 MG/ML
4 INJECTION INTRAMUSCULAR; INTRAVENOUS ONCE AS NEEDED
Status: DISCONTINUED | OUTPATIENT
Start: 2023-07-10 | End: 2023-07-10 | Stop reason: HOSPADM

## 2023-07-10 RX ADMIN — HYDROMORPHONE HYDROCHLORIDE 0.5 MG: 1 INJECTION, SOLUTION INTRAMUSCULAR; INTRAVENOUS; SUBCUTANEOUS at 08:34

## 2023-07-10 RX ADMIN — LIDOCAINE HYDROCHLORIDE 5 MG: 10 INJECTION, SOLUTION EPIDURAL; INFILTRATION; INTRACAUDAL; PERINEURAL at 12:32

## 2023-07-10 RX ADMIN — PROPOFOL 100 MG: 10 INJECTION, EMULSION INTRAVENOUS at 12:32

## 2023-07-10 RX ADMIN — FENTANYL CITRATE 12.5 MCG: 50 INJECTION INTRAMUSCULAR; INTRAVENOUS at 13:27

## 2023-07-10 RX ADMIN — BRINZOLAMIDE 1 DROP: 10 SUSPENSION/ DROPS OPHTHALMIC at 14:33

## 2023-07-10 RX ADMIN — HEPARIN SODIUM 5000 UNITS: 5000 INJECTION INTRAVENOUS; SUBCUTANEOUS at 05:04

## 2023-07-10 RX ADMIN — CIPROFLOXACIN 400 MG: 2 INJECTION, SOLUTION INTRAVENOUS at 22:15

## 2023-07-10 RX ADMIN — LATANOPROST 1 DROP: 50 SOLUTION OPHTHALMIC at 22:14

## 2023-07-10 RX ADMIN — INSULIN GLARGINE 15 UNITS: 100 INJECTION, SOLUTION SUBCUTANEOUS at 22:11

## 2023-07-10 RX ADMIN — FENTANYL CITRATE 25 MCG: 50 INJECTION INTRAMUSCULAR; INTRAVENOUS at 13:17

## 2023-07-10 RX ADMIN — BRIMONIDINE TARTRATE 1 DROP: 2 SOLUTION/ DROPS OPHTHALMIC at 14:32

## 2023-07-10 RX ADMIN — ASPIRIN 81 MG: 81 TABLET, COATED ORAL at 08:30

## 2023-07-10 RX ADMIN — BRIMONIDINE TARTRATE 1 DROP: 2 SOLUTION/ DROPS OPHTHALMIC at 22:14

## 2023-07-10 RX ADMIN — SODIUM CHLORIDE 100 ML/HR: 0.9 INJECTION, SOLUTION INTRAVENOUS at 16:20

## 2023-07-10 RX ADMIN — HEPARIN SODIUM 5000 UNITS: 5000 INJECTION INTRAVENOUS; SUBCUTANEOUS at 22:11

## 2023-07-10 RX ADMIN — BRIMONIDINE TARTRATE 1 DROP: 2 SOLUTION/ DROPS OPHTHALMIC at 05:09

## 2023-07-10 RX ADMIN — SODIUM CHLORIDE, SODIUM LACTATE, POTASSIUM CHLORIDE, AND CALCIUM CHLORIDE: .6; .31; .03; .02 INJECTION, SOLUTION INTRAVENOUS at 13:33

## 2023-07-10 RX ADMIN — SODIUM CHLORIDE, SODIUM LACTATE, POTASSIUM CHLORIDE, AND CALCIUM CHLORIDE 75 ML/HR: .6; .31; .03; .02 INJECTION, SOLUTION INTRAVENOUS at 14:37

## 2023-07-10 RX ADMIN — FENTANYL CITRATE 50 MCG: 50 INJECTION INTRAMUSCULAR; INTRAVENOUS at 12:32

## 2023-07-10 RX ADMIN — TAMSULOSIN HYDROCHLORIDE 0.4 MG: 0.4 CAPSULE ORAL at 16:13

## 2023-07-10 RX ADMIN — SODIUM CHLORIDE 75 ML/HR: 0.9 INJECTION, SOLUTION INTRAVENOUS at 04:06

## 2023-07-10 RX ADMIN — PANTOPRAZOLE SODIUM 40 MG: 40 TABLET, DELAYED RELEASE ORAL at 05:04

## 2023-07-10 RX ADMIN — PROPOFOL 80 MCG/KG/MIN: 10 INJECTION, EMULSION INTRAVENOUS at 12:35

## 2023-07-10 RX ADMIN — HEPARIN SODIUM 5000 UNITS: 5000 INJECTION INTRAVENOUS; SUBCUTANEOUS at 14:33

## 2023-07-10 RX ADMIN — ATORVASTATIN CALCIUM 20 MG: 20 TABLET, FILM COATED ORAL at 08:30

## 2023-07-10 RX ADMIN — BRINZOLAMIDE 1 DROP: 10 SUSPENSION/ DROPS OPHTHALMIC at 05:04

## 2023-07-10 RX ADMIN — CIPROFLOXACIN: 2 INJECTION, SOLUTION INTRAVENOUS at 12:36

## 2023-07-10 RX ADMIN — SODIUM CHLORIDE, SODIUM LACTATE, POTASSIUM CHLORIDE, AND CALCIUM CHLORIDE: .6; .31; .03; .02 INJECTION, SOLUTION INTRAVENOUS at 11:49

## 2023-07-10 RX ADMIN — METOPROLOL SUCCINATE 100 MG: 100 TABLET, EXTENDED RELEASE ORAL at 08:30

## 2023-07-10 RX ADMIN — ONDANSETRON 4 MG: 2 INJECTION INTRAMUSCULAR; INTRAVENOUS at 12:49

## 2023-07-10 RX ADMIN — HYDROMORPHONE HYDROCHLORIDE 0.5 MG: 1 INJECTION, SOLUTION INTRAMUSCULAR; INTRAVENOUS; SUBCUTANEOUS at 04:06

## 2023-07-10 RX ADMIN — BRINZOLAMIDE 1 DROP: 10 SUSPENSION/ DROPS OPHTHALMIC at 22:14

## 2023-07-10 NOTE — OP NOTE
OPERATIVE REPORT  PATIENT NAME: Ora Mcintyre    :  1933  MRN: [id number]  Pt Location: AN OR ROOM 04    SURGERY DATE: 7/10/2023    Surgeon(s) and Role:     * Kulwant Mukherjee MD - Primary    Preop Diagnosis:  Ureteral stone [N20.1]    Post-Op Diagnosis Codes:     * Ureteral stone [N20.1]    Procedure(s):  Right - CYSTOSCOPY URETEROSCOPY WITH LITHOTRIPSY HOLMIUM LASER. RETROGRADE PYELOGRAM AND INSERTION STENT URETERAL. STONE BASKET EXTRACTION    Specimen(s):  ID Type Source Tests Collected by Time Destination   A : ureteral calculi  Calculus Ureter, Right STONE ANALYSIS Kulwant Mukherjee MD 7/10/2023 1328        Estimated Blood Loss:   Minimal    Drains:  Urethral Catheter Latex;Coude 18 Fr. (Active)   Number of days: 0       Anesthesia Type:   General    Operative Indications:  Ureteral stone [N20.1]      Operative Findings:  Proximal migration of the ureteral stone is noted, poor visualization with a standard flexible ureteroscope, much improved with a disposable digital scope. Stone encountered in the mid pole calyx, broken with fragmentation settings, and all visible stone fragments were basketed free. Stone free. A 6 Fr x 26 cm ureteral stent was placed along with a bowman catheter. The foreskin was reduced down over the head of the penis. Complications:   None    Procedure and Technique:        PROCEDURES PERFORMED:  1) Cystoscopy  2) Right retrograde pyelography with fluoroscopic interpretation  3) Right ureteroscopy with laser lithotripsy and basket extraction of stone  4) Right ureteral stent placement (6F x 26cm)    SURGEON:  Kulwant Mukherjee MD    ASSISTANTS:  none    NOTE:  There were no qualified teaching residents to assist with this case    ANESTHESIA: General     COMPLICATIONS:   None    ANTIBIOTICS:  cipro    INTRAOPERATIVE THROMBOEMBOLISM PROPHYLAXIS:  Pneumatic compression stockings       FINDINGS:    1. The Right calculus was not radiopaque on plain fluoroscopy.   2. Retrograde pyelogram was performed on the Right side using a 5 Fr open ended catheter. Approximately 15 mL contrast was injected. 3. The following findings were noted: moderate hydronephrosis      INDICATIONS FOR PROCEDURE:  Jalen Juan is an 80 y.o. old male with a Right ureteral calculus. After discussing the options for treatment, including medical expulsive therapy, extracorporeal shockwave lithotripsy, and ureteroscopy, the patient elected to undergo ureteroscopy and ureteral stent placement. We discussed the procedure in detail, the alternatives, and the risks, and they signed informed consent to proceed (these are outlined in the surgical consent form). PROCEDURE IN DETAIL:     The patient was identified by name, date of birth, and MRN  and brought to the OR. Antibiotic prophylaxis and DVT prophylaxis were administered as per the guidelines. They were placed in the dorsal lithotomy position with care to pad all pressure points. They were prepped and draped in the usual sterile fashion. A surgical time out was performed with all in the room in agreement with the correct patient, procedure, indications, and laterality. A 21-Portuguese rigid cystoscope was used to enter the bladder. The bladder was inspected in its entirety and there were no lesions noted. The ureteral orifices were identified in their normal orthotopic positions. Trabeculations noted, bladder stones present, one 2 + cm in size, enlargement of the prostate noted as well    The Right ureteral orifice was identified and a 5 Fr open ended catheter was placed into the ureteral orifice. A retrograde pyelogram was performed with the findings as described above. A wire was advanced up to the kidney under fluoroscopic guidance. Leaving this safety wire in place, the bladder was drained. A "7.5 Fr semi-rigid ureteroscope was advanced up the ureter under vision . No stone was noted. Suspecting proximal migration a 12/14 access sheath was placed. The stone was encountered in the mid pole calyceal region location. The stone was not noted to be impacted. A holmium laser fiber was passed through the ureteroscope and laser lithotripsy was commenced at settings of 1.2 J and 5 Hz. The stones were fragmented to very small pieces and dust.  Visualization was quite poor at this point. A disposable ureteroscope was opened and with better flow and a digital image processor we were able to see again. Once we were satisfied that the stone was fragmented to dust, a 1.9 [place] zero tip nitinol basket was passed through the ureteroscope. The residual fragments were basketed out and removed. The ureteroscope was backed down the ureter under vision and there were no residual fragments and the ureter was noted to be intact with no injury and severe edema where the stone had been located. A 6 Fr x 26 cm JJ stent was then passed up the wire  under fluoroscopic guidance into the kidney with a good curl noted in the kidney and in the bladder. The stent string was removed. The bladder was drained. All instrument counts and sponge counts were correct. The patient was placed back into the supine position, awakened from general anesthesia and brought to recovery room in stable condition. ESTIMATED BLOOD LOSS:  Minimal      DRAINS:   Urethral Catheter Latex;Coude 18 Fr. (Active)       SPECIMENS:   Order Name Source Comment Collection Info Order Time   STONE ANALYSIS Ureter, Right ureteral calculi  Collected By: Matt Torres MD 7/10/2023  1:29 PM        IMPLANTS:   Implant Name Type Inv.  Item Serial No.  Lot No. LRB No. Used Action   STENT URETERAL 6FR 420 W Magnetic OPTIMA W/NITINOL GDWR - [id number]  STENT URETERAL 6FR 22CM INLAY OPTIMA W/NITINOL [facility] [id number] Right 1 Implanted        COMPLICATIONS: none    DISPOSITION: PACU     PLAN:  The patient may undergo a trial of void tomorrow morning, his kidney function can be trended to look for improvement of KATE. We will arrange for cystoscopy and ureteral stent removal in our office in some weeks. I was present for the entire procedure. and A qualified resident physician was not available.     Patient Disposition:  PACU         SIGNATURE: Kobi Graf MD  DATE: July 10, 2023  TIME: 1:34 PM

## 2023-07-10 NOTE — ASSESSMENT & PLAN NOTE
· History of bioprosthetic AVR  · Also had mild to moderate mitral regurgitation on last echocardiogram which was 2 years ago  · Loud murmur on exam should be reassessed by repeat echocardiogram outpatient, as he is not having any active symptoms

## 2023-07-10 NOTE — PLAN OF CARE
Problem: PAIN - ADULT  Goal: Verbalizes/displays adequate comfort level or baseline comfort level  Description: Interventions:  - Encourage patient to monitor pain and request assistance  - Assess pain using appropriate pain scale  - Administer analgesics based on type and severity of pain and evaluate response  - Implement non-pharmacological measures as appropriate and evaluate response  - Consider cultural and social influences on pain and pain management  - Notify physician/advanced practitioner if interventions unsuccessful or patient reports new pain  Outcome: Progressing     Problem: INFECTION - ADULT  Goal: Absence or prevention of progression during hospitalization  Description: INTERVENTIONS:  - Assess and monitor for signs and symptoms of infection  - Monitor lab/diagnostic results  - Monitor all insertion sites, i.e. indwelling lines, tubes, and drains  - Monitor endotracheal if appropriate and nasal secretions for changes in amount and color  - [place] appropriate cooling/warming therapies per order  - Administer medications as ordered  - Instruct and encourage patient and family to use good hand hygiene technique  - Identify and instruct in appropriate isolation precautions for identified infection/condition  Outcome: Progressing  Goal: Absence of fever/infection during neutropenic period  Description: INTERVENTIONS:  - Monitor WBC    Outcome: Progressing     Problem: SAFETY ADULT  Goal: Patient will remain free of falls  Description: INTERVENTIONS:  - Educate patient/family on patient safety including physical limitations  - Instruct patient to call for assistance with activity   - Consult OT/PT to assist with strengthening/mobility   - Keep Call bell within reach  - Keep bed low and locked with side rails adjusted as appropriate  - Keep care items and personal belongings within reach  - Initiate and maintain comfort rounds  - Make Fall Risk Sign visible to staff  - Offer Toileting every  Hours, in advance of need  - Initiate/Maintain alarm  - Obtain necessary fall risk management equipment:   - Apply yellow socks and bracelet for high fall risk patients  - Consider moving patient to room near nurses station  Outcome: Progressing  Goal: Maintain or return to baseline ADL function  Description: INTERVENTIONS:  -  Assess patient's ability to carry out ADLs; assess patient's baseline for ADL function and identify physical deficits which impact ability to perform ADLs (bathing, care of mouth/teeth, toileting, grooming, dressing, etc.)  - Assess/evaluate cause of self-care deficits   - Assess range of motion  - Assess patient's mobility; develop plan if impaired  - Assess patient's need for assistive devices and provide as appropriate  - Encourage maximum independence but intervene and supervise when necessary  - Involve family in performance of ADLs  - Assess for home care needs following discharge   - Consider OT consult to assist with ADL evaluation and planning for discharge  - Provide patient education as appropriate  Outcome: Progressing  Goal: Maintains/Returns to pre admission functional level  Description: INTERVENTIONS:  - Perform BMAT or MOVE assessment daily.   - Set and communicate daily mobility goal to care team and patient/family/caregiver. - Collaborate with rehabilitation services on mobility goals if consulted  - Perform Range of Motion  times a day. - Reposition patient every  hours.   - Dangle patient  times a day  - Stand patient  times a day  - Ambulate patient  times a day  - Out of bed to chair  times a day   - Out of bed for meals  times a day  - Out of bed for toileting  - Record patient progress and toleration of activity level   Outcome: Progressing     Problem: DISCHARGE PLANNING  Goal: Discharge to home or other facility with appropriate resources  Description: INTERVENTIONS:  - Identify barriers to discharge w/patient and caregiver  - Arrange for needed discharge resources and transportation as appropriate  - Identify discharge learning needs (meds, wound care, etc.)  - Arrange for interpretive services to assist at discharge as needed  - Refer to Case Management Department for coordinating discharge planning if the patient needs post-hospital services based on physician/advanced practitioner order or complex needs related to functional status, cognitive ability, or social support system  Outcome: Progressing     Problem: Knowledge Deficit  Goal: Patient/family/caregiver demonstrates understanding of disease process, treatment plan, medications, and discharge instructions  Description: Complete learning assessment and assess knowledge base.   Interventions:  - Provide teaching at level of understanding  - Provide teaching via preferred learning methods  Outcome: Progressing     Problem: Prexisting or High Potential for Compromised Skin Integrity  Goal: Skin integrity is maintained or improved  Description: INTERVENTIONS:  - Identify patients at risk for skin breakdown  - Assess and monitor skin integrity  - Assess and monitor nutrition and hydration status  - Monitor labs   - Assess for incontinence   - Turn and reposition patient  - Assist with mobility/ambulation  - Relieve pressure over bony prominences  - Avoid friction and shearing  - Provide appropriate hygiene as needed including keeping skin clean and dry  - Evaluate need for skin moisturizer/barrier cream  - Collaborate with interdisciplinary team   - Patient/family teaching  - Consider wound care consult   Outcome: Progressing     Problem: MOBILITY - ADULT  Goal: Maintain or return to baseline ADL function  Description: INTERVENTIONS:  -  Assess patient's ability to carry out ADLs; assess patient's baseline for ADL function and identify physical deficits which impact ability to perform ADLs (bathing, care of mouth/teeth, toileting, grooming, dressing, etc.)  - Assess/evaluate cause of self-care deficits   - Assess range of motion  - Assess patient's mobility; develop plan if impaired  - Assess patient's need for assistive devices and provide as appropriate  - Encourage maximum independence but intervene and supervise when necessary  - Involve family in performance of ADLs  - Assess for home care needs following discharge   - Consider OT consult to assist with ADL evaluation and planning for discharge  - Provide patient education as appropriate  Outcome: Progressing  Goal: Maintains/Returns to pre admission functional level  Description: INTERVENTIONS:  - Perform BMAT or MOVE assessment daily.   - Set and communicate daily mobility goal to care team and patient/family/caregiver. - Collaborate with rehabilitation services on mobility goals if consulted  - Perform Range of Motion  times a day. - Reposition patient every  hours.   - Dangle patient  times a day  - Stand patient  times a day  - Ambulate patient  times a day  - Out of bed to chair  times a day   - Out of bed for meals times a day  - Out of bed for toileting  - Record patient progress and toleration of activity level   Outcome: Progressing

## 2023-07-10 NOTE — ASSESSMENT & PLAN NOTE
· Acute right flank pain with nausea and vomiting  · CT abdomen: Mild to moderate right hydroureteronephrosis due to an obstructing calculus in the proximal ureter measuring 0.5 cm. · IVF/pain control/supportive care.  Urine clear  · Urology consulted in ED, anticipate plan for surgical intervention today  · Add flomax

## 2023-07-10 NOTE — ANESTHESIA PREPROCEDURE EVALUATION
Procedure:  CYSTOSCOPY URETEROSCOPY WITH LITHOTRIPSY HOLMIUM LASER, RETROGRADE PYELOGRAM AND INSERTION STENT URETERAL (Right: Bladder)    Obstructing stone w/ nausea and vomitting  - WBC 8.6, Cr. 1.7    CAD s/p CABG  - on Metoprolol    Echo 2021  EF 60%, mod MR, AVR w/ normal function    Relevant Problems   CARDIO   (+) Coronary artery disease   (+) Coronary artery disease involving native coronary artery of native heart without angina pectoris   (+) Essential hypertension   (+) Hyperlipidemia   (+) Right leg DVT (HCC)      ENDO   (+) DM2 (diabetes mellitus, type 2) ([facility])      GI/HEPATIC   (+) GI bleeding   (+) Gastroesophageal reflux disease   (+) Other dysphagia   (+) Rectal bleeding      /RENAL   (+) 0.5 cm proximal reteral stone with R hydronephrosis   (+) Acute kidney injury superimposed on chronic kidney disease vs progressive CKD   (+) Chronic renal failure syndrome   (+) 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)   (+) Stage 3 chronic kidney disease ([facility])      HEMATOLOGY   (+) Anemia   (+) Other specified anemias      MUSCULOSKELETAL   (+) Diaphragmatic hernia   (+) Low back pain      NEURO/PSYCH   (+) New daily persistent headache        Physical Exam    Airway    Mallampati score: III  TM Distance: <3 FB  Neck ROM: limited     Dental   No notable dental hx     Cardiovascular  Rhythm: regular, Rate: normal, Cardiovascular exam normal    Pulmonary  Pulmonary exam normal Breath sounds clear to auscultation,     Other Findings        Anesthesia Plan  ASA Score- 3     Anesthesia Type- general with ASA Monitors. Additional Monitors:   Airway Plan: LMA. Comment: Risks/benefits and alternatives discussed with patient including likely possibility of PONV and sore throat, as well as the rare possibilities of aspiration, dental/oropharyngeal/ocular injuries, or grave/life threatening anesthetic and surgical emergencies. .       Plan Factors-Exercise tolerance (METS): >4 METS.     Patient summary reviewed. Patient instructed to abstain from smoking on day of procedure. Patient did not smoke on day of surgery. Induction- intravenous. Postoperative Plan- Plan for postoperative opioid use. Planned trial extubation    Informed Consent- Anesthetic plan and risks discussed with patient. I personally reviewed this patient with the CRNA. Discussed and agreed on the Anesthesia Plan with the CRNA. Ariela Banda

## 2023-07-10 NOTE — CONSULTS
CONSULT    Patient Name: Jorden Whelan  Patient MRN: [id number]  Date of Service: 7/10/2023   Date / Time Note Created: 7/10/2023 7:59 AM   Referring Provider: Seng Alicea MD    Provider Creating Note: SERAFIN Bernabe    PCP: Iraida Villanueva  Attending Provider:  Seng Alicea MD    Reason for Consult: Flank Pain    HPI: Jorden Whelan is a pleasant 70-year-old male not previously known to our service with remote history of nephrolithiasis, unclear surgical management. But patient reports seeing urologist many years ago for stone treatment "before blasting invented". He presented to [facility] emergency room with a chief complaint of right-sided flank pain radiating to the right lower quadrant accompanied by nausea without fever, chills, dysuria or gross hematuria. Patient was admitted for acute kidney injury with serum creatinine between 1.6--1.7. Urine analysis negative for infection. No white count. CT examination revealed moderate right hydronephrosis secondary to proximal right ureteral 5 mm calculus, right renal unit inflammation. Lower tract significant for substantial prostatic megaly as well as bladder calculi measuring 1.9 cm and 3 mm. Urologic consultation requested for further management recommendations.     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)   • Mitral valve disease   • Vertigo   • NPDR (nonproliferative diabetic retinopathy) ([facility])   • Chronic renal failure syndrome   • Bilateral carotid artery stenosis   • S/P AVR bioprothetic    • Spinal stenosis in cervical region   • PVD (peripheral vascular disease) ([facility])   • Coronary artery disease involving native coronary artery of native heart without angina pectoris   • Pain in joints   • Myalgia   • BMI 24.0-24.9, adult   • Acute pulmonary embolism ([facility])   • Moderate protein-calorie malnutrition ([facility])   • GI bleeding   • Encounter for support and coordination of transition of care   • Right leg DVT ([address])   • Rectal bleeding   • Anemia   • History of pulmonary embolism and DVT   • BMI 26.0-26.9,adult   • BMI 25.0-25.9,adult   • DM2 (diabetes mellitus, type 2) ([facility])   • BMI 27.0-27.9,adult   • Other specified anemias   • Other dysphagia   • Skin rash   • New daily persistent headache   • Left upper quadrant pain   • Esophageal dysmotility   • Acute kidney injury superimposed on chronic kidney disease ([facility])   • 0.5 cm proximal reteral stone with R hydronephrosis            Impressions  · Right Ureteral Calculus  · Right hydronephrosis  · Renal Colic  · KATE    Recommendations  1. Initiate aggressive IVFs   2. Flomax  3. Analgesia/Narcotics   4. Anti-emetics   5. ATBs  6. Strain urine   7. NPO for OR today. Explained risk, benefits and potential complications of ureteroscopic stone extraction. Patient has verbalized understanding of possible need for ureteral stent only for any signs of infection and/or technical difficult prohibiting stone retrieval etc.; requiring staged ureteroscopy electively once recovered and infection free as OP. Formal consent by surgeon. Past Medical History:   Diagnosis Date   • Anemia 6/22/2021   • Bilateral carotid artery stenosis 1/4/2021    <50% on doppler 7/27/2018.  No change from Aug/2016   • Bilateral carotid bruits    • BMI 25.0-25.9,adult 4/6/2021   • BMI 25.0-25.9,adult 8/31/2021   • BMI 26.0-26.9,adult 8/31/2021   • BMI 27.0-27.9,adult 12/13/2022   • Coronary artery disease involving native coronary artery of native heart without angina pectoris 4/6/2021   • CTS (carpal tunnel syndrome)    • Deviated septum    • Diabetes (HCC)    • 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HCC)    • Dizziness    • Double vision    • Ear problems    • Encounter for support and coordination of transition of care 6/22/2021   • Esophageal dysmotility 6/6/2023   • Fatigue    • General weakness    • GERD (gastroesophageal reflux disease)    • Hearing impaired person, bilateral    • HL (hearing loss)    • Hypertension    • Left upper quadrant pain 6/6/2023   • Medicare annual wellness visit, subsequent 8/31/2021   • Microscopic hematuria    • Myalgia 4/6/2021   • New daily persistent headache 3/14/2023   • Other dysphagia 12/13/2022   • Other headache syndrome 3/14/2023   • Other specified anemias 12/13/2022   • Pain in joints 4/6/2021   • PVD (peripheral vascular disease) ([address]) 4/2/2021   • Rectal bleeding 6/22/2021   • Renal calculi    • Right leg DVT ([address]) 6/22/2021   • Right lumbar radiculopathy    • S/P AVR (aortic valve replacement) 4/2/2021   • Skin rash 12/13/2022   • SOB (shortness of breath) on exertion    • Spinal stenosis in cervical region 4/2/2021   • Tachycardia    • Type 2 diabetes mellitus with stage 3a chronic kidney disease, with long-term current use of insulin ([address]) 9/8/2022   • Type 2 diabetes mellitus without complication, with long-term current use of insulin ([address]) 3/14/2023   • Type 2 diabetes mellitus, without long-term current use of insulin ([address]) 9/8/2022   • Urinary frequency    • Vertigo 1/4/2021       Past Surgical History:   Procedure Laterality Date   • AORTIC VALVE REPLACEMENT  09/2016   • BYPASS GRAFT     • CARPAL TUNNEL RELEASE Right     by Dr. Marysol Dsouza   • CATARACT EXTRACTION, BILATERAL     • COLONOSCOPY  09/16/2015   • CORONARY ARTERY BYPASS GRAFT  09/2016    x1    • HAND SURGERY Left    • URETERECTOMY Family History   Family history unknown: Yes       Social History     Socioeconomic History   • Marital status: /Civil Union     Spouse name: Not on file   • Number of children: Not on file   • Years of education: Not on file   • Highest education level: Not on file   Occupational History   • Not on file   Tobacco Use   • Smoking status: Former   • Smokeless tobacco: Never   • Tobacco comments:     Never smoker - As per [employer]   Vaping Use   • Vaping Use: Never used   Substance and Sexual Activity   • Alcohol use: Not Currently   • Drug use: Never   • Sexual activity: Not Currently   Other Topics Concern   • Not on file   Social History Narrative    Monthly foot exam: Sees podiatrist [id number], Dr. Orelia Najjar    Annual eye exam: Sees ophthal [id number], Dr. Rola Dang    PSA: [id number] urology    Annual dental checkup: Sees dentist    - As per [employer]     Social Determinants of Health     Financial Resource Strain: Low Risk  (9/8/2022)    Overall Financial Resource Strain (CARDIA)    • Difficulty of Paying Living Expenses: Not very hard   Food Insecurity: Not on file   Transportation Needs: No Transportation Needs (9/8/2022)    PRAPARE - Transportation    • Lack of Transportation (Medical): No    • Lack of Transportation (Non-Medical):  No   Physical Activity: Not on file   Stress: Not on file   Social Connections: Not on file   Intimate Partner Violence: Not on file   Housing Stability: Not on file       Allergies   Allergen Reactions   • Lisinopril Cough   • Penicillin G Hives   • Penicillins        Review of Systems  10 point review of systems negative except as noted in HPI  Constitutional:   negative for - chills or fever  Cardiovascular:   no chest pain or dyspnea on exertion  Gastrointestinal:   positive for - abdominal pain and nausea/vomiting  Genito-Urinary:   no dysuria, trouble voiding, or hematuria  Neurological:   no TIA or stroke symptoms     Chart Review   Allergies, current medications, history, problem list    Vital Signs  /88   Pulse 80   Temp 98.4 °F (36.9 °C)   Resp 19   Wt 72.6 kg (160 lb 0.9 oz)   SpO2 95%   BMI 25.07 kg/m²     Physical Exam  General appearance: alert and oriented, in no acute distress, appears stated age, cooperative and no distress  Head: Normocephalic, without obvious abnormality, atraumatic  Neck: no adenopathy, no carotid bruit, no JVD, supple, symmetrical, trachea midline and thyroid not enlarged, symmetric, no tenderness/mass/nodules  Lungs: diminished breath sounds  Heart: regular rate and rhythm, S1, S2 normal, no murmur, click, rub or gallop  Abdomen: soft, non-tender; bowel sounds normal; no masses,  no organomegaly  Extremities: extremities normal, warm and well-perfused; no cyanosis, clubbing, or edema  Pulses: 2+ and symmetric  Neurologic: Grossly normal  No urinary drains     Laboratory Studies  Lab Results   Component Value Date    HGBA1C 7.5 03/02/2023    HGBA1C 6.5 (H) 07/08/2021    K 4.0 07/10/2023     (H) 07/10/2023    CO2 20 (L) 07/10/2023    CREATININE 1.70 (H) 07/10/2023    BUN 33 (H) 07/10/2023    MG 2.3 06/09/2021    PHOS 3.1 06/08/2021     Lab Results   Component Value Date    WBC 8.60 07/10/2023    RBC 3.98 07/10/2023    HGB 12.5 07/10/2023    HCT 37.6 07/10/2023    MCV 95 07/10/2023    MCH 31.4 07/10/2023    RDW 12.7 07/10/2023     (L) 07/10/2023         Imaging and Other Studies  ). CT abdomen pelvis wo contrast    Result Date: 7/9/2023  Narrative: CT ABDOMEN AND PELVIS WITHOUT IV CONTRAST INDICATION:   rt flank pain, rlq abd pain. COMPARISON: CT abdomen pelvis from outside facility 3/20/2018 TECHNIQUE:  CT examination of the abdomen and pelvis was performed without intravenous contrast. Multiplanar 2D reformatted images were created from the source data.  This examination, like all CT scans performed in the [facility], was performed utilizing techniques to minimize radiation dose exposure, including the use of iterative reconstruction and automated exposure control. Radiation dose length product (DLP) for this visit:  616 mGy-cm Enteric contrast was not administered. FINDINGS: ABDOMEN LOWER CHEST: Large hiatal hernia enlarged from prior. Coronary artery and valvular calcifications. LIVER/BILIARY TREE:  Unremarkable. GALLBLADDER:  No calcified gallstones. No pericholecystic inflammatory change. SPLEEN:  Unremarkable. PANCREAS: Scattered pancreatic calcifications suggest chronic pancreatitis. ADRENAL GLANDS:  Unremarkable. KIDNEYS/URETERS: Mild to moderate right hydroureteronephrosis due to an obstructing calculus in the proximal ureter measuring 0.5 cm. Right perinephric and periureteral edema. STOMACH AND BOWEL:  Unremarkable. APPENDIX: Normal. ABDOMINOPELVIC CAVITY:  No ascites. No pneumoperitoneum. No lymphadenopathy. VESSELS: Advanced atherosclerosis. Ectasia of the infrarenal abdominal aorta measuring 2.7 cm, unchanged. PELVIS REPRODUCTIVE ORGANS: Prostatomegaly. URINARY BLADDER: Bladder calculi measuring 1.9 cm and 0.3 cm. ABDOMINAL WALL/INGUINAL REGIONS: Small fat-containing left inguinal hernia. OSSEOUS STRUCTURES: No acute fracture or destructive osseous lesion. Spinal degenerative changes are noted. Impression: 1. Mild to moderate right hydroureteronephrosis due to an obstructing calculus in the proximal ureter measuring 0.5 cm. 2. Bladder calculi measuring 1.9 cm and 0.3 cm. 3. Large hiatal hernia enlarged from prior. The study was marked in [facility] for immediate notification. Workstation performed: [id number]     US abdomen complete    Result Date: 6/20/2023  Narrative: ABDOMEN ULTRASOUND, COMPLETE INDICATION:   N18.31: Chronic kidney disease, stage 3a R10.12: Left upper quadrant pain. COMPARISON:  None TECHNIQUE:   Real-time ultrasound of the abdomen was performed with a curvilinear transducer with both volumetric sweeps and still imaging techniques.  FINDINGS: PANCREAS: Portions of the pancreas are obscured by bowel gas. Visualized portions of the pancreas are unremarkable. AORTA AND IVC:  Visualized portions are normal for patient age. LIVER: Size:  Within normal range. The liver measures 13.2 cm in the midclavicular line. Contour:  Surface contour is smooth. Parenchyma:  Echogenicity and echotexture are within normal limits. No liver mass identified. Limited imaging of the main portal vein shows it to be patent and hepatopetal. BILIARY: No gallbladder findings. No intrahepatic biliary dilatation. CBD measures 5.0 mm. No choledocholithiasis. KIDNEY: Right kidney measures 10.1 x 4.5 x 3.4  cm. Volume 81.3 mL There is a cyst measuring 0.6 x 0.7 x 0.5 cm. Left kidney measures 10.4 x 5.2 x 3.2 cm. Volume 91.3 mL There is a cyst measuring 0.8 x 0.8 x 0.7 cm. SPLEEN: Measures 9.2 x 8.8 x 3.8 cm. Volume 160.3 mL Within normal limits. ASCITES:  None.      Impression: Bilateral renal cysts Workstation performed: [id number]       Medications   Current Facility-Administered Medications   Medication Dose Route Frequency Provider Last Rate   • acetaminophen  650 mg Oral Q6H PRN Cameron Estevez MD     • aspirin  81 mg Oral Daily Cameron Estevez MD     • atorvastatin  20 mg Oral Daily Cameron Estevez MD     • brimonidine tartrate  1 drop Both Eyes Q8H [address] Cameron Estevez MD     • brinzolamide  1 drop Both Eyes Q8H [address] Cameron Estevez MD     • heparin (porcine)  5,000 Units Subcutaneous [facility] Cameron Estevez MD     • HYDROmorphone  0.5 mg Intravenous Q3H PRN Cameron Estevez MD     • insulin glargine  15 Units Subcutaneous HS Cameron Estevez MD     • insulin lispro  1-5 Units Subcutaneous TID AC Cameron Estevez MD     • latanoprost  1 drop Both Eyes HS Cameron Estevez MD     • metoprolol succinate  100 mg Oral Daily Cameron Estevez MD     • ondansetron  4 mg Intravenous Q4H PRN Cameron Estevez MD     • oxyCODONE  5 mg Oral Q4H PRN Cameron Estevez MD     • pantoprazole  40 mg Oral Early Morning Cameron Estevez MD     • sodium chloride  100 mL/hr Intravenous Continuous Esme Gordon PA-C 75 mL/hr (07/10/23 [id number])   • tamsulosin  0.4 mg Oral Daily With [address] [place] Dobbins CRNP

## 2023-07-10 NOTE — ASSESSMENT & PLAN NOTE
· History of pulmonary embolism and DVT in 2021. He completed 6 months course of AC.  DVT prophalaxis

## 2023-07-10 NOTE — ASSESSMENT & PLAN NOTE
Lab Results   Component Value Date    HGBA1C 7.5 03/02/2023     Recent Labs     07/09/23  1630 07/09/23  2213   POCGLU 165* 175*     Blood Sugar Average: Last 72 hrs:  (P) 170 overall good blood sugar control at this time  · Hold oral hypoglycemics.  Continue Lantus   · Due for A1c which can be sent with patient's blood work in the morning

## 2023-07-10 NOTE — TELEPHONE ENCOUNTER
Tentatively scheduled patient for cysto/stent removal in our office in 3 weeks per Dr. Brooke Freeman.  Will call and confirm once d/c

## 2023-07-10 NOTE — TELEPHONE ENCOUNTER
S/p right URS and LL, has a 6 fr x 26 cm right ureteral stent. Please schedule him for cystoscopy and ureteral stent removal in ~3 weeks. He can be ordered for a follow up renal u/s at that visit. I have started him on finasteride as well which he should take as a maintenance medication.

## 2023-07-10 NOTE — PROGRESS NOTES
[facility]  Progress Note  Name: Zachariah Chowdary  MRN: [id number]  Unit/Bed#: W -01 I Date of Admission: 7/9/2023   Date of Service: 7/10/2023 I Hospital Day: 0    Assessment/Plan   * 0.5 cm proximal reteral stone with R hydronephrosis  Assessment & Plan  · Acute right flank pain with nausea and vomiting  · CT abdomen: Mild to moderate right hydroureteronephrosis due to an obstructing calculus in the proximal ureter measuring 0.5 cm. · IVF/pain control/supportive care. Urine clear  · Urology consulted in ED, anticipate plan for surgical intervention today  · Add flomax    Acute kidney injury superimposed on chronic kidney disease vs progressive CKD  Assessment & Plan  Lab Results   Component Value Date    EGFR 34 07/10/2023    EGFR 37 07/09/2023    EGFR 42 12/03/2021    CREATININE 1.70 (H) 07/10/2023    CREATININE 1.62 (H) 07/09/2023    CREATININE 1.48 (H) 12/03/2021   · Baseline creatinine 1.4 as of 2 years ago prerenal without any updated recent lab work available. Patient presented with creatinine 1.62 which linda further today to 1.7 suggesting at least some element of acute kidney injury  · Suspect this is 2/2 poor intake and vomiting and obstructive due to stone  · Recommend IV fluids and stone treatment  IVF. Anticipate improvement after urological intervention     DM2 (diabetes mellitus, type 2) [facility])  Assessment & Plan  Lab Results   Component Value Date    HGBA1C 7.5 03/02/2023     Recent Labs     07/09/23  1630 07/09/23  2213   POCGLU 165* 175*     Blood Sugar Average: Last 72 hrs:  (P) 170 overall good blood sugar control at this time  · Hold oral hypoglycemics. Continue Lantus   · Due for A1c which can be sent with patient's blood work in the morning    History of pulmonary embolism and DVT  Assessment & Plan  · History of pulmonary embolism and DVT in 2021. He completed 6 months course of AC.  DVT prophalaxis    S/P AVR bioprothetic   Assessment & Plan  · History of bioprosthetic AVR  · Also had mild to moderate mitral regurgitation on last echocardiogram which was 2 years ago  · Loud murmur on exam should be reassessed by repeat echocardiogram outpatient, as he is not having any active symptoms    Coronary artery disease  Assessment & Plan  · Continue aspirin, statin, beta-blocker         VTE Pharmacologic Prophylaxis: VTE Score: 5 sc heparin    Patient Centered Rounds: I performed bedside rounds with nursing staff today. Discussions with Specialists or Other Care Team Provider:     Education and Discussions with Family / Patient:   Total Time Spent on Date of Encounter in care of patient: 25 minutes This time was spent on one or more of the following: performing physical exam; counseling and coordination of care; obtaining or reviewing history; documenting in the medical record; reviewing/ordering tests, medications or procedures; communicating with other healthcare professionals and discussing with patient's family/caregivers. Current Length of Stay: 0 day(s)  Current Patient Status: Observation   Certification Statement: The patient, admitted on an observation basis, will now require > 2 midnight hospital stay due to awaiting stone treatment; KATE  Discharge Plan: Anticipate discharge tomorrow to home. Code Status: Level 1 - Full Code    Subjective:   Patient reports his pain is currently under better control with medicine and he has not had any additional events of nausea or vomiting. He feels that he is passing urine without difficulty. He denies any chest pain, dizziness, shortness of breath. Only complaint is discomfort from his IV site    Objective:     Vitals:   Temp (24hrs), Av.1 °F (36.7 °C), Min:97.8 °F (36.6 °C), Max:98.4 °F (36.9 °C)    Temp:  [97.8 °F (36.6 °C)-98.4 °F (36.9 °C)] 98.4 °F (36.9 °C)  HR:  [52-80] 80  Resp:  [14-21] 19  BP: (118-160)/(63-94) 160/88  SpO2:  [95 %-99 %] 95 %  Body mass index is 25.07 kg/m².      Input and Output Summary (last 24 hours): Intake/Output Summary (Last 24 hours) at 7/10/2023 0801  Last data filed at 7/10/2023 0442  Gross per 24 hour   Intake --   Output 200 ml   Net -200 ml       Physical Exam:   Physical Exam  Vitals reviewed. Constitutional:       General: He is not in acute distress. Appearance: Normal appearance. He is normal weight. He is not ill-appearing, toxic-appearing or diaphoretic. Comments: Well-appearing gentleman seen laying in bed, looks younger than stated age and not currently in any distress   Eyes:      General: No scleral icterus. Right eye: No discharge. Left eye: No discharge. Conjunctiva/sclera: Conjunctivae normal.   Cardiovascular:      Rate and Rhythm: Normal rate and regular rhythm. Heart sounds: Murmur (Loud murmur noted) heard. Pulmonary:      Effort: No respiratory distress. Breath sounds: No stridor. No wheezing, rhonchi or rales. Abdominal:      General: There is no distension. Tenderness: There is no guarding. Musculoskeletal:      Right lower leg: No edema. Left lower leg: No edema. Skin:     General: Skin is warm and dry. Coloration: Skin is not jaundiced or pale. Findings: No bruising, erythema, lesion or rash. Neurological:      General: No focal deficit present. Mental Status: He is alert. Mental status is at baseline. Psychiatric:         Mood and Affect: Mood normal.         Thought Content:  Thought content normal.          Additional Data:     Labs:  Results from last 7 days   Lab Units 07/10/23  0445   WBC Thousand/uL 8.60   HEMOGLOBIN g/dL 12.5   HEMATOCRIT % 37.6   PLATELETS Thousands/uL 147*   NEUTROS PCT % 68   LYMPHS PCT % 20   MONOS PCT % 11   EOS PCT % 1     Results from last 7 days   Lab Units 07/10/23  0445 07/09/23  0935   SODIUM mmol/L 138 141   POTASSIUM mmol/L 4.0 4.4   CHLORIDE mmol/L 109* 107   CO2 mmol/L 20* 24   BUN mg/dL 33* 43*   CREATININE mg/dL 1.70* 1.62*   ANION GAP mmol/L 9 10   CALCIUM mg/dL 8.2* 9.1   ALBUMIN g/dL  --  4.2   TOTAL BILIRUBIN mg/dL  --  0.56   ALK PHOS U/L  --  61   ALT U/L  --  18   AST U/L  --  15   GLUCOSE RANDOM mg/dL 136 168*         Results from last 7 days   Lab Units 07/09/23  2213 07/09/23  1630   POC GLUCOSE mg/dl 175* 165*               Lines/Drains:  Invasive Devices     Peripheral Intravenous Line  Duration           Peripheral IV 07/09/23 Left;Ventral (anterior) Forearm <1 day              Imaging: CT    Recent Cultures (last 7 days):         Last 24 Hours Medication List:   Current Facility-Administered Medications   Medication Dose Route Frequency Provider Last Rate   • acetaminophen  650 mg Oral Q6H PRN Lou Valderrama MD     • aspirin  81 mg Oral Daily Lou Valderrama MD     • atorvastatin  20 mg Oral Daily Lou Valderrama MD     • brimonidine tartrate  1 drop Both Eyes Q8H [address] Lou Valderrama MD     • brinzolamide  1 drop Both Eyes Q8H [address]  Lou Valdrerama MD     • heparin (porcine)  5,000 Units Subcutaneous [facility] Lou Valderrama MD     • HYDROmorphone  0.5 mg Intravenous Q3H PRN Lou Valderrama MD     • insulin glargine  15 Units Subcutaneous HS Lou Valderrama MD     • insulin lispro  1-5 Units Subcutaneous TID AC Lou Valderrama MD     • latanoprost  1 drop Both Eyes  Lou Valderrama MD     • metoprolol succinate  100 mg Oral Daily Lou Valderrama MD     • ondansetron  4 mg Intravenous Q4H PRN Lou Valderrama MD     • oxyCODONE  5 mg Oral Q4H PRN Lou Valderrama MD     • pantoprazole  40 mg Oral Early Morning Lou Valderrama MD     • sodium chloride  100 mL/hr Intravenous Continuous Esme Gordon PA-C 75 mL/hr (07/10/23 0406)   • tamsulosin  0.4 mg Oral Daily With Dinner Mirella Mattson PA-C          Today, Patient Was Seen By: Mirella Mattson PA-C    **Please Note: This note may have been constructed using a voice recognition system. **

## 2023-07-10 NOTE — ANESTHESIA POSTPROCEDURE EVALUATION
Post-Op Assessment Note    CV Status:  Stable  Pain Score: 0    Pain management: adequate     Mental Status:  Arousable and sleepy   Hydration Status:  Euvolemic and stable   PONV Controlled:  Controlled   Airway Patency:  Patent and adequate      Post Op Vitals Reviewed: Yes      Staff: CRNA         No notable events documented.     BP   123/59   Temp      Pulse 50   Resp 16   SpO2 97% NC 2L

## 2023-07-10 NOTE — PLAN OF CARE
Problem: PAIN - ADULT  Goal: Verbalizes/displays adequate comfort level or baseline comfort level  Description: Interventions:  - Encourage patient to monitor pain and request assistance  - Assess pain using appropriate pain scale  - Administer analgesics based on type and severity of pain and evaluate response  - Implement non-pharmacological measures as appropriate and evaluate response  - Consider cultural and social influences on pain and pain management  - Notify physician/advanced practitioner if interventions unsuccessful or patient reports new pain  Outcome: Progressing     Problem: INFECTION - ADULT  Goal: Absence or prevention of progression during hospitalization  Description: INTERVENTIONS:  - Assess and monitor for signs and symptoms of infection  - Monitor lab/diagnostic results  - Monitor all insertion sites, i.e. indwelling lines, tubes, and drains  - Monitor endotracheal if appropriate and nasal secretions for changes in amount and color  - [place] appropriate cooling/warming therapies per order  - Administer medications as ordered  - Instruct and encourage patient and family to use good hand hygiene technique  - Identify and instruct in appropriate isolation precautions for identified infection/condition  Outcome: Progressing  Goal: Absence of fever/infection during neutropenic period  Description: INTERVENTIONS:  - Monitor WBC    Outcome: Progressing     Problem: SAFETY ADULT  Goal: Patient will remain free of falls  Description: INTERVENTIONS:  - Educate patient/family on patient safety including physical limitations  - Instruct patient to call for assistance with activity   - Consult OT/PT to assist with strengthening/mobility   - Keep Call bell within reach  - Keep bed low and locked with side rails adjusted as appropriate  - Keep care items and personal belongings within reach  - Initiate and maintain comfort rounds  - Make Fall Risk Sign visible to staff  - Offer Toileting every  Hours, in advance of need  - Initiate/Maintain alarm  - Obtain necessary fall risk management equipment:   - Apply yellow socks and bracelet for high fall risk patients  - Consider moving patient to room near nurses station  Outcome: Progressing  Goal: Maintain or return to baseline ADL function  Description: INTERVENTIONS:  -  Assess patient's ability to carry out ADLs; assess patient's baseline for ADL function and identify physical deficits which impact ability to perform ADLs (bathing, care of mouth/teeth, toileting, grooming, dressing, etc.)  - Assess/evaluate cause of self-care deficits   - Assess range of motion  - Assess patient's mobility; develop plan if impaired  - Assess patient's need for assistive devices and provide as appropriate  - Encourage maximum independence but intervene and supervise when necessary  - Involve family in performance of ADLs  - Assess for home care needs following discharge   - Consider OT consult to assist with ADL evaluation and planning for discharge  - Provide patient education as appropriate  Outcome: Progressing  Goal: Maintains/Returns to pre admission functional level  Description: INTERVENTIONS:  - Perform BMAT or MOVE assessment daily.   - Set and communicate daily mobility goal to care team and patient/family/caregiver. - Collaborate with rehabilitation services on mobility goals if consulted  - Perform Range of Motion  times a day. - Reposition patient every  hours.   - Dangle patient  times a day  - Stand patient  times a day  - Ambulate patient  times a day  - Out of bed to chair  times a day   - Out of bed for meals  times a day  - Out of bed for toileting  - Record patient progress and toleration of activity level   Outcome: Progressing     Problem: DISCHARGE PLANNING  Goal: Discharge to home or other facility with appropriate resources  Description: INTERVENTIONS:  - Identify barriers to discharge w/patient and caregiver  - Arrange for needed discharge resources and transportation as appropriate  - Identify discharge learning needs (meds, wound care, etc.)  - Arrange for interpretive services to assist at discharge as needed  - Refer to Case Management Department for coordinating discharge planning if the patient needs post-hospital services based on physician/advanced practitioner order or complex needs related to functional status, cognitive ability, or social support system  Outcome: Progressing     Problem: Knowledge Deficit  Goal: Patient/family/caregiver demonstrates understanding of disease process, treatment plan, medications, and discharge instructions  Description: Complete learning assessment and assess knowledge base.   Interventions:  - Provide teaching at level of understanding  - Provide teaching via preferred learning methods  Outcome: Progressing     Problem: Prexisting or High Potential for Compromised Skin Integrity  Goal: Skin integrity is maintained or improved  Description: INTERVENTIONS:  - Identify patients at risk for skin breakdown  - Assess and monitor skin integrity  - Assess and monitor nutrition and hydration status  - Monitor labs   - Assess for incontinence   - Turn and reposition patient  - Assist with mobility/ambulation  - Relieve pressure over bony prominences  - Avoid friction and shearing  - Provide appropriate hygiene as needed including keeping skin clean and dry  - Evaluate need for skin moisturizer/barrier cream  - Collaborate with interdisciplinary team   - Patient/family teaching  - Consider wound care consult   Outcome: Progressing     Problem: MOBILITY - ADULT  Goal: Maintain or return to baseline ADL function  Description: INTERVENTIONS:  -  Assess patient's ability to carry out ADLs; assess patient's baseline for ADL function and identify physical deficits which impact ability to perform ADLs (bathing, care of mouth/teeth, toileting, grooming, dressing, etc.)  - Assess/evaluate cause of self-care deficits   - Assess range of motion  - Assess patient's mobility; develop plan if impaired  - Assess patient's need for assistive devices and provide as appropriate  - Encourage maximum independence but intervene and supervise when necessary  - Involve family in performance of ADLs  - Assess for home care needs following discharge   - Consider OT consult to assist with ADL evaluation and planning for discharge  - Provide patient education as appropriate  Outcome: Progressing  Goal: Maintains/Returns to pre admission functional level  Description: INTERVENTIONS:  - Perform BMAT or MOVE assessment daily.   - Set and communicate daily mobility goal to care team and patient/family/caregiver. - Collaborate with rehabilitation services on mobility goals if consulted  - Perform Range of Motion  times a day. - Reposition patient every  hours.   - Dangle patient  times a day  - Stand patient  times a day  - Ambulate patient  times a day  - Out of bed to chair  times a day   - Out of bed for meals  times a day  - Out of bed for toileting  - Record patient progress and toleration of activity level   Outcome: Progressing

## 2023-07-10 NOTE — ASSESSMENT & PLAN NOTE
Lab Results   Component Value Date    EGFR 34 07/10/2023    EGFR 37 07/09/2023    EGFR 42 12/03/2021    CREATININE 1.70 (H) 07/10/2023    CREATININE 1.62 (H) 07/09/2023    CREATININE 1.48 (H) 12/03/2021   · Baseline creatinine 1.4 as of 2 years ago prerenal without any updated recent lab work available. Patient presented with creatinine 1.62 which linda further today to 1.7 suggesting at least some element of acute kidney injury  · Suspect this is 2/2 poor intake and vomiting and obstructive due to stone  · Recommend IV fluids and stone treatment  IVF.  Anticipate improvement after urological intervention

## 2023-07-10 NOTE — DISCHARGE INSTR - AVS FIRST PAGE
Dear Dnay Mijares,     It was our pleasure to care for you here at [facility]. It is our hope that we were always able to exceed the expected standards for your care during your stay. You were hospitalized due to kidney stone. You were cared for on the 4th floor by Olga Chadwick PA-C under the service of Chuck Whaley MD with the [facility] who covers for your primary care physician (PCP), Ross Rojo MD, while you were hospitalized. If you have any questions or concerns related to this hospitalization, you may contact us at [phone]. For follow up as well as any medication refills, we recommend that you follow up with your primary care physician. A registered nurse will reach out to you by phone within a few days after your discharge to answer any additional questions that you may have after going home. However, at this time we provide for you here, the most important instructions / recommendations at discharge:     Notable Medication Adjustments -   Flomax and Proscar daily  Keflex for the next 3 days  Ditropan 3 times a day to help with stent spasms  Monitor your blood sugars and hold your glipizide to prevent low blood sugars  Tylenol for mild pain. Oxycodone as needed for severe pain. Stool softeners should be taken with oxycodone  Testing Required after Discharge -   Please arrange an outpatient echocardiogram to follow-up on your heart valves  You will need a repeat BMP to follow-up on your kidney levels. While in the hospital your kidney number went up to 1.7 and we are not aware of what your recent outpatient kidney levels were through the [place].   At discharge your number went back down to 1.5  ** Please contact your PCP to request testing orders for any of the testing recommended here **  Important follow up information -   Urology  Family doctor  Other Instructions -   Do not take any NSAID medications as these are dangerous for your kidneys  Please review this entire after visit summary as additional general instructions including medication list, appointments, activity, diet, any pertinent wound care, and other additional recommendations from your care team that may be provided for you. Sincerely,     Braulio Kapoor,    Today, 7/10/23, you had ureteroscopy with laser lithotripsy and stone basket extraction. You have a ureteral stent on the inside of your body, a small plastic tube to help you right kidney drain. This will need to be removed in our office in a few weeks and I will have the office schedule this for you. I will place you on a medication to shrink the size of your large prostate. You do have a sizable bladder stone and you can think about having this removed in the future if you like. This was too large to safely remove today. You may notice some urgency and frequency and some blood in the urine after surgery like this. This is typically self limited.     I wish you a rapid and uneventful recovery,    Dr. Lydia Reynoso

## 2023-07-11 ENCOUNTER — TRANSITIONAL CARE MANAGEMENT (OUTPATIENT)
Dept: INTERNAL MEDICINE CLINIC | Facility: CLINIC | Age: 88
End: 2023-07-11

## 2023-07-11 VITALS
RESPIRATION RATE: 15 BRPM | BODY MASS INDEX: 25.07 KG/M2 | SYSTOLIC BLOOD PRESSURE: 119 MMHG | DIASTOLIC BLOOD PRESSURE: 60 MMHG | HEART RATE: 74 BPM | WEIGHT: 160.05 LBS | OXYGEN SATURATION: 96 % | TEMPERATURE: 99.6 F

## 2023-07-11 LAB
ANION GAP SERPL CALCULATED.3IONS-SCNC: 8 MMOL/L
BASOPHILS # BLD AUTO: 0.02 THOUSANDS/ÂΜL (ref 0–0.1)
BASOPHILS NFR BLD AUTO: 0 % (ref 0–1)
BUN SERPL-MCNC: 23 MG/DL (ref 5–25)
CALCIUM SERPL-MCNC: 7.8 MG/DL (ref 8.4–10.2)
CHLORIDE SERPL-SCNC: 109 MMOL/L (ref 96–108)
CO2 SERPL-SCNC: 21 MMOL/L (ref 21–32)
CREAT SERPL-MCNC: 1.5 MG/DL (ref 0.6–1.3)
EOSINOPHIL # BLD AUTO: 0.06 THOUSAND/ÂΜL (ref 0–0.61)
EOSINOPHIL NFR BLD AUTO: 1 % (ref 0–6)
ERYTHROCYTE [DISTWIDTH] IN BLOOD BY AUTOMATED COUNT: 12.6 % (ref 11.6–15.1)
EST. AVERAGE GLUCOSE BLD GHB EST-MCNC: 163 MG/DL
GFR SERPL CREATININE-BSD FRML MDRD: 40 ML/MIN/1.73SQ M
GLUCOSE SERPL-MCNC: 110 MG/DL (ref 65–140)
GLUCOSE SERPL-MCNC: 116 MG/DL (ref 65–140)
GLUCOSE SERPL-MCNC: 180 MG/DL (ref 65–140)
HBA1C MFR BLD: 7.3 %
HCT VFR BLD AUTO: 35.4 % (ref 36.5–49.3)
HGB BLD-MCNC: 11.8 G/DL (ref 12–17)
IMM GRANULOCYTES # BLD AUTO: 0.04 THOUSAND/UL (ref 0–0.2)
IMM GRANULOCYTES NFR BLD AUTO: 0 % (ref 0–2)
LYMPHOCYTES # BLD AUTO: 1.45 THOUSANDS/ÂΜL (ref 0.6–4.47)
LYMPHOCYTES NFR BLD AUTO: 15 % (ref 14–44)
MCH RBC QN AUTO: 31.4 PG (ref 26.8–34.3)
MCHC RBC AUTO-ENTMCNC: 33.3 G/DL (ref 31.4–37.4)
MCV RBC AUTO: 94 FL (ref 82–98)
MONOCYTES # BLD AUTO: 1.18 THOUSAND/ÂΜL (ref 0.17–1.22)
MONOCYTES NFR BLD AUTO: 12 % (ref 4–12)
NEUTROPHILS # BLD AUTO: 7.07 THOUSANDS/ÂΜL (ref 1.85–7.62)
NEUTS SEG NFR BLD AUTO: 72 % (ref 43–75)
NRBC BLD AUTO-RTO: 0 /100 WBCS
PLATELET # BLD AUTO: 141 THOUSANDS/UL (ref 149–390)
PMV BLD AUTO: 10.1 FL (ref 8.9–12.7)
POTASSIUM SERPL-SCNC: 4 MMOL/L (ref 3.5–5.3)
RBC # BLD AUTO: 3.76 MILLION/UL (ref 3.88–5.62)
SODIUM SERPL-SCNC: 138 MMOL/L (ref 135–147)
WBC # BLD AUTO: 9.82 THOUSAND/UL (ref 4.31–10.16)

## 2023-07-11 PROCEDURE — 85025 COMPLETE CBC W/AUTO DIFF WBC: CPT | Performed by: UROLOGY

## 2023-07-11 PROCEDURE — 99239 HOSP IP/OBS DSCHRG MGMT >30: CPT | Performed by: PHYSICIAN ASSISTANT

## 2023-07-11 PROCEDURE — 99232 SBSQ HOSP IP/OBS MODERATE 35: CPT | Performed by: UROLOGY

## 2023-07-11 PROCEDURE — 82948 REAGENT STRIP/BLOOD GLUCOSE: CPT

## 2023-07-11 PROCEDURE — 80048 BASIC METABOLIC PNL TOTAL CA: CPT | Performed by: UROLOGY

## 2023-07-11 RX ORDER — ACETAMINOPHEN 325 MG/1
975 TABLET ORAL EVERY 8 HOURS PRN
Refills: 0
Start: 2023-07-11

## 2023-07-11 RX ORDER — OXYCODONE HYDROCHLORIDE 5 MG/1
5 TABLET ORAL EVERY 6 HOURS PRN
Qty: 10 TABLET | Refills: 0 | Status: SHIPPED | OUTPATIENT
Start: 2023-07-11

## 2023-07-11 RX ORDER — CEPHALEXIN 500 MG/1
500 CAPSULE ORAL EVERY 8 HOURS SCHEDULED
Qty: 9 CAPSULE | Refills: 0 | Status: SHIPPED | OUTPATIENT
Start: 2023-07-11 | End: 2023-07-14

## 2023-07-11 RX ORDER — OXYBUTYNIN CHLORIDE 5 MG/1
5 TABLET ORAL 3 TIMES DAILY
Qty: 90 TABLET | Refills: 0 | Status: SHIPPED | OUTPATIENT
Start: 2023-07-11

## 2023-07-11 RX ORDER — CEPHALEXIN 500 MG/1
500 CAPSULE ORAL EVERY 8 HOURS SCHEDULED
Status: DISCONTINUED | OUTPATIENT
Start: 2023-07-11 | End: 2023-07-11 | Stop reason: HOSPADM

## 2023-07-11 RX ORDER — FINASTERIDE 5 MG/1
5 TABLET, FILM COATED ORAL DAILY
Qty: 90 TABLET | Refills: 0 | Status: SHIPPED | OUTPATIENT
Start: 2023-07-11 | End: 2023-10-09

## 2023-07-11 RX ORDER — TAMSULOSIN HYDROCHLORIDE 0.4 MG/1
0.4 CAPSULE ORAL
Qty: 30 CAPSULE | Refills: 0 | Status: SHIPPED | OUTPATIENT
Start: 2023-07-11

## 2023-07-11 RX ORDER — OXYBUTYNIN CHLORIDE 5 MG/1
5 TABLET ORAL 3 TIMES DAILY
Status: DISCONTINUED | OUTPATIENT
Start: 2023-07-11 | End: 2023-07-11 | Stop reason: HOSPADM

## 2023-07-11 RX ORDER — ACETAMINOPHEN 325 MG/1
975 TABLET ORAL EVERY 8 HOURS SCHEDULED
Status: DISCONTINUED | OUTPATIENT
Start: 2023-07-11 | End: 2023-07-11 | Stop reason: HOSPADM

## 2023-07-11 RX ADMIN — INSULIN LISPRO 1 UNITS: 100 INJECTION, SOLUTION INTRAVENOUS; SUBCUTANEOUS at 11:47

## 2023-07-11 RX ADMIN — OXYBUTYNIN CHLORIDE 5 MG: 5 TABLET ORAL at 08:44

## 2023-07-11 RX ADMIN — ASPIRIN 81 MG: 81 TABLET, COATED ORAL at 08:44

## 2023-07-11 RX ADMIN — OXYCODONE HYDROCHLORIDE 5 MG: 5 TABLET ORAL at 06:07

## 2023-07-11 RX ADMIN — ACETAMINOPHEN 975 MG: 325 TABLET, FILM COATED ORAL at 08:44

## 2023-07-11 RX ADMIN — HEPARIN SODIUM 5000 UNITS: 5000 INJECTION INTRAVENOUS; SUBCUTANEOUS at 06:07

## 2023-07-11 RX ADMIN — PANTOPRAZOLE SODIUM 40 MG: 40 TABLET, DELAYED RELEASE ORAL at 06:07

## 2023-07-11 RX ADMIN — BRINZOLAMIDE 1 DROP: 10 SUSPENSION/ DROPS OPHTHALMIC at 06:12

## 2023-07-11 RX ADMIN — BRIMONIDINE TARTRATE 1 DROP: 2 SOLUTION/ DROPS OPHTHALMIC at 06:12

## 2023-07-11 RX ADMIN — CEPHALEXIN 500 MG: 500 CAPSULE ORAL at 08:44

## 2023-07-11 RX ADMIN — SODIUM CHLORIDE 100 ML/HR: 0.9 INJECTION, SOLUTION INTRAVENOUS at 06:02

## 2023-07-11 RX ADMIN — ATORVASTATIN CALCIUM 20 MG: 20 TABLET, FILM COATED ORAL at 08:45

## 2023-07-11 RX ADMIN — METOPROLOL SUCCINATE 100 MG: 100 TABLET, EXTENDED RELEASE ORAL at 08:45

## 2023-07-11 NOTE — PROGRESS NOTES
Progress Note - Urology  Reema Alfonso 11/12/1933, 80 y.o. male MRN: [id number]    Unit/Bed#: W -01 Encounter: [id number]      Assessment & Plan:  Reema Alfonso is an 80year-old admitted due to right ureteral 5 mm obstructing calculus. Patient underwent cystoscopy, ureteroscopy, holmium laser lithotripsy, retrograde pyelogram, insertion of right ureteral stent yesterday.  -Improvement in creatinine to 1.5  -No leukocytosis  -Liu catheter removed. Voiding.   - Mild flank pain this AM, resolved. -Vital signs stable, afebrile  -Patient to follow-up for cystoscopy and ureteral stent removal in 3 weeks  -Urology will sign off at this time. Subjective:   HPI: Patient seen at bedside. Voiding without difficulty. Looking forward for discharge. Review of Systems   Constitutional: Negative for chills and fever. Respiratory: Negative for cough and shortness of breath. Cardiovascular: Negative for chest pain and palpitations. Gastrointestinal: Negative for abdominal pain and vomiting. Genitourinary: Positive for hematuria. Negative for dysuria. Musculoskeletal: Negative for arthralgias and back pain. Skin: Negative for color change and rash. Neurological: Negative for seizures and syncope. All other systems reviewed and are negative. Objective:  Vitals: Blood pressure 119/60, pulse 74, temperature 99.6 °F (37.6 °C), resp. rate 15, weight 72.6 kg (160 lb 0.9 oz), SpO2 96 %. ,Body mass index is 25.07 kg/m². Physical Exam  Constitutional:       General: He is not in acute distress. Appearance: Normal appearance. He is normal weight. He is not ill-appearing or toxic-appearing. HENT:      Head: Normocephalic and atraumatic. Right Ear: External ear normal.      Left Ear: External ear normal.      Nose: Nose normal.      Mouth/Throat:      Mouth: Mucous membranes are moist.   Eyes:      General: No scleral icterus.      Conjunctiva/sclera: Conjunctivae normal.   Cardiovascular: Rate and Rhythm: Normal rate. Pulses: Normal pulses. Pulmonary:      Effort: Pulmonary effort is normal.   Abdominal:      General: Abdomen is flat. There is no distension. Palpations: Abdomen is soft. Tenderness: There is no abdominal tenderness. There is no guarding. Musculoskeletal:         General: Normal range of motion. Cervical back: Normal range of motion. Skin:     General: Skin is warm. Findings: No rash. Neurological:      General: No focal deficit present. Mental Status: He is alert and oriented to person, place, and time. Mental status is at baseline. Psychiatric:         Mood and Affect: Mood normal.         Behavior: Behavior normal.         Thought Content: Thought content normal.         Judgment: Judgment normal.         Labs:  Recent Labs     07/09/23  0935 07/10/23  0445 07/11/23  0529   WBC 10.22* 8.60 9.82     Recent Labs     07/09/23  0935 07/10/23  0445 07/11/23  0529   HGB 12.7 12.5 11.8*     Recent Labs     07/09/23  0935 07/10/23  0445 07/11/23  0529   HCT 39.1 37.6 35.4*     Recent Labs     07/09/23  0935 07/10/23  0445 07/11/23  0529   CREATININE 1.62* 1.70* 1.50*         History:  Past Medical History:   Diagnosis Date   • Anemia 6/22/2021   • Bilateral carotid artery stenosis 1/4/2021    <50% on doppler 7/27/2018.  No change from Aug/2016   • Bilateral carotid bruits    • BMI 25.0-25.9,adult 4/6/2021   • BMI 25.0-25.9,adult 8/31/2021   • BMI 26.0-26.9,adult 8/31/2021   • BMI 27.0-27.9,adult 12/13/2022   • Coronary artery disease involving native coronary artery of native heart without angina pectoris 4/6/2021   • CTS (carpal tunnel syndrome)    • Deviated septum    • Diabetes ([address])    • 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HCC)    • Dizziness    • Double vision    • Ear problems    • Encounter for support and coordination of transition of care 6/22/2021   • Esophageal dysmotility 6/6/2023   • Fatigue    • General weakness    • GERD (gastroesophageal reflux disease)    • Hearing impaired person, bilateral    • HL (hearing loss)    • Hypertension    • Left upper quadrant pain 6/6/2023   • Medicare annual wellness visit, subsequent 8/31/2021   • Microscopic hematuria    • Myalgia 4/6/2021   • New daily persistent headache 3/14/2023   • Other dysphagia 12/13/2022   • Other headache syndrome 3/14/2023   • Other specified anemias 12/13/2022   • Pain in joints 4/6/2021   • PVD (peripheral vascular disease) ([address]) 4/2/2021   • Rectal bleeding 6/22/2021   • Renal calculi    • Right leg DVT ([address]) 6/22/2021   • Right lumbar radiculopathy    • S/P AVR (aortic valve replacement) 4/2/2021   • Skin rash 12/13/2022   • SOB (shortness of breath) on exertion    • Spinal stenosis in cervical region 4/2/2021   • Tachycardia    • Type 2 diabetes mellitus with stage 3a chronic kidney disease, with long-term current use of insulin ([address]) 9/8/2022   • Type 2 diabetes mellitus without complication, with long-term current use of insulin ([address]) 3/14/2023   • Type 2 diabetes mellitus, without long-term current use of insulin ([address]) 9/8/2022   • Urinary frequency    • Vertigo 1/4/2021     Social History     Socioeconomic History   • Marital status: /Civil Union     Spouse name: None   • Number of children: None   • Years of education: None   • Highest education level: None   Occupational History   • None   Tobacco Use   • Smoking status: Former   • Smokeless tobacco: Never   • Tobacco comments:     Never smoker - As per [employer]   Vaping Use   • Vaping Use: Never used   Substance and Sexual Activity   • Alcohol use: Not Currently   • Drug use: Never   • Sexual activity: Not Currently   Other Topics Concern   • None   Social History Narrative    Monthly foot exam: Sees podiatrist [id number], Dr. Gisele Bianchi    Annual eye exam: Sees ophthal [id number], Dr. Yanci Calderon    PSA: [address]  urology    Annual dental checkup: Sees dentist    - As per [employer]     Social Determinants of Health Financial Resource Strain: Low Risk  (9/8/2022)    Overall Financial Resource Strain (CARDIA)    • Difficulty of Paying Living Expenses: Not very hard   Food Insecurity: Not on file   Transportation Needs: No Transportation Needs (9/8/2022)    PRAPARE - Transportation    • Lack of Transportation (Medical): No    • Lack of Transportation (Non-Medical):  No   Physical Activity: Not on file   Stress: Not on file   Social Connections: Not on file   Intimate Partner Violence: Not on file   Housing Stability: Not on file     Past Surgical History:   Procedure Laterality Date   • AORTIC VALVE REPLACEMENT  09/2016   • BYPASS GRAFT     • CARPAL TUNNEL RELEASE Right     by Dr. Brandon Babb   • CATARACT EXTRACTION, BILATERAL     • COLONOSCOPY  09/16/2015   • CORONARY ARTERY BYPASS GRAFT  09/2016    x1    • FL RETROGRADE PYELOGRAM  7/10/2023   • HAND SURGERY Left    • ID CYSTO/URETERO W/LITHOTRIPSY &INDWELL STENT INSRT Right 7/10/2023    Procedure: CYSTOSCOPY URETEROSCOPY WITH LITHOTRIPSY HOLMIUM LASER, RETROGRADE PYELOGRAM AND INSERTION STENT URETERAL, STONE BASKET EXTRACTION;  Surgeon: Radha Andrade MD;  Location: [facility];  Service: Urology   • URETERECTOMY       Family History   Family history unknown: Yes       Cecille Blair PA-C  Date: 7/11/2023 Time: 12:42 PM

## 2023-07-11 NOTE — ASSESSMENT & PLAN NOTE
11/13/2018        Nabor Anthony  [address]  [place] [address]      Dear Nabor    Your attached lab results were normal.      If you have any questions or concerns, please feel free to give our office a call at [phone].  Thank you for choosing [facility] for your medical care.        Sincerely,          Tello Guerrero MD  [address] [place] [address] [phone]    Resulted Orders   CBC & AUTO DIFFERENTIAL   Result Value Ref Range    WBC 7.3 4.2 - 11.0 K/mcL    RBC 4.57 4.50 - 5.90 mil/mcL    HGB 14.5 13.0 - 17.0 g/dL    HCT 44.1 39.0 - 51.0 %    MCV 96.5 78.0 - 100.0 fl    MCH 31.7 26.0 - 34.0 pg    MCHC 32.9 32.0 - 36.5 g/dL    RDW-CV 13.0 11.0 - 15.0 %     140 - 450 K/mcL    NRBC 0 0 /100 WBC    DIFF TYPE AUTOMATED DIFFERENTIAL     Neutrophil 69 %    LYMPH 20 %    MONO 8 %    EOSIN 3 %    BASO 0 %    Percent Immature Granuloctyes 0 %    Absolute Neutrophil 5.1 1.8 - 7.7 K/mcL    Absolute Lymph 1.5 1.0 - 4.0 K/mcL    Absolute Mono 0.6 0.3 - 0.9 K/mcL    Absolute Eos 0.2 0.1 - 0.5 K/mcL    Absolute Baso 0.0 0.0 - 0.3 K/mcL    Absolute Immature Granulocytes 0.0 0 - 0.2 K/mcl   COMPREHENSIVE METABOLIC PANEL   Result Value Ref Range    Fasting Status NONFASTING hrs    Sodium 140 135 - 145 mmol/L    Potassium 4.5 3.4 - 5.1 mmol/L    Chloride 104 98 - 107 mmol/L    Carbon Dioxide 28 21 - 32 mmol/L    Anion Gap 12 10 - 20 mmol/L    Glucose 81 65 - 99 mg/dL    BUN 18 6 - 20 mg/dL    Creatinine 1.29 (H) 0.67 - 1.17 mg/dL    GFR Estimate,  56     GFR Estimate, Non African American 48     BUN/Creatinine Ratio 14 7 - 25    CALCIUM 9.2 8.4 - 10.2 mg/dL    TOTAL BILIRUBIN 0.8 0.2 - 1.0 mg/dL    AST/SGOT 30 <38 Units/L    ALT/SGPT 28 <79 Units/L    ALK PHOSPHATASE 84 45 - 117 Units/L    TOTAL PROTEIN 7.3 6.4 - 8.2 g/dL    Albumin 3.9 3.6 - 5.1 g/dL    GLOBULIN 3.4 2.0 - 4.0 g/dL    A/G Ratio, Serum 1.1 1.0 - 2.4   LIPASE LEVEL   Result Value Ref Range    Lab Results   Component Value Date    EGFR 40 07/11/2023    EGFR 34 07/10/2023    EGFR 37 07/09/2023    CREATININE 1.50 (H) 07/11/2023    CREATININE 1.70 (H) 07/10/2023    CREATININE 1.62 (H) 07/09/2023   · Baseline creatinine 1.4 in our lab work from 2021. Patient's daughter states he gets his labs done through the [place] and I am not able to access these records. Patient presented with creatinine 1.62 which linda further to 1.7 suggesting at least some element of acute kidney injury  · Suspect this is 2/2 poor intake and vomiting and obstructive due to stone  · Following IV fluids and stone treatment, creatinine did improve to 1.5.   He will need ongoing outpatient labs Lipase 95 73 - 393 Units/L   THYROID STIMULATING HORMONE   Result Value Ref Range    TSH 3.908 0.350 - 5.000 mcUnits/mL   C REACTIVE PROTEIN   Result Value Ref Range    C-REACTIVE PROTEIN 0.8 <1.0 mg/dL   BILIRUBIN DIRECT   Result Value Ref Range    DIRECT BILIRUBIN 0.2 0.0 - 0.2 mg/dL   URINALYSIS DIPSTICK ONLY   Result Value Ref Range    SPECIMEN TYPE clean catch     COLOR amy     APPEARANCE clear     GLUCOSE(URINE) neg     BILIRUBIN neg     KETONES neg     SPECIFIC GRAVITY 1.020     BLOOD neg     pH 6.5     PROTEIN(URINE) neg     UROBILINOGEN 0.2     NITRITE neg     LEUKOCYTE ESTERASE neg

## 2023-07-11 NOTE — DISCHARGE SUMMARY
[address]  Discharge- Chantal Velasquez 11/12/1933, 80 y.o. male MRN: [id number]  Unit/Bed#: COLLIN POLLACK 420-01 Encounter: [id number]  Primary Care Provider: Ila Mcfarland MD   Date and time admitted to hospital: 7/9/2023  8:49 AM    * 0.5 cm proximal reteral stone with R hydronephrosis  Assessment & Plan  · Acute right flank pain with nausea and vomiting present on admission. · CT abdomen: Mild to moderate right hydroureteronephrosis due to an obstructing calculus in the proximal ureter measuring 0.5 cm. · IVF/pain control/supportive care. Urine clear with no signs of sepsis  · Urology consult appreciated. Patient is now postop day 1 status post lithotripsy, basket stone extraction and stent. · Continue flomax, proscar and ditropan at discharge +3 days of antibiotics. As needed oxycodone for severe pain    Acute kidney injury superimposed on chronic kidney disease vs progressive CKD  Assessment & Plan  Lab Results   Component Value Date    EGFR 40 07/11/2023    EGFR 34 07/10/2023    EGFR 37 07/09/2023    CREATININE 1.50 (H) 07/11/2023    CREATININE 1.70 (H) 07/10/2023    CREATININE 1.62 (H) 07/09/2023   · Baseline creatinine 1.4 in our lab work from 2021. Patient's daughter states he gets his labs done through the [address] [place] [address] and I am not able to access these records. Patient presented with creatinine 1.62 which linda further to 1.7 suggesting at least some element of acute kidney injury  · Suspect this is 2/2 poor intake and vomiting and obstructive due to stone  · Following IV fluids and stone treatment, creatinine did improve to 1.5.   He will need ongoing outpatient labs      DM2 (diabetes mellitus, type 2) [facility])  Assessment & Plan  Lab Results   Component Value Date    HGBA1C 7.5 03/02/2023     Recent Labs     07/10/23  0808 07/10/23  1218 07/10/23  1606 07/11/23  0715   POCGLU 115 119 124 110     Blood Sugar Average: Last 72 hrs:  (P) [id number] overall good blood sugar control at this time. Avoid hypoglycemia  · Would stop glipizide. Continue Lantus   · Repeat A1c in process    History of pulmonary embolism and DVT  Assessment & Plan  · History of pulmonary embolism and DVT in 2021. He completed 6 months course of AC. DVT prophalaxis    S/P AVR bioprothetic   Assessment & Plan  · History of bioprosthetic AVR  · Also had mild to moderate mitral regurgitation on last echocardiogram which was 2 years ago  · Loud murmur on exam should be reassessed by repeat echocardiogram outpatient, as he is not having any active symptoms    Coronary artery disease  Assessment & Plan  · Continue aspirin, statin, beta-blocker    Medical Problems     Resolved Problems  Date Reviewed: 7/11/2023   None       Discharging Physician / Practitioner: Kathie Lema PA-C  PCP: Subha Nascimento MD  Admission Date:   Admission Orders (From admission, onward)     Ordered        07/10/23 1521  Inpatient Admission  Once            07/09/23 1223  Place in Observation  Once                      Discharge Date: 07/11/23    Consultations During Hospital Stay:  · urology    Procedures Performed:   · Right - CYSTOSCOPY URETEROSCOPY WITH LITHOTRIPSY HOLMIUM LASER. RETROGRADE PYELOGRAM AND INSERTION STENT URETERAL. STONE BASKET EXTRACTION    Significant Findings / Test Results:   · As above    Incidental Findings:   · Murmur on exam    Test Results Pending at Discharge (will require follow up):   · none     Outpatient Tests Requested:  · Bmp  · Echo if not done in past 6 months    Complications:  none    Reason for Admission: right flank pain    Hospital Course:   Trevin Sanchez is a 80 y.o. male patient who originally presented to the hospital on 7/9/2023 due to right flank pain, nausea and vomiting. Patient was found to have kidney stone in the right proximal ureter. He was given supportive care and seen by urology.   He underwent cystoscopy with stent placement and stone extraction with improvement in symptoms. Mild acute kidney injury that he had present on admission appeared to resolve back to suspected baseline CKD and patient is otherwise stable for discharge today with appropriate follow-up with urology. Of note a Liu catheter that was placed after surgery will be removed today before he leaves    Please see above list of diagnoses and related plan for additional information. Condition at Discharge: stable    Discharge Day Visit / Exam:   Subjective: Patient reports he was doing fine after surgery but notes he is having stent pain when he urinates, and some intermittent back pain. He denies any dizziness, lightheadedness, shortness of breath  Vitals: Blood Pressure: 119/60 (07/11/23 0738)  Pulse: 74 (07/11/23 0738)  Temperature: 99.6 °F (37.6 °C) (07/11/23 0738)  Temp Source: Temporal (07/10/23 1137)  Respirations: 15 (07/11/23 0738)  Weight - Scale: 72.6 kg (160 lb 0.9 oz) (07/09/23 0853)  SpO2: 96 % (07/11/23 0848)  Exam:   Physical Exam  Vitals reviewed. Constitutional:       General: He is not in acute distress. Appearance: Normal appearance. He is normal weight. He is not ill-appearing, toxic-appearing or diaphoretic. Eyes:      General: No scleral icterus. Right eye: No discharge. Left eye: No discharge. Conjunctiva/sclera: Conjunctivae normal.   Cardiovascular:      Rate and Rhythm: Normal rate and regular rhythm. Heart sounds: Murmur (loud murmur) heard. Pulmonary:      Effort: No respiratory distress. Breath sounds: No stridor. No wheezing, rhonchi or rales. Abdominal:      General: There is no distension. Tenderness: There is no abdominal tenderness. There is no guarding. Musculoskeletal:      Right lower leg: No edema. Left lower leg: No edema. Skin:     General: Skin is warm and dry. Coloration: Skin is not jaundiced or pale. Findings: No bruising, erythema, lesion or rash.    Neurological:      General: No focal deficit present. Mental Status: He is alert. Mental status is at baseline. Comments: Good historian patient seen sitting up in chair   Psychiatric:         Mood and Affect: Mood normal.         Thought Content: Thought content normal.          Discussion with Family: Updated  (daughter) via phone. Discharge instructions/Information to patient and family:   See after visit summary for information provided to patient and family. Provisions for Follow-Up Care:  See after visit summary for information related to follow-up care and any pertinent home health orders. Disposition:   Home    Planned Readmission: none     Discharge Statement:  I spent 35 minutes discharging the patient. This time was spent on the day of discharge. I had direct contact with the patient on the day of discharge. Greater than 50% of the total time was spent examining patient, answering all patient questions, arranging and discussing plan of care with patient as well as directly providing post-discharge instructions. Additional time then spent on discharge activities. Case discussed with patient's nurse and with urology    Discharge Medications:  See after visit summary for reconciled discharge medications provided to patient and/or family.       **Please Note: This note may have been constructed using a voice recognition system**

## 2023-07-11 NOTE — ASSESSMENT & PLAN NOTE
· Acute right flank pain with nausea and vomiting present on admission. · CT abdomen: Mild to moderate right hydroureteronephrosis due to an obstructing calculus in the proximal ureter measuring 0.5 cm. · IVF/pain control/supportive care. Urine clear with no signs of sepsis  · Urology consult appreciated. Patient is now postop day 1 status post lithotripsy, basket stone extraction and stent. · Continue flomax, proscar and ditropan at discharge +3 days of antibiotics.   As needed oxycodone for severe pain

## 2023-07-11 NOTE — PLAN OF CARE
Problem: PAIN - ADULT  Goal: Verbalizes/displays adequate comfort level or baseline comfort level  Description: Interventions:  - Encourage patient to monitor pain and request assistance  - Assess pain using appropriate pain scale  - Administer analgesics based on type and severity of pain and evaluate response  - Implement non-pharmacological measures as appropriate and evaluate response  - Consider cultural and social influences on pain and pain management  - Notify physician/advanced practitioner if interventions unsuccessful or patient reports new pain  Outcome: Progressing     Problem: INFECTION - ADULT  Goal: Absence or prevention of progression during hospitalization  Description: INTERVENTIONS:  - Assess and monitor for signs and symptoms of infection  - Monitor lab/diagnostic results  - Monitor all insertion sites, i.e. indwelling lines, tubes, and drains  - Monitor endotracheal if appropriate and nasal secretions for changes in amount and color  - [place] appropriate cooling/warming therapies per order  - Administer medications as ordered  - Instruct and encourage patient and family to use good hand hygiene technique  - Identify and instruct in appropriate isolation precautions for identified infection/condition  Outcome: Progressing  Goal: Absence of fever/infection during neutropenic period  Description: INTERVENTIONS:  - Monitor WBC    Outcome: Progressing     Problem: SAFETY ADULT  Goal: Patient will remain free of falls  Description: INTERVENTIONS:  - Educate patient/family on patient safety including physical limitations  - Instruct patient to call for assistance with activity   - Consult OT/PT to assist with strengthening/mobility   - Keep Call bell within reach  - Keep bed low and locked with side rails adjusted as appropriate  - Keep care items and personal belongings within reach  - Initiate and maintain comfort rounds  - Make Fall Risk Sign visible to staff  - Offer Toileting every 2 Hours, in advance of need  - Initiate/Maintain bed alarm  - Obtain necessary fall risk management equipment: yellow socks  - Apply yellow socks and bracelet for high fall risk patients  - Consider moving patient to room near nurses station  Outcome: Progressing  Goal: Maintain or return to baseline ADL function  Description: INTERVENTIONS:  -  Assess patient's ability to carry out ADLs; assess patient's baseline for ADL function and identify physical deficits which impact ability to perform ADLs (bathing, care of mouth/teeth, toileting, grooming, dressing, etc.)  - Assess/evaluate cause of self-care deficits   - Assess range of motion  - Assess patient's mobility; develop plan if impaired  - Assess patient's need for assistive devices and provide as appropriate  - Encourage maximum independence but intervene and supervise when necessary  - Involve family in performance of ADLs  - Assess for home care needs following discharge   - Consider OT consult to assist with ADL evaluation and planning for discharge  - Provide patient education as appropriate  Outcome: Progressing  Goal: Maintains/Returns to pre admission functional level  Description: INTERVENTIONS:  - Perform BMAT or MOVE assessment daily.   - Set and communicate daily mobility goal to care team and patient/family/caregiver. - Collaborate with rehabilitation services on mobility goals if consulted  - Perform Range of Motion 2 times a day. - Reposition patient every 2 hours.   - Dangle patient 2 times a day  - Stand patient 2 times a day  - Ambulate patient 2 times a day  - Out of bed to chair 2 times a day   - Out of bed for meals 2 times a day  - Out of bed for toileting  - Record patient progress and toleration of activity level   Outcome: Progressing     Problem: DISCHARGE PLANNING  Goal: Discharge to home or other facility with appropriate resources  Description: INTERVENTIONS:  - Identify barriers to discharge w/patient and caregiver  - Arrange for needed discharge resources and transportation as appropriate  - Identify discharge learning needs (meds, wound care, etc.)  - Arrange for interpretive services to assist at discharge as needed  - Refer to Case Management Department for coordinating discharge planning if the patient needs post-hospital services based on physician/advanced practitioner order or complex needs related to functional status, cognitive ability, or social support system  Outcome: Progressing     Problem: Knowledge Deficit  Goal: Patient/family/caregiver demonstrates understanding of disease process, treatment plan, medications, and discharge instructions  Description: Complete learning assessment and assess knowledge base.   Interventions:  - Provide teaching at level of understanding  - Provide teaching via preferred learning methods  Outcome: Progressing     Problem: Prexisting or High Potential for Compromised Skin Integrity  Goal: Skin integrity is maintained or improved  Description: INTERVENTIONS:  - Identify patients at risk for skin breakdown  - Assess and monitor skin integrity  - Assess and monitor nutrition and hydration status  - Monitor labs   - Assess for incontinence   - Turn and reposition patient  - Assist with mobility/ambulation  - Relieve pressure over bony prominences  - Avoid friction and shearing  - Provide appropriate hygiene as needed including keeping skin clean and dry  - Evaluate need for skin moisturizer/barrier cream  - Collaborate with interdisciplinary team   - Patient/family teaching  - Consider wound care consult   Outcome: Progressing     Problem: MOBILITY - ADULT  Goal: Maintain or return to baseline ADL function  Description: INTERVENTIONS:  -  Assess patient's ability to carry out ADLs; assess patient's baseline for ADL function and identify physical deficits which impact ability to perform ADLs (bathing, care of mouth/teeth, toileting, grooming, dressing, etc.)  - Assess/evaluate cause of self-care deficits - Assess range of motion  - Assess patient's mobility; develop plan if impaired  - Assess patient's need for assistive devices and provide as appropriate  - Encourage maximum independence but intervene and supervise when necessary  - Involve family in performance of ADLs  - Assess for home care needs following discharge   - Consider OT consult to assist with ADL evaluation and planning for discharge  - Provide patient education as appropriate  Outcome: Progressing  Goal: Maintains/Returns to pre admission functional level  Description: INTERVENTIONS:  - Perform BMAT or MOVE assessment daily.   - Set and communicate daily mobility goal to care team and patient/family/caregiver. - Collaborate with rehabilitation services on mobility goals if consulted  - Perform Range of Motion 2 times a day. - Reposition patient every 2 hours.   - Dangle patient 2 times a day  - Stand patient 2 times a day  - Ambulate patient 2 times a day  - Out of bed to chair 2 times a day   - Out of bed for meals 2 times a day  - Out of bed for toileting  - Record patient progress and toleration of activity level   Outcome: Progressing

## 2023-07-11 NOTE — ASSESSMENT & PLAN NOTE
Lab Results   Component Value Date    HGBA1C 7.5 03/02/2023     Recent Labs     07/10/23  0808 07/10/23  1218 07/10/23  1606 07/11/23  0715   POCGLU 115 119 124 110     Blood Sugar Average: Last 72 hrs:  (P) [id number] overall good blood sugar control at this time. Avoid hypoglycemia  · Would stop glipizide.   Continue Lantus   · Repeat A1c in process

## 2023-07-12 ENCOUNTER — OFFICE VISIT (OUTPATIENT)
Dept: INTERNAL MEDICINE CLINIC | Facility: CLINIC | Age: 88
End: 2023-07-12
Payer: MEDICARE

## 2023-07-12 VITALS
DIASTOLIC BLOOD PRESSURE: 72 MMHG | BODY MASS INDEX: 24.96 KG/M2 | WEIGHT: 159 LBS | HEIGHT: 67 IN | OXYGEN SATURATION: 99 % | HEART RATE: 66 BPM | TEMPERATURE: 98.5 F | RESPIRATION RATE: 18 BRPM | SYSTOLIC BLOOD PRESSURE: 110 MMHG

## 2023-07-12 DIAGNOSIS — D64.9 ANEMIA, UNSPECIFIED TYPE: ICD-10-CM

## 2023-07-12 DIAGNOSIS — D69.6 THROMBOCYTOPENIA (HCC): ICD-10-CM

## 2023-07-12 DIAGNOSIS — E78.2 MIXED HYPERLIPIDEMIA: ICD-10-CM

## 2023-07-12 DIAGNOSIS — N18.32 STAGE 3B CHRONIC KIDNEY DISEASE (HCC): ICD-10-CM

## 2023-07-12 DIAGNOSIS — Z79.4 TYPE 2 DIABETES MELLITUS WITH DIABETIC NEPHROPATHY, WITH LONG-TERM CURRENT USE OF INSULIN (HCC): ICD-10-CM

## 2023-07-12 DIAGNOSIS — I10 ESSENTIAL HYPERTENSION: Chronic | ICD-10-CM

## 2023-07-12 DIAGNOSIS — I25.10 CORONARY ARTERY DISEASE INVOLVING NATIVE CORONARY ARTERY OF NATIVE HEART WITHOUT ANGINA PECTORIS: ICD-10-CM

## 2023-07-12 DIAGNOSIS — N13.2 URETERAL STONE WITH HYDRONEPHROSIS: ICD-10-CM

## 2023-07-12 DIAGNOSIS — Z76.89 ENCOUNTER FOR SUPPORT AND COORDINATION OF TRANSITION OF CARE: Primary | ICD-10-CM

## 2023-07-12 DIAGNOSIS — E11.21 TYPE 2 DIABETES MELLITUS WITH DIABETIC NEPHROPATHY, WITH LONG-TERM CURRENT USE OF INSULIN (HCC): ICD-10-CM

## 2023-07-12 PROCEDURE — 99496 TRANSJ CARE MGMT HIGH F2F 7D: CPT | Performed by: INTERNAL MEDICINE

## 2023-07-12 NOTE — PATIENT INSTRUCTIONS
Patient was advised to continue present medications. discussed with the patient medications and laboratory data in detail. Follow-up with me  as advised. If any blood test was ordered please do 1 week prior to next appointment unless advise to get earlier.   If you have any questions please call the office [phone]

## 2023-07-12 NOTE — TELEPHONE ENCOUNTER
Called home phone and spoke to patient's wife. Patient currently not home. Patient does have some dysuria with urination. Reviewed stent expectations with patient's wife. Advised proper hydration, avoid bladder irritants and constipation. Reviewed heating pack for flank pain. Informed patient's wife if patient has any questions regarding any other concerns and medications to please call office back. Patient's wife r/s cysto/stent from 8/1 to 8/4.

## 2023-07-12 NOTE — PROGRESS NOTES
Assessment & Plan     1. Encounter for support and coordination of transition of care  Assessment & Plan:  He was admitted at [facility] with right ureter stone and right hydronephrosis. He underwent stent and lithotripsy. His hospitalization record reviewed. 2. Type 2 diabetes mellitus with diabetic nephropathy, with long-term current use of insulin ([facility])  Assessment & Plan:    Lab Results   Component Value Date    HGBA1C 7.3 (H) 07/10/2023   Is on metformin and Lantus insulin. Glipizide discontinued. Advised check blood sugar at home with elevated will increase the Lantus from 23 to 28 units nightly. Advised to continue to watch diet. No hypoglycemia. Orders:  -     Basic metabolic panel; Future    3. Coronary artery disease involving native coronary artery of native heart without angina pectoris  Assessment & Plan:  Stable asymptomatic. Has been seen and followed by cardiologist.    Orders:  -     Lipid panel; Future    4. Essential hypertension  Assessment & Plan:  Repeat blood pressure 118/70. Advised to continue pain medication and low-salt diet. 5. Stage 3b chronic kidney disease [facility])  Assessment & Plan:  Lab Results   Component Value Date    EGFR 40 07/11/2023    EGFR 34 07/10/2023    EGFR 37 07/09/2023    CREATININE 1.50 (H) 07/11/2023    CREATININE 1.70 (H) 07/10/2023    CREATININE 1.62 (H) 07/09/2023   's renal function stable advised to continue present medications and maintain hydration. Orders:  -     Basic metabolic panel; Future    6. 0.5 cm proximal reteral stone with R hydronephrosis  Assessment & Plan:  Status post lithotripsy and stent. Has some right flank pain. Advised to follow with urologist.  Continue present medications as prescribed upon discharge from the hospital.      7. Mixed hyperlipidemia  Assessment & Plan:  Last cholesterol 144 LDL 83, HDL 46, triglyceride 66.   Advised to continue atorvastatin and low-cholesterol diet will follow lipid panel. Orders:  -     Lipid panel; Future    8. Thrombocytopenia ([address])  Assessment & Plan:  When discharged from the hospital is platelet count was 483C will obtain follow-up. Orders:  -     CBC and differential; Future    9. Anemia, unspecified type  Assessment & Plan:  His hemoglobin is decreased to 11.8 upon discharge possible from hydration will follow CBC    Orders:  -     CBC and differential; Future       Subjective     Transitional Care Management Review:   Jalen Juan is a 80 y.o. male here for TCM follow up. During the TCM phone call patient stated:  TCM Call     Date and time call was made  7/12/2023 10:15 AM    Hospital care reviewed  Records reviewed    Patient was hospitialized at  [address] [place] [address]    Date of Admission  07/09/23    Date of discharge  07/11/23    Diagnosis  0.5cm proximal reteral stone with R hydronephrosis    Disposition  Home    Were the patients medications reviewed and updated  Yes    Current Symptoms  --  Urinary urgency      TCM Call     Post hospital issues  None    Should patient be enrolled in anticoag monitoring? No    Scheduled for follow up?   Yes    Patients specialists  Urologist    Urologist name  Angel Seen [facility] [place]    Urologist contact #  [phone]    Did you obtain your prescribed medications  Yes    Do you need help managing your prescriptions or medications  No    Is transportation to your appointment needed  No    I have advised the patient to call PCP with any new or worsening symptoms  [address] or [address] other    Support System  Family    The type of support provided  Emotional; Physical; Financial    Do you have social support  Yes, as much as I need    Are you recieving any outpatient services  No    Are you recieving home care services  No    Are you using any community resources  No    Current waiver services  No    Have you fallen in the last 12 months  No    Interperter language line needed  No    Counseling  Family        HPI 55-year-old white male patient presents for transitional care management. He denies any chest pain, shortness of breath, pain in abdomen. Denies any cough, fever, chills. Denies nausea vomiting diarrhea. He was admitted at the [facility] initially well-controlled hospital with right flank pain found to have a right ureter stone and right hydronephrosis. Also has a stones in the urinary bladder as well. He underwent lithotripsy and stent placement. He gets some right flank pain. Review of Systems   Constitutional: Negative for chills and fever. HENT: Negative for congestion, ear pain, hearing loss, nosebleeds, sinus pain, sore throat and trouble swallowing. Eyes: Negative for discharge, redness and visual disturbance. Respiratory: Negative for cough, chest tightness and shortness of breath. Cardiovascular: Negative for chest pain and palpitations. Gastrointestinal: Negative for abdominal pain, blood in stool, constipation, diarrhea, nausea and vomiting. Genitourinary: Positive for flank pain ( Right flank pain). Negative for dysuria, frequency and hematuria. Musculoskeletal: Negative for arthralgias, myalgias and neck pain. Skin: Negative for color change and rash. Neurological: Negative for dizziness, speech difficulty, weakness and headaches. Hematological: Does not bruise/bleed easily. Psychiatric/Behavioral: Negative for agitation and behavioral problems. Objective     /72 (BP Location: Left arm, Patient Position: Sitting, Cuff Size: Standard)   Pulse 66   Temp 98.5 °F (36.9 °C) (Temporal)   Resp 18   Ht 5' 7" (1.702 m)   Wt 72.1 kg (159 lb)   SpO2 99%   BMI 24.90 kg/m²      Physical Exam  Vitals and nursing note reviewed. Constitutional:       General: He is not in acute distress. Appearance: He is well-developed and normal weight. HENT:      Head: Normocephalic and atraumatic.       Nose: Nose normal.      Mouth/Throat:      Mouth: Mucous membranes are moist.   Eyes:      General: No scleral icterus. Right eye: No discharge. Left eye: No discharge. Extraocular Movements: Extraocular movements intact. Conjunctiva/sclera: Conjunctivae normal.   Cardiovascular:      Rate and Rhythm: Normal rate and regular rhythm. Pulses: Normal pulses. Heart sounds: Normal heart sounds. Pulmonary:      Effort: Pulmonary effort is normal. No respiratory distress. Breath sounds: Normal breath sounds. No wheezing, rhonchi or rales. Abdominal:      General: Bowel sounds are normal.      Palpations: Abdomen is soft. Tenderness: There is no abdominal tenderness. Musculoskeletal:         General: No swelling. Normal range of motion. Cervical back: Normal range of motion and neck supple. Right lower leg: No edema. Left lower leg: No edema. Skin:     General: Skin is warm and dry. Capillary Refill: Capillary refill takes less than 2 seconds. Neurological:      General: No focal deficit present. Mental Status: He is alert and oriented to person, place, and time. Psychiatric:         Mood and Affect: Mood normal.         Behavior: Behavior normal.        TCM Call     Date and time call was made  7/12/2023 10:15 AM    Hospital care reviewed  Records reviewed    Patient was hospitialized at  [address] [place] [address]    Date of Admission  07/09/23    Date of discharge  07/11/23    Diagnosis  0.5cm proximal reteral stone with R hydronephrosis    Disposition  Home    Were the patients medications reviewed and updated  Yes    Current Symptoms  --  Urinary urgency      TCM Call     Post hospital issues  None    Should patient be enrolled in anticoag monitoring? No    Scheduled for follow up?   Yes    Patients specialists  Urologist    [facility]    [facility]rologist contact #  [phone]    Did you obtain your prescribed medications Yes    Do you need help managing your prescriptions or medications  No    Is transportation to your appointment needed  No    I have advised the patient to call PCP with any new or worsening symptoms  [address] or [facility] other    Support System  Family    The type of support provided  Emotional; Physical; Financial    Do you have social support  Yes, as much as I need    Are you recieving any outpatient services  No    Are you recieving home care services  No    Are you using any community resources  No    Current waiver services  No    Have you fallen in the last 12 months  No    Interperter language line needed  No    Counseling  Family          Medications have been reviewed by provider in current encounter    Kodak Sheth MD

## 2023-07-13 NOTE — ASSESSMENT & PLAN NOTE
Status post lithotripsy and stent. Has some right flank pain.   Advised to follow with urologist.  Continue present medications as prescribed upon discharge from the hospital.

## 2023-07-13 NOTE — ASSESSMENT & PLAN NOTE
Last cholesterol 144 LDL 83, HDL 46, triglyceride 66. Advised to continue atorvastatin and low-cholesterol diet will follow lipid panel.

## 2023-07-13 NOTE — ASSESSMENT & PLAN NOTE
Lab Results   Component Value Date    HGBA1C 7.3 (H) 07/10/2023   Is on metformin and Lantus insulin. Glipizide discontinued. Advised check blood sugar at home with elevated will increase the Lantus from 23 to 28 units nightly. Advised to continue to watch diet. No hypoglycemia.

## 2023-07-13 NOTE — ASSESSMENT & PLAN NOTE
He was admitted at [facility] with right ureter stone and right hydronephrosis. He underwent stent and lithotripsy. His hospitalization record reviewed.

## 2023-07-13 NOTE — ASSESSMENT & PLAN NOTE
Lab Results   Component Value Date    EGFR 40 07/11/2023    EGFR 34 07/10/2023    EGFR 37 07/09/2023    CREATININE 1.50 (H) 07/11/2023    CREATININE 1.70 (H) 07/10/2023    CREATININE 1.62 (H) 07/09/2023   's renal function stable advised to continue present medications and maintain hydration.

## 2023-07-17 LAB
COLOR STONE: NORMAL
COMMENT-STONE3: NORMAL
COMPOSITION: NORMAL
LABORATORY COMMENT REPORT: NORMAL
PHOTO: NORMAL
SIZE STONE: NORMAL MM
SPEC SOURCE SUBJ: NORMAL
STONE ANALYSIS-IMP: NORMAL
STONE ANALYSIS-IMP: NORMAL
URATE MFR STONE: 100 %
WT STONE: 52 MG

## 2023-08-04 ENCOUNTER — PROCEDURE VISIT (OUTPATIENT)
Dept: UROLOGY | Facility: CLINIC | Age: 88
End: 2023-08-04
Payer: MEDICARE

## 2023-08-04 VITALS
HEIGHT: 67 IN | SYSTOLIC BLOOD PRESSURE: 130 MMHG | HEART RATE: 74 BPM | DIASTOLIC BLOOD PRESSURE: 80 MMHG | OXYGEN SATURATION: 98 % | WEIGHT: 158 LBS | BODY MASS INDEX: 24.8 KG/M2

## 2023-08-04 DIAGNOSIS — N20.0 KIDNEY STONES: Primary | ICD-10-CM

## 2023-08-04 PROCEDURE — 52310 CYSTOSCOPY AND TREATMENT: CPT | Performed by: PHYSICIAN ASSISTANT

## 2023-08-04 NOTE — PROGRESS NOTES
Cystoscopy     Date/Time 8/4/2023 2:00 PM     Performed by  Brain Mehta PA-C   Authorized by Natalie Alcazar PA-C     Universal Protocol:  Consent given by: patient  Patient identity confirmed: verbally with patient        Procedure Details:  Procedure type: simple removal of a foreign body, stone, or stent    Patient tolerance: Patient tolerated the procedure well with no immediate complications    Additional Procedure Details: 80-year-old man history of kidney stones. He had a ureteroscopy July 10. He is now here for stent removal.  The penis is prepped and draped in usual fashion. 2% lidocaine used for local anesthetic. The flexible cystoscope was passed per the meatus. Pendulous urethra was normal there was mild stricture noted at the bulbous urethra. This was easily navigated with the cystoscope. The prostate was unobstructed. Upon entering the bladder the urine was mildly turbid. The bladder wall showed 2-3+ trabeculation. There was a fairly large free-floating bladder stone noted. The stent was identified emanating from the right ureteral orifice grasped with forceps and removed without difficulty. The patient is made aware of postprocedure care. Signs and symptoms of post stent removal that may occur and understands. He will follow-up in 6 weeks with a kidney and bladder ultrasound prior to visit.

## 2023-08-15 NOTE — ASSESSMENT & PLAN NOTE
His hemoglobin was dropped from 13 6-11 4 most likely 2nd to rectal bleeding  No further rectal bleeding  Colonoscopy revealed internal hemorrhoid  Will follow CBC  Detail Level: Zone

## 2023-08-29 ENCOUNTER — TELEPHONE (OUTPATIENT)
Dept: GASTROENTEROLOGY | Facility: CLINIC | Age: 88
End: 2023-08-29

## 2023-08-29 ENCOUNTER — OFFICE VISIT (OUTPATIENT)
Dept: GASTROENTEROLOGY | Facility: CLINIC | Age: 88
End: 2023-08-29
Payer: MEDICARE

## 2023-08-29 VITALS
HEIGHT: 67 IN | BODY MASS INDEX: 24.48 KG/M2 | DIASTOLIC BLOOD PRESSURE: 77 MMHG | WEIGHT: 156 LBS | HEART RATE: 60 BPM | SYSTOLIC BLOOD PRESSURE: 147 MMHG

## 2023-08-29 DIAGNOSIS — K21.9 GASTROESOPHAGEAL REFLUX DISEASE, UNSPECIFIED WHETHER ESOPHAGITIS PRESENT: Primary | ICD-10-CM

## 2023-08-29 DIAGNOSIS — K22.4 ESOPHAGEAL DYSMOTILITY: ICD-10-CM

## 2023-08-29 DIAGNOSIS — K25.3 CAMERON LESION, ACUTE: ICD-10-CM

## 2023-08-29 DIAGNOSIS — R10.12 LEFT UPPER QUADRANT PAIN: ICD-10-CM

## 2023-08-29 DIAGNOSIS — R13.19 OTHER DYSPHAGIA: ICD-10-CM

## 2023-08-29 DIAGNOSIS — Z12.11 COLON CANCER SCREENING: ICD-10-CM

## 2023-08-29 PROCEDURE — 99214 OFFICE O/P EST MOD 30 MIN: CPT | Performed by: NURSE PRACTITIONER

## 2023-08-29 NOTE — PROGRESS NOTES
[facility] - Outpatient Follow-up Note  Naun Santos 80 y.o. male MRN: [id number]  Encounter: [id number]          ASSESSMENT AND PLAN:      1. Gastroesophageal reflux disease, unspecified whether esophagitis present  Patient has history of GERD. Symptoms of acid reflux and heartburn are currently well controlled. Patient denies nausea or vomiting. Patient denies epigastric or abdominal pain. GD done 1/31/2023 which showed esophageal dysphagia, gastroesophageal reflux, torturous esophagus with 4 cm hiatal hernia. Suspect mild pellety disorder of the esophagus especially the distal aspect. Suspected inflamed fold with erosions likely Donneta Au in nature. -EGD; Scheduled for procedure. Prep and procedure explained to patient in detail. Further recommendations pending results of procedure.  -Continue omeprazole 20 Mg daily  -Continue antireflux diet and measures      2. Other dysphagia  3. Esophageal dysmotility  Reports intermittent episodes of dysphagia to solid foods and liquid. Patient reports last episode was approximately 1 month ago. Patient denies vomiting. Barium swallow showed marked esophageal dysmotility with diffuse tertiary contractions,  Creating a  "corkscrew esophagus " appearance. No esophageal narrowing or obstruction lesion. No gastroesophageal reflux demonstrated on this exam.  -EGD with Botox: Scheduled for procedure. Prep and procedure explained to patient in detail. Further recommendations pending results of procedure.  -Eat slow, chew well, alternate solids with liquids.  -Continue omeprazole 20 Mg daily    4. Colon cancer screening   Last colonoscopy done 2021. No further colon cancer screening recommended due to age    Follow up 4 weeks after procedure.     ______________________________________________________________________    SUBJECTIVE:  Milena Greenfield is a 80-year-old male who presents to the office for follow-up for GERD, dysphagia, and esophageal dysmotility. Patient reports he continues to have intermittent episodes of dysphagia with solids and liquids. Last episode was approximately 1 month ago. Patient denies nausea, vomiting, acid reflux, heartburn, epigastric or abdominal pain. Patient denies blood in stool, blood from rectal area, or black tarry stool. Abdomen exam benign. Barium Swallow done 6/2023 showed marked esophageal dysmotility with diffused tertiary contractions, creating a corkscrew esophagus appearance. Moderate size hiatal hernia is present. No fixed esophageal narrowing or obstruction lesion. Unremarkable appearance of hypopharynx and cervical esophagus. No gastroesophageal reflux demonstrated on this exam.     EGD done 1/31/2023 which showed esophageal dysphagia, gastroesophageal reflux, torturous esophagus with 4 cm hiatal hernia. Suspect mild pellety disorder of the esophagus especially the distal aspect. Suspected inflamed fold with erosions likely Dulcy Sessions in nature. Small gastric polyp removed. Biopsy showed no H. pylori. Mild chronic inactive antral gastritis with intestinal metaplasia. Fundic gland polyp negative for dysplasia. Eosinophils are fewer than 3 cells per high-power field and squamous epithelium. Benign squamous and cardiac-oxyntic mucosa with nonspecific chronic inflammatory changes in the EG junction. Patient is on no blood thinners except for aspirin daily. REVIEW OF SYSTEMS IS OTHERWISE NEGATIVE. Historical Information   Past Medical History:   Diagnosis Date   • Anemia 6/22/2021   • Bilateral carotid artery stenosis 1/4/2021    <50% on doppler 7/27/2018.  No change from Aug/2016   • Bilateral carotid bruits    • BMI 25.0-25.9,adult 4/6/2021   • BMI 25.0-25.9,adult 8/31/2021   • BMI 26.0-26.9,adult 8/31/2021   • BMI 27.0-27.9,adult 12/13/2022   • Coronary artery disease involving native coronary artery of native heart without angina pectoris 4/6/2021   • CTS (carpal tunnel syndrome)    • Deviated septum    • Diabetes ([address])    • 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HCC)    • Dizziness    • Double vision    • Ear problems    • Encounter for support and coordination of transition of care 6/22/2021   • Esophageal dysmotility 6/6/2023   • Fatigue    • General weakness    • GERD (gastroesophageal reflux disease)    • Hearing impaired person, bilateral    • HL (hearing loss)    • Hypertension    • Left upper quadrant pain 6/6/2023   • Medicare annual wellness visit, subsequent 8/31/2021   • Microscopic hematuria    • Myalgia 4/6/2021   • New daily persistent headache 3/14/2023   • Other dysphagia 12/13/2022   • Other headache syndrome 3/14/2023   • Other specified anemias 12/13/2022   • Pain in joints 4/6/2021   • PVD (peripheral vascular disease) ([address]) 4/2/2021   • Rectal bleeding 6/22/2021   • Renal calculi    • Right leg DVT ([address]) 6/22/2021   • Right lumbar radiculopathy    • S/P AVR (aortic valve replacement) 4/2/2021   • Skin rash 12/13/2022   • SOB (shortness of breath) on exertion    • Spinal stenosis in cervical region 4/2/2021   • Tachycardia    • Thrombocytopenia ([address]) 7/12/2023   • Type 2 diabetes mellitus with stage 3a chronic kidney disease, with long-term current use of insulin ([address]) 9/8/2022   • Type 2 diabetes mellitus without complication, with long-term current use of insulin ([address]) 3/14/2023   • Type 2 diabetes mellitus, without long-term current use of insulin ([address]) 9/8/2022   • Urinary frequency    • Vertigo 1/4/2021     Past Surgical History:   Procedure Laterality Date   • AORTIC VALVE REPLACEMENT  09/2016   • BYPASS GRAFT     • CARPAL TUNNEL RELEASE Right     by Dr. Caitlyn Martínez   • CATARACT EXTRACTION, BILATERAL     • COLONOSCOPY  09/16/2015   • CORONARY ARTERY BYPASS GRAFT  09/2016    x1    • FL RETROGRADE PYELOGRAM  7/10/2023   • HAND SURGERY Left    • MS CYSTO/URETERO W/LITHOTRIPSY &INDWELL STENT INSRT Right 7/10/2023    Procedure: CYSTOSCOPY URETEROSCOPY WITH LITHOTRIPSY HOLMIUM LASER, RETROGRADE PYELOGRAM AND INSERTION STENT URETERAL, STONE BASKET EXTRACTION;  Surgeon: Андрей Doty MD;  Location: [facility];  Service: Urology   • URETERECTOMY       Social History   Social History     Substance and Sexual Activity   Alcohol Use Not Currently     Social History     Substance and Sexual Activity   Drug Use Never     Social History     Tobacco Use   Smoking Status Former   Smokeless Tobacco Never   Tobacco Comments    Never smoker - As per [employer]     Family History   Family history unknown: Yes       Meds/Allergies       Current Outpatient Medications:   •  acetaminophen (TYLENOL) 325 mg tablet  •  aspirin (ECOTRIN LOW STRENGTH) 81 mg EC tablet  •  atorvastatin (LIPITOR) 40 mg tablet  •  Brinzolamide-Brimonidine (Simbrinza) 1-0.2 % SUSP  •  Cinnamon 500 MG capsule  •  clotrimazole-betamethasone (LOTRISONE) 1-0.05 % cream  •  finasteride (PROSCAR) 5 mg tablet  •  insulin glargine (LANTUS) 100 units/mL subcutaneous injection  •  latanoprost (XALATAN) 0.005 % ophthalmic solution  •  losartan (COZAAR) 100 MG tablet  •  metFORMIN (GLUCOPHAGE) 500 mg tablet  •  metoprolol succinate (TOPROL-XL) 100 mg 24 hr tablet  •  Multiple Vitamin (multivitamin) tablet  •  omeprazole (PriLOSEC) 20 mg delayed release capsule  •  oxybutynin (DITROPAN) 5 mg tablet  •  oxyCODONE (ROXICODONE) 5 immediate release tablet  •  tamsulosin (FLOMAX) 0.4 mg  •  vitamin B-12 (VITAMIN B-12) 1,000 mcg tablet    Allergies   Allergen Reactions   • Lisinopril Cough   • Penicillin G Hives     And other penicillins           Objective     There were no vitals taken for this visit. There is no height or weight on file to calculate BMI.       PHYSICAL EXAM:      General Appearance:   Alert, cooperative, no distress   HEENT:   Normocephalic, atraumatic, anicteric.     Neck:  Supple, symmetrical, trachea midline   Lungs:   Clear to auscultation bilaterally; no rales, rhonchi or wheezing; respirations unlabored    Heart[de-identified]   Regular rate and rhythm; no murmur, rub, or gallop. Abdomen:   Soft, non-tender, non-distended; normal bowel sounds; no masses, no organomegaly    Genitalia:   Deferred    Rectal:   Deferred    Extremities:  No cyanosis, clubbing or edema    Pulses:  2+ and symmetric    Skin:  No jaundice, rashes, or lesions    Lymph nodes:  No palpable cervical lymphadenopathy        Lab Results:   No visits with results within 1 Day(s) from this visit.    Latest known visit with results is:   Admission on 07/09/2023, Discharged on 07/11/2023   Component Date Value   • Color, UA 07/09/2023 Light Yellow    • Clarity, UA 07/09/2023 Clear    • Specific Gravity, UA 07/09/2023 1.016    • pH, UA 07/09/2023 5.5    • Leukocytes, UA 07/09/2023 Negative    • Nitrite, UA 07/09/2023 Negative    • Protein, UA 07/09/2023 Trace (A)    • Glucose, UA 07/09/2023 Negative    • Ketones, UA 07/09/2023 Negative    • Urobilinogen, UA 07/09/2023 <2.0    • Bilirubin, UA 07/09/2023 Negative    • Occult Blood, UA 07/09/2023 Trace (A)    • WBC 07/09/2023 10.22 (H)    • RBC 07/09/2023 4.15    • Hemoglobin 07/09/2023 12.7    • Hematocrit 07/09/2023 39.1    • MCV 07/09/2023 94    • MCH 07/09/2023 30.6    • MCHC 07/09/2023 32.5    • RDW 07/09/2023 12.6    • MPV 07/09/2023 9.9    • Platelets 73/89/0570 160    • nRBC 07/09/2023 0    • Neutrophils Relative 07/09/2023 67    • Immat GRANS % 07/09/2023 1    • Lymphocytes Relative 07/09/2023 21    • Monocytes Relative 07/09/2023 8    • Eosinophils Relative 07/09/2023 2    • Basophils Relative 07/09/2023 1    • Neutrophils Absolute 07/09/2023 6.97    • Immature Grans Absolute 07/09/2023 0.06    • Lymphocytes Absolute 07/09/2023 2.15    • Monocytes Absolute 07/09/2023 0.78    • Eosinophils Absolute 07/09/2023 0.20    • Basophils Absolute 07/09/2023 0.06    • Sodium 07/09/2023 141    • Potassium 07/09/2023 4.4    • Chloride 07/09/2023 107    • CO2 07/09/2023 24    • ANION GAP 07/09/2023 10    • BUN 07/09/2023 43 (H)    • Creatinine 07/09/2023 1.62 (H)    • Glucose 07/09/2023 168 (H)    • Calcium 07/09/2023 9.1    • AST 07/09/2023 15    • ALT 07/09/2023 18    • Alkaline Phosphatase 07/09/2023 61    • Total Protein 07/09/2023 7.4    • Albumin 07/09/2023 4.2    • Total Bilirubin 07/09/2023 0.56    • eGFR 07/09/2023 37    • RBC, UA 07/09/2023 10-20 (A)    • WBC, UA 07/09/2023 None Seen    • Epithelial Cells 07/09/2023 Occasional    • Bacteria, UA 07/09/2023 None Seen    • POC Glucose 07/09/2023 165 (H)    • Platelets 53/30/4986 157    • MPV 07/09/2023 10.0    • POC Glucose 07/09/2023 175 (H)    • Hemoglobin A1C 07/10/2023 7.3 (H)    • EAG 07/10/2023 163    • WBC 07/10/2023 8.60    • RBC 07/10/2023 3.98    • Hemoglobin 07/10/2023 12.5    • Hematocrit 07/10/2023 37.6    • MCV 07/10/2023 95    • MCH 07/10/2023 31.4    • MCHC 07/10/2023 33.2    • RDW 07/10/2023 12.7    • MPV 07/10/2023 10.0    • Platelets 69/38/8980 147 (L)    • nRBC 07/10/2023 0    • Neutrophils Relative 07/10/2023 68    • Immat GRANS % 07/10/2023 0    • Lymphocytes Relative 07/10/2023 20    • Monocytes Relative 07/10/2023 11    • Eosinophils Relative 07/10/2023 1    • Basophils Relative 07/10/2023 0    • Neutrophils Absolute 07/10/2023 5.76    • Immature Grans Absolute 07/10/2023 0.02    • Lymphocytes Absolute 07/10/2023 1.71    • Monocytes Absolute 07/10/2023 0.97    • Eosinophils Absolute 07/10/2023 0.11    • Basophils Absolute 07/10/2023 0.03    • Sodium 07/10/2023 138    • Potassium 07/10/2023 4.0    • Chloride 07/10/2023 109 (H)    • CO2 07/10/2023 20 (L)    • ANION GAP 07/10/2023 9    • BUN 07/10/2023 33 (H)    • Creatinine 07/10/2023 1.70 (H)    • Glucose 07/10/2023 136    • Glucose, Fasting 07/10/2023 136 (H)    • Calcium 07/10/2023 8.2 (L)    • eGFR 07/10/2023 34    • POC Glucose 07/10/2023 115    • COLOR 07/10/2023 Brown    • Size 07/10/2023 4x4    • Stone Weight 07/10/2023 52    • Composition 07/10/2023 Comment    • URIC ACID 07/10/2023 100    • STONE COMMENT 07/10/2023 Comment    • STONE COMMENT 07/10/2023 Comment    • Please note 07/10/2023 Comment    • Disclaimer 07/10/2023 Comment    • Photo 07/10/2023 Comment    • Stone Source 07/10/2023 Comment    • POC Glucose 07/10/2023 119    • POC Glucose 07/10/2023 124    • Sodium 07/11/2023 138    • Potassium 07/11/2023 4.0    • Chloride 07/11/2023 109 (H)    • CO2 07/11/2023 21    • ANION GAP 07/11/2023 8    • BUN 07/11/2023 23    • Creatinine 07/11/2023 1.50 (H)    • Glucose 07/11/2023 116    • Calcium 07/11/2023 7.8 (L)    • eGFR 07/11/2023 40    • WBC 07/11/2023 9.82    • RBC 07/11/2023 3.76 (L)    • Hemoglobin 07/11/2023 11.8 (L)    • Hematocrit 07/11/2023 35.4 (L)    • MCV 07/11/2023 94    • MCH 07/11/2023 31.4    • MCHC 07/11/2023 33.3    • RDW 07/11/2023 12.6    • MPV 07/11/2023 10.1    • Platelets 96/63/1871 141 (L)    • nRBC 07/11/2023 0    • Neutrophils Relative 07/11/2023 72    • Immat GRANS % 07/11/2023 0    • Lymphocytes Relative 07/11/2023 15    • Monocytes Relative 07/11/2023 12    • Eosinophils Relative 07/11/2023 1    • Basophils Relative 07/11/2023 0    • Neutrophils Absolute 07/11/2023 7.07    • Immature Grans Absolute 07/11/2023 0.04    • Lymphocytes Absolute 07/11/2023 1.45    • Monocytes Absolute 07/11/2023 1.18    • Eosinophils Absolute 07/11/2023 0.06    • Basophils Absolute 07/11/2023 0.02    • POC Glucose 07/11/2023 110    • POC Glucose 07/11/2023 180 (H)          Radiology Results:   No results found.

## 2023-08-29 NOTE — TELEPHONE ENCOUNTER
Scheduled date of EGD(as of today):9/12/23  Physician performing EGD:Trey  Location of EGD:[facility]  Instructions reviewed with patient by:Joana MARTIN  Clearances: None

## 2023-08-30 ENCOUNTER — TELEPHONE (OUTPATIENT)
Dept: GASTROENTEROLOGY | Facility: CLINIC | Age: 88
End: 2023-08-30

## 2023-08-30 NOTE — TELEPHONE ENCOUNTER
Patient is scheduled for an EGD with Dr. Roger Aragon on 9/12/23. I called and left a message asking patient to call back to schedule his 4 week follow up for after the EGD.

## 2023-09-11 ENCOUNTER — HOSPITAL ENCOUNTER (OUTPATIENT)
Dept: ULTRASOUND IMAGING | Facility: HOSPITAL | Age: 88
Discharge: HOME | End: 2023-09-11
Payer: MEDICARE

## 2023-09-11 DIAGNOSIS — N20.0 KIDNEY STONES: ICD-10-CM

## 2023-09-11 PROCEDURE — 76775 US EXAM ABDO BACK WALL LIM: CPT

## 2023-09-21 ENCOUNTER — OFFICE VISIT (OUTPATIENT)
Dept: INTERNAL MEDICINE CLINIC | Facility: CLINIC | Age: 88
End: 2023-09-21
Payer: MEDICARE

## 2023-09-21 VITALS
HEART RATE: 56 BPM | BODY MASS INDEX: 24.96 KG/M2 | WEIGHT: 159 LBS | HEIGHT: 67 IN | DIASTOLIC BLOOD PRESSURE: 80 MMHG | TEMPERATURE: 98.5 F | OXYGEN SATURATION: 99 % | SYSTOLIC BLOOD PRESSURE: 132 MMHG

## 2023-09-21 DIAGNOSIS — K21.9 GASTROESOPHAGEAL REFLUX DISEASE, UNSPECIFIED WHETHER ESOPHAGITIS PRESENT: ICD-10-CM

## 2023-09-21 DIAGNOSIS — Z95.2 S/P AVR (AORTIC VALVE REPLACEMENT): ICD-10-CM

## 2023-09-21 DIAGNOSIS — R80.9 MICROALBUMINURIA DUE TO TYPE 2 DIABETES MELLITUS: ICD-10-CM

## 2023-09-21 DIAGNOSIS — E11.21 TYPE 2 DIABETES MELLITUS WITH DIABETIC NEPHROPATHY, WITH LONG-TERM CURRENT USE OF INSULIN (HCC): Primary | ICD-10-CM

## 2023-09-21 DIAGNOSIS — E78.2 MIXED HYPERLIPIDEMIA: ICD-10-CM

## 2023-09-21 DIAGNOSIS — I25.10 CORONARY ARTERY DISEASE INVOLVING NATIVE CORONARY ARTERY OF NATIVE HEART WITHOUT ANGINA PECTORIS: ICD-10-CM

## 2023-09-21 DIAGNOSIS — Z79.4 TYPE 2 DIABETES MELLITUS WITH DIABETIC NEPHROPATHY, WITH LONG-TERM CURRENT USE OF INSULIN (HCC): Primary | ICD-10-CM

## 2023-09-21 DIAGNOSIS — N18.32 STAGE 3B CHRONIC KIDNEY DISEASE (HCC): ICD-10-CM

## 2023-09-21 DIAGNOSIS — I10 ESSENTIAL HYPERTENSION: Chronic | ICD-10-CM

## 2023-09-21 DIAGNOSIS — E11.29 MICROALBUMINURIA DUE TO TYPE 2 DIABETES MELLITUS: ICD-10-CM

## 2023-09-21 DIAGNOSIS — D69.6 THROMBOCYTOPENIA (HCC): ICD-10-CM

## 2023-09-21 DIAGNOSIS — Z23 NEED FOR PROPHYLACTIC VACCINATION AND INOCULATION AGAINST INFLUENZA: ICD-10-CM

## 2023-09-21 PROCEDURE — 99214 OFFICE O/P EST MOD 30 MIN: CPT | Performed by: INTERNAL MEDICINE

## 2023-09-21 PROCEDURE — G0008 ADMIN INFLUENZA VIRUS VAC: HCPCS | Performed by: INTERNAL MEDICINE

## 2023-09-21 PROCEDURE — 90662 IIV NO PRSV INCREASED AG IM: CPT | Performed by: INTERNAL MEDICINE

## 2023-09-21 RX ORDER — GLIPIZIDE 5 MG/1
5 TABLET ORAL
COMMUNITY
End: 2023-09-21 | Stop reason: SDUPTHER

## 2023-09-21 RX ORDER — METOPROLOL SUCCINATE 100 MG/1
100 TABLET, EXTENDED RELEASE ORAL DAILY
COMMUNITY

## 2023-09-21 RX ORDER — GLIPIZIDE 5 MG/1
5 TABLET ORAL
Qty: 180 TABLET | Refills: 1 | Status: SHIPPED | OUTPATIENT
Start: 2023-09-21

## 2023-09-21 NOTE — PROGRESS NOTES
Assessment/Plan:    1. Type 2 diabetes mellitus with diabetic nephropathy, with long-term current use of insulin [facility])  Assessment & Plan:    Lab Results   Component Value Date    HGBA1C 7.3 (H) 07/10/2023   Patient denies any hypoglycemia. Discussed with the patient due to low GFR advised to discontinue metformin patient agreed so metformin was discontinued from today. Increase Lantus insulin from 23 to 33 units once daily from today. Call if blood sugar less than 80 or persistently elevated blood sugar then will adjust the dose of Lantus. He has been taking glipizide 5 mg twice daily without any adverse effect or hypoglycemia. So we will continue that medication for now. We will check hemoglobin A1c fasting blood sugar. Continue 1800-calorie ADA diet. He has been seen and followed by eye doctor as well as podiatrist.    Orders:  -     glipiZIDE (GLUCOTROL) 5 mg tablet; Take 1 tablet (5 mg total) by mouth 2 (two) times a day before meals  -     Comprehensive metabolic panel; Future  -     Hemoglobin A1C; Future  -     Albumin / creatinine urine ratio    2. Gastroesophageal reflux disease, unspecified whether esophagitis present    3. 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 ([facility])    4. Coronary artery disease involving native coronary artery of native heart without angina pectoris  -     Lipid panel; Future    5. Essential hypertension  -     CBC and differential; Future  -     Comprehensive metabolic panel; Future    6. Stage 3b chronic kidney disease ([facility])  -     Comprehensive metabolic panel; Future    7. Mixed hyperlipidemia  -     Lipid panel; Future    8. BMI 24.0-24.9, adult    9. Thrombocytopenia (HCC)  -     CBC and differential; Future    10. Need for prophylactic vaccination and inoculation against influenza  -     influenza vaccine, high-dose, PF 0.7 mL (FLUZONE HIGH-DOSE)    11. S/P AVR bioprothetic         His GE reflux is well controlled on omeprazole.   Has been watching diet for reflux precautions. He is on losartan for his microalbuminuria. Patient has a known CAD and status post AVR presently asymptomatic has been seen and followed by cardiologist.  Yoana Awe to follow with cardiologist.  CKD stable advised to maintain hydration. Hyperlipidemia last LDL 86 cholesterol 150 advised to continue present medication atorvastatin and low-cholesterol low carbs diet. Last platelet count was 951 with normal hemoglobin and WBC. Will follow CBC. Subjective: Patient presents for follow-up. Patient ID: Vero Macias is a 80 y.o. male. HPI   80-year-old white male patient presents to follow-up his medical problems. He denies any chest pain, shortness of breath, pain in abdomen. Denies any cough, fever, chills. Denies any nausea vomiting diarrhea.     The following portions of the patient's history were reviewed and updated as appropriate:     Past Medical History:  He has a past medical history of Anemia (06/22/2021), Arthritis, Bilateral carotid artery stenosis (01/04/2021), Bilateral carotid bruits, BMI 25.0-25.9,adult (04/06/2021), BMI 25.0-25.9,adult (08/31/2021), BMI 26.0-26.9,adult (08/31/2021), BMI 27.0-27.9,adult (12/13/2022), Coronary artery disease involving native coronary artery of native heart without angina pectoris (04/06/2021), CTS (carpal tunnel syndrome), Deviated septum, Diabetes ([address]), Diabetes mellitus ([address]) (05/1995), 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[address]), Dizziness, Double vision, Ear problems, Encounter for support and coordination of transition of care (06/22/2021), Esophageal dysmotility (06/06/2023), Fatigue, General weakness, GERD (gastroesophageal reflux disease), Hearing impaired person, bilateral, HL (hearing loss), Hypertension, Left upper quadrant pain (06/06/2023), Medicare annual wellness visit, subsequent (08/31/2021), Microscopic hematuria, Myalgia (04/06/2021), New daily persistent headache (03/14/2023), Other dysphagia (12/13/2022), Other headache syndrome (03/14/2023), Other specified anemias (12/13/2022), Pain in joints (04/06/2021), PVD (peripheral vascular disease) ([address]) (04/02/2021), Rectal bleeding (06/22/2021), Renal calculi, Right leg DVT ([address]) (06/22/2021), Right lumbar radiculopathy, S/P AVR (aortic valve replacement) (04/02/2021), Skin rash (12/13/2022), SOB (shortness of breath) on exertion, Spinal stenosis in cervical region (04/02/2021), Tachycardia, Thrombocytopenia ([address]) (07/12/2023), Type 2 diabetes mellitus with stage 3a chronic kidney disease, with long-term current use of insulin ([address]) (09/08/2022), Type 2 diabetes mellitus without complication, with long-term current use of insulin ([address]) (03/14/2023), Type 2 diabetes mellitus, without long-term current use of insulin ([address]) (09/08/2022), Urinary frequency, and Vertigo (01/04/2021). ,  _______________________________________________________________________  Past Surgical History:   has a past surgical history that includes Ureterectomy; Bypass Graft; Carpal tunnel release (Right); Hand surgery (Left); Cataract extraction, bilateral; Aortic valve replacement (09/2016); 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); Colonoscopy (09/16/2015); [place] retrograde pyelogram (7/10/2023); and pr cysto/uretero w/lithotripsy &indwell stent insrt (Right, 7/10/2023). ,  _______________________________________________________________________  Family History:  Family history is unknown by patient. ,  _______________________________________________________________________  Social History:   reports that he has quit smoking. His smoking use included cigarettes. He has a 0.50 pack-year smoking history. He has never used smokeless tobacco. He reports that he does not currently use alcohol. He reports that he does not use drugs. ,  _______________________________________________________________________  Allergies:  is allergic to lisinopril and penicillin g..  _______________________________________________________________________  Current Outpatient Medications   Medication Sig Dispense Refill   • acetaminophen (TYLENOL) 325 mg tablet Take 3 tablets (975 mg total) by mouth every 8 (eight) hours as needed for mild pain  0   • aspirin (ECOTRIN LOW STRENGTH) 81 mg EC tablet Take 81 mg by mouth daily     • atorvastatin (LIPITOR) 40 mg tablet Take 20 mg by mouth daily 1/2 tablet     • Brinzolamide-Brimonidine (Simbrinza) 1-0.2 % SUSP Apply 1 drop to eye 2 (two) times a day     • Cinnamon 500 MG capsule Take 500 mg by mouth 2 (two) times a day     • clotrimazole-betamethasone (LOTRISONE) 1-0.05 % cream Apply topically 2 (two) times a day 30 g 0   • finasteride (PROSCAR) 5 mg tablet Take 1 tablet (5 mg total) by mouth daily 90 tablet 0   • glipiZIDE (GLUCOTROL) 5 mg tablet Take 1 tablet (5 mg total) by mouth 2 (two) times a day before meals 180 tablet 1   • insulin glargine (LANTUS) 100 units/mL subcutaneous injection Inject 23 Units under the skin daily at bedtime     • latanoprost (XALATAN) 0.005 % ophthalmic solution Administer 1 drop to both eyes daily at bedtime     • losartan (COZAAR) 100 MG tablet Take 100 mg by mouth daily      • metoprolol succinate (TOPROL-XL) 100 mg 24 hr tablet Take 100 mg by mouth daily     • Multiple Vitamin (multivitamin) tablet Take 1 tablet by mouth daily     • omeprazole (PriLOSEC) 20 mg delayed release capsule Take 20 mg by mouth daily     • oxybutynin (DITROPAN) 5 mg tablet Take 1 tablet (5 mg total) by mouth 3 (three) times a day 90 tablet 0   • tamsulosin (FLOMAX) 0.4 mg Take 1 capsule (0.4 mg total) by mouth daily with dinner 30 capsule 0   • vitamin B-12 (VITAMIN B-12) 1,000 mcg tablet Take 1 tablet by mouth every other day       No current facility-administered medications for this visit.     _______________________________________________________________________  Review of Systems   Constitutional: Negative for chills and fever. HENT: Negative for congestion, ear pain, hearing loss, nosebleeds, sinus pain, sore throat and trouble swallowing. Eyes: Negative for discharge, redness and visual disturbance. Respiratory: Negative for cough, chest tightness and shortness of breath. Cardiovascular: Negative for chest pain and palpitations. Gastrointestinal: Negative for abdominal pain, blood in stool, constipation, diarrhea, nausea and vomiting. Genitourinary: Negative for dysuria, flank pain and hematuria. Musculoskeletal: Negative for arthralgias, myalgias and neck pain. Skin: Negative for color change and rash. Neurological: Negative for dizziness, speech difficulty and weakness. Hematological: Does not bruise/bleed easily. Psychiatric/Behavioral: Negative for agitation and behavioral problems. Objective:  Vitals:    09/21/23 1329   BP: 132/80   BP Location: Left arm   Patient Position: Sitting   Cuff Size: Adult   Pulse: 56   Temp: 98.5 °F (36.9 °C)   TempSrc: Tympanic   SpO2: 99%   Weight: 72.1 kg (159 lb)   Height: 5' 7" (1.702 m)     Body mass index is 24.9 kg/m². Physical Exam  Vitals and nursing note reviewed. Constitutional:       General: He is not in acute distress. Appearance: Normal appearance. HENT:      Head: Normocephalic and atraumatic. Nose: Nose normal.      Mouth/Throat:      Mouth: Mucous membranes are moist.   Eyes:      General: No scleral icterus. Right eye: No discharge. Left eye: No discharge. Extraocular Movements: Extraocular movements intact. Conjunctiva/sclera: Conjunctivae normal.   Cardiovascular:      Rate and Rhythm: Normal rate and regular rhythm. Pulses: Normal pulses. no weak pulses          Dorsalis pedis pulses are 2+ on the right side and 2+ on the left side. Posterior tibial pulses are 2+ on the right side and 2+ on the left side. Heart sounds: Murmur heard.    Pulmonary:      Effort: Pulmonary effort is normal. No respiratory distress. Breath sounds: Normal breath sounds. No wheezing, rhonchi or rales. Abdominal:      General: Bowel sounds are normal.      Palpations: Abdomen is soft. Tenderness: There is no abdominal tenderness. Musculoskeletal:         General: Normal range of motion. Cervical back: Normal range of motion and neck supple. No muscular tenderness. Right lower leg: No edema. Left lower leg: No edema. Feet:      Right foot:      Skin integrity: No ulcer, skin breakdown, erythema, warmth or dry skin. Left foot:      Skin integrity: No ulcer, skin breakdown, erythema, warmth or dry skin. Skin:     General: Skin is warm. Findings: No rash. Neurological:      General: No focal deficit present. Mental Status: He is alert and oriented to person, place, and time. Motor: No weakness. Psychiatric:         Mood and Affect: Mood normal.         Behavior: Behavior normal.         Patient's shoes and socks removed. Right Foot/Ankle   Right Foot Inspection  Skin Exam: skin normal and skin intact. No dry skin, no warmth, no erythema, no maceration, no abnormal color, no pre-ulcer and no ulcer. Toe Exam: ROM and strength within normal limits. Sensory   Monofilament testing: intact    Vascular  The right DP pulse is 2+. The right PT pulse is 2+. Left Foot/Ankle  Left Foot Inspection  Skin Exam: skin normal and skin intact. No dry skin, no warmth, no erythema, no maceration, normal color, no pre-ulcer and no ulcer. Toe Exam: ROM and strength within normal limits. Sensory   Monofilament testing: intact    Vascular  The left DP pulse is 2+. The left PT pulse is 2+. Assign Risk Category  No deformity present  No loss of protective sensation  No weak pulses  Risk: 0  I spent 30 minutes with the patient today.   More than 50% time spent for reviewing of external notes, reviewing of the results of diagnostics test, management of care, patient education and ordering of test.

## 2023-09-21 NOTE — ASSESSMENT & PLAN NOTE
Lab Results   Component Value Date    HGBA1C 7.3 (H) 07/10/2023   Patient denies any hypoglycemia. Discussed with the patient due to low GFR advised to discontinue metformin patient agreed so metformin was discontinued from today. Increase Lantus insulin from 23 to 33 units once daily from today. Call if blood sugar less than 80 or persistently elevated blood sugar then will adjust the dose of Lantus. He has been taking glipizide 5 mg twice daily without any adverse effect or hypoglycemia. So we will continue that medication for now. We will check hemoglobin A1c fasting blood sugar. Continue 1800-calorie ADA diet.   He has been seen and followed by eye doctor as well as podiatrist.

## 2023-10-05 ENCOUNTER — HOSPITAL ENCOUNTER (OUTPATIENT)
Dept: GASTROENTEROLOGY | Facility: AMBULARY SURGERY CENTER | Age: 88
Setting detail: OUTPATIENT SURGERY
End: 2023-10-05
Attending: INTERNAL MEDICINE
Payer: MEDICARE

## 2023-10-05 ENCOUNTER — APPOINTMENT (EMERGENCY)
Dept: CT IMAGING | Facility: HOSPITAL | Age: 88
End: 2023-10-05
Payer: MEDICARE

## 2023-10-05 ENCOUNTER — ANESTHESIA (OUTPATIENT)
Dept: GASTROENTEROLOGY | Facility: AMBULARY SURGERY CENTER | Age: 88
End: 2023-10-05

## 2023-10-05 ENCOUNTER — HOSPITAL ENCOUNTER (EMERGENCY)
Facility: HOSPITAL | Age: 88
Discharge: HOME | End: 2023-10-05
Attending: EMERGENCY MEDICINE
Payer: MEDICARE

## 2023-10-05 ENCOUNTER — ANESTHESIA EVENT (OUTPATIENT)
Dept: GASTROENTEROLOGY | Facility: AMBULARY SURGERY CENTER | Age: 88
End: 2023-10-05

## 2023-10-05 VITALS
OXYGEN SATURATION: 98 % | DIASTOLIC BLOOD PRESSURE: 79 MMHG | HEART RATE: 67 BPM | SYSTOLIC BLOOD PRESSURE: 180 MMHG | TEMPERATURE: 98.6 F | RESPIRATION RATE: 16 BRPM

## 2023-10-05 VITALS
DIASTOLIC BLOOD PRESSURE: 58 MMHG | HEART RATE: 59 BPM | RESPIRATION RATE: 18 BRPM | OXYGEN SATURATION: 96 % | SYSTOLIC BLOOD PRESSURE: 106 MMHG | TEMPERATURE: 97.2 F

## 2023-10-05 DIAGNOSIS — K22.4 ESOPHAGEAL DYSMOTILITY: ICD-10-CM

## 2023-10-05 DIAGNOSIS — R13.19 OTHER DYSPHAGIA: ICD-10-CM

## 2023-10-05 DIAGNOSIS — K25.3 CAMERON LESION, ACUTE: ICD-10-CM

## 2023-10-05 DIAGNOSIS — N39.0 UTI (URINARY TRACT INFECTION): Primary | ICD-10-CM

## 2023-10-05 LAB
ALBUMIN SERPL BCP-MCNC: 3.9 G/DL (ref 3.5–5)
ALP SERPL-CCNC: 67 U/L (ref 34–104)
ALT SERPL W P-5'-P-CCNC: 14 U/L (ref 7–52)
ANION GAP SERPL CALCULATED.3IONS-SCNC: 8 MMOL/L
AST SERPL W P-5'-P-CCNC: 15 U/L (ref 13–39)
BACTERIA UR QL AUTO: ABNORMAL /HPF
BASOPHILS # BLD AUTO: 0.04 THOUSANDS/ÂΜL (ref 0–0.1)
BASOPHILS NFR BLD AUTO: 1 % (ref 0–1)
BILIRUB SERPL-MCNC: 0.46 MG/DL (ref 0.2–1)
BILIRUB UR QL STRIP: NEGATIVE
BUN SERPL-MCNC: 25 MG/DL (ref 5–25)
CALCIUM SERPL-MCNC: 8.9 MG/DL (ref 8.4–10.2)
CHLORIDE SERPL-SCNC: 107 MMOL/L (ref 96–108)
CLARITY UR: CLEAR
CO2 SERPL-SCNC: 23 MMOL/L (ref 21–32)
COLOR UR: COLORLESS
CREAT SERPL-MCNC: 1.32 MG/DL (ref 0.6–1.3)
EOSINOPHIL # BLD AUTO: 0.21 THOUSAND/ÂΜL (ref 0–0.61)
EOSINOPHIL NFR BLD AUTO: 3 % (ref 0–6)
ERYTHROCYTE [DISTWIDTH] IN BLOOD BY AUTOMATED COUNT: 13.2 % (ref 11.6–15.1)
GFR SERPL CREATININE-BSD FRML MDRD: 47 ML/MIN/1.73SQ M
GLUCOSE SERPL-MCNC: 117 MG/DL (ref 65–140)
GLUCOSE SERPL-MCNC: 236 MG/DL (ref 65–140)
GLUCOSE UR STRIP-MCNC: NEGATIVE MG/DL
HCT VFR BLD AUTO: 39.3 % (ref 36.5–49.3)
HGB BLD-MCNC: 12.9 G/DL (ref 12–17)
HGB UR QL STRIP.AUTO: NEGATIVE
IMM GRANULOCYTES # BLD AUTO: 0.02 THOUSAND/UL (ref 0–0.2)
IMM GRANULOCYTES NFR BLD AUTO: 0 % (ref 0–2)
KETONES UR STRIP-MCNC: NEGATIVE MG/DL
LEUKOCYTE ESTERASE UR QL STRIP: ABNORMAL
LYMPHOCYTES # BLD AUTO: 1.57 THOUSANDS/ÂΜL (ref 0.6–4.47)
LYMPHOCYTES NFR BLD AUTO: 20 % (ref 14–44)
MCH RBC QN AUTO: 31.4 PG (ref 26.8–34.3)
MCHC RBC AUTO-ENTMCNC: 32.8 G/DL (ref 31.4–37.4)
MCV RBC AUTO: 96 FL (ref 82–98)
MONOCYTES # BLD AUTO: 0.94 THOUSAND/ÂΜL (ref 0.17–1.22)
MONOCYTES NFR BLD AUTO: 12 % (ref 4–12)
NEUTROPHILS # BLD AUTO: 4.98 THOUSANDS/ÂΜL (ref 1.85–7.62)
NEUTS SEG NFR BLD AUTO: 64 % (ref 43–75)
NITRITE UR QL STRIP: NEGATIVE
NON-SQ EPI CELLS URNS QL MICRO: ABNORMAL /HPF
NRBC BLD AUTO-RTO: 0 /100 WBCS
PH UR STRIP.AUTO: 5.5 [PH]
PLATELET # BLD AUTO: 145 THOUSANDS/UL (ref 149–390)
PMV BLD AUTO: 10 FL (ref 8.9–12.7)
POTASSIUM SERPL-SCNC: 4.4 MMOL/L (ref 3.5–5.3)
PROT SERPL-MCNC: 7 G/DL (ref 6.4–8.4)
PROT UR STRIP-MCNC: NEGATIVE MG/DL
RBC # BLD AUTO: 4.11 MILLION/UL (ref 3.88–5.62)
RBC #/AREA URNS AUTO: ABNORMAL /HPF
SODIUM SERPL-SCNC: 138 MMOL/L (ref 135–147)
SP GR UR STRIP.AUTO: 1.01 (ref 1–1.03)
UROBILINOGEN UR STRIP-ACNC: <2 MG/DL
WBC # BLD AUTO: 7.76 THOUSAND/UL (ref 4.31–10.16)
WBC #/AREA URNS AUTO: ABNORMAL /HPF

## 2023-10-05 PROCEDURE — 99284 EMERGENCY DEPT VISIT MOD MDM: CPT

## 2023-10-05 PROCEDURE — 43239 EGD BIOPSY SINGLE/MULTIPLE: CPT | Performed by: INTERNAL MEDICINE

## 2023-10-05 PROCEDURE — 88305 TISSUE EXAM BY PATHOLOGIST: CPT | Performed by: PATHOLOGY

## 2023-10-05 PROCEDURE — 99284 EMERGENCY DEPT VISIT MOD MDM: CPT | Performed by: EMERGENCY MEDICINE

## 2023-10-05 PROCEDURE — 36415 COLL VENOUS BLD VENIPUNCTURE: CPT

## 2023-10-05 PROCEDURE — 80053 COMPREHEN METABOLIC PANEL: CPT

## 2023-10-05 PROCEDURE — 74176 CT ABD & PELVIS W/O CONTRAST: CPT

## 2023-10-05 PROCEDURE — 96365 THER/PROPH/DIAG IV INF INIT: CPT

## 2023-10-05 PROCEDURE — 85025 COMPLETE CBC W/AUTO DIFF WBC: CPT

## 2023-10-05 PROCEDURE — 88342 IMHCHEM/IMCYTCHM 1ST ANTB: CPT | Performed by: PATHOLOGY

## 2023-10-05 PROCEDURE — 43236 UPPR GI SCOPE W/SUBMUC INJ: CPT | Performed by: INTERNAL MEDICINE

## 2023-10-05 PROCEDURE — 87086 URINE CULTURE/COLONY COUNT: CPT

## 2023-10-05 PROCEDURE — 81001 URINALYSIS AUTO W/SCOPE: CPT

## 2023-10-05 PROCEDURE — 87077 CULTURE AEROBIC IDENTIFY: CPT

## 2023-10-05 PROCEDURE — 88313 SPECIAL STAINS GROUP 2: CPT | Performed by: PATHOLOGY

## 2023-10-05 PROCEDURE — 88341 IMHCHEM/IMCYTCHM EA ADD ANTB: CPT | Performed by: PATHOLOGY

## 2023-10-05 PROCEDURE — G1004 CDSM NDSC: HCPCS

## 2023-10-05 PROCEDURE — 82948 REAGENT STRIP/BLOOD GLUCOSE: CPT

## 2023-10-05 PROCEDURE — 87186 SC STD MICRODIL/AGAR DIL: CPT

## 2023-10-05 RX ORDER — SODIUM CHLORIDE, SODIUM LACTATE, POTASSIUM CHLORIDE, CALCIUM CHLORIDE 600; 310; 30; 20 MG/100ML; MG/100ML; MG/100ML; MG/100ML
INJECTION, SOLUTION INTRAVENOUS CONTINUOUS PRN
Status: DISCONTINUED | OUTPATIENT
Start: 2023-10-05 | End: 2023-10-05

## 2023-10-05 RX ORDER — CEPHALEXIN 500 MG/1
500 CAPSULE ORAL EVERY 12 HOURS SCHEDULED
Qty: 14 CAPSULE | Refills: 0 | Status: SHIPPED | OUTPATIENT
Start: 2023-10-05 | End: 2023-10-12

## 2023-10-05 RX ORDER — CEPHALEXIN 250 MG/1
500 CAPSULE ORAL ONCE
Status: COMPLETED | OUTPATIENT
Start: 2023-10-05 | End: 2023-10-05

## 2023-10-05 RX ORDER — PROPOFOL 10 MG/ML
INJECTION, EMULSION INTRAVENOUS AS NEEDED
Status: DISCONTINUED | OUTPATIENT
Start: 2023-10-05 | End: 2023-10-05

## 2023-10-05 RX ORDER — SODIUM CHLORIDE, SODIUM GLUCONATE, SODIUM ACETATE, POTASSIUM CHLORIDE, MAGNESIUM CHLORIDE, SODIUM PHOSPHATE, DIBASIC, AND POTASSIUM PHOSPHATE .53; .5; .37; .037; .03; .012; .00082 G/100ML; G/100ML; G/100ML; G/100ML; G/100ML; G/100ML; G/100ML
500 INJECTION, SOLUTION INTRAVENOUS ONCE
Status: COMPLETED | OUTPATIENT
Start: 2023-10-05 | End: 2023-10-05

## 2023-10-05 RX ORDER — LIDOCAINE HYDROCHLORIDE 20 MG/ML
INJECTION, SOLUTION EPIDURAL; INFILTRATION; INTRACAUDAL; PERINEURAL AS NEEDED
Status: DISCONTINUED | OUTPATIENT
Start: 2023-10-05 | End: 2023-10-05

## 2023-10-05 RX ORDER — SODIUM CHLORIDE, SODIUM LACTATE, POTASSIUM CHLORIDE, CALCIUM CHLORIDE 600; 310; 30; 20 MG/100ML; MG/100ML; MG/100ML; MG/100ML
125 INJECTION, SOLUTION INTRAVENOUS CONTINUOUS
Status: CANCELLED | OUTPATIENT
Start: 2023-10-05

## 2023-10-05 RX ADMIN — PROPOFOL 50 MG: 10 INJECTION, EMULSION INTRAVENOUS at 09:24

## 2023-10-05 RX ADMIN — ONABOTULINUMTOXINA 100 UNITS: 100 INJECTION, POWDER, LYOPHILIZED, FOR SOLUTION INTRADERMAL; INTRAMUSCULAR at 09:33

## 2023-10-05 RX ADMIN — LIDOCAINE HYDROCHLORIDE 100 MG: 20 INJECTION, SOLUTION EPIDURAL; INFILTRATION; INTRACAUDAL; PERINEURAL at 09:20

## 2023-10-05 RX ADMIN — SODIUM CHLORIDE, SODIUM GLUCONATE, SODIUM ACETATE, POTASSIUM CHLORIDE, MAGNESIUM CHLORIDE, SODIUM PHOSPHATE, DIBASIC, AND POTASSIUM PHOSPHATE 500 ML: .53; .5; .37; .037; .03; .012; .00082 INJECTION, SOLUTION INTRAVENOUS at 17:59

## 2023-10-05 RX ADMIN — CEPHALEXIN 500 MG: 250 CAPSULE ORAL at 19:59

## 2023-10-05 RX ADMIN — SODIUM CHLORIDE, SODIUM LACTATE, POTASSIUM CHLORIDE, AND CALCIUM CHLORIDE: .6; .31; .03; .02 INJECTION, SOLUTION INTRAVENOUS at 09:10

## 2023-10-05 RX ADMIN — PROPOFOL 100 MG: 10 INJECTION, EMULSION INTRAVENOUS at 09:20

## 2023-10-05 NOTE — ED PROVIDER NOTES
History  Chief Complaint   Patient presents with   • Flank Pain     Pt reports R flank/side pain since last night, states he could not sleep on his side d/t pain. Denies NVD. Had procedure done this morning and ever since, unable to urinate as much. States only getting small amts of urine out. Denies hematuria      80year-old male presenting due to right-sided flank pain. Patient states he has a distant history of kidney stones where he had 7 or 8 in the past and states his current right flank pain feels like a kidney stone. His pain started 2 days ago where when he laid down he felt like he was laying on a beach ball on his right side with pain in his flank and dysuria as well. Patient states today he is having difficulty urinating as well as dysuria but denies any hematuria or other systemic symptoms. Patient currently states he does not have pain. Prior to Admission Medications   Prescriptions Last Dose Informant Patient Reported? Taking?    Brinzolamide-Brimonidine (Simbrinza) 1-0.2 % SUSP  Self Yes No   Sig: Apply 1 drop to eye 2 (two) times a day   Cinnamon 500 MG capsule  Self Yes No   Sig: Take 500 mg by mouth 2 (two) times a day   Multiple Vitamin (multivitamin) tablet  Self Yes No   Sig: Take 1 tablet by mouth daily   acetaminophen (TYLENOL) 325 mg tablet  Self No No   Sig: Take 3 tablets (975 mg total) by mouth every 8 (eight) hours as needed for mild pain   aspirin (ECOTRIN LOW STRENGTH) 81 mg EC tablet  Self Yes No   Sig: Take 81 mg by mouth daily   atorvastatin (LIPITOR) 40 mg tablet  Self Yes No   Sig: Take 20 mg by mouth daily 1/2 tablet   clotrimazole-betamethasone (LOTRISONE) 1-0.05 % cream  Self No No   Sig: Apply topically 2 (two) times a day   finasteride (PROSCAR) 5 mg tablet  Self No No   Sig: Take 1 tablet (5 mg total) by mouth daily   glipiZIDE (GLUCOTROL) 5 mg tablet   No No   Sig: Take 1 tablet (5 mg total) by mouth 2 (two) times a day before meals   insulin glargine (LANTUS) 100 units/mL subcutaneous injection  Self Yes No   Sig: Inject 23 Units under the skin daily at bedtime   latanoprost (XALATAN) 0.005 % ophthalmic solution  Self Yes No   Sig: Administer 1 drop to both eyes daily at bedtime   losartan (COZAAR) 100 MG tablet  Self Yes No   Sig: Take 100 mg by mouth daily    metoprolol succinate (TOPROL-XL) 100 mg 24 hr tablet   Yes No   Sig: Take 100 mg by mouth daily   omeprazole (PriLOSEC) 20 mg delayed release capsule  Self Yes No   Sig: Take 20 mg by mouth daily   oxybutynin (DITROPAN) 5 mg tablet  Self No No   Sig: Take 1 tablet (5 mg total) by mouth 3 (three) times a day   tamsulosin (FLOMAX) 0.4 mg  Self No No   Sig: Take 1 capsule (0.4 mg total) by mouth daily with dinner   vitamin B-12 (VITAMIN B-12) 1,000 mcg tablet  Self Yes No   Sig: Take 1 tablet by mouth every other day      Facility-Administered Medications: None       Past Medical History:   Diagnosis Date   • Anemia 06/22/2021   • Arthritis    • Bilateral carotid artery stenosis 01/04/2021    <50% on doppler 7/27/2018.  No change from Aug/2016   • Bilateral carotid bruits    • BMI 25.0-25.9,adult 04/06/2021   • BMI 25.0-25.9,adult 08/31/2021   • BMI 26.0-26.9,adult 08/31/2021   • BMI 27.0-27.9,adult 12/13/2022   • Coronary artery disease involving native coronary artery of native heart without angina pectoris 04/06/2021   • CTS (carpal tunnel syndrome)    • Deviated septum    • Diabetes ([address])    • Diabetes mellitus ([address]) 05/1995   • 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HCC)    • Dizziness    • Double vision    • Ear problems    • Encounter for support and coordination of transition of care 06/22/2021   • Esophageal dysmotility 06/06/2023   • Fatigue    • General weakness    • GERD (gastroesophageal reflux disease)    • Hearing impaired person, bilateral    • HL (hearing loss)    • Hypertension    • Left upper quadrant pain 06/06/2023   • Medicare annual wellness visit, subsequent 08/31/2021   • Microscopic hematuria • Myalgia 04/06/2021   • New daily persistent headache 03/14/2023   • Other dysphagia 12/13/2022   • Other headache syndrome 03/14/2023   • Other specified anemias 12/13/2022   • Pain in joints 04/06/2021   • PVD (peripheral vascular disease) ([address]) 04/02/2021   • Rectal bleeding 06/22/2021   • Renal calculi    • Right leg DVT ([address]) 06/22/2021   • Right lumbar radiculopathy    • S/P AVR (aortic valve replacement) 04/02/2021   • Skin rash 12/13/2022   • SOB (shortness of breath) on exertion    • Spinal stenosis in cervical region 04/02/2021   • Tachycardia    • Thrombocytopenia ([address]) 07/12/2023   • Type 2 diabetes mellitus with stage 3a chronic kidney disease, with long-term current use of insulin ([address]) 09/08/2022   • Type 2 diabetes mellitus without complication, with long-term current use of insulin ([address]) 03/14/2023   • Type 2 diabetes mellitus, without long-term current use of insulin ([address]) 09/08/2022   • Urinary frequency    • Vertigo 01/04/2021       Past Surgical History:   Procedure Laterality Date   • AORTIC VALVE REPLACEMENT  09/2016   • BYPASS GRAFT     • CARPAL TUNNEL RELEASE Right     by Dr. Edson Gloria   • CATARACT EXTRACTION, BILATERAL     • COLONOSCOPY  09/16/2015   • CORONARY ARTERY BYPASS GRAFT  09/2016    x1    • FL RETROGRADE PYELOGRAM  7/10/2023   • HAND SURGERY Left    • NM CYSTO/URETERO W/LITHOTRIPSY &INDWELL STENT INSRT Right 7/10/2023    Procedure: CYSTOSCOPY URETEROSCOPY WITH LITHOTRIPSY HOLMIUM LASER, RETROGRADE PYELOGRAM AND INSERTION STENT URETERAL, STONE BASKET EXTRACTION;  Surgeon: Ami Olson MD;  Location: [facility];  Service: Urology   • URETERECTOMY         Family History   Family history unknown: Yes     I have reviewed and agree with the history as documented.     E-Cigarette/Vaping   • E-Cigarette Use Never User      E-Cigarette/Vaping Substances     Social History     Tobacco Use   • Smoking status: Former     Packs/day: 0.25     Years: 2.00     Total pack years: 0.50     Types: Cigarettes   • Smokeless tobacco: Never   • Tobacco comments:     Never smoker - As per [employer]   Vaping Use   • Vaping Use: Never used   Substance Use Topics   • Alcohol use: Not Currently   • Drug use: Never        Review of Systems   Constitutional: Negative for chills and fever. Respiratory: Negative for cough and shortness of breath. Cardiovascular: Negative for chest pain and palpitations. Gastrointestinal: Negative for abdominal pain and vomiting. Genitourinary: Positive for dysuria and flank pain. Negative for hematuria. Musculoskeletal: Negative for arthralgias and back pain. Skin: Negative for color change and rash. Neurological: Negative for dizziness, weakness, light-headedness, numbness and headaches. All other systems reviewed and are negative. Physical Exam  ED Triage Vitals [10/05/23 1730]   Temperature Pulse Respirations Blood Pressure SpO2   98.6 °F (37 °C) 67 16 (!) 180/79 98 %      Temp src Heart Rate Source Patient Position - Orthostatic VS BP Location FiO2 (%)   -- Monitor Sitting Right arm --      Pain Score       5             Orthostatic Vital Signs  Vitals:    10/05/23 1730   BP: (!) 180/79   Pulse: 67   Patient Position - Orthostatic VS: Sitting       Physical Exam  Vitals and nursing note reviewed. Constitutional:       General: He is not in acute distress. Appearance: He is well-developed. HENT:      Head: Normocephalic and atraumatic. Nose: Nose normal. No congestion. Mouth/Throat:      Pharynx: Oropharynx is clear. Eyes:      Extraocular Movements: Extraocular movements intact. Conjunctiva/sclera: Conjunctivae normal.   Cardiovascular:      Rate and Rhythm: Normal rate and regular rhythm. Pulses: Normal pulses. Heart sounds: Normal heart sounds. No murmur heard. Pulmonary:      Effort: Pulmonary effort is normal. No respiratory distress. Breath sounds: Normal breath sounds. Chest:      Chest wall: No tenderness. Abdominal:      General: Abdomen is flat. Bowel sounds are normal. There is no distension. Palpations: Abdomen is soft. Tenderness: There is no abdominal tenderness. There is right CVA tenderness. There is no left CVA tenderness. Musculoskeletal:         General: No deformity or signs of injury. Normal range of motion. Cervical back: Normal range of motion and neck supple. No rigidity or tenderness. Skin:     General: Skin is warm and dry. Findings: No bruising, lesion or rash. Neurological:      General: No focal deficit present. Mental Status: He is alert. Cranial Nerves: No cranial nerve deficit. Sensory: No sensory deficit. ED Medications  Medications   multi-electrolyte (ISOLYTE-S PH 7.4) bolus 500 mL (0 mL Intravenous Stopped 10/5/23 1859)   cephalexin (KEFLEX) capsule 500 mg (500 mg Oral Given 10/5/23 1959)       Diagnostic Studies  Results Reviewed     Procedure Component Value Units Date/Time    Urine Microscopic [[id number]]  (Abnormal) Collected: 10/05/23 1813    Lab Status: Final result Specimen: Urine, Clean Catch Updated: 10/05/23 1930     RBC, UA 2-4 /hpf      WBC, UA Innumerable /hpf      Epithelial Cells Occasional /hpf      Bacteria, UA Occasional /hpf     Urine culture [[id number]] Collected: 10/05/23 1813    Lab Status:  In process Specimen: Urine, Clean Catch Updated: 10/05/23 1930    Comprehensive metabolic panel [[id number]]  (Abnormal) Collected: 10/05/23 1801    Lab Status: Final result Specimen: Blood from Arm, Left Updated: 10/05/23 1831     Sodium 138 mmol/L      Potassium 4.4 mmol/L      Chloride 107 mmol/L      CO2 23 mmol/L      ANION GAP 8 mmol/L      BUN 25 mg/dL      Creatinine 1.32 mg/dL      Glucose 236 mg/dL      Calcium 8.9 mg/dL      AST 15 U/L      ALT 14 U/L      Alkaline Phosphatase 67 U/L      Total Protein 7.0 g/dL      Albumin 3.9 g/dL      Total Bilirubin 0.46 mg/dL      eGFR 47 ml/min/1.73sq m     Narrative:      [employer] guidelines for Chronic Kidney Disease (CKD):   •  Stage 1 with normal or high GFR (GFR > 90 mL/min/1.73 square meters)  •  Stage 2 Mild CKD (GFR = 60-89 mL/min/1.73 square meters)  •  Stage 3A Moderate CKD (GFR = 45-59 mL/min/1.73 square meters)  •  Stage 3B Moderate CKD (GFR = 30-44 mL/min/1.73 square meters)  •  Stage 4 Severe CKD (GFR = 15-29 mL/min/1.73 square meters)  •  Stage 5 End Stage CKD (GFR <15 mL/min/1.73 square meters)  Note: GFR calculation is accurate only with a steady state creatinine    UA w Reflex to Microscopic w Reflex to Culture [[id number]]  (Abnormal) Collected: 10/05/23 1813    Lab Status: Final result Specimen: Urine, Clean Catch Updated: 10/05/23 1830     Color, UA Colorless     Clarity, UA Clear     Specific Gravity, UA 1.010     pH, UA 5.5     Leukocytes, UA Large     Nitrite, UA Negative     Protein, UA Negative mg/dl      Glucose, UA Negative mg/dl      Ketones, UA Negative mg/dl      Urobilinogen, UA <2.0 mg/dl      Bilirubin, UA Negative     Occult Blood, UA Negative    CBC and differential [[id number]]  (Abnormal) Collected: 10/05/23 1801    Lab Status: Final result Specimen: Blood from Arm, Left Updated: 10/05/23 1816     WBC 7.76 Thousand/uL      RBC 4.11 Million/uL      Hemoglobin 12.9 g/dL      Hematocrit 39.3 %      MCV 96 fL      MCH 31.4 pg      MCHC 32.8 g/dL      RDW 13.2 %      MPV 10.0 fL      Platelets 721 Thousands/uL      nRBC 0 /100 WBCs      Neutrophils Relative 64 %      Immat GRANS % 0 %      Lymphocytes Relative 20 %      Monocytes Relative 12 %      Eosinophils Relative 3 %      Basophils Relative 1 %      Neutrophils Absolute 4.98 Thousands/µL      Immature Grans Absolute 0.02 Thousand/uL      Lymphocytes Absolute 1.57 Thousands/µL      Monocytes Absolute 0.94 Thousand/µL      Eosinophils Absolute 0.21 Thousand/µL      Basophils Absolute 0.04 Thousands/µL                  CT renal stone study abdomen pelvis wo contrast   Final Result by Khloe Benedict MD (10/05 1907)      No evidence obstructive uropathy. 2.2 cm bladder calculus. Large hiatal hernia. Workstation performed: [id number]               Procedures  Procedures      ED Course  ED Course as of 10/05/23 2148   Thu Oct 05, 2023   255 80year-old male presenting due to right flank pain and dysuria. Differential diagnosis including kidney stone, UTI, pyelonephritis. 1829 CBC and differential(!)  Plt 145 otherwise Unremarkable   1832 Vitals reviewed, hypertensive but otherwise WNL   1840 Glucose, Random(!): 236   1841 Creatinine(!): 1.32  Baseline around 1.6   1935 CT renal stone study abdomen pelvis wo contrast  No evidence obstructive uropathy.     2.2 cm bladder calculus.     Large hiatal hernia. 1958 WBC, UA(!): Innumerable   1958 Bacteria, UA: Occasional   1958 UA suggestive of UTI. Will treat with Keflex, first dose given in emergency department. Discussed return precautions with patient, patient is agreeable and appropriate for discharge. MDM      Disposition  Final diagnoses:   UTI (urinary tract infection)     Time reflects when diagnosis was documented in both MDM as applicable and the Disposition within this note     Time User Action Codes Description Comment    10/5/2023  7:54 PM Negro Rousseau Add [N39.0] UTI (urinary tract infection)       ED Disposition     ED Disposition   Discharge    Condition   Stable    Date/Time   Thu Oct 5, 2023  7:59 PM    [address] discharge to home/self care.                Follow-up Information     Follow up With Specialties Details Why Contact Info Additional Information    Natalee Johnson MD Internal Medicine   [address] Emergency Department Emergency Medicine   [address] [facility] [address] [facility] Emergency Department, Pending sale to [facility] [address], [place], [address]          Discharge Medication List as of 10/5/2023  7:59 PM      START taking these medications    Details   cephalexin (KEFLEX) 500 mg capsule Take 1 capsule (500 mg total) by mouth every 12 (twelve) hours for 7 days, Starting Thu 10/5/2023, Until Thu 10/12/2023, Normal         CONTINUE these medications which have NOT CHANGED    Details   acetaminophen (TYLENOL) 325 mg tablet Take 3 tablets (975 mg total) by mouth every 8 (eight) hours as needed for mild pain, Starting Tue 7/11/2023, No Print      aspirin (ECOTRIN LOW STRENGTH) 81 mg EC tablet Take 81 mg by mouth daily, Historical Med      atorvastatin (LIPITOR) 40 mg tablet Take 20 mg by mouth daily 1/2 tablet, Historical Med      Brinzolamide-Brimonidine (Simbrinza) 1-0.2 % SUSP Apply 1 drop to eye 2 (two) times a day, Historical Med      Cinnamon 500 MG capsule Take 500 mg by mouth 2 (two) times a day, Historical Med      clotrimazole-betamethasone (LOTRISONE) 1-0.05 % cream Apply topically 2 (two) times a day, Starting Tue 12/13/2022, Normal      finasteride (PROSCAR) 5 mg tablet Take 1 tablet (5 mg total) by mouth daily, Starting Tue 7/11/2023, Until Mon 10/9/2023, Normal      glipiZIDE (GLUCOTROL) 5 mg tablet Take 1 tablet (5 mg total) by mouth 2 (two) times a day before meals, Starting Thu 9/21/2023, Normal      insulin glargine (LANTUS) 100 units/mL subcutaneous injection Inject 23 Units under the skin daily at bedtime, Historical Med      latanoprost (XALATAN) 0.005 % ophthalmic solution Administer 1 drop to both eyes daily at bedtime, Historical Med      losartan (COZAAR) 100 MG tablet Take 100 mg by mouth daily , Historical Med      metoprolol succinate (TOPROL-XL) 100 mg 24 hr tablet Take 100 mg by mouth daily, Historical Med      Multiple Vitamin (multivitamin) tablet Take 1 tablet by mouth daily, Historical Med      omeprazole (PriLOSEC) 20 mg delayed release capsule Take 20 mg by mouth daily, Historical Med      oxybutynin (DITROPAN) 5 mg tablet Take 1 tablet (5 mg total) by mouth 3 (three) times a day, Starting Tue 7/11/2023, Normal      tamsulosin (FLOMAX) 0.4 mg Take 1 capsule (0.4 mg total) by mouth daily with dinner, Starting Tue 7/11/2023, Normal      vitamin B-12 (VITAMIN B-12) 1,000 mcg tablet Take 1 tablet by mouth every other day, Historical Med           No discharge procedures on file. PDMP Review     None           ED Provider  Attending physically available and evaluated Yunier Pizarro. I managed the patient along with the ED Attending.     Electronically Signed by         Khushi Pedersen MD  10/05/23 2918

## 2023-10-05 NOTE — ANESTHESIA PREPROCEDURE EVALUATION
Procedure:  EGD    Relevant Problems   CARDIO   (+) Coronary artery disease   (+) Coronary artery disease involving native coronary artery of native heart without angina pectoris   (+) Essential hypertension   (+) Hyperlipidemia      ENDO   (+) DM2 (diabetes mellitus, type 2) (HCC)      GI/HEPATIC   (+) Gastroesophageal reflux disease   (+) Other dysphagia      /RENAL   (+) Acute kidney injury superimposed on chronic kidney disease vs progressive CKD   (+) Chronic renal failure syndrome   (+) 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    (+) Stage 3 chronic kidney disease (HCC)      HEMATOLOGY   (+) Other specified anemias   (+) Thrombocytopenia (HCC)      MUSCULOSKELETAL   (+) Diaphragmatic hernia   (+) Low back pain      Digestive   (+) Esophageal dysmotility      Other   (+) History of pulmonary embolism and DVT   (+) S/P AVR bioprothetic    Echo 2021  EF 60%, mod MR, AVR w/ normal function     Physical Exam    Airway    Mallampati score: III  TM Distance: <3 FB  Neck ROM: full     Dental   Comment: Denies loose teeth,     Cardiovascular  Cardiovascular exam normal    Pulmonary  Pulmonary exam normal     Other Findings  Portions of exam deferred due to low yield and/or unknown COVID status      Anesthesia Plan  ASA Score- 3     Anesthesia Type- IV sedation with anesthesia with ASA Monitors. Additional Monitors:   Airway Plan:           Plan Factors-Exercise tolerance (METS): >4 METS. Chart reviewed. Existing labs reviewed. Patient summary reviewed. Patient is not a current smoker. Induction- intravenous. Postoperative Plan-     Informed Consent- Anesthetic plan and risks discussed with patient. I personally reviewed this patient with the CRNA. Discussed and agreed on the Anesthesia Plan with the CRNA. Cash Fan

## 2023-10-05 NOTE — ANESTHESIA POSTPROCEDURE EVALUATION
Post-Op Assessment Note    CV Status:  Stable  Pain Score: 0    Pain management: adequate     Mental Status:  Sleepy and arousable   Hydration Status:  Stable   PONV Controlled:  None   Airway Patency:  Patent and adequate      Post Op Vitals Reviewed: Yes      Staff: CRNA         No notable events documented.     BP (!) 86/53 (10/05/23 0941)    Temp     Pulse 64 (10/05/23 0941)   Resp 18 (10/05/23 0941)    SpO2 99 % (10/05/23 0941)

## 2023-10-05 NOTE — DISCHARGE INSTRUCTIONS
Take full course of antibiotics as prescribed for urinary tract infection. Thank you for allowing us to take part in your care.

## 2023-10-05 NOTE — H&P
History and Physical - [facility]  Dione Adam 80 y.o. male MRN: [id number]                  HPI: Dione Adam is a 80y.o. 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 REVIEW OF SYSTEMS: Per the HPI, and otherwise unremarkable. Historical Information   Past Medical History:   Diagnosis Date   • Anemia 06/22/2021   • Arthritis    • Bilateral carotid artery stenosis 01/04/2021    <50% on doppler 7/27/2018.  No change from Aug/2016   • Bilateral carotid bruits    • BMI 25.0-25.9,adult 04/06/2021   • BMI 25.0-25.9,adult 08/31/2021   • BMI 26.0-26.9,adult 08/31/2021   • BMI 27.0-27.9,adult 12/13/2022   • Coronary artery disease involving native coronary artery of native heart without angina pectoris 04/06/2021   • CTS (carpal tunnel syndrome)    • Deviated septum    • Diabetes ([address])    • Diabetes mellitus ([address]) 05/1995   • 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HCC)    • Dizziness    • Double vision    • Ear problems    • Encounter for support and coordination of transition of care 06/22/2021   • Esophageal dysmotility 06/06/2023   • Fatigue    • General weakness    • GERD (gastroesophageal reflux disease)    • Hearing impaired person, bilateral    • HL (hearing loss)    • Hypertension    • Left upper quadrant pain 06/06/2023   • Medicare annual wellness visit, subsequent 08/31/2021   • Microscopic hematuria    • Myalgia 04/06/2021   • New daily persistent headache 03/14/2023   • Other dysphagia 12/13/2022   • Other headache syndrome 03/14/2023   • Other specified anemias 12/13/2022   • Pain in joints 04/06/2021   • PVD (peripheral vascular disease) ([address]) 04/02/2021   • Rectal bleeding 06/22/2021   • Renal calculi    • Right leg DVT ([address]) 06/22/2021   • Right lumbar radiculopathy    • S/P AVR (aortic valve replacement) 04/02/2021   • Skin rash 12/13/2022   • SOB (shortness of breath) on exertion    • Spinal stenosis in cervical region 04/02/2021   • Tachycardia • Thrombocytopenia ([address]) 07/12/2023   • Type 2 diabetes mellitus with stage 3a chronic kidney disease, with long-term current use of insulin ([address]) 09/08/2022   • Type 2 diabetes mellitus without complication, with long-term current use of insulin ([address]) 03/14/2023   • Type 2 diabetes mellitus, without long-term current use of insulin ([address]) 09/08/2022   • Urinary frequency    • Vertigo 01/04/2021     Past Surgical History:   Procedure Laterality Date   • AORTIC VALVE REPLACEMENT  09/2016   • BYPASS GRAFT     • CARPAL TUNNEL RELEASE Right     by Dr. Aron Bloch   • CATARACT EXTRACTION, BILATERAL     • COLONOSCOPY  09/16/2015   • CORONARY ARTERY BYPASS GRAFT  09/2016    x1    • FL RETROGRADE PYELOGRAM  7/10/2023   • HAND SURGERY Left    • MS CYSTO/URETERO W/LITHOTRIPSY &INDWELL STENT INSRT Right 7/10/2023    Procedure: CYSTOSCOPY URETEROSCOPY WITH LITHOTRIPSY HOLMIUM LASER, RETROGRADE PYELOGRAM AND INSERTION STENT URETERAL, STONE BASKET EXTRACTION;  Surgeon: Lucinda Gutierrez MD;  Location: [facility];  Service: Urology   • URETERECTOMY       Social History   Social History     Substance and Sexual Activity   Alcohol Use Not Currently     Social History     Substance and Sexual Activity   Drug Use Never     Social History     Tobacco Use   Smoking Status Former   • Packs/day: 0.25   • Years: 2.00   • Total pack years: 0.50   • Types: Cigarettes   Smokeless Tobacco Never   Tobacco Comments    Never smoker - As per [employer]     Family History   Family history unknown: Yes       Meds/Allergies       Current Outpatient Medications:   •  acetaminophen (TYLENOL) 325 mg tablet  •  aspirin (ECOTRIN LOW STRENGTH) 81 mg EC tablet  •  atorvastatin (LIPITOR) 40 mg tablet  •  Brinzolamide-Brimonidine (Simbrinza) 1-0.2 % SUSP  •  Cinnamon 500 MG capsule  •  clotrimazole-betamethasone (LOTRISONE) 1-0.05 % cream  •  finasteride (PROSCAR) 5 mg tablet  •  glipiZIDE (GLUCOTROL) 5 mg tablet  •  insulin glargine (LANTUS) 100 units/mL subcutaneous injection  •  latanoprost (XALATAN) 0.005 % ophthalmic solution  •  losartan (COZAAR) 100 MG tablet  •  metoprolol succinate (TOPROL-XL) 100 mg 24 hr tablet  •  Multiple Vitamin (multivitamin) tablet  •  omeprazole (PriLOSEC) 20 mg delayed release capsule  •  oxybutynin (DITROPAN) 5 mg tablet  •  vitamin B-12 (VITAMIN B-12) 1,000 mcg tablet  •  tamsulosin (FLOMAX) 0.4 mg    Current Facility-Administered Medications:   •  onabotulinumtoxin A (BOTOX) injection 200 Units, 200 Units, Intramuscular, Once    Allergies   Allergen Reactions   • Lisinopril Cough   • Penicillin G Hives     And other penicillins       Objective     /79   Pulse (!) 50   Temp (!) 97.2 °F (36.2 °C) (Tympanic)   Resp 18   SpO2 99%       PHYSICAL EXAM    Gen: NAD  Head: NCAT  CV: RRR  CHEST: Clear  ABD: soft, NT/ND  EXT: no edema      ASSESSMENT/PLAN:  This is a 80y.o. year old male here for EGD, and he is stable and optimized for his procedure.

## 2023-10-05 NOTE — ED ATTENDING ATTESTATION
10/5/2023  I, Radha Alfonso MD, saw and evaluated the patient. I have discussed the patient with the resident/non-physician practitioner and agree with the resident's/non-physician practitioner's findings, Plan of Care, and MDM as documented in the resident's/non-physician practitioner's note, except where noted. All available labs and Radiology studies were reviewed. I was present for key portions of any procedure(s) performed by the resident/non-physician practitioner and I was immediately available to provide assistance. At this point I agree with the current assessment done in the Emergency Department. I have conducted an independent evaluation of this patient a history and physical is as follows:    20-year-old male presented for evaluation of right-sided flank pain over the last 2 days. States its been achy, fairly constant. Denies any associated fever, chills, nausea, vomiting, rashes. He does report some difficulty urinating at home today. States he was just dribbling at home but since arrival to the emergency department has been able to urinate normally. Reports a history of ureteral stones as well as a chronic bladder stone. On exam he has no rashes. No CVA tenderness. Abdomen soft, nontender. Differential diagnosis includes ureteral stone, less likely UTI. Doubt appendicitis, SBO, AAA. Plan UA, labs, CT, reevaluate. ED Course  ED Course as of 10/05/23 1900   Thu Oct 05, 2023   1836 BUN: 25   1836 Creatinine(!): 1.32  Baseline. 1836 Glucose, Random(!): 236   1836 Platelet Count(!): 864  Baseline thrombocytopenia. CT showed no acute changes. Does have 2.2 cm bladder calculus which is known. UA notable for innumerable white cells and occasional bacteria. Given patient's symptoms we will treat for UTI. He has a penicillin allergy but has tolerated Keflex in the past.  We will give twice a day renally dosed.     Critical Care Time  Procedures

## 2023-10-07 LAB — BACTERIA UR CULT: NORMAL

## 2023-10-12 LAB — BACTERIA UR CULT: ABNORMAL

## 2023-10-12 PROCEDURE — 88341 IMHCHEM/IMCYTCHM EA ADD ANTB: CPT | Performed by: PATHOLOGY

## 2023-10-12 PROCEDURE — 88305 TISSUE EXAM BY PATHOLOGIST: CPT | Performed by: PATHOLOGY

## 2023-10-12 PROCEDURE — 88313 SPECIAL STAINS GROUP 2: CPT | Performed by: PATHOLOGY

## 2023-10-12 PROCEDURE — 88342 IMHCHEM/IMCYTCHM 1ST ANTB: CPT | Performed by: PATHOLOGY

## 2023-10-12 RX ORDER — SULFAMETHOXAZOLE AND TRIMETHOPRIM 800; 160 MG/1; MG/1
1 TABLET ORAL 2 TIMES DAILY
Qty: 14 TABLET | Refills: 0 | Status: SHIPPED | OUTPATIENT
Start: 2023-10-12 | End: 2023-10-19

## 2023-10-12 NOTE — RESULT ENCOUNTER NOTE
Patient's urine culture results were reviewed. He was discharged home on Keflex. His sensitivities are not susceptible to Keflex. He is growing 100,000 colony count of staph epidermidis. A prescription for Bactrim DS, dispense 14, 1 pill twice daily for 7 days was sent to the patient's pharmacy. I left a message on the patient's machine to call for the results and to discuss the treatment plan.

## 2023-10-17 NOTE — RESULT ENCOUNTER NOTE
Patient was informed via my chart biopsies were benign. Repeat EGD in 3 months to ensure ulcer healing. Additionally repeat antral biopsies.

## 2023-10-28 PROBLEM — Z12.11 COLON CANCER SCREENING: Status: RESOLVED | Noted: 2021-06-22 | Resolved: 2023-10-28

## 2023-11-03 ENCOUNTER — OFFICE VISIT (OUTPATIENT)
Dept: UROLOGY | Facility: CLINIC | Age: 88
End: 2023-11-03
Payer: MEDICARE

## 2023-11-03 VITALS
BODY MASS INDEX: 25.43 KG/M2 | HEART RATE: 77 BPM | OXYGEN SATURATION: 97 % | DIASTOLIC BLOOD PRESSURE: 80 MMHG | SYSTOLIC BLOOD PRESSURE: 140 MMHG | HEIGHT: 67 IN | WEIGHT: 162 LBS

## 2023-11-03 DIAGNOSIS — N39.0 URINARY TRACT INFECTION WITHOUT HEMATURIA, SITE UNSPECIFIED: Primary | ICD-10-CM

## 2023-11-03 PROCEDURE — 99213 OFFICE O/P EST LOW 20 MIN: CPT | Performed by: PHYSICIAN ASSISTANT

## 2023-11-06 NOTE — PROGRESS NOTES
UROLOGY PROGRESS NOTE   Patient Identifiers: Sha Cotter (MRN [id number])  Date of Service: 11/6/2023    Subjective:   78-year-old man with history of nephrolithiasis. The emergency room with flank pain. CT showed no evidence of obstructive uropathy although he does have a 2.2 cm bladder calculi. Urine culture was positive for infection with staph epi. Treated with appropriate antibiotics and here for follow-up. Comfortable from a voiding standpoint.   Not interested in any surgery    Reason for visit: Bladder calculi and UTI follow-up    Objective:     VITALS:    Vitals:    11/03/23 1522   BP: 140/80   Pulse: 77   SpO2: 97%           LABS:  Lab Results   Component Value Date    HGB 12.9 10/05/2023    HCT 39.3 10/05/2023    WBC 7.76 10/05/2023     (L) 10/05/2023   ]    Lab Results   Component Value Date    K 4.4 10/05/2023     10/05/2023    CO2 23 10/05/2023    BUN 25 10/05/2023    CREATININE 1.32 (H) 10/05/2023    CALCIUM 8.9 10/05/2023   ]        INPATIENT MEDS:    Current Outpatient Medications:     acetaminophen (TYLENOL) 325 mg tablet, Take 3 tablets (975 mg total) by mouth every 8 (eight) hours as needed for mild pain, Disp: , Rfl: 0    aspirin (ECOTRIN LOW STRENGTH) 81 mg EC tablet, Take 81 mg by mouth daily, Disp: , Rfl:     atorvastatin (LIPITOR) 40 mg tablet, Take 20 mg by mouth daily 1/2 tablet, Disp: , Rfl:     Brinzolamide-Brimonidine (Simbrinza) 1-0.2 % SUSP, Apply 1 drop to eye 2 (two) times a day, Disp: , Rfl:     Cinnamon 500 MG capsule, Take 500 mg by mouth 2 (two) times a day, Disp: , Rfl:     glipiZIDE (GLUCOTROL) 5 mg tablet, Take 1 tablet (5 mg total) by mouth 2 (two) times a day before meals, Disp: 180 tablet, Rfl: 1    insulin glargine (LANTUS) 100 units/mL subcutaneous injection, Inject 23 Units under the skin daily at bedtime, Disp: , Rfl:     latanoprost (XALATAN) 0.005 % ophthalmic solution, Administer 1 drop to both eyes daily at bedtime, Disp: , Rfl:     losartan (COZAAR) 100 MG tablet, Take 100 mg by mouth daily , Disp: , Rfl:     metoprolol succinate (TOPROL-XL) 100 mg 24 hr tablet, Take 100 mg by mouth daily, Disp: , Rfl:     Multiple Vitamin (multivitamin) tablet, Take 1 tablet by mouth daily, Disp: , Rfl:     omeprazole (PriLOSEC) 20 mg delayed release capsule, Take 20 mg by mouth daily, Disp: , Rfl:     clotrimazole-betamethasone (LOTRISONE) 1-0.05 % cream, Apply topically 2 (two) times a day, Disp: 30 g, Rfl: 0    finasteride (PROSCAR) 5 mg tablet, Take 1 tablet (5 mg total) by mouth daily, Disp: 90 tablet, Rfl: 0    oxybutynin (DITROPAN) 5 mg tablet, Take 1 tablet (5 mg total) by mouth 3 (three) times a day, Disp: 90 tablet, Rfl: 0    tamsulosin (FLOMAX) 0.4 mg, Take 1 capsule (0.4 mg total) by mouth daily with dinner, Disp: 30 capsule, Rfl: 0    vitamin B-12 (VITAMIN B-12) 1,000 mcg tablet, Take 1 tablet by mouth every other day, Disp: , Rfl:       Physical Exam:   /80 (BP Location: Left arm, Patient Position: Sitting, Cuff Size: Large)   Pulse 77   Ht 5' 7" (1.702 m)   Wt 73.5 kg (162 lb)   SpO2 97%   BMI 25.37 kg/m²   GEN: no acute distress    RESP: breathing comfortably with no accessory muscle use    ABD: soft, non-tender, non-distended   INCISION:    EXT: no significant peripheral edema       RADIOLOGY:   IMPRESSION:     No evidence obstructive uropathy. 2.2 cm bladder calculus. Large hiatal hernia. Assessment:   1. Urinary tract infection  #2.   Bladder calculi    Plan:   -Prefers to follow-up with urology as needed  -We will call with any questions or concerns  -  -

## 2023-12-14 ENCOUNTER — RA CDI HCC (OUTPATIENT)
Dept: OTHER | Facility: HOSPITAL | Age: 88
End: 2023-12-14

## 2023-12-14 NOTE — PROGRESS NOTES
[address] [facility] coding opportunities          Chart Reviewed number of suggestions sent to Provider: 5  E11.22  E11.39  N88.9046  I73.9  E11.51     Patients Insurance     Medicare Insurance: [employer]

## 2023-12-19 NOTE — PROGRESS NOTES
Assessment and Plan:     Problem List Items Addressed This Visit    None      Depression Screening and Follow-up Plan: Patient was screened for depression during today's encounter. They screened negative with a PHQ-2 score of 0.      Preventive health issues were discussed with patient, and age appropriate screening tests were ordered as noted in patient's After Visit Summary.  Personalized health advice and appropriate referrals for health education or preventive services given if needed, as noted in patient's After Visit Summary.     History of Present Illness:     Patient presents for a Medicare Wellness Visit    HPI   Patient Care Team:  Pablo Smith MD as PCP - General (Internal Medicine)     Review of Systems:     Review of Systems     Problem List:     Patient Active Problem List   Diagnosis    Stage 3 chronic kidney disease (HCC)    Generalized weakness    Essential hypertension    Left shoulder pain    Coronary artery disease    Acquired hallux rigidus    Age-related macular degeneration    Allergic rhinitis    Carpal tunnel syndrome    Choroidal nevus    Diaphragmatic hernia    Gastroesophageal reflux disease    Glaucoma    Hearing loss    History of coronary artery bypass surgery    Hyperlipidemia    Low back pain    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     Mitral valve disease    Vertigo    NPDR (nonproliferative diabetic retinopathy) ([facility])    Chronic renal failure syndrome    Bilateral carotid artery stenosis    S/P AVR bioprothetic     Spinal stenosis in cervical region    PVD (peripheral vascular disease) ([facility])    Coronary artery disease involving native coronary artery of native heart without angina pectoris    Pain in joints    Myalgia    BMI 24.0-24.9, adult    Acute pulmonary embolism (HCC)    Moderate protein-calorie malnutrition (HCC)    GI bleeding    Right leg DVT (HCC)    Rectal bleeding    Anemia    History of pulmonary embolism and DVT    BMI 26.0-26.9,adult     BMI 25.0-25.9,adult    DM2 (diabetes mellitus, type 2) ([facility])    BMI 27.0-27.9,adult    Other specified anemias    Other dysphagia    Skin rash    New daily persistent headache    Left upper quadrant pain    Esophageal dysmotility    Acute kidney injury superimposed on chronic kidney disease vs progressive CKD    0.5 cm proximal reteral stone with R hydronephrosis    Thrombocytopenia ([facility])      Past Medical and Surgical History:     Past Medical History:   Diagnosis Date    Anemia 06/22/2021    Arthritis     Bilateral carotid artery stenosis 01/04/2021    <50% on doppler 7/27/2018. No change from Aug/2016    Bilateral carotid bruits     BMI 25.0-25.9,adult 04/06/2021    BMI 25.0-25.9,adult 08/31/2021    BMI 26.0-26.9,adult 08/31/2021    BMI 27.0-27.9,adult 12/13/2022    Coronary artery disease involving native coronary artery of native heart without angina pectoris 04/06/2021    CTS (carpal tunnel syndrome)     Deviated septum     Diabetes ([facility])     Diabetes mellitus ([facility]) 05/1995    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[facility])     Dizziness     Double vision     Ear problems     Encounter for support and coordination of transition of care 06/22/2021    Esophageal dysmotility 06/06/2023    Fatigue     General weakness     GERD (gastroesophageal reflux disease)     Hearing impaired person, bilateral     HL (hearing loss)     Hypertension     Left upper quadrant pain 06/06/2023    Medicare annual wellness visit, subsequent 08/31/2021    Microscopic hematuria     Myalgia 04/06/2021    New daily persistent headache 03/14/2023    Other dysphagia 12/13/2022    Other headache syndrome 03/14/2023    Other specified anemias 12/13/2022    Pain in joints 04/06/2021    PVD (peripheral vascular disease) ([facility]) 04/02/2021    Rectal bleeding 06/22/2021    Renal calculi     Right leg DVT ([facility]) 06/22/2021    Right lumbar radiculopathy     S/P AVR (aortic valve replacement) 04/02/2021    Skin rash 12/13/2022    SOB (shortness of breath)  on exertion     Spinal stenosis in cervical region 04/02/2021    Tachycardia     Thrombocytopenia ([facility]) 07/12/2023    Type 2 diabetes mellitus with stage 3a chronic kidney disease, with long-term current use of insulin ([facility]) 09/08/2022    Type 2 diabetes mellitus without complication, with long-term current use of insulin ([facility]) 03/14/2023    Type 2 diabetes mellitus, without long-term current use of insulin ([facility]) 09/08/2022    Urinary frequency     Vertigo 01/04/2021     Past Surgical History:   Procedure Laterality Date    AORTIC VALVE REPLACEMENT  09/2016    BYPASS GRAFT      CARPAL TUNNEL RELEASE Right     by Dr. Jurado    CATARACT EXTRACTION, BILATERAL      COLONOSCOPY  09/16/2015    CORONARY ARTERY BYPASS GRAFT  09/2016    x1     FL RETROGRADE PYELOGRAM  7/10/2023    HAND SURGERY Left     LA CYSTO/URETERO W/LITHOTRIPSY &INDWELL STENT INSRT Right 7/10/2023    Procedure: CYSTOSCOPY URETEROSCOPY WITH LITHOTRIPSY HOLMIUM LASER, RETROGRADE PYELOGRAM AND INSERTION STENT URETERAL, STONE BASKET EXTRACTION;  Surgeon: Serge Marks MD;  Location: [facility];  Service: Urology    URETERECTOMY        Family History:     Family History   Family history unknown: Yes      Social History:     Social History     Socioeconomic History    Marital status: /Civil Union     Spouse name: None    Number of children: None    Years of education: None    Highest education level: None   Occupational History    None   Tobacco Use    Smoking status: Former     Current packs/day: 0.25     Average packs/day: 0.3 packs/day for 2.0 years (0.5 ttl pk-yrs)     Types: Cigarettes    Smokeless tobacco: Never    Tobacco comments:     Never smoker - As per [employer]   Vaping Use    Vaping status: Never Used   Substance and Sexual Activity    Alcohol use: Not Currently    Drug use: Never    Sexual activity: Not Currently     Partners: Female   Other Topics Concern    None   Social History Narrative    Monthly foot exam: Sees podiatrist  [id number], Dr. Tang    Annual eye exam: Sees ophthal [id number], Dr. CATIA Moreira    PSA: Follows urology    Annual dental checkup: Sees dentist    - As per [employer]     Social Determinants of Health     Financial Resource Strain: Low Risk  (12/21/2023)    Overall Financial Resource Strain (CARDIA)     Difficulty of Paying Living Expenses: Not hard at all   Food Insecurity: Not on file   Transportation Needs: No Transportation Needs (12/21/2023)    PRAPARE - Transportation     Lack of Transportation (Medical): No     Lack of Transportation (Non-Medical): No   Physical Activity: Not on file   Stress: Not on file   Social Connections: Not on file   Intimate Partner Violence: Not on file   Housing Stability: Not on file      Medications and Allergies:     Current Outpatient Medications   Medication Sig Dispense Refill    acetaminophen (TYLENOL) 325 mg tablet Take 3 tablets (975 mg total) by mouth every 8 (eight) hours as needed for mild pain  0    aspirin (ECOTRIN LOW STRENGTH) 81 mg EC tablet Take 81 mg by mouth daily      atorvastatin (LIPITOR) 40 mg tablet Take 20 mg by mouth daily 1/2 tablet      Brinzolamide-Brimonidine (Simbrinza) 1-0.2 % SUSP Apply 1 drop to eye 2 (two) times a day      Cinnamon 500 MG capsule Take 500 mg by mouth 2 (two) times a day      glipiZIDE (GLUCOTROL) 5 mg tablet Take 1 tablet (5 mg total) by mouth 2 (two) times a day before meals 180 tablet 1    insulin glargine (LANTUS) 100 units/mL subcutaneous injection Inject 23 Units under the skin daily at bedtime      latanoprost (XALATAN) 0.005 % ophthalmic solution Administer 1 drop to both eyes daily at bedtime      losartan (COZAAR) 100 MG tablet Take 100 mg by mouth daily       metoprolol succinate (TOPROL-XL) 100 mg 24 hr tablet Take 100 mg by mouth daily      Multiple Vitamin (multivitamin) tablet Take 1 tablet by mouth daily      omeprazole (PriLOSEC) 20 mg delayed release capsule Take 20 mg by mouth daily      tamsulosin (FLOMAX)  0.4 mg Take 1 capsule (0.4 mg total) by mouth daily with dinner 30 capsule 0    vitamin B-12 (VITAMIN B-12) 1,000 mcg tablet Take 1 tablet by mouth every other day      finasteride (PROSCAR) 5 mg tablet Take 1 tablet (5 mg total) by mouth daily 90 tablet 0     No current facility-administered medications for this visit.     Allergies   Allergen Reactions    Lisinopril Cough    Penicillin G Hives     And other penicillins      Immunizations:     Immunization History   Administered Date(s) Administered    COVID-19 MODERNA VACC 0.5 ML IM 01/25/2021, 02/21/2021, 12/03/2021    H1N1, All Formulations 01/20/2010    INFLUENZA 10/19/1999, 01/01/2001, 10/14/2003, 11/23/2005, 10/30/2006, 10/31/2007, 10/01/2008, 09/11/2009, 10/01/2010, 10/08/2010, 09/26/2011, 10/01/2012, 09/17/2013, 10/01/2013, 09/29/2014, 10/20/2015, 10/03/2016, 10/06/2016, 10/23/2017, 10/25/2017, 08/20/2018, 09/20/2018, 10/28/2019, 10/05/2020, 11/01/2020, 12/10/2021, 10/06/2022, 11/05/2022    Influenza Split High Dose Preservative Free IM 10/28/2019    Influenza Whole 10/01/1998, 12/04/2000, 10/01/2001    Influenza, high dose seasonal 0.7 mL 08/31/2021, 10/06/2022, 09/21/2023    Pneumococcal 01/01/1999, 05/01/1999, 05/18/1999, 01/01/2000, 09/29/2014    Pneumococcal Conjugate 13-Valent 04/21/2016, 06/10/2016    Td (adult), Unspecified 12/04/2000, 01/01/2001    Tdap 07/11/2017    Zoster 06/28/2010, 01/01/2011, 06/10/2016    Zoster Vaccine Recombinant 03/16/2023      Health Maintenance:     There are no preventive care reminders to display for this patient.      Topic Date Due    Pneumococcal Vaccine: 65+ Years (2 - PPSV23 or PCV20) 08/05/2016    COVID-19 Vaccine (4 - 2023-24 season) 09/01/2023      Medicare Screening Tests and Risk Assessments:     Nolberto is here for his Subsequent Wellness visit.     Health Risk Assessment:   Patient rates overall health as good. Patient feels that their physical health rating is same. Patient is satisfied with their life.  Eyesight was rated as slightly worse. Hearing was rated as slightly worse. Patient feels that their emotional and mental health rating is same. Patients states they are never, rarely angry. Patient states they are sometimes unusually tired/fatigued. Pain experienced in the last 7 days has been none. Patient states that he has experienced no weight loss or gain in last 6 months.     Depression Screening:   PHQ-2 Score: 0      Fall Risk Screening:   In the past year, patient has experienced: no history of falling in past year      Home Safety:  Patient does not have trouble with stairs inside or outside of their home. Patient has working smoke alarms and has no working carbon monoxide detector. Home safety hazards include: none.     Nutrition:   Current diet is Limited junk food, Diabetic, Low Cholesterol and No Added Salt.     Medications:   Patient is currently taking over-the-counter supplements. OTC medications include: see medication list. Patient is able to manage medications.     Activities of Daily Living (ADLs)/Instrumental Activities of Daily Living (IADLs):   Walk and transfer into and out of bed and chair?: Yes  Dress and groom yourself?: Yes    Bathe or shower yourself?: Yes    Feed yourself? Yes  Do your laundry/housekeeping?: Yes  Manage your money, pay your bills and track your expenses?: Yes  Make your own meals?: Yes    Do your own shopping?: Yes    Previous Hospitalizations:   Any hospitalizations or ED visits within the last 12 months?: Yes    How many hospitalizations have you had in the last year?: 1-2    Advance Care Planning:   Living will: Yes    Durable POA for healthcare: Yes    Advanced directive: Yes    Advanced directive counseling given: Yes    ACP document given: Yes    Patient declined ACP directive: No    End of Life Decisions reviewed with patient: Yes      Cognitive Screening:   Provider or family/friend/caregiver concerned regarding cognition?: No    PREVENTIVE SCREENINGS       "Cardiovascular Screening:    General: History Lipid Disorder and Screening Current      Diabetes Screening:     General: History Diabetes and Screening Current      Colorectal Cancer Screening:     General: Screening Not Indicated      Prostate Cancer Screening:    General: Screening Not Indicated      Osteoporosis Screening:    General: Risks and Benefits Discussed      Abdominal Aortic Aneurysm (AAA) Screening:    Risk factors include: tobacco use        General: Screening Not Indicated      Lung Cancer Screening:     General: Screening Not Indicated    Screening, Brief Intervention, and Referral to Treatment (SBIRT)    Screening  Typical number of drinks in a day: 0  Typical number of drinks in a week: 0  Interpretation: Low risk drinking behavior.    Single Item Drug Screening:  How often have you used an illegal drug (including marijuana) or a prescription medication for non-medical reasons in the past year? never    Single Item Drug Screen Score: 0  Interpretation: Negative screen for possible drug use disorder    Brief Intervention  Alcohol & drug use screenings were reviewed. No concerns regarding substance use disorder identified.     Other Counseling Topics:   Car/seat belt/driving safety, skin self-exam, sunscreen and calcium and vitamin D intake and regular weightbearing exercise.     No results found.     Physical Exam:     /70 (BP Location: Left arm, Patient Position: Sitting, Cuff Size: Standard)   Pulse 63   Temp 97.9 °F (36.6 °C) (Temporal)   Resp 18   Ht 5' 7\" (1.702 m)   Wt 74.4 kg (164 lb)   SpO2 98%   BMI 25.69 kg/m²     Physical Exam     Pablo Smith MD  "

## 2023-12-21 ENCOUNTER — OFFICE VISIT (OUTPATIENT)
Dept: INTERNAL MEDICINE CLINIC | Facility: CLINIC | Age: 88
End: 2023-12-21
Payer: MEDICARE

## 2023-12-21 VITALS
BODY MASS INDEX: 25.74 KG/M2 | HEIGHT: 67 IN | WEIGHT: 164 LBS | HEART RATE: 63 BPM | OXYGEN SATURATION: 98 % | SYSTOLIC BLOOD PRESSURE: 124 MMHG | RESPIRATION RATE: 18 BRPM | TEMPERATURE: 97.9 F | DIASTOLIC BLOOD PRESSURE: 70 MMHG

## 2023-12-21 DIAGNOSIS — Z00.00 MEDICARE ANNUAL WELLNESS VISIT, SUBSEQUENT: Primary | ICD-10-CM

## 2023-12-21 DIAGNOSIS — Z95.1 HISTORY OF CORONARY ARTERY BYPASS SURGERY: ICD-10-CM

## 2023-12-21 DIAGNOSIS — N21.0 BLADDER CALCULI: ICD-10-CM

## 2023-12-21 DIAGNOSIS — E11.21 TYPE 2 DIABETES MELLITUS WITH DIABETIC NEPHROPATHY, WITH LONG-TERM CURRENT USE OF INSULIN (HCC): ICD-10-CM

## 2023-12-21 DIAGNOSIS — D69.6 THROMBOCYTOPENIA (HCC): ICD-10-CM

## 2023-12-21 DIAGNOSIS — N18.31 STAGE 3A CHRONIC KIDNEY DISEASE (HCC): ICD-10-CM

## 2023-12-21 DIAGNOSIS — Z79.4 TYPE 2 DIABETES MELLITUS WITH DIABETIC NEPHROPATHY, WITH LONG-TERM CURRENT USE OF INSULIN (HCC): ICD-10-CM

## 2023-12-21 DIAGNOSIS — I10 ESSENTIAL HYPERTENSION: Chronic | ICD-10-CM

## 2023-12-21 DIAGNOSIS — E78.2 MIXED HYPERLIPIDEMIA: ICD-10-CM

## 2023-12-21 DIAGNOSIS — Z95.2 S/P AVR (AORTIC VALVE REPLACEMENT): ICD-10-CM

## 2023-12-21 PROCEDURE — G0439 PPPS, SUBSEQ VISIT: HCPCS | Performed by: INTERNAL MEDICINE

## 2023-12-21 PROCEDURE — 99213 OFFICE O/P EST LOW 20 MIN: CPT | Performed by: INTERNAL MEDICINE

## 2023-12-21 NOTE — PROGRESS NOTES
Assessment/Plan:    1. Medicare annual wellness visit, subsequent    2. Essential hypertension  -     CBC and differential; Future  -     Basic metabolic panel; Future    3. Thrombocytopenia (HCC)  -     CBC and differential; Future    4. Stage 3a chronic kidney disease (HCC)  -     CBC and differential; Future  -     Basic metabolic panel; Future    5. Mixed hyperlipidemia  -     Lipid panel; Future    6. History of coronary artery bypass surgery  -     Lipid panel; Future    7. S/P AVR bioprothetic     8. BMI 25.0-25.9,adult    9. Type 2 diabetes mellitus with diabetic nephropathy, with long-term current use of insulin (HCC)  -     Basic metabolic panel; Future  -     Hemoglobin A1C; Future; Expected date: 03/21/2024    10. Bladder calculi    Blood pressure well-controlled advised to continue present medications and low-salt diet.  Last platelet count 145 K with normal WBC hemoglobin will follow CBC.  CKD stable advised to maintain hydration.  Cholesterol 150 and LDL 86 last time advised to continue present medication low-cholesterol low-carb diet.  Will follow lipid panel.  Patient has been seen and followed by cardiologist for CAD and status post AVR.  Lately had echocardiogram November 28, 2023.  His last hemoglobin A1c was 7.3.  His fasting blood sugar 10 2-1 16 evening sugar before dinner was around 300.  Patient said lately he is not watching his diet very closely.  Advised for 1800-calorie ADA diet.  Advised to change the timing of glipizide 5 mg twice daily to milligram in the morning and 5 mg at noon time.  Continue insulin 23 units at 6 PM as prescribed.  No hypoglycemia.  Follow CBC BMP hemoglobin A1c lipid panel.  For urinary bladder calculi he has been seen and followed by urologist.    BMI Counseling: Body mass index is 25.69 kg/m². The BMI is above normal. Nutrition recommendations include decreasing portion sizes, decreasing fast food intake, consuming healthier snacks, limiting drinks that contain  sugar, moderation in carbohydrate intake, increasing intake of lean protein and reducing intake of cholesterol. No pharmacotherapy was ordered. Rationale for BMI follow-up plan is due to patient being overweight or obese.     Depression Screening and Follow-up Plan: Patient was screened for depression during today's encounter. They screened negative with a PHQ-2 score of 0.         Subjective: Patient presents for annual wellness exam as well as follow-up of his medical problems.      Patient ID: Nolberto Guajardo is a 89 y.o. male.    HPI  89-year-old white male patient presents for annual wellness exam as well as follow-up of his medical problems.  He denies any chest pain, shortness of breath, pain in the abdomen.  Denies any cough, fever, chills.  Denies nausea vomiting diarrhea.  No new problems except his blood sugar is elevated during evening hours before supper.  Says that lately he has not been watching diet very closely.  He was seen by cardiology had echocardiogram.    The following portions of the patient's history were reviewed and updated as appropriate:     Past Medical History:  He has a past medical history of Anemia (06/22/2021), Arthritis, Bilateral carotid artery stenosis (01/04/2021), Bilateral carotid bruits, Bladder calculi (12/21/2023), BMI 25.0-25.9,adult (04/06/2021), BMI 25.0-25.9,adult (08/31/2021), BMI 26.0-26.9,adult (08/31/2021), BMI 27.0-27.9,adult (12/13/2022), Coronary artery disease involving native coronary artery of native heart without angina pectoris (04/06/2021), CTS (carpal tunnel syndrome), Deviated septum, Diabetes (HCC), Diabetes mellitus (HCC) (05/1995), 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[facility]), Dizziness, Double vision, Ear problems, Encounter for support and coordination of transition of care (06/22/2021), Esophageal dysmotility (06/06/2023), Fatigue, General weakness, GERD (gastroesophageal reflux disease), Hearing impaired person, bilateral, HL (hearing loss), Hypertension,  Left upper quadrant pain (06/06/2023), Medicare annual wellness visit, subsequent (08/31/2021), Medicare annual wellness visit, subsequent (12/21/2023), Microscopic hematuria, Myalgia (04/06/2021), New daily persistent headache (03/14/2023), Other dysphagia (12/13/2022), Other headache syndrome (03/14/2023), Other specified anemias (12/13/2022), Pain in joints (04/06/2021), PVD (peripheral vascular disease) ([facility]) (04/02/2021), Rectal bleeding (06/22/2021), Renal calculi, Right leg DVT ([facility]) (06/22/2021), Right lumbar radiculopathy, S/P AVR (aortic valve replacement) (04/02/2021), Skin rash (12/13/2022), SOB (shortness of breath) on exertion, Spinal stenosis in cervical region (04/02/2021), Tachycardia, Thrombocytopenia ([facility]) (07/12/2023), Type 2 diabetes mellitus with stage 3a chronic kidney disease, with long-term current use of insulin ([facility]) (09/08/2022), Type 2 diabetes mellitus without complication, with long-term current use of insulin ([facility]) (03/14/2023), Type 2 diabetes mellitus, without long-term current use of insulin ([facility]) (09/08/2022), Urinary frequency, and Vertigo (01/04/2021).,  _______________________________________________________________________  Past Surgical History:   has a past surgical history that includes Ureterectomy; Bypass Graft; Carpal tunnel release (Right); Hand surgery (Left); Cataract extraction, bilateral; Aortic valve replacement (09/2016); 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); Colonoscopy (09/16/2015); [place] retrograde pyelogram (7/10/2023); and pr cysto/uretero w/lithotripsy &indwell stent insrt (Right, 7/10/2023).,  _______________________________________________________________________  Family History:  Family history is unknown by patient.,  _______________________________________________________________________  Social History:   reports that he has quit smoking. His smoking use included cigarettes. He has a 0.5 pack-year smoking history. He has never used smokeless tobacco. He  reports that he does not currently use alcohol. He reports that he does not use drugs.,  _______________________________________________________________________  Allergies:  is allergic to lisinopril and penicillin g..  _______________________________________________________________________  Current Outpatient Medications   Medication Sig Dispense Refill    acetaminophen (TYLENOL) 325 mg tablet Take 3 tablets (975 mg total) by mouth every 8 (eight) hours as needed for mild pain  0    aspirin (ECOTRIN LOW STRENGTH) 81 mg EC tablet Take 81 mg by mouth daily      atorvastatin (LIPITOR) 40 mg tablet Take 20 mg by mouth daily 1/2 tablet      Brinzolamide-Brimonidine (Simbrinza) 1-0.2 % SUSP Apply 1 drop to eye 2 (two) times a day      Cinnamon 500 MG capsule Take 500 mg by mouth 2 (two) times a day      glipiZIDE (GLUCOTROL) 5 mg tablet Take 1 tablet (5 mg total) by mouth 2 (two) times a day before meals 180 tablet 1    insulin glargine (LANTUS) 100 units/mL subcutaneous injection Inject 23 Units under the skin daily at bedtime      latanoprost (XALATAN) 0.005 % ophthalmic solution Administer 1 drop to both eyes daily at bedtime      losartan (COZAAR) 100 MG tablet Take 100 mg by mouth daily       metoprolol succinate (TOPROL-XL) 100 mg 24 hr tablet Take 100 mg by mouth daily      Multiple Vitamin (multivitamin) tablet Take 1 tablet by mouth daily      omeprazole (PriLOSEC) 20 mg delayed release capsule Take 20 mg by mouth daily      tamsulosin (FLOMAX) 0.4 mg Take 1 capsule (0.4 mg total) by mouth daily with dinner 30 capsule 0    vitamin B-12 (VITAMIN B-12) 1,000 mcg tablet Take 1 tablet by mouth every other day      finasteride (PROSCAR) 5 mg tablet Take 1 tablet (5 mg total) by mouth daily 90 tablet 0     No current facility-administered medications for this visit.     _______________________________________________________________________  Review of Systems   Constitutional:  Negative for chills and fever.   HENT:   "Negative for congestion, ear pain, hearing loss, nosebleeds, sinus pain, sore throat and trouble swallowing.    Eyes:  Negative for discharge, redness and visual disturbance.   Respiratory:  Negative for cough, chest tightness and shortness of breath.    Cardiovascular:  Negative for chest pain and palpitations.   Gastrointestinal:  Negative for abdominal pain, blood in stool, constipation, diarrhea, nausea and vomiting.   Genitourinary:  Negative for dysuria, flank pain and hematuria.   Musculoskeletal:  Negative for arthralgias, myalgias and neck pain.   Skin:  Negative for color change and rash.   Neurological:  Negative for dizziness, speech difficulty, weakness and headaches.   Hematological:  Does not bruise/bleed easily.   Psychiatric/Behavioral:  Negative for agitation and behavioral problems.            Objective:  Vitals:    12/21/23 1401   BP: 124/70   BP Location: Left arm   Patient Position: Sitting   Cuff Size: Standard   Pulse: 63   Resp: 18   Temp: 97.9 °F (36.6 °C)   TempSrc: Temporal   SpO2: 98%   Weight: 74.4 kg (164 lb)   Height: 5' 7\" (1.702 m)     Body mass index is 25.69 kg/m².     Physical Exam  Vitals and nursing note reviewed.   Constitutional:       General: He is not in acute distress.     Appearance: Normal appearance.   HENT:      Head: Normocephalic and atraumatic.      Right Ear: Ear canal and external ear normal.      Left Ear: Ear canal and external ear normal.      Nose: Nose normal.      Mouth/Throat:      Mouth: Mucous membranes are moist.   Eyes:      General: No scleral icterus.        Right eye: No discharge.         Left eye: No discharge.      Extraocular Movements: Extraocular movements intact.      Conjunctiva/sclera: Conjunctivae normal.   Cardiovascular:      Rate and Rhythm: Normal rate and regular rhythm.      Pulses: Normal pulses.      Heart sounds: Murmur heard.   Pulmonary:      Effort: Pulmonary effort is normal. No respiratory distress.      Breath sounds: Normal " breath sounds. No wheezing.   Abdominal:      General: Bowel sounds are normal. There is no distension.      Palpations: Abdomen is soft.      Tenderness: There is no abdominal tenderness.   Musculoskeletal:         General: Normal range of motion.      Cervical back: Normal range of motion and neck supple. No muscular tenderness.      Right lower leg: No edema.      Left lower leg: No edema.      Comments: He ambulates with cane.   Skin:     General: Skin is warm.      Findings: No rash.   Neurological:      General: No focal deficit present.      Mental Status: He is alert and oriented to person, place, and time.   Psychiatric:         Mood and Affect: Mood normal.         Behavior: Behavior normal.

## 2023-12-21 NOTE — PATIENT INSTRUCTIONS
Patient was advised to continue present medications. discussed with the patient medications and laboratory data in detail.  Follow-up with me in 3 months or as advised.  If any blood test was ordered please do 1 week prior to next appointment unless advise to get earlier.  If you have any questions please call the office [phone]  Type 2 Diabetes Management for Adults   AMBULATORY CARE:   Type 2 diabetes  is a disease that affects how your body uses glucose (sugar). Either your body cannot make enough insulin, or it cannot use the insulin correctly. It is important to keep diabetes controlled to prevent damage to your heart, blood vessels, and other organs. Management will help you feel well and enjoy your daily activities. Your diabetes care team providers can help you make a plan to fit diabetes care into your schedule. Your plan can change over time to fit your needs and your family's needs.       Have someone call your local emergency number (911 in the [place]) if:   You cannot be woken.    You have signs of diabetic ketoacidosis:     confusion, fatigue    vomiting    rapid heartbeat    fruity smelling breath    extreme thirst    dry mouth and skin    You have any of the following signs of a heart attack:      Squeezing, pressure, or pain in your chest    You may  also have any of the following:     Discomfort or pain in your back, neck, jaw, stomach, or arm    Shortness of breath    Nausea or vomiting    Lightheadedness or a sudden cold sweat    You have any of the following signs of a stroke:      Numbness or drooping on one side of your face     Weakness in an arm or leg    Confusion or difficulty speaking    Dizziness, a severe headache, or vision loss    Call your doctor or diabetes care team provider if:   You have a sore or wound that will not heal.    You have a change in the amount you urinate.    Your blood sugar levels are higher than your target goals.    You often have lower blood sugar levels than  your target goals.    Your skin is red, dry, warm, or swollen.    You have trouble coping with diabetes, or you feel anxious or depressed.    You have trouble following any part of your care plan, such as your meal plan.    You have questions or concerns about your condition or care.    What you need to know about high blood sugar levels:  High blood sugar levels may not cause any symptoms. You may feel more thirsty or urinate more often than usual. Over time, high blood sugar levels can damage your nerves, blood vessels, tissues, and organs. The following can increase your blood sugar levels:  Large meals or large amounts of carbohydrates at one time    Less physical activity    Stress    Illness    A lower dose of diabetes medicine or insulin, or a late dose    What you need to know about low blood sugar levels:  Symptoms include feeling shaky, dizzy, irritable, or confused. You can prevent symptoms by keeping your blood sugar levels from going too low.  Treat a low blood sugar level right away:      Drink 4 ounces of juice or have 1 tube of glucose gel.    Check your blood sugar level again 10 to 15 minutes later.    When the level goes back to normal, eat a meal or snack to prevent another decrease.       Keep glucose gel, raisins, or hard candy with you at all times to treat a low blood sugar level.     Your blood sugar level can get too low if you take diabetes medicine or insulin and do not eat enough food.     If you use insulin, check your blood sugar level before you exercise.      If your blood sugar level is below 100 mg/dL, eat 4 crackers or 2 ounces of raisins, or drink 4 ounces of juice.    Check your level every 30 minutes if you exercise longer than 1 hour.    You may need a snack during or after exercise.    What you can do to manage your blood sugar levels:   Check your blood sugar levels as directed and as needed.  Several items are available to use to check your levels. You may need to check by  testing a drop of blood in a glucose monitor. You may instead be given a continuous glucose monitoring (CGM) device. The device is worn at all times. The CGM checks your blood sugar level every 5 minutes. It sends results to an electronic device such as a smart phone. A CGM can be used with or without an insulin pump. You and your diabetes care team providers will decide on the best method for you. The goal for blood sugar levels before meals  is between 80 and 130 mg/dL and 2 hours after eating  is lower than 180 mg/dL.            Make healthy food choices.  Work with a dietitian to create a meal plan that works for you and your schedule. A dietitian can help you learn how to eat the right amount of carbohydrates (sugar and starchy foods) during your meals and snacks. Examples of carbohydrates are breads, cereals, rice, pasta, fruit, low-fat dairy, and sweets. Carbohydrates can raise your blood sugar level if you eat too many at one time.         Eat high-fiber foods as directed.  Fiber helps improve blood sugar levels. Fiber also lowers your risk for heart disease and other problems diabetes can cause. Examples of high-fiber foods include vegetables, whole-grain bread, and beans such as dai beans. Your dietitian can tell you how much fiber to have each day.         Get regular physical activity.  Physical activity can help you get to your target blood sugar level goal and manage your weight. Get at least 150 minutes of moderate to vigorous aerobic physical activity each week. Resistance training, such as lifting weights, should be done 3 times each week. Do not miss more than 2 days of physical activity in a row. Do not sit longer than 30 minutes at a time. Your healthcare provider can help you create an activity plan. The plan can include the best activities for you and can help you build your strength and endurance.            Maintain a healthy weight.  Ask your team what a healthy weight is for you. A  healthy weight can help you control diabetes and prevent heart disease. Ask your team to help you create a weight-loss plan, if needed. Even a loss of 3% to 7% of your excess body weight can help make a difference in managing diabetes. Your team will help you set a weight-loss goal, such as 10 to 15 pounds, or 5% of your extra weight. Together you and your team can set manageable weight-loss goals.    Take your diabetes medicine or insulin as directed.  You may need diabetes medicine, insulin, or both to help control your blood sugar levels. Your healthcare provider will teach you how and when to take your diabetes medicine or insulin. You will also be taught about side effects oral diabetes medicine can cause. Insulin may be injected or given through a pump or pen. You and your providers will decide on the best method for you:    An insulin pump  is an implanted device that gives your insulin 24 hours a day. An insulin pump prevents the need for multiple insulin injections in a day.         An insulin pen  is a device prefilled with the right amount of insulin.         You and your family members will be taught how to draw up and give insulin  if this is the best method for you. Your providers will also teach you how to dispose of needles and syringes.    You will learn how much insulin you need  and when to give it. You will be taught when not to give insulin. You will also be taught what to do if your blood sugar level drops too low. This may happen if you take insulin and do not eat the right amount of carbohydrates.    More ways to manage type 2 diabetes:   Wear medical alert identification.  Wear medical alert jewelry or carry a card that says you have diabetes. Ask your provider where to get these items.         Do not smoke.  Nicotine and other chemicals in cigarettes and cigars can cause lung and blood vessel damage. It also makes it more difficult to manage your diabetes. Ask your provider for information  if you currently smoke and need help to quit. Do not use e-cigarettes or smokeless tobacco in place of cigarettes or to help you quit. They still contain nicotine.    Check your feet each day for cuts, scratches, calluses, or other wounds.  Look for redness and swelling, and feel for warmth. Wear shoes that fit well. Check your shoes for rocks or other objects that can hurt your feet. Do not walk barefoot or wear shoes without socks. Wear cotton socks to help keep your feet dry.         Ask about vaccines you may need.  You have a higher risk for serious illness if you get the flu, pneumonia, COVID-19, or hepatitis. Ask your provider if you should get vaccines to prevent these or other diseases, and when to get the vaccines.    Talk to your provider if you become stressed about diabetes care.  Sometimes being able to fit diabetes care into your life can cause increased stress. The stress can cause you not to take care of yourself properly. Your provider can help by offering tips about self-care. A mental health provider can listen and offer help with self-care issues. Other types of counseling can help you make nutrition or physical activity changes.    Have your A1c checked as directed.  Your provider may check your A1c every 3 months, or 2 times each year if your diabetes is controlled. An A1c test shows the average amount of sugar in your blood over the past 2 to 3 months. Your provider will tell you what your A1c level should be.    Have screening tests as directed.  Your provider may recommend screening for complications of diabetes and other conditions that may develop. Some screenings may begin right away and some may happen within the first 5 years of diagnosis:    Examples of diabetes complications  include kidney problems, high cholesterol, high blood pressure, blood vessel problems, eye problems, and sleep apnea.    You may be screened for a low vitamin B level  if you take oral diabetes medicine for a  long time.    You may be screened for polycystic ovarian syndrome (PCOS)  if you are of childbearing age.    Follow up with your doctor or diabetes care team providers as directed:  You may need to have blood tests done before your follow-up visit. The test results will show if changes need to be made in your treatment or self-care. Talk to your provider if you cannot afford your medicine. Write down your questions so you remember to ask them during your visits.  © Copyright [employer] 2023 Information is for End User's use only and may not be sold, redistributed or otherwise used for commercial purposes.  The above information is an  only. It is not intended as medical advice for individual conditions or treatments. Talk to your doctor, nurse or pharmacist before following any medical regimen to see if it is safe and effective for you.

## 2024-01-30 ENCOUNTER — APPOINTMENT (EMERGENCY)
Dept: CT IMAGING | Facility: HOSPITAL | Age: 89
End: 2024-01-30
Payer: MEDICARE

## 2024-01-30 ENCOUNTER — HOSPITAL ENCOUNTER (EMERGENCY)
Facility: HOSPITAL | Age: 89
Discharge: HOME | End: 2024-01-30
Attending: EMERGENCY MEDICINE
Payer: MEDICARE

## 2024-01-30 VITALS
SYSTOLIC BLOOD PRESSURE: 162 MMHG | HEART RATE: 52 BPM | RESPIRATION RATE: 16 BRPM | OXYGEN SATURATION: 100 % | TEMPERATURE: 97.9 F | DIASTOLIC BLOOD PRESSURE: 80 MMHG

## 2024-01-30 DIAGNOSIS — N28.9 KIDNEY LESION, NATIVE, LEFT: ICD-10-CM

## 2024-01-30 DIAGNOSIS — R10.9 LEFT FLANK PAIN: Primary | ICD-10-CM

## 2024-01-30 LAB
ALBUMIN SERPL BCP-MCNC: 4.1 G/DL (ref 3.5–5)
ALP SERPL-CCNC: 78 U/L (ref 34–104)
ALT SERPL W P-5'-P-CCNC: 18 U/L (ref 7–52)
ANION GAP SERPL CALCULATED.3IONS-SCNC: 7 MMOL/L
AST SERPL W P-5'-P-CCNC: 13 U/L (ref 13–39)
BACTERIA UR QL AUTO: ABNORMAL /HPF
BASOPHILS # BLD AUTO: 0.06 THOUSANDS/ÂΜL (ref 0–0.1)
BASOPHILS NFR BLD AUTO: 1 % (ref 0–1)
BILIRUB SERPL-MCNC: 0.56 MG/DL (ref 0.2–1)
BILIRUB UR QL STRIP: NEGATIVE
BUN SERPL-MCNC: 29 MG/DL (ref 5–25)
CALCIUM SERPL-MCNC: 8.7 MG/DL (ref 8.4–10.2)
CHLORIDE SERPL-SCNC: 106 MMOL/L (ref 96–108)
CLARITY UR: CLEAR
CO2 SERPL-SCNC: 25 MMOL/L (ref 21–32)
COLOR UR: ABNORMAL
CREAT SERPL-MCNC: 1.41 MG/DL (ref 0.6–1.3)
EOSINOPHIL # BLD AUTO: 0.2 THOUSAND/ÂΜL (ref 0–0.61)
EOSINOPHIL NFR BLD AUTO: 3 % (ref 0–6)
ERYTHROCYTE [DISTWIDTH] IN BLOOD BY AUTOMATED COUNT: 12.7 % (ref 11.6–15.1)
GFR SERPL CREATININE-BSD FRML MDRD: 43 ML/MIN/1.73SQ M
GLUCOSE SERPL-MCNC: 216 MG/DL (ref 65–140)
GLUCOSE UR STRIP-MCNC: NEGATIVE MG/DL
HCT VFR BLD AUTO: 41.6 % (ref 36.5–49.3)
HGB BLD-MCNC: 13.4 G/DL (ref 12–17)
HGB UR QL STRIP.AUTO: NEGATIVE
HOLD SPECIMEN: NORMAL
IMM GRANULOCYTES # BLD AUTO: 0.01 THOUSAND/UL (ref 0–0.2)
IMM GRANULOCYTES NFR BLD AUTO: 0 % (ref 0–2)
KETONES UR STRIP-MCNC: NEGATIVE MG/DL
LEUKOCYTE ESTERASE UR QL STRIP: ABNORMAL
LIPASE SERPL-CCNC: 25 U/L (ref 11–82)
LYMPHOCYTES # BLD AUTO: 1.95 THOUSANDS/ÂΜL (ref 0.6–4.47)
LYMPHOCYTES NFR BLD AUTO: 30 % (ref 14–44)
MCH RBC QN AUTO: 30.1 PG (ref 26.8–34.3)
MCHC RBC AUTO-ENTMCNC: 32.2 G/DL (ref 31.4–37.4)
MCV RBC AUTO: 94 FL (ref 82–98)
MONOCYTES # BLD AUTO: 0.68 THOUSAND/ÂΜL (ref 0.17–1.22)
MONOCYTES NFR BLD AUTO: 10 % (ref 4–12)
NEUTROPHILS # BLD AUTO: 3.72 THOUSANDS/ÂΜL (ref 1.85–7.62)
NEUTS SEG NFR BLD AUTO: 56 % (ref 43–75)
NITRITE UR QL STRIP: NEGATIVE
NON-SQ EPI CELLS URNS QL MICRO: ABNORMAL /HPF
NRBC BLD AUTO-RTO: 0 /100 WBCS
PH UR STRIP.AUTO: 5.5 [PH]
PLATELET # BLD AUTO: 147 THOUSANDS/UL (ref 149–390)
PMV BLD AUTO: 10.5 FL (ref 8.9–12.7)
POTASSIUM SERPL-SCNC: 4.6 MMOL/L (ref 3.5–5.3)
PROT SERPL-MCNC: 7.6 G/DL (ref 6.4–8.4)
PROT UR STRIP-MCNC: ABNORMAL MG/DL
RBC # BLD AUTO: 4.45 MILLION/UL (ref 3.88–5.62)
RBC #/AREA URNS AUTO: ABNORMAL /HPF
SODIUM SERPL-SCNC: 138 MMOL/L (ref 135–147)
SP GR UR STRIP.AUTO: 1.02 (ref 1–1.03)
UROBILINOGEN UR STRIP-ACNC: <2 MG/DL
WBC # BLD AUTO: 6.62 THOUSAND/UL (ref 4.31–10.16)
WBC #/AREA URNS AUTO: ABNORMAL /HPF

## 2024-01-30 PROCEDURE — 81001 URINALYSIS AUTO W/SCOPE: CPT | Performed by: EMERGENCY MEDICINE

## 2024-01-30 PROCEDURE — 87086 URINE CULTURE/COLONY COUNT: CPT | Performed by: EMERGENCY MEDICINE

## 2024-01-30 PROCEDURE — 36415 COLL VENOUS BLD VENIPUNCTURE: CPT

## 2024-01-30 PROCEDURE — 83690 ASSAY OF LIPASE: CPT | Performed by: EMERGENCY MEDICINE

## 2024-01-30 PROCEDURE — 85025 COMPLETE CBC W/AUTO DIFF WBC: CPT | Performed by: EMERGENCY MEDICINE

## 2024-01-30 PROCEDURE — 99284 EMERGENCY DEPT VISIT MOD MDM: CPT | Performed by: EMERGENCY MEDICINE

## 2024-01-30 PROCEDURE — 74176 CT ABD & PELVIS W/O CONTRAST: CPT

## 2024-01-30 PROCEDURE — 80053 COMPREHEN METABOLIC PANEL: CPT | Performed by: EMERGENCY MEDICINE

## 2024-01-30 PROCEDURE — 99284 EMERGENCY DEPT VISIT MOD MDM: CPT

## 2024-01-30 PROCEDURE — G1004 CDSM NDSC: HCPCS

## 2024-01-30 RX ORDER — OXYCODONE HYDROCHLORIDE 5 MG/1
5 TABLET ORAL EVERY 6 HOURS PRN
Qty: 8 TABLET | Refills: 0 | Status: SHIPPED | OUTPATIENT
Start: 2024-01-30

## 2024-01-30 RX ORDER — LIDOCAINE 50 MG/G
1 PATCH TOPICAL ONCE
Status: DISCONTINUED | OUTPATIENT
Start: 2024-01-30 | End: 2024-01-30 | Stop reason: HOSPADM

## 2024-01-30 RX ORDER — DOXYCYCLINE HYCLATE 100 MG/1
100 CAPSULE ORAL 2 TIMES DAILY
Qty: 19 CAPSULE | Refills: 0 | Status: SHIPPED | OUTPATIENT
Start: 2024-01-30 | End: 2024-02-09

## 2024-01-30 RX ORDER — DOXYCYCLINE HYCLATE 100 MG/1
100 CAPSULE ORAL ONCE
Status: COMPLETED | OUTPATIENT
Start: 2024-01-30 | End: 2024-01-30

## 2024-01-30 RX ADMIN — DOXYCYCLINE 100 MG: 100 CAPSULE ORAL at 17:03

## 2024-01-30 RX ADMIN — LIDOCAINE 1 PATCH: 700 PATCH TOPICAL at 15:07

## 2024-01-30 NOTE — DISCHARGE INSTRUCTIONS
Diagnosis; left flank pain - possible left kidney infection - pyelonephritis - lesion on left kidney    - for pain- can use over the counter generic acetaminophen 500 mg every 4 hrs while awake    -  the lidocaine patch can remain on for up to 12 hrs at a time- can not get warm or wet- can use over the counter generic lidocaine patches to a abran     - only as needed for severe pain - oxycodone 12/ to 1 tablet as needed - need to be careful with this medication - can cause nausea/ confusion     - doxycycline- antibiotic starting tomorrow  with  meals 1 capsule 2 times a day for 10 days    - you have an enlarging lesion on your left kidney- this will need to be followed- you probably get dedicated  repeat imaging of your left kidney within 3 months- please call the urologist to schedule an appointment- I placed a referral in the computer for you     - please return to  the er for any fevers- temp > 100.4/ any shaking chills- any persistent vomiting or any worsening left flank/ side pain

## 2024-01-30 NOTE — ED PROVIDER NOTES
History  Chief Complaint   Patient presents with    Flank Pain     Left sided flank pain starting Sunday, recently treated for UTI. Pain is similar. Denies n/v. Denies urinary symptoms.      89 yr  male with previous hx of kidney stones/ uti-- states approx 2 days ago at rest- with left mid back area pain -- aching- pain has gone up and down since -but never went away -- pt initial states initially felt like a kidney stone- but unsure at present-- no fever/ chills no n/v- no gu comps-  no change in stools- no abrupt onset of chest  -  back pain radiation to abd/ flank- no ble neuro comps - no injury - no rash in area      History provided by:  Patient  Flank Pain  Pain location:  L flank  Pain quality: aching    Pain quality: not cramping    Pain radiates to:  Does not radiate  Associated symptoms: no dysuria and no hematuria        Prior to Admission Medications   Prescriptions Last Dose Informant Patient Reported? Taking?   Brinzolamide-Brimonidine (Simbrinza) 1-0.2 % SUSP  Self Yes No   Sig: Apply 1 drop to eye 2 (two) times a day   Cinnamon 500 MG capsule  Self Yes No   Sig: Take 500 mg by mouth 2 (two) times a day   Multiple Vitamin (multivitamin) tablet  Self Yes No   Sig: Take 1 tablet by mouth daily   acetaminophen (TYLENOL) 325 mg tablet  Self No No   Sig: Take 3 tablets (975 mg total) by mouth every 8 (eight) hours as needed for mild pain   aspirin (ECOTRIN LOW STRENGTH) 81 mg EC tablet  Self Yes No   Sig: Take 81 mg by mouth daily   atorvastatin (LIPITOR) 40 mg tablet  Self Yes No   Sig: Take 20 mg by mouth daily 1/2 tablet   finasteride (PROSCAR) 5 mg tablet  Self No No   Sig: Take 1 tablet (5 mg total) by mouth daily   glipiZIDE (GLUCOTROL) 5 mg tablet   No No   Sig: Take 1 tablet (5 mg total) by mouth 2 (two) times a day before meals   insulin glargine (LANTUS) 100 units/mL subcutaneous injection  Self Yes No   Sig: Inject 23 Units under the skin daily at bedtime   latanoprost (XALATAN) 0.005 %  ophthalmic solution  Self Yes No   Sig: Administer 1 drop to both eyes daily at bedtime   losartan (COZAAR) 100 MG tablet  Self Yes No   Sig: Take 100 mg by mouth daily    metoprolol succinate (TOPROL-XL) 100 mg 24 hr tablet   Yes No   Sig: Take 100 mg by mouth daily   omeprazole (PriLOSEC) 20 mg delayed release capsule  Self Yes No   Sig: Take 20 mg by mouth daily   tamsulosin (FLOMAX) 0.4 mg  Self No No   Sig: Take 1 capsule (0.4 mg total) by mouth daily with dinner   vitamin B-12 (VITAMIN B-12) 1,000 mcg tablet  Self Yes No   Sig: Take 1 tablet by mouth every other day      Facility-Administered Medications: None       Past Medical History:   Diagnosis Date    Anemia 06/22/2021    Arthritis     Bilateral carotid artery stenosis 01/04/2021    <50% on doppler 7/27/2018. No change from Aug/2016    Bilateral carotid bruits     Bladder calculi 12/21/2023    BMI 25.0-25.9,adult 04/06/2021    BMI 25.0-25.9,adult 08/31/2021    BMI 26.0-26.9,adult 08/31/2021    BMI 27.0-27.9,adult 12/13/2022    Coronary artery disease involving native coronary artery of native heart without angina pectoris 04/06/2021    CTS (carpal tunnel syndrome)     Deviated septum     Diabetes ([facility])     Diabetes mellitus (HCC) 05/1995    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[facility])     Dizziness     Double vision     Ear problems     Encounter for support and coordination of transition of care 06/22/2021    Esophageal dysmotility 06/06/2023    Fatigue     General weakness     GERD (gastroesophageal reflux disease)     Hearing impaired person, bilateral     HL (hearing loss)     Hypertension     Left upper quadrant pain 06/06/2023    Medicare annual wellness visit, subsequent 08/31/2021    Medicare annual wellness visit, subsequent 12/21/2023    Microscopic hematuria     Myalgia 04/06/2021    New daily persistent headache 03/14/2023    Other dysphagia 12/13/2022    Other headache syndrome 03/14/2023    Other specified anemias 12/13/2022    Pain in joints  04/06/2021    PVD (peripheral vascular disease) ([facility]) 04/02/2021    Rectal bleeding 06/22/2021    Renal calculi     Right leg DVT ([facility]) 06/22/2021    Right lumbar radiculopathy     S/P AVR (aortic valve replacement) 04/02/2021    Skin rash 12/13/2022    SOB (shortness of breath) on exertion     Spinal stenosis in cervical region 04/02/2021    Tachycardia     Thrombocytopenia ([facility]) 07/12/2023    Type 2 diabetes mellitus with stage 3a chronic kidney disease, with long-term current use of insulin ([facility]) 09/08/2022    Type 2 diabetes mellitus without complication, with long-term current use of insulin ([facility]) 03/14/2023    Type 2 diabetes mellitus, without long-term current use of insulin ([facility]) 09/08/2022    Urinary frequency     Vertigo 01/04/2021       Past Surgical History:   Procedure Laterality Date    AORTIC VALVE REPLACEMENT  09/2016    BYPASS GRAFT      CARPAL TUNNEL RELEASE Right     by Dr. Jurado    CATARACT EXTRACTION, BILATERAL      COLONOSCOPY  09/16/2015    CORONARY ARTERY BYPASS GRAFT  09/2016    x1     FL RETROGRADE PYELOGRAM  7/10/2023    HAND SURGERY Left     WI CYSTO/URETERO W/LITHOTRIPSY &INDWELL STENT INSRT Right 7/10/2023    Procedure: CYSTOSCOPY URETEROSCOPY WITH LITHOTRIPSY HOLMIUM LASER, RETROGRADE PYELOGRAM AND INSERTION STENT URETERAL, STONE BASKET EXTRACTION;  Surgeon: Serge Marks MD;  Location: [facility];  Service: Urology    URETERECTOMY         Family History   Family history unknown: Yes     I have reviewed and agree with the history as documented.    E-Cigarette/Vaping    E-Cigarette Use Never User      E-Cigarette/Vaping Substances     Social History     Tobacco Use    Smoking status: Former     Current packs/day: 0.25     Average packs/day: 0.3 packs/day for 2.0 years (0.5 ttl pk-yrs)     Types: Cigarettes    Smokeless tobacco: Never    Tobacco comments:     Never smoker - As per [employer]   Vaping Use    Vaping status: Never Used   Substance Use Topics    Alcohol use: Not  Currently    Drug use: Never       Review of Systems   Constitutional: Negative.    HENT: Negative.     Eyes: Negative.    Respiratory: Negative.     Cardiovascular: Negative.    Gastrointestinal: Negative.    Endocrine: Negative.    Genitourinary:  Positive for flank pain. Negative for decreased urine volume, difficulty urinating, dysuria, enuresis, frequency, genital sores, hematuria, penile discharge, penile pain, penile swelling, scrotal swelling, testicular pain and urgency.   Musculoskeletal:  Negative for arthralgias, back pain, gait problem, joint swelling, myalgias, neck pain and neck stiffness.   Skin: Negative.    Allergic/Immunologic: Negative.    Neurological: Negative.    Hematological: Negative.    Psychiatric/Behavioral: Negative.         Physical Exam  Physical Exam  Vitals and nursing note reviewed.   Constitutional:       General: He is not in acute distress.     Appearance: Normal appearance. He is not ill-appearing, toxic-appearing or diaphoretic.      Comments: Avss- systolic htn-   which improved in er- pulse ox 100 % on ra- interpretation is normal- no intervention    HENT:      Head: Normocephalic and atraumatic.      Nose: Nose normal.      Mouth/Throat:      Mouth: Mucous membranes are moist.   Eyes:      General: No scleral icterus.        Right eye: No discharge.         Left eye: No discharge.      Extraocular Movements: Extraocular movements intact.      Conjunctiva/sclera: Conjunctivae normal.      Pupils: Pupils are equal, round, and reactive to light.      Comments: Mm pink   Neck:      Vascular: No carotid bruit.      Comments: No pmt c/t/l/s spine   Cardiovascular:      Rate and Rhythm: Normal rate.      Pulses: Normal pulses.      Heart sounds: Murmur heard.      No friction rub. No gallop.   Pulmonary:      Effort: Pulmonary effort is normal. No respiratory distress.      Breath sounds: Normal breath sounds. No stridor. No wheezing, rhonchi or rales.   Chest:      Chest wall: No  tenderness.   Abdominal:      General: Abdomen is flat. Bowel sounds are normal. There is no distension.      Palpations: Abdomen is soft. There is no mass.      Tenderness: There is no abdominal tenderness. There is left CVA tenderness. There is no right CVA tenderness, guarding or rebound.      Hernia: No hernia is present.      Comments: Left cva area tenderness only- abd is soft -nt-nd- no peritoneal signs- no pulsatile abd mass/bruit/ tenderness- no ascites   Musculoskeletal:         General: No swelling, tenderness, deformity or signs of injury. Normal range of motion.      Cervical back: Normal range of motion and neck supple. No rigidity or tenderness.      Right lower leg: No edema.      Left lower leg: No edema.      Comments: Equal bilateral radial/dp pulses- no ble edema/calf tenderness/asym/ erythema   Lymphadenopathy:      Cervical: No cervical adenopathy.   Skin:     General: Skin is warm.      Capillary Refill: Capillary refill takes less than 2 seconds.      Coloration: Skin is not jaundiced or pale.      Findings: No bruising, erythema, lesion or rash.   Neurological:      General: No focal deficit present.      Mental Status: He is alert and oriented to person, place, and time. Mental status is at baseline.      Cranial Nerves: No cranial nerve deficit.      Sensory: No sensory deficit.      Motor: No weakness.      Coordination: Coordination normal.      Gait: Gait normal.      Comments: Normal n on focal neuro exam    Psychiatric:         Mood and Affect: Mood normal.         Behavior: Behavior normal.         Thought Content: Thought content normal.         Judgment: Judgment normal.         Vital Signs  ED Triage Vitals   Temperature Pulse Respirations Blood Pressure SpO2   01/30/24 1121 01/30/24 1121 01/30/24 1121 01/30/24 1121 01/30/24 1121   97.9 °F (36.6 °C) 61 18 (!) 181/81 96 %      Temp src Heart Rate Source Patient Position - Orthostatic VS BP Location FiO2 (%)   -- 01/30/24 1331  01/30/24 1331 01/30/24 1331 --    Monitor Sitting Right arm       Pain Score       01/30/24 1121       5           Vitals:    01/30/24 1121 01/30/24 1331   BP: (!) 181/81 162/80   Pulse: 61 (!) 52   Patient Position - Orthostatic VS:  Sitting         Visual Acuity      ED Medications  Medications   lidocaine (LIDODERM) 5 % patch 1 patch (0 patches Topical Hold 1/30/24 1331)       Diagnostic Studies  Results Reviewed       Procedure Component Value Units Date/Time    Urine culture [[id number]] Collected: 01/30/24 1330    Lab Status: In process Specimen: Urine, Clean Catch Updated: 01/30/24 1345    [place] draw [[id number]] Collected: 01/30/24 1123    Lab Status: Final result Specimen: Blood from Arm, Right Updated: 01/30/24 1301    Narrative:      The following orders were created for panel order [place] draw.  Procedure                               Abnormality         Status                     ---------                               -----------         ------                     Light Blue Top on hold[[id number]]                           Final result               Green / Black tube on hold[[id number]]                       Final result                 Please view results for these tests on the individual orders.    Urine Microscopic [[id number]]  (Abnormal) Collected: 01/30/24 1213    Lab Status: Final result Specimen: Urine, Clean Catch Updated: 01/30/24 1241     RBC, UA 2-4 /hpf      WBC, UA Innumerable /hpf      Epithelial Cells Occasional /hpf      Bacteria, UA None Seen /hpf     UA (URINE) with reflex to Scope [[id number]]  (Abnormal) Collected: 01/30/24 1213    Lab Status: Final result Specimen: Urine, Clean Catch Updated: 01/30/24 1239     Color, UA Light Yellow     Clarity, UA Clear     Specific Gravity, UA 1.019     pH, UA 5.5     Leukocytes, UA Large     Nitrite, UA Negative     Protein, UA Trace mg/dl      Glucose, UA Negative mg/dl      Ketones, UA Negative mg/dl      Urobilinogen, UA <2.0 mg/dl       Bilirubin, UA Negative     Occult Blood, UA Negative    Comprehensive metabolic panel [[id number]]  (Abnormal) Collected: 01/30/24 1123    Lab Status: Final result Specimen: Blood from Arm, Right Updated: 01/30/24 1156     Sodium 138 mmol/L      Potassium 4.6 mmol/L      Chloride 106 mmol/L      CO2 25 mmol/L      ANION GAP 7 mmol/L      BUN 29 mg/dL      Creatinine 1.41 mg/dL      Glucose 216 mg/dL      Calcium 8.7 mg/dL      AST 13 U/L      ALT 18 U/L      Alkaline Phosphatase 78 U/L      Total Protein 7.6 g/dL      Albumin 4.1 g/dL      Total Bilirubin 0.56 mg/dL      eGFR 43 ml/min/1.73sq m     Narrative:      [employer] guidelines for Chronic Kidney Disease (CKD):     Stage 1 with normal or high GFR (GFR > 90 mL/min/1.73 square meters)    Stage 2 Mild CKD (GFR = 60-89 mL/min/1.73 square meters)    Stage 3A Moderate CKD (GFR = 45-59 mL/min/1.73 square meters)    Stage 3B Moderate CKD (GFR = 30-44 mL/min/1.73 square meters)    Stage 4 Severe CKD (GFR = 15-29 mL/min/1.73 square meters)    Stage 5 End Stage CKD (GFR <15 mL/min/1.73 square meters)  Note: GFR calculation is accurate only with a steady state creatinine    Lipase [[id number]]  (Normal) Collected: 01/30/24 1123    Lab Status: Final result Specimen: Blood from Arm, Right Updated: 01/30/24 1156     Lipase 25 u/L     CBC and differential [[id number]]  (Abnormal) Collected: 01/30/24 1123    Lab Status: Final result Specimen: Blood from Arm, Right Updated: 01/30/24 1147     WBC 6.62 Thousand/uL      RBC 4.45 Million/uL      Hemoglobin 13.4 g/dL      Hematocrit 41.6 %      MCV 94 fL      MCH 30.1 pg      MCHC 32.2 g/dL      RDW 12.7 %      MPV 10.5 fL      Platelets 147 Thousands/uL      nRBC 0 /100 WBCs      Neutrophils Relative 56 %      Immat GRANS % 0 %      Lymphocytes Relative 30 %      Monocytes Relative 10 %      Eosinophils Relative 3 %      Basophils Relative 1 %      Neutrophils Absolute 3.72 Thousands/µL      Immature Grans  Absolute 0.01 Thousand/uL      Lymphocytes Absolute 1.95 Thousands/µL      Monocytes Absolute 0.68 Thousand/µL      Eosinophils Absolute 0.20 Thousand/µL      Basophils Absolute 0.06 Thousands/µL                    CT renal stone study abdomen pelvis without contrast    (Results Pending)              Procedures  Procedures         ED Course  ED Course as of 01/30/24 1351   e Jan 30, 2024   1320 Er md note- initial er workup was first nursed   1348 Er md note- 10/23 ct scan of abd/pelvis report reviewed by er md   1349 Er md procedure note for  bedside transabd u/s by er md- no fluid seen in ruq/luq/behind bladder- no r / l / hydronephrosis - no aaa   1351 Er md note- pt offered pain medication refuses                                              Medical Decision Making  Amount and/or Complexity of Data Reviewed  Labs: ordered.  Radiology: ordered.    Risk  Prescription drug management.             Disposition  Final diagnoses:   None     ED Disposition       None          Follow-up Information    None         Patient's Medications   Discharge Prescriptions    No medications on file       No discharge procedures on file.    PDMP Review       None            ED Provider  Electronically Signed by           reviewed and compared   Radiology: ordered and independent interpretation performed. Decision-making details documented in ED Course.     Details: All reviewed   Discussion of management or test interpretation with external provider(s): Moderate amount of er md thought complexity and workup     Risk  Prescription drug management.  Decision regarding hospitalization.             Disposition  Final diagnoses:   None     ED Disposition       None          Follow-up Information    None         Patient's Medications   Discharge Prescriptions    No medications on file       No discharge procedures on file.    PDMP Review       None            ED Provider  Electronically Signed by             Jim Almonte MD  02/05/24 [id number]

## 2024-02-01 LAB — BACTERIA UR CULT: NORMAL

## 2024-02-19 PROBLEM — Z00.00 MEDICARE ANNUAL WELLNESS VISIT, SUBSEQUENT: Status: RESOLVED | Noted: 2023-12-21 | Resolved: 2024-02-19

## 2024-03-14 ENCOUNTER — APPOINTMENT (OUTPATIENT)
Dept: LAB | Facility: CLINIC | Age: 89
End: 2024-03-14
Payer: MEDICARE

## 2024-03-14 DIAGNOSIS — E78.2 MIXED HYPERLIPIDEMIA: ICD-10-CM

## 2024-03-14 DIAGNOSIS — D64.9 ANEMIA, UNSPECIFIED TYPE: ICD-10-CM

## 2024-03-14 DIAGNOSIS — Z79.4 TYPE 2 DIABETES MELLITUS WITH DIABETIC NEPHROPATHY, WITH LONG-TERM CURRENT USE OF INSULIN (HCC): ICD-10-CM

## 2024-03-14 DIAGNOSIS — D69.6 THROMBOCYTOPENIA (HCC): ICD-10-CM

## 2024-03-14 DIAGNOSIS — Z95.1 HISTORY OF CORONARY ARTERY BYPASS SURGERY: ICD-10-CM

## 2024-03-14 DIAGNOSIS — I10 ESSENTIAL HYPERTENSION: Chronic | ICD-10-CM

## 2024-03-14 DIAGNOSIS — N18.32 STAGE 3B CHRONIC KIDNEY DISEASE (HCC): ICD-10-CM

## 2024-03-14 DIAGNOSIS — E11.21 TYPE 2 DIABETES MELLITUS WITH DIABETIC NEPHROPATHY, WITH LONG-TERM CURRENT USE OF INSULIN (HCC): ICD-10-CM

## 2024-03-14 DIAGNOSIS — N18.31 STAGE 3A CHRONIC KIDNEY DISEASE (HCC): ICD-10-CM

## 2024-03-14 DIAGNOSIS — I25.10 CORONARY ARTERY DISEASE INVOLVING NATIVE CORONARY ARTERY OF NATIVE HEART WITHOUT ANGINA PECTORIS: ICD-10-CM

## 2024-03-14 LAB
ALBUMIN SERPL BCP-MCNC: 4 G/DL (ref 3.5–5)
ALP SERPL-CCNC: 70 U/L (ref 34–104)
ALT SERPL W P-5'-P-CCNC: 22 U/L (ref 7–52)
ANION GAP SERPL CALCULATED.3IONS-SCNC: 10 MMOL/L (ref 4–13)
AST SERPL W P-5'-P-CCNC: 18 U/L (ref 13–39)
BASOPHILS # BLD AUTO: 0.05 THOUSANDS/ÂΜL (ref 0–0.1)
BASOPHILS NFR BLD AUTO: 1 % (ref 0–1)
BILIRUB SERPL-MCNC: 0.52 MG/DL (ref 0.2–1)
BUN SERPL-MCNC: 28 MG/DL (ref 5–25)
CALCIUM SERPL-MCNC: 8.9 MG/DL (ref 8.4–10.2)
CHLORIDE SERPL-SCNC: 110 MMOL/L (ref 96–108)
CHOLEST SERPL-MCNC: 129 MG/DL
CO2 SERPL-SCNC: 26 MMOL/L (ref 21–32)
CREAT SERPL-MCNC: 1.47 MG/DL (ref 0.6–1.3)
CREAT UR-MCNC: 150 MG/DL
EOSINOPHIL # BLD AUTO: 0.17 THOUSAND/ÂΜL (ref 0–0.61)
EOSINOPHIL NFR BLD AUTO: 3 % (ref 0–6)
ERYTHROCYTE [DISTWIDTH] IN BLOOD BY AUTOMATED COUNT: 13.1 % (ref 11.6–15.1)
EST. AVERAGE GLUCOSE BLD GHB EST-MCNC: 169 MG/DL
GFR SERPL CREATININE-BSD FRML MDRD: 41 ML/MIN/1.73SQ M
GLUCOSE P FAST SERPL-MCNC: 78 MG/DL (ref 65–99)
HBA1C MFR BLD: 7.5 %
HCT VFR BLD AUTO: 41 % (ref 36.5–49.3)
HDLC SERPL-MCNC: 40 MG/DL
HGB BLD-MCNC: 13.5 G/DL (ref 12–17)
IMM GRANULOCYTES # BLD AUTO: 0.01 THOUSAND/UL (ref 0–0.2)
IMM GRANULOCYTES NFR BLD AUTO: 0 % (ref 0–2)
LDLC SERPL CALC-MCNC: 72 MG/DL (ref 0–100)
LYMPHOCYTES # BLD AUTO: 2.28 THOUSANDS/ÂΜL (ref 0.6–4.47)
LYMPHOCYTES NFR BLD AUTO: 39 % (ref 14–44)
MCH RBC QN AUTO: 30.8 PG (ref 26.8–34.3)
MCHC RBC AUTO-ENTMCNC: 32.9 G/DL (ref 31.4–37.4)
MCV RBC AUTO: 94 FL (ref 82–98)
MICROALBUMIN UR-MCNC: 21.7 MG/L
MICROALBUMIN/CREAT 24H UR: 14 MG/G CREATININE (ref 0–30)
MONOCYTES # BLD AUTO: 0.7 THOUSAND/ÂΜL (ref 0.17–1.22)
MONOCYTES NFR BLD AUTO: 12 % (ref 4–12)
NEUTROPHILS # BLD AUTO: 2.67 THOUSANDS/ÂΜL (ref 1.85–7.62)
NEUTS SEG NFR BLD AUTO: 45 % (ref 43–75)
NONHDLC SERPL-MCNC: 89 MG/DL
NRBC BLD AUTO-RTO: 0 /100 WBCS
PLATELET # BLD AUTO: 159 THOUSANDS/UL (ref 149–390)
PMV BLD AUTO: 10.7 FL (ref 8.9–12.7)
POTASSIUM SERPL-SCNC: 4 MMOL/L (ref 3.5–5.3)
PROT SERPL-MCNC: 7.2 G/DL (ref 6.4–8.4)
RBC # BLD AUTO: 4.38 MILLION/UL (ref 3.88–5.62)
SODIUM SERPL-SCNC: 146 MMOL/L (ref 135–147)
TRIGL SERPL-MCNC: 85 MG/DL
WBC # BLD AUTO: 5.88 THOUSAND/UL (ref 4.31–10.16)

## 2024-03-14 PROCEDURE — 80053 COMPREHEN METABOLIC PANEL: CPT

## 2024-03-14 PROCEDURE — 85025 COMPLETE CBC W/AUTO DIFF WBC: CPT

## 2024-03-14 PROCEDURE — 36415 COLL VENOUS BLD VENIPUNCTURE: CPT

## 2024-03-14 PROCEDURE — 80061 LIPID PANEL: CPT

## 2024-03-14 PROCEDURE — 83036 HEMOGLOBIN GLYCOSYLATED A1C: CPT

## 2024-03-16 DIAGNOSIS — Z79.4 TYPE 2 DIABETES MELLITUS WITH DIABETIC NEPHROPATHY, WITH LONG-TERM CURRENT USE OF INSULIN (HCC): ICD-10-CM

## 2024-03-16 DIAGNOSIS — E11.21 TYPE 2 DIABETES MELLITUS WITH DIABETIC NEPHROPATHY, WITH LONG-TERM CURRENT USE OF INSULIN (HCC): ICD-10-CM

## 2024-03-18 RX ORDER — GLIPIZIDE 5 MG/1
TABLET ORAL
Qty: 180 TABLET | Refills: 1 | Status: SHIPPED | OUTPATIENT
Start: 2024-03-18

## 2024-03-28 ENCOUNTER — OFFICE VISIT (OUTPATIENT)
Dept: INTERNAL MEDICINE CLINIC | Facility: CLINIC | Age: 89
End: 2024-03-28
Payer: MEDICARE

## 2024-03-28 VITALS
RESPIRATION RATE: 16 BRPM | HEART RATE: 80 BPM | OXYGEN SATURATION: 98 % | SYSTOLIC BLOOD PRESSURE: 144 MMHG | TEMPERATURE: 98.7 F | HEIGHT: 67 IN | WEIGHT: 165 LBS | DIASTOLIC BLOOD PRESSURE: 78 MMHG | BODY MASS INDEX: 25.9 KG/M2

## 2024-03-28 DIAGNOSIS — Z79.4 TYPE 2 DIABETES MELLITUS WITH DIABETIC NEPHROPATHY, WITH LONG-TERM CURRENT USE OF INSULIN (HCC): Primary | ICD-10-CM

## 2024-03-28 DIAGNOSIS — K21.9 GASTROESOPHAGEAL REFLUX DISEASE, UNSPECIFIED WHETHER ESOPHAGITIS PRESENT: ICD-10-CM

## 2024-03-28 DIAGNOSIS — E78.2 MIXED HYPERLIPIDEMIA: ICD-10-CM

## 2024-03-28 DIAGNOSIS — N18.32 STAGE 3B CHRONIC KIDNEY DISEASE (HCC): ICD-10-CM

## 2024-03-28 DIAGNOSIS — I10 ESSENTIAL HYPERTENSION: Chronic | ICD-10-CM

## 2024-03-28 DIAGNOSIS — I25.10 CORONARY ARTERY DISEASE INVOLVING NATIVE CORONARY ARTERY OF NATIVE HEART WITHOUT ANGINA PECTORIS: ICD-10-CM

## 2024-03-28 DIAGNOSIS — E11.21 TYPE 2 DIABETES MELLITUS WITH DIABETIC NEPHROPATHY, WITH LONG-TERM CURRENT USE OF INSULIN (HCC): Primary | ICD-10-CM

## 2024-03-28 DIAGNOSIS — Z95.2 S/P AVR (AORTIC VALVE REPLACEMENT): ICD-10-CM

## 2024-03-28 PROBLEM — N18.9 CKD (CHRONIC KIDNEY DISEASE): Status: ACTIVE | Noted: 2024-03-28

## 2024-03-28 PROCEDURE — 99214 OFFICE O/P EST MOD 30 MIN: CPT | Performed by: INTERNAL MEDICINE

## 2024-03-28 PROCEDURE — G2211 COMPLEX E/M VISIT ADD ON: HCPCS | Performed by: INTERNAL MEDICINE

## 2024-03-28 NOTE — PROGRESS NOTES
Assessment/Plan:    1. Type 2 diabetes mellitus with diabetic nephropathy, with long-term current use of insulin (Formerly [facility])    2. Coronary artery disease involving native coronary artery of native heart without angina pectoris    3. Essential hypertension    4. Gastroesophageal reflux disease, unspecified whether esophagitis present    5. Mixed hyperlipidemia    6. S/P AVR bioprothetic     7. BMI 25.0-25.9,adult    8. Stage 3b chronic kidney disease (HCC)  Assessment & Plan:  Lab Results   Component Value Date    EGFR 41 03/14/2024    EGFR 43 01/30/2024    EGFR 47 10/05/2023    CREATININE 1.47 (H) 03/14/2024    CREATININE 1.41 (H) 01/30/2024    CREATININE 1.32 (H) 10/05/2023            Discussion/summary/plan:    He is hemoglobin A1c increased from 7.3-7.5.  Patient denies any symptoms of hypoglycemia.  His fasting blood sugar around 105 and p.m. blood sugars are 140.  Discussed with the patient to changing glipizide to Jardiance patient stated [facility]  Meanwhile continue present medications.  Advised to call if blood sugar less than 80.  Continue 1800-calorie ADA diet.  For CAD and status post AVR he was seen by cardiologist had recently echocardiogram revealed increased pressure gradient across AVR so he will be going to see cardiothoracic surgeon next week Dr. Zhou.  Blood pressure is well-controlled advised to continue present medications and low-salt diet.  His blood pressure at home 1 30-1 40 / 70s.  His cholesterol 129, triglyceride 85, HDL 40, LDL 72 well-controlled advised to continue present medication and low-cholesterol low carbs diet.  CKD stable.  Advised to maintain hydration.  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  His symptoms are controlled on omeprazole for GE reflux and he would like to continue same dose as it is effective.  Advised to watch diet and calorie  intake and try to lose weight.  Exercise as much as he can.    Subjective: Patient presents for follow-up.      Patient ID: Nolberto Guajardo is a 89 y.o. male.    HPI  89-year-old white male patient presents for follow-up of his medical problems.  He denies any chest pain, shortness of breath, pain in abdomen.  Denies any cough, fever, chills.  Denies any nausea vomiting diarrhea.  He was seen by his cardiologist had echocardiogram found to have a problem with his aortic valve so he is going to see cardiothoracic surgeon next week.    The following portions of the patient's history were reviewed and updated as appropriate:     Past Medical History:  He has a past medical history of Anemia (06/22/2021), Arthritis, Bilateral carotid artery stenosis (01/04/2021), Bilateral carotid bruits, Bladder calculi (12/21/2023), BMI 25.0-25.9,adult (04/06/2021), BMI 25.0-25.9,adult (08/31/2021), BMI 26.0-26.9,adult (08/31/2021), BMI 27.0-27.9,adult (12/13/2022), CKD (chronic kidney disease) (03/28/2024), Coronary artery disease involving native coronary artery of native heart without angina pectoris (04/06/2021), CTS (carpal tunnel syndrome), Deviated septum, Diabetes ([facility]), Diabetes mellitus (HCC) (05/1995), 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[facility]), Dizziness, Double vision, Ear problems, Encounter for support and coordination of transition of care (06/22/2021), Esophageal dysmotility (06/06/2023), Fatigue, General weakness, GERD (gastroesophageal reflux disease), Hearing impaired person, bilateral, HL (hearing loss), Hypertension, Left upper quadrant pain (06/06/2023), Medicare annual wellness visit, subsequent (08/31/2021), Medicare annual wellness visit, subsequent (12/21/2023), Microscopic hematuria, Myalgia (04/06/2021), New daily persistent headache (03/14/2023), Other dysphagia (12/13/2022), Other headache syndrome (03/14/2023), Other specified anemias (12/13/2022), Pain in joints (04/06/2021), PVD (peripheral vascular disease)  ([facility]) (04/02/2021), Rectal bleeding (06/22/2021), Renal calculi, Right leg DVT ([facility]) (06/22/2021), Right lumbar radiculopathy, S/P AVR (aortic valve replacement) (04/02/2021), Skin rash (12/13/2022), SOB (shortness of breath) on exertion, Spinal stenosis in cervical region (04/02/2021), Tachycardia, Thrombocytopenia ([facility]) (07/12/2023), Type 2 diabetes mellitus with stage 3a chronic kidney disease, with long-term current use of insulin ([facility]) (09/08/2022), Type 2 diabetes mellitus without complication, with long-term current use of insulin ([facility]) (03/14/2023), Type 2 diabetes mellitus, without long-term current use of insulin ([facility]) (09/08/2022), Urinary frequency, and Vertigo (01/04/2021).,  _______________________________________________________________________  Past Surgical History:   has a past surgical history that includes Ureterectomy; Bypass Graft; Carpal tunnel release (Right); Hand surgery (Left); Cataract extraction, bilateral; Aortic valve replacement (09/2016); 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); Colonoscopy (09/16/2015); FL retrograde pyelogram (7/10/2023); and pr cysto/uretero w/lithotripsy &indwell stent insrt (Right, 7/10/2023).,  _______________________________________________________________________  Family History:  Family history is unknown by patient.,  _______________________________________________________________________  Social History:   reports that he has quit smoking. His smoking use included cigarettes. He has a 0.5 pack-year smoking history. He has never used smokeless tobacco. He reports that he does not currently use alcohol. He reports that he does not use drugs.,  _______________________________________________________________________  Allergies:  is allergic to lisinopril and penicillin g..  _______________________________________________________________________  Current Outpatient Medications   Medication Sig Dispense Refill    acetaminophen (TYLENOL) 325 mg tablet Take 3  tablets (975 mg total) by mouth every 8 (eight) hours as needed for mild pain  0    aspirin (ECOTRIN LOW STRENGTH) 81 mg EC tablet Take 81 mg by mouth daily      atorvastatin (LIPITOR) 40 mg tablet Take 20 mg by mouth daily 1/2 tablet      Brinzolamide-Brimonidine (Simbrinza) 1-0.2 % SUSP Apply 1 drop to eye 2 (two) times a day      Cinnamon 500 MG capsule Take 500 mg by mouth 2 (two) times a day      glipiZIDE (GLUCOTROL) 5 mg tablet TAKE 1 TABLET BY MOUTH TWICE DAILY BEFORE MEAL(S) 180 tablet 1    insulin glargine (LANTUS) 100 units/mL subcutaneous injection Inject 32 Units under the skin daily at bedtime      latanoprost (XALATAN) 0.005 % ophthalmic solution Administer 1 drop to both eyes daily at bedtime      losartan (COZAAR) 100 MG tablet Take 100 mg by mouth daily       metoprolol succinate (TOPROL-XL) 100 mg 24 hr tablet Take 100 mg by mouth daily      Multiple Vitamin (multivitamin) tablet Take 1 tablet by mouth daily      omeprazole (PriLOSEC) 20 mg delayed release capsule Take 20 mg by mouth daily      oxyCODONE (Roxicodone) 5 immediate release tablet Take 1 tablet (5 mg total) by mouth every 6 (six) hours as needed for moderate pain or severe pain for up to 8 doses Max Daily Amount: 20 mg 8 tablet 0    tamsulosin (FLOMAX) 0.4 mg Take 1 capsule (0.4 mg total) by mouth daily with dinner 30 capsule 0    finasteride (PROSCAR) 5 mg tablet Take 1 tablet (5 mg total) by mouth daily 90 tablet 0     No current facility-administered medications for this visit.     _______________________________________________________________________  Review of Systems   Constitutional:  Negative for chills and fever.   HENT:  Negative for congestion, ear pain, nosebleeds, sinus pain, sore throat and trouble swallowing.    Eyes:  Negative for discharge, redness and visual disturbance.   Respiratory:  Negative for cough, chest tightness and shortness of breath.    Cardiovascular:  Negative for chest pain and palpitations.  "  Gastrointestinal:  Negative for abdominal pain, blood in stool, constipation, diarrhea, nausea and vomiting.   Genitourinary:  Negative for dysuria, flank pain and hematuria.   Musculoskeletal:  Negative for arthralgias and neck pain.   Skin:  Negative for color change and rash.   Neurological:  Negative for dizziness, speech difficulty, weakness and headaches.   Hematological:  Does not bruise/bleed easily.   Psychiatric/Behavioral:  Negative for agitation and behavioral problems.            Objective:  Vitals:    03/28/24 1418   BP: 144/78   BP Location: Right arm   Patient Position: Sitting   Cuff Size: Standard   Pulse: 80   Resp: 16   Temp: 98.7 °F (37.1 °C)   TempSrc: Temporal   SpO2: 98%   Weight: 74.8 kg (165 lb)   Height: 5' 7\" (1.702 m)     Body mass index is 25.84 kg/m².     Physical Exam  Vitals and nursing note reviewed.   Constitutional:       General: He is not in acute distress.     Appearance: Normal appearance.   HENT:      Head: Normocephalic and atraumatic.      Nose: Nose normal.      Mouth/Throat:      Mouth: Mucous membranes are moist.   Eyes:      General: No scleral icterus.        Right eye: No discharge.         Left eye: No discharge.      Conjunctiva/sclera: Conjunctivae normal.   Cardiovascular:      Rate and Rhythm: Normal rate and regular rhythm.      Pulses: Normal pulses.      Heart sounds: Murmur heard.   Pulmonary:      Effort: Pulmonary effort is normal. No respiratory distress.      Breath sounds: No wheezing.   Abdominal:      General: Bowel sounds are normal.      Palpations: Abdomen is soft.      Tenderness: There is no abdominal tenderness.   Musculoskeletal:         General: Normal range of motion.      Cervical back: Normal range of motion and neck supple. No muscular tenderness.      Right lower leg: Edema (Has a traceRight lower extremity.) present.      Left lower leg: No edema.      Comments: He ambulates with cane.   Skin:     General: Skin is warm.      Findings: " No rash.   Neurological:      General: No focal deficit present.      Mental Status: He is alert and oriented to person, place, and time.   Psychiatric:         Mood and Affect: Mood normal.         Behavior: Behavior normal.         I spent 30 minutes with the patient today.  More than 50% time spent for reviewing of external notes, reviewing of the results of diagnostics test, management of care, patient education and ordering of test.

## 2024-03-28 NOTE — PATIENT INSTRUCTIONS
Patient was advised to continue present medications. discussed with the patient medications and laboratory data in detail.  Follow-up with me in 3 months or as advised.  If any blood test was ordered please do 1 week prior to next appointment unless advise to get earlier.  If you have any questions please call the office [phone]

## 2024-03-28 NOTE — ASSESSMENT & PLAN NOTE
Lab Results   Component Value Date    EGFR 41 03/14/2024    EGFR 43 01/30/2024    EGFR 47 10/05/2023    CREATININE 1.47 (H) 03/14/2024    CREATININE 1.41 (H) 01/30/2024    CREATININE 1.32 (H) 10/05/2023

## 2024-04-01 ENCOUNTER — ANESTHESIA (OUTPATIENT)
Dept: GASTROENTEROLOGY | Facility: HOSPITAL | Age: 89
End: 2024-04-01

## 2024-04-01 ENCOUNTER — ANESTHESIA EVENT (OUTPATIENT)
Dept: GASTROENTEROLOGY | Facility: HOSPITAL | Age: 89
End: 2024-04-01

## 2024-04-01 ENCOUNTER — HOSPITAL ENCOUNTER (OUTPATIENT)
Dept: GASTROENTEROLOGY | Facility: HOSPITAL | Age: 89
Setting detail: OUTPATIENT SURGERY
Discharge: HOME | End: 2024-04-01
Attending: INTERNAL MEDICINE | Admitting: INTERNAL MEDICINE
Payer: MEDICARE

## 2024-04-01 VITALS
SYSTOLIC BLOOD PRESSURE: 138 MMHG | RESPIRATION RATE: 16 BRPM | OXYGEN SATURATION: 100 % | BODY MASS INDEX: 27.21 KG/M2 | DIASTOLIC BLOOD PRESSURE: 62 MMHG | HEART RATE: 50 BPM | HEIGHT: 65 IN | TEMPERATURE: 96.9 F | WEIGHT: 163.3 LBS

## 2024-04-01 DIAGNOSIS — K25.3 CAMERON LESION, ACUTE: ICD-10-CM

## 2024-04-01 DIAGNOSIS — K25.9 GASTRIC ULCER, UNSPECIFIED CHRONICITY, UNSPECIFIED WHETHER GASTRIC ULCER HEMORRHAGE OR PERFORATION PRESENT: ICD-10-CM

## 2024-04-01 LAB
GLUCOSE SERPL-MCNC: 114 MG/DL (ref 65–140)
GLUCOSE SERPL-MCNC: 74 MG/DL (ref 65–140)

## 2024-04-01 PROCEDURE — 82948 REAGENT STRIP/BLOOD GLUCOSE: CPT

## 2024-04-01 RX ORDER — SODIUM CHLORIDE, SODIUM LACTATE, POTASSIUM CHLORIDE, CALCIUM CHLORIDE 600; 310; 30; 20 MG/100ML; MG/100ML; MG/100ML; MG/100ML
INJECTION, SOLUTION INTRAVENOUS CONTINUOUS PRN
Status: DISCONTINUED | OUTPATIENT
Start: 2024-04-01 | End: 2024-04-01

## 2024-04-01 RX ORDER — GLYCOPYRROLATE 0.2 MG/ML
INJECTION INTRAMUSCULAR; INTRAVENOUS AS NEEDED
Status: DISCONTINUED | OUTPATIENT
Start: 2024-04-01 | End: 2024-04-01

## 2024-04-01 RX ORDER — DEXTROSE MONOHYDRATE 50 MG/ML
INJECTION, SOLUTION INTRAVENOUS CONTINUOUS PRN
Status: DISCONTINUED | OUTPATIENT
Start: 2024-04-01 | End: 2024-04-01

## 2024-04-01 RX ORDER — LIDOCAINE HYDROCHLORIDE 20 MG/ML
INJECTION, SOLUTION EPIDURAL; INFILTRATION; INTRACAUDAL; PERINEURAL AS NEEDED
Status: DISCONTINUED | OUTPATIENT
Start: 2024-04-01 | End: 2024-04-01

## 2024-04-01 RX ORDER — PROPOFOL 10 MG/ML
INJECTION, EMULSION INTRAVENOUS AS NEEDED
Status: DISCONTINUED | OUTPATIENT
Start: 2024-04-01 | End: 2024-04-01

## 2024-04-01 RX ADMIN — PROPOFOL 80 MG: 10 INJECTION, EMULSION INTRAVENOUS at 08:31

## 2024-04-01 RX ADMIN — SODIUM CHLORIDE, SODIUM LACTATE, POTASSIUM CHLORIDE, AND CALCIUM CHLORIDE: .6; .31; .03; .02 INJECTION, SOLUTION INTRAVENOUS at 08:10

## 2024-04-01 RX ADMIN — DEXTROSE: 5 SOLUTION INTRAVENOUS at 08:31

## 2024-04-01 RX ADMIN — GLYCOPYRROLATE 0.2 MCG: 0.2 INJECTION, SOLUTION INTRAMUSCULAR; INTRAVENOUS at 08:30

## 2024-04-01 RX ADMIN — LIDOCAINE HYDROCHLORIDE 100 MG: 20 INJECTION, SOLUTION EPIDURAL; INFILTRATION; INTRACAUDAL; PERINEURAL at 08:31

## 2024-04-01 NOTE — ANESTHESIA POSTPROCEDURE EVALUATION
Post-Op Assessment Note    CV Status:  Stable  Pain Score: 0    Pain management: adequate       Mental Status:  Sleepy and arousable   Hydration Status:  Stable   PONV Controlled:  Controlled   Airway Patency:  Patent     Post Op Vitals Reviewed: Yes    No anethesia notable event occurred.    Staff: CRNA               BP   100/58   Temp      Pulse  53   Resp   14   SpO2   100

## 2024-04-01 NOTE — H&P
History and Physical - [facility]  Nolberto Guajardo 89 y.o. male MRN: [id number]                  HPI: Nolberto Guajardo is a 89 y.o. year old male who presents for GERD      REVIEW OF SYSTEMS: Per the HPI, and otherwise unremarkable.    Historical Information   Past Medical History:   Diagnosis Date    Anemia 06/22/2021    Arthritis     Bilateral carotid artery stenosis 01/04/2021    <50% on doppler 7/27/2018. No change from Aug/2016    Bilateral carotid bruits     Bladder calculi 12/21/2023    BMI 25.0-25.9,adult 04/06/2021    BMI 25.0-25.9,adult 08/31/2021    BMI 26.0-26.9,adult 08/31/2021    BMI 27.0-27.9,adult 12/13/2022    CKD (chronic kidney disease) 03/28/2024    Coronary artery disease involving native coronary artery of native heart without angina pectoris 04/06/2021    CTS (carpal tunnel syndrome)     Deviated septum     Diabetes ([facility])     Diabetes mellitus ([facility]) 05/1995    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[facility])     Dizziness     Double vision     Ear problems     Encounter for support and coordination of transition of care 06/22/2021    Esophageal dysmotility 06/06/2023    Fatigue     General weakness     GERD (gastroesophageal reflux disease)     Hearing impaired person, bilateral     HL (hearing loss)     Hypertension     Left upper quadrant pain 06/06/2023    Medicare annual wellness visit, subsequent 08/31/2021    Medicare annual wellness visit, subsequent 12/21/2023    Microscopic hematuria     Myalgia 04/06/2021    New daily persistent headache 03/14/2023    Other dysphagia 12/13/2022    Other headache syndrome 03/14/2023    Other specified anemias 12/13/2022    Pain in joints 04/06/2021    PVD (peripheral vascular disease) ([facility]) 04/02/2021    Rectal bleeding 06/22/2021    Renal calculi     Right leg DVT ([facility]) 06/22/2021    Right lumbar radiculopathy     S/P AVR (aortic valve replacement) 04/02/2021    Skin rash 12/13/2022    SOB (shortness of breath) on exertion     Spinal stenosis in  cervical region 04/02/2021    Tachycardia     Thrombocytopenia ([facility]) 07/12/2023    Type 2 diabetes mellitus with stage 3a chronic kidney disease, with long-term current use of insulin ([facility]) 09/08/2022    Type 2 diabetes mellitus without complication, with long-term current use of insulin ([facility]) 03/14/2023    Type 2 diabetes mellitus, without long-term current use of insulin ([facility]) 09/08/2022    Urinary frequency     Vertigo 01/04/2021     Past Surgical History:   Procedure Laterality Date    AORTIC VALVE REPLACEMENT  09/2016    BYPASS GRAFT      CARPAL TUNNEL RELEASE Right     by Dr. Jurado    CATARACT EXTRACTION, BILATERAL      COLONOSCOPY  09/16/2015    CORONARY ARTERY BYPASS GRAFT  09/2016    x1     FL RETROGRADE PYELOGRAM  7/10/2023    HAND SURGERY Left     ND CYSTO/URETERO W/LITHOTRIPSY &INDWELL STENT INSRT Right 7/10/2023    Procedure: CYSTOSCOPY URETEROSCOPY WITH LITHOTRIPSY HOLMIUM LASER, RETROGRADE PYELOGRAM AND INSERTION STENT URETERAL, STONE BASKET EXTRACTION;  Surgeon: Serge Marks MD;  Location: [facility];  Service: Urology    URETERECTOMY       Social History   Social History     Substance and Sexual Activity   Alcohol Use Not Currently     Social History     Substance and Sexual Activity   Drug Use Never     Social History     Tobacco Use   Smoking Status Former    Current packs/day: 0.25    Average packs/day: 0.3 packs/day for 2.0 years (0.5 ttl pk-yrs)    Types: Cigarettes   Smokeless Tobacco Never   Tobacco Comments    Never smoker - As per [employer]     Family History   Family history unknown: Yes       Meds/Allergies       Current Outpatient Medications:     aspirin (ECOTRIN LOW STRENGTH) 81 mg EC tablet    atorvastatin (LIPITOR) 40 mg tablet    metoprolol succinate (TOPROL-XL) 100 mg 24 hr tablet    tamsulosin (FLOMAX) 0.4 mg    acetaminophen (TYLENOL) 325 mg tablet    Brinzolamide-Brimonidine (Simbrinza) 1-0.2 % SUSP    Cinnamon 500 MG capsule    finasteride (PROSCAR) 5 mg tablet     "glipiZIDE (GLUCOTROL) 5 mg tablet    insulin glargine (LANTUS) 100 units/mL subcutaneous injection    latanoprost (XALATAN) 0.005 % ophthalmic solution    losartan (COZAAR) 100 MG tablet    Multiple Vitamin (multivitamin) tablet    omeprazole (PriLOSEC) 20 mg delayed release capsule    oxyCODONE (Roxicodone) 5 immediate release tablet  No current facility-administered medications for this encounter.    Facility-Administered Medications Ordered in Other Encounters:     lactated ringers infusion, , Intravenous, Continuous PRN, New Bag at 04/01/24 0810    Allergies   Allergen Reactions    Lisinopril Cough    Penicillin G Hives     And other penicillins       Objective     /63   Pulse (!) 48   Temp (!) 96.9 °F (36.1 °C) (Temporal)   Resp 16   Ht 5' 5\" (1.651 m)   Wt 74.1 kg (163 lb 4.8 oz)   SpO2 100%   BMI 27.17 kg/m²       PHYSICAL EXAM    Gen: NAD  Head: NCAT  CV: RRR  CHEST: Clear  ABD: soft, NT/ND  EXT: no edema      ASSESSMENT/PLAN:  This is a 89 y.o. year old male here for EGD  , and he is stable and optimized for his procedure.        "

## 2024-04-01 NOTE — ANESTHESIA PREPROCEDURE EVALUATION
Procedure:  EGD    Relevant Problems   CARDIO   (+) Coronary artery disease   (+) Coronary artery disease involving native coronary artery of native heart without angina pectoris   (+) Essential hypertension   (+) Hyperlipidemia   (+) Right leg DVT (HCC)      ENDO   (+) DM2 (diabetes mellitus, type 2) (HCC)      GI/HEPATIC   (+) GI bleeding   (+) Gastroesophageal reflux disease   (+) Other dysphagia   (+) Rectal bleeding      /RENAL   (+) 0.5 cm proximal reteral stone with R hydronephrosis   (+) Acute kidney injury superimposed on chronic kidney disease vs progressive CKD   (+) CKD (chronic kidney disease)   (+) Chronic renal failure syndrome   (+) 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  (HCC)   (+) Stage 3 chronic kidney disease (HCC)      HEMATOLOGY   (+) Anemia   (+) Other specified anemias   (+) Thrombocytopenia (HCC)      MUSCULOSKELETAL   (+) Diaphragmatic hernia   (+) Low back pain      NEURO/PSYCH   (+) New daily persistent headache      S/p AVR    TTE March 2024:    Left Ventricle: Left ventricle is normal in size. Wall thickness is   normal. Prominent basal septal hypertrophy Systolic function is normal   with an ejection fraction of 60-65%. Wall motion is within normal limits.   There is grade II (moderate) diastolic dysfunction and elevated left   atrial pressure.     Left Atrium: Left atrium cavity is severely dilated.     Right Ventricle: Right ventricle cavity is moderately dilated. Systolic   function is mildly reduced.     Right Atrium: Right atrium cavity is moderately dilated.     Aortic Valve: There is a bioprosthetic aortic valve. #23 [place]   valve There is no regurgitation. The gradient recorded across the   prosthetic aortic valve is above the expected range.     Mitral Valve: The leaflets exhibit normal excursion. There is moderate   posterior annular calcification. There is moderate regurgitation with an   eccentrically directed jet. There is no evidence of mitral valve  stenosis.     Ascending Aorta: The aorta appears normal in size.     IVC/SVC: The inferior vena cava demonstrates a diameter of <=21 mm and   collapses >50%; therefore, the right atrial pressure is estimated at 0-5   mmHg.    Tricuspid Valve: There is mild regurgitation.     Pulmonic Valve: The pulmonic valve was not well visualized. There is no   regurgitation or stenosis. The estimated pulmonary artery systolic   pressure is 34.5 mmHg.     Pericardium: There is no pericardial effusion.         Physical Exam    Airway    Mallampati score: II  TM Distance: >3 FB  Neck ROM: full     Dental       Cardiovascular  Cardiovascular exam normal    Pulmonary  Pulmonary exam normal     Other Findings        Anesthesia Plan  ASA Score- 3     Anesthesia Type- IV sedation with anesthesia with ASA Monitors.         Additional Monitors:     Airway Plan:            Plan Factors-    Chart reviewed. EKG reviewed.  Existing labs reviewed. Patient summary reviewed.                  Induction- intravenous.    Postoperative Plan-     Informed Consent- Anesthetic plan and risks discussed with patient.  I personally reviewed this patient with the CRNA. Discussed and agreed on the Anesthesia Plan with the CRNA..

## 2024-07-08 ENCOUNTER — TELEPHONE (OUTPATIENT)
Age: 89
End: 2024-07-08

## 2024-07-08 NOTE — TELEPHONE ENCOUNTER
"Pt's wife called in requesting if a medication can be called in for the pt. Pt has been experiencing a cough since last week and states OTC medication is not helping. Pt's wife states the last time the pt had symptoms similar to this a medication was called in, she states they were \"perles\" and they helped.    Please advise if possible.  "

## 2024-07-09 ENCOUNTER — OFFICE VISIT (OUTPATIENT)
Dept: INTERNAL MEDICINE CLINIC | Facility: CLINIC | Age: 89
End: 2024-07-09
Payer: MEDICARE

## 2024-07-09 VITALS
HEIGHT: 65 IN | HEART RATE: 63 BPM | SYSTOLIC BLOOD PRESSURE: 118 MMHG | BODY MASS INDEX: 26.66 KG/M2 | DIASTOLIC BLOOD PRESSURE: 70 MMHG | RESPIRATION RATE: 16 BRPM | WEIGHT: 160 LBS | TEMPERATURE: 98.3 F | OXYGEN SATURATION: 95 %

## 2024-07-09 DIAGNOSIS — D63.8 ANEMIA IN OTHER CHRONIC DISEASES CLASSIFIED ELSEWHERE: ICD-10-CM

## 2024-07-09 DIAGNOSIS — E78.2 MIXED HYPERLIPIDEMIA: ICD-10-CM

## 2024-07-09 DIAGNOSIS — N18.31 STAGE 3A CHRONIC KIDNEY DISEASE (HCC): ICD-10-CM

## 2024-07-09 DIAGNOSIS — I10 ESSENTIAL HYPERTENSION: Chronic | ICD-10-CM

## 2024-07-09 DIAGNOSIS — I25.10 CORONARY ARTERY DISEASE INVOLVING NATIVE CORONARY ARTERY OF NATIVE HEART WITHOUT ANGINA PECTORIS: ICD-10-CM

## 2024-07-09 DIAGNOSIS — J40 BRONCHITIS: Primary | ICD-10-CM

## 2024-07-09 DIAGNOSIS — Z95.2 S/P AVR (AORTIC VALVE REPLACEMENT): ICD-10-CM

## 2024-07-09 LAB
SARS-COV-2 AG UPPER RESP QL IA: NEGATIVE
VALID CONTROL: NORMAL

## 2024-07-09 PROCEDURE — 87811 SARS-COV-2 COVID19 W/OPTIC: CPT | Performed by: INTERNAL MEDICINE

## 2024-07-09 PROCEDURE — 99214 OFFICE O/P EST MOD 30 MIN: CPT | Performed by: INTERNAL MEDICINE

## 2024-07-09 RX ORDER — CARVEDILOL 25 MG/1
12.5 TABLET ORAL 2 TIMES DAILY WITH MEALS
COMMUNITY
Start: 2024-05-20

## 2024-07-09 RX ORDER — AZITHROMYCIN 250 MG/1
TABLET, FILM COATED ORAL
Qty: 6 TABLET | Refills: 0 | Status: SHIPPED | OUTPATIENT
Start: 2024-07-09 | End: 2024-07-14

## 2024-07-09 RX ORDER — BENZONATATE 200 MG/1
200 CAPSULE ORAL EVERY 8 HOURS PRN
Qty: 20 CAPSULE | Refills: 0 | Status: SHIPPED | OUTPATIENT
Start: 2024-07-09

## 2024-07-09 RX ORDER — LINAGLIPTIN 5 MG/1
5 TABLET, FILM COATED ORAL DAILY
COMMUNITY
Start: 2024-07-01 | End: 2025-07-01

## 2024-07-09 NOTE — PATIENT INSTRUCTIONS
Patient was advised to continue present medications. discussed with the patient medications and laboratory data in detail.  Follow-up with me in 4 weeks or as advised.  If any blood test was ordered please do 1 week prior to next appointment unless advise to get earlier.  If you have any questions please call the office [phone]

## 2024-07-09 NOTE — PROGRESS NOTES
Assessment/Plan:    1. Bronchitis  -     POCT Rapid Covid Ag  -     azithromycin (Zithromax) 250 mg tablet; Take 2 tablets (500 mg total) by mouth daily for 1 day, THEN 1 tablet (250 mg total) daily for 4 days.  -     benzonatate (TESSALON) 200 MG capsule; Take 1 capsule (200 mg total) by mouth every 8 (eight) hours as needed for cough  2. Essential hypertension  3. Coronary artery disease involving native coronary artery of native heart without angina pectoris  4. Stage 3a chronic kidney disease (HCC)  5. Anemia in other chronic diseases classified elsewhere  6. S/P AVR bioprothetic   7. Mixed hyperlipidemia     Discussion/summary/plan:    Patient developed cough for about last 5 days.  He has been taking Benadryl and cough medication but not effective.  Patient states his breathing is okay.  Cough is mainly dry in nature.  Denies chest pain.  Denies fever or chills.  Denies any nausea vomiting diarrhea.  COVID-19 test was negative in the office today.  Possible viral versus bacterial bronchitis.  Start azithromycin.  If not better or get worse patient is to call me then we will  order chest x-ray.  Blood pressure well-controlled advised to continue present medication and low-salt diet.  For diabetes mellitus he was seen by endocrinology July 1, 2024 started on Tradjenta but [facility].  Last hemoglobin A1c was 7.6.  Cholesterol well-controlled LDL 71 to continue present dose of atorvastatin and low-cholesterol low-carb diet.  CKD stable GFR 50 to maintain hydration.  Patient is status post AVR and has history of CAD has been seen and followed by cardiologist.  His metoprolol discontinued and was changed to carvedilol by cardiologist.  He has a chronic anemia hemoglobin 12.9 stable.  [facility], [facility]24 reviewed.    Subjective: Patient presents with complaint of cough and follow-up medical problems.      Patient ID: Nolberto Guajardo is a 89  y.o. male.    Cough  Pertinent negatives include no chest pain, chills, ear pain, eye redness, fever, headaches, rash, sore throat or shortness of breath.     89-year-old white male patient present with chronic cough for last 5 days.  Cough is mainly dry in nature.  Has been taking benzonatate but not effective.  Denies any chest pain or shortness of breath.  Denies fever or chills.  Denies nausea vomiting diarrhea.  He was seen by recently endocrinologist and started on new medication for diabetes.  He was also seen by cardiologist.  [facility]  [facility]24.    The following portions of the patient's history were reviewed and updated as appropriate:     Past Medical History:  He has a past medical history of Anemia (06/22/2021), Arthritis, Bilateral carotid artery stenosis (01/04/2021), Bilateral carotid bruits, Bladder calculi (12/21/2023), BMI 25.0-25.9,adult (04/06/2021), BMI 25.0-25.9,adult (08/31/2021), BMI 26.0-26.9,adult (08/31/2021), BMI 27.0-27.9,adult (12/13/2022), Bronchitis (07/09/2024), CKD (chronic kidney disease) (03/28/2024), Coronary artery disease involving native coronary artery of native heart without angina pectoris (04/06/2021), CTS (carpal tunnel syndrome), Deviated septum, Diabetes ([facility]), Diabetes mellitus ([facility]) (05/1995), 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[facility]), Dizziness, Double vision, Ear problems, Encounter for support and coordination of transition of care (06/22/2021), Esophageal dysmotility (06/06/2023), Fatigue, General weakness, GERD (gastroesophageal reflux disease), Hearing impaired person, bilateral, HL (hearing loss), Hypertension, Left upper quadrant pain (06/06/2023), Medicare annual wellness visit, subsequent (08/31/2021), Medicare annual wellness visit, subsequent (12/21/2023), Microscopic hematuria, Myalgia (04/06/2021), New daily persistent headache (03/14/2023), Other  dysphagia (12/13/2022), Other headache syndrome (03/14/2023), Other specified anemias (12/13/2022), Pain in joints (04/06/2021), PVD (peripheral vascular disease) ([facility]) (04/02/2021), Rectal bleeding (06/22/2021), Renal calculi, Right leg DVT ([facility]) (06/22/2021), Right lumbar radiculopathy, S/P AVR (aortic valve replacement) (04/02/2021), Skin rash (12/13/2022), SOB (shortness of breath) on exertion, Spinal stenosis in cervical region (04/02/2021), Tachycardia, Thrombocytopenia ([facility]) (07/12/2023), Type 2 diabetes mellitus with stage 3a chronic kidney disease, with long-term current use of insulin ([facility]) (09/08/2022), Type 2 diabetes mellitus without complication, with long-term current use of insulin ([facility]) (03/14/2023), Type 2 diabetes mellitus, without long-term current use of insulin ([facility]) (09/08/2022), Urinary frequency, and Vertigo (01/04/2021).,  _______________________________________________________________________  Past Surgical History:   has a past surgical history that includes Ureterectomy; Bypass Graft; Carpal tunnel release (Right); Hand surgery (Left); Cataract extraction, bilateral; Aortic valve replacement (09/2016); 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); Colonoscopy (09/16/2015); [place] retrograde pyelogram (7/10/2023); and pr cysto/uretero w/lithotripsy &indwell stent insrt (Right, 7/10/2023).,  _______________________________________________________________________  Family History:  Family history is unknown by patient.,  _______________________________________________________________________  Social History:   reports that he has quit smoking. His smoking use included cigarettes. He has a 0.5 pack-year smoking history. He has never used smokeless tobacco. He reports that he does not currently use alcohol. He reports that he does not use drugs.,  _______________________________________________________________________  Allergies:  is allergic to lisinopril and penicillin  g..  _______________________________________________________________________  Current Outpatient Medications   Medication Sig Dispense Refill   • aspirin (ECOTRIN LOW STRENGTH) 81 mg EC tablet Take 81 mg by mouth daily     • atorvastatin (LIPITOR) 40 mg tablet Take 20 mg by mouth daily 1/2 tablet     • azithromycin (Zithromax) 250 mg tablet Take 2 tablets (500 mg total) by mouth daily for 1 day, THEN 1 tablet (250 mg total) daily for 4 days. 6 tablet 0   • benzonatate (TESSALON) 200 MG capsule Take 1 capsule (200 mg total) by mouth every 8 (eight) hours as needed for cough 20 capsule 0   • Brinzolamide-Brimonidine (Simbrinza) 1-0.2 % SUSP Apply 1 drop to eye 2 (two) times a day     • carvedilol (COREG) 25 mg tablet Take 12.5 mg by mouth 2 (two) times a day with meals     • Cinnamon 500 MG capsule Take 500 mg by mouth 2 (two) times a day     • Empagliflozin 25 MG TABS Take 25 mg by mouth daily     • insulin glargine (LANTUS) 100 units/mL subcutaneous injection Inject 26 Units under the skin daily at bedtime     • latanoprost (XALATAN) 0.005 % ophthalmic solution Administer 1 drop to both eyes daily at bedtime     • linaGLIPtin (Tradjenta) 5 MG TABS Take 5 mg by mouth daily     • losartan (COZAAR) 100 MG tablet Take 100 mg by mouth daily      • Multiple Vitamin (multivitamin) tablet Take 1 tablet by mouth daily     • omeprazole (PriLOSEC) 20 mg delayed release capsule Take 20 mg by mouth daily     • tamsulosin (FLOMAX) 0.4 mg Take 1 capsule (0.4 mg total) by mouth daily with dinner 30 capsule 0   • acetaminophen (TYLENOL) 325 mg tablet Take 3 tablets (975 mg total) by mouth every 8 (eight) hours as needed for mild pain (Patient not taking: Reported on 7/9/2024)  0   • finasteride (PROSCAR) 5 mg tablet Take 1 tablet (5 mg total) by mouth daily 90 tablet 0     No current facility-administered medications for this visit.     _______________________________________________________________________  Review of Systems  "  Constitutional:  Positive for fatigue. Negative for chills and fever.   HENT:  Negative for congestion, ear pain, nosebleeds, sinus pain, sore throat and trouble swallowing.    Eyes:  Negative for discharge, redness and visual disturbance.   Respiratory:  Positive for cough. Negative for chest tightness and shortness of breath.    Cardiovascular:  Negative for chest pain and palpitations.   Gastrointestinal:  Negative for abdominal pain, blood in stool, constipation, diarrhea, nausea and vomiting.   Genitourinary:  Negative for dysuria, flank pain and hematuria.   Skin:  Negative for color change and rash.   Neurological:  Negative for dizziness, speech difficulty, weakness and headaches.   Hematological:  Does not bruise/bleed easily.   Psychiatric/Behavioral:  Negative for agitation and behavioral problems.            Objective:  Vitals:    07/09/24 1043   BP: 118/70   BP Location: Left arm   Patient Position: Sitting   Cuff Size: Standard   Pulse: 63   Resp: 16   Temp: 98.3 °F (36.8 °C)   TempSrc: Temporal   SpO2: 95%   Weight: 72.6 kg (160 lb)   Height: 5' 5\" (1.651 m)     Body mass index is 26.63 kg/m².     Physical Exam  Vitals and nursing note reviewed.   Constitutional:       General: He is not in acute distress.     Appearance: Normal appearance.   HENT:      Head: Normocephalic and atraumatic.      Right Ear: Ear canal and external ear normal.      Left Ear: Ear canal and external ear normal.      Nose: Nose normal.      Mouth/Throat:      Mouth: Mucous membranes are moist.   Eyes:      General: No scleral icterus.        Right eye: No discharge.         Left eye: No discharge.      Extraocular Movements: Extraocular movements intact.      Conjunctiva/sclera: Conjunctivae normal.   Cardiovascular:      Rate and Rhythm: Normal rate and regular rhythm.      Pulses: Normal pulses.      Heart sounds: Murmur heard.   Pulmonary:      Effort: Pulmonary effort is normal. No respiratory distress.      Breath " sounds: Normal breath sounds. No wheezing, rhonchi or rales.   Abdominal:      General: Bowel sounds are normal. There is no distension.      Palpations: Abdomen is soft.      Tenderness: There is no abdominal tenderness.   Musculoskeletal:         General: Normal range of motion.      Cervical back: Normal range of motion and neck supple. No muscular tenderness.      Right lower leg: No edema.      Left lower leg: No edema.   Skin:     General: Skin is warm.   Neurological:      General: No focal deficit present.      Mental Status: He is alert and oriented to person, place, and time.   Psychiatric:         Mood and Affect: Mood normal.         Behavior: Behavior normal.

## 2024-08-05 ENCOUNTER — OFFICE VISIT (OUTPATIENT)
Dept: INTERNAL MEDICINE CLINIC | Facility: CLINIC | Age: 89
End: 2024-08-05
Payer: MEDICARE

## 2024-08-05 VITALS
HEIGHT: 65 IN | TEMPERATURE: 98.3 F | BODY MASS INDEX: 27.32 KG/M2 | RESPIRATION RATE: 16 BRPM | DIASTOLIC BLOOD PRESSURE: 74 MMHG | HEART RATE: 56 BPM | OXYGEN SATURATION: 97 % | SYSTOLIC BLOOD PRESSURE: 114 MMHG | WEIGHT: 164 LBS

## 2024-08-05 DIAGNOSIS — I10 ESSENTIAL HYPERTENSION: Chronic | ICD-10-CM

## 2024-08-05 DIAGNOSIS — D53.9 DEFICIENCY ANEMIA: ICD-10-CM

## 2024-08-05 DIAGNOSIS — I25.10 CORONARY ARTERY DISEASE INVOLVING NATIVE CORONARY ARTERY OF NATIVE HEART WITHOUT ANGINA PECTORIS: ICD-10-CM

## 2024-08-05 DIAGNOSIS — K27.9 PEPTIC ULCER DISEASE: ICD-10-CM

## 2024-08-05 DIAGNOSIS — E78.2 MIXED HYPERLIPIDEMIA: ICD-10-CM

## 2024-08-05 DIAGNOSIS — Z95.2 S/P AVR (AORTIC VALVE REPLACEMENT): ICD-10-CM

## 2024-08-05 DIAGNOSIS — E11.29 MICROALBUMINURIA DUE TO TYPE 2 DIABETES MELLITUS  (HCC): ICD-10-CM

## 2024-08-05 DIAGNOSIS — N18.32 STAGE 3B CHRONIC KIDNEY DISEASE (HCC): ICD-10-CM

## 2024-08-05 DIAGNOSIS — R80.9 MICROALBUMINURIA DUE TO TYPE 2 DIABETES MELLITUS  (HCC): ICD-10-CM

## 2024-08-05 DIAGNOSIS — D69.6 THROMBOCYTOPENIA (HCC): ICD-10-CM

## 2024-08-05 DIAGNOSIS — E11.21 TYPE 2 DIABETES MELLITUS WITH DIABETIC NEPHROPATHY, WITH LONG-TERM CURRENT USE OF INSULIN (HCC): Primary | ICD-10-CM

## 2024-08-05 DIAGNOSIS — Z79.4 TYPE 2 DIABETES MELLITUS WITH DIABETIC NEPHROPATHY, WITH LONG-TERM CURRENT USE OF INSULIN (HCC): Primary | ICD-10-CM

## 2024-08-05 PROCEDURE — 99214 OFFICE O/P EST MOD 30 MIN: CPT | Performed by: INTERNAL MEDICINE

## 2024-08-05 PROCEDURE — G2211 COMPLEX E/M VISIT ADD ON: HCPCS | Performed by: INTERNAL MEDICINE

## 2024-08-05 RX ORDER — GLIPIZIDE 5 MG/1
5 TABLET ORAL
COMMUNITY
End: 2024-08-05 | Stop reason: SDUPTHER

## 2024-08-05 RX ORDER — GLIPIZIDE 5 MG/1
5 TABLET ORAL
Qty: 180 TABLET | Refills: 1 | Status: SHIPPED | OUTPATIENT
Start: 2024-08-05

## 2024-08-05 NOTE — PATIENT INSTRUCTIONS
Patient was advised to continue present medications. discussed with the patient medications and laboratory data in detail.  Follow-up with me in 3 months or as advised.  If any blood test was ordered please do 1 week prior to next appointment unless advise to get earlier.  If you have any questions please call the office [phone]

## 2024-08-05 NOTE — PROGRESS NOTES
Assessment/Plan:    1. Type 2 diabetes mellitus with diabetic nephropathy, with long-term current use of insulin ([facility])  -     glipiZIDE (GLUCOTROL) 5 mg tablet; Take 1 tablet (5 mg total) by mouth 2 (two) times a day before meals  -     Comprehensive metabolic panel; Future  -     Hemoglobin A1C; Future  2. Thrombocytopenia (HCC)  -     CBC and differential; Future  3. Deficiency anemia  -     CBC and differential; Future  -     Iron Panel (Includes Ferritin, Iron Sat%, Iron, and TIBC); Future  -     Vitamin B12; Future  -     Folate; Future  -     Protein electrophoresis, serum; Future  4. Stage 3b chronic kidney disease (HCC)  -     Ambulatory Referral to Nephrology; Future  -     CBC and differential; Future  -     Comprehensive metabolic panel; Future  5. Coronary artery disease involving native coronary artery of native heart without angina pectoris  -     Lipid panel; Future  6. Essential hypertension  -     Ambulatory Referral to Nephrology; Future  -     Comprehensive metabolic panel; Future  7. Peptic ulcer disease  8. Mixed hyperlipidemia  -     Lipid panel; Future  -     Comprehensive metabolic panel; Future  9. S/P AVR bioprothetic   10. 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  ([facility])  -     Ambulatory Referral to Nephrology; Future  -     Comprehensive metabolic panel; Future  11. BMI 27.0-27.9,adult     Discussion/summary/plan:    Last hemoglobin A1c 8.7.  He was seen by an endocrinologist and was prescribed Tradjenta but [facility] and he cannot [facility] it.  He is on Jardiance and Lantus insulin 26 units once a day.  His blood sugar was in 200s so he started taking glipizide which he has at home and has been taking glipizide 5 mg twice a day since then his blood sugar is now in 110s.  He denies any hypoglycemia.  Patient would like to take glipizide as it is effective and has not no any side effect.  Discussed with the patient glipizide may cause hypoglycemia so if blood  sugar less than 80 to call physician.  Patient said he tried the Januvia and his sugar was not controlled on Januvia.  So I prescribed glipizide 5 mg twice daily and continue Jardiance as well as insulin as prescribed.  Will follow-up blood sugar and hemoglobin A1c.  Patient advised to follow with the endocrinologist.  Has history of CAD status post AVR advised to follow-up with cardiologist presently asymptomatic.  Cough is all resolved.  Blood pressure well-controlled advised to continue same medication low-salt diet.  EKG stable.  Discussed with the patient to see a nephrologist for further review of his recommendation he agreed so I referred to nephrologist.  Anemia and thrombocytopenia we will order anemia workup as well as follow CBC.  If it get worse will refer to hematologist.  He had a EGD April 2024 l gastric ulcers healed.  He takes omeprazole.  Denies any dysphagia.  Had a colonoscopy in 2021 was advised no further follow-up colonoscopy needed.  No blood in the stool or dark-colored stool.  Advised to watch diet and try to lose weight.  He ambulates with cane.  He has a microalbuminuria he is on losartan.  Will follow electrolytes renal function A1c lipid panel  .  Cholesterol well-controlled.  Last cholesterol 129, triglyceride 85 and  HDL 40, LDL 72 advised to continue present dose of atorvastatin low-cholesterol low-carb diet we will    Subjective: Patient presents for follow-up.      Patient ID: Nolberto Guajardo is a 89 y.o. male.    Rash  Pertinent negatives include no congestion, cough, diarrhea, fever, shortness of breath, sore throat or vomiting.     89-year-old white male patient present for follow-up of his medical problems.  He denies chest pain, shortness of breath, pain abdomen.  Denies any cough, fever, chills.  Denies any nausea vomiting diarrhea.    The following portions of the patient's history were reviewed and updated as appropriate:     Past Medical History:  He has a past medical history  of Anemia (06/22/2021), Arthritis, Bilateral carotid artery stenosis (01/04/2021), Bilateral carotid bruits, Bladder calculi (12/21/2023), BMI 25.0-25.9,adult (04/06/2021), BMI 25.0-25.9,adult (08/31/2021), BMI 26.0-26.9,adult (08/31/2021), BMI 27.0-27.9,adult (12/13/2022), Bronchitis (07/09/2024), CKD (chronic kidney disease) (03/28/2024), Coronary artery disease involving native coronary artery of native heart without angina pectoris (04/06/2021), CTS (carpal tunnel syndrome), Deficiency anemia (08/05/2024), Deviated septum, Diabetes ([facility]), Diabetes mellitus ([facility]) (05/1995), 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[facility]), Dizziness, Double vision, Ear problems, Encounter for support and coordination of transition of care (06/22/2021), Esophageal dysmotility (06/06/2023), Fatigue, General weakness, GERD (gastroesophageal reflux disease), Hearing impaired person, bilateral, HL (hearing loss), Hypertension, Left upper quadrant pain (06/06/2023), Medicare annual wellness visit, subsequent (08/31/2021), Medicare annual wellness visit, subsequent (12/21/2023), Microscopic hematuria, Myalgia (04/06/2021), New daily persistent headache (03/14/2023), Other dysphagia (12/13/2022), Other headache syndrome (03/14/2023), Other specified anemias (12/13/2022), Pain in joints (04/06/2021), Peptic ulcer disease (08/05/2024), PVD (peripheral vascular disease) ([facility]) (04/02/2021), Rectal bleeding (06/22/2021), Renal calculi, Right leg DVT ([facility]) (06/22/2021), Right lumbar radiculopathy, S/P AVR (aortic valve replacement) (04/02/2021), Skin rash (12/13/2022), SOB (shortness of breath) on exertion, Spinal stenosis in cervical region (04/02/2021), Tachycardia, Thrombocytopenia ([facility]) (07/12/2023), Type 2 diabetes mellitus with stage 3a chronic kidney disease, with long-term current use of insulin ([facility]) (09/08/2022), Type 2 diabetes mellitus without complication, with long-term current use of insulin ([facility]) (03/14/2023), Type 2 diabetes mellitus,  without long-term current use of insulin (HCC) (09/08/2022), Urinary frequency, and Vertigo (01/04/2021).,  _______________________________________________________________________  Past Surgical History:   has a past surgical history that includes Ureterectomy; Bypass Graft; Carpal tunnel release (Right); Hand surgery (Left); Cataract extraction, bilateral; Aortic valve replacement (09/2016); 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); Colonoscopy (09/16/2015); [place] retrograde pyelogram (7/10/2023); and pr cysto/uretero w/lithotripsy &indwell stent insrt (Right, 7/10/2023).,  _______________________________________________________________________  Family History:  Family history is unknown by patient.,  _______________________________________________________________________  Social History:   reports that he has quit smoking. His smoking use included cigarettes. He has a 0.5 pack-year smoking history. He has never used smokeless tobacco. He reports that he does not currently use alcohol. He reports that he does not use drugs.,  _______________________________________________________________________  Allergies:  is allergic to lisinopril and penicillin g..  _______________________________________________________________________  Current Outpatient Medications   Medication Sig Dispense Refill   • acetaminophen (TYLENOL) 325 mg tablet Take 3 tablets (975 mg total) by mouth every 8 (eight) hours as needed for mild pain  0   • aspirin (ECOTRIN LOW STRENGTH) 81 mg EC tablet Take 81 mg by mouth daily     • atorvastatin (LIPITOR) 40 mg tablet Take 20 mg by mouth daily 1/2 tablet     • Brinzolamide-Brimonidine (Simbrinza) 1-0.2 % SUSP Apply 1 drop to eye 2 (two) times a day     • carvedilol (COREG) 25 mg tablet Take 12.5 mg by mouth 2 (two) times a day with meals     • Cinnamon 500 MG capsule Take 500 mg by mouth 2 (two) times a day     • Empagliflozin 25 MG TABS Take 25 mg by mouth daily     • glipiZIDE (GLUCOTROL) 5 mg tablet  Take 1 tablet (5 mg total) by mouth 2 (two) times a day before meals 180 tablet 1   • insulin glargine (LANTUS) 100 units/mL subcutaneous injection Inject 26 Units under the skin daily at bedtime     • latanoprost (XALATAN) 0.005 % ophthalmic solution Administer 1 drop to both eyes daily at bedtime     • losartan (COZAAR) 100 MG tablet Take 100 mg by mouth daily      • Multiple Vitamin (multivitamin) tablet Take 1 tablet by mouth daily     • omeprazole (PriLOSEC) 20 mg delayed release capsule Take 20 mg by mouth daily     • tamsulosin (FLOMAX) 0.4 mg Take 1 capsule (0.4 mg total) by mouth daily with dinner 30 capsule 0   • finasteride (PROSCAR) 5 mg tablet Take 1 tablet (5 mg total) by mouth daily 90 tablet 0     No current facility-administered medications for this visit.     _______________________________________________________________________  Review of Systems   Constitutional:  Negative for chills and fever.   HENT:  Negative for congestion, ear pain, hearing loss, nosebleeds, sinus pain, sore throat and trouble swallowing.    Eyes:  Negative for discharge, redness and visual disturbance.   Respiratory:  Negative for cough, chest tightness and shortness of breath.    Cardiovascular:  Negative for chest pain and palpitations.   Gastrointestinal:  Negative for abdominal pain, blood in stool, constipation, diarrhea, nausea and vomiting.   Genitourinary:  Negative for dysuria, flank pain and hematuria.   Musculoskeletal:  Positive for arthralgias. Negative for myalgias and neck pain.   Skin:  Positive for rash. Negative for color change.   Neurological:  Negative for dizziness, speech difficulty, weakness and headaches.   Hematological:  Does not bruise/bleed easily.   Psychiatric/Behavioral:  Negative for agitation and behavioral problems.          Objective:  Vitals:    08/05/24 1152   BP: 114/74   BP Location: Left arm   Patient Position: Sitting   Cuff Size: Standard   Pulse: 56   Resp: 16   Temp: 98.3 °F  "(36.8 °C)   TempSrc: Temporal   SpO2: 97%   Weight: 74.4 kg (164 lb)   Height: 5' 5\" (1.651 m)     Body mass index is 27.29 kg/m².     Physical Exam  Vitals and nursing note reviewed.   Constitutional:       General: He is not in acute distress.     Appearance: Normal appearance.   HENT:      Head: Normocephalic and atraumatic.      Right Ear: External ear normal.      Left Ear: External ear normal.      Nose: Nose normal.      Mouth/Throat:      Mouth: Mucous membranes are moist.   Eyes:      General: No scleral icterus.        Right eye: No discharge.         Left eye: No discharge.      Extraocular Movements: Extraocular movements intact.      Conjunctiva/sclera: Conjunctivae normal.   Cardiovascular:      Rate and Rhythm: Normal rate and regular rhythm.      Pulses: Normal pulses.      Heart sounds: Murmur heard.   Pulmonary:      Effort: Pulmonary effort is normal. No respiratory distress.      Breath sounds: Normal breath sounds. No wheezing, rhonchi or rales.   Abdominal:      General: Bowel sounds are normal.      Palpations: Abdomen is soft.      Tenderness: There is no abdominal tenderness.   Musculoskeletal:         General: Normal range of motion.      Cervical back: Normal range of motion and neck supple. No muscular tenderness.      Right lower leg: No edema.      Left lower leg: No edema.      Comments: He ambulates with cane.   Skin:     General: Skin is warm.      Findings: No rash.   Neurological:      General: No focal deficit present.      Mental Status: He is alert and oriented to person, place, and time.      Motor: No weakness.   Psychiatric:         Mood and Affect: Mood normal.         Behavior: Behavior normal.         "

## 2024-08-19 NOTE — TELEPHONE ENCOUNTER
Please let him know that I ordered urine test
Wife advised
Yesterday his urine was very dark - is clear today  Concerned because he is taking Eliquis  The visiting nurse suggested a urine test     They are going for their routine labs tomorrow would you be able to order a urine test?  They are going to a [facility] 
Add High Risk Medication Management Associated Diagnosis?: Yes
Detail Level: Zone

## 2024-09-13 ENCOUNTER — TELEPHONE (OUTPATIENT)
Age: 89
End: 2024-09-13

## 2024-10-07 ENCOUNTER — TELEPHONE (OUTPATIENT)
Dept: NEPHROLOGY | Facility: CLINIC | Age: 89
End: 2024-10-07

## 2024-10-30 ENCOUNTER — RA CDI HCC (OUTPATIENT)
Dept: OTHER | Facility: HOSPITAL | Age: 89
End: 2024-10-30

## 2024-10-30 NOTE — PROGRESS NOTES
HCC coding opportunities          Chart Reviewed number of suggestions sent to Provider: 5  E11.22  E11.3293  I73.9  E11.51  E11.39     Patients Insurance     Medicare Insurance: Medicare

## 2024-11-06 ENCOUNTER — OFFICE VISIT (OUTPATIENT)
Dept: INTERNAL MEDICINE CLINIC | Facility: CLINIC | Age: 89
End: 2024-11-06
Payer: MEDICARE

## 2024-11-06 ENCOUNTER — TELEPHONE (OUTPATIENT)
Dept: ADMINISTRATIVE | Facility: OTHER | Age: 89
End: 2024-11-06

## 2024-11-06 VITALS
OXYGEN SATURATION: 98 % | TEMPERATURE: 98.4 F | WEIGHT: 166 LBS | RESPIRATION RATE: 16 BRPM | BODY MASS INDEX: 27.66 KG/M2 | SYSTOLIC BLOOD PRESSURE: 120 MMHG | DIASTOLIC BLOOD PRESSURE: 70 MMHG | HEART RATE: 77 BPM | HEIGHT: 65 IN

## 2024-11-06 DIAGNOSIS — L98.9 SKIN LESION: ICD-10-CM

## 2024-11-06 DIAGNOSIS — E78.2 MIXED HYPERLIPIDEMIA: ICD-10-CM

## 2024-11-06 DIAGNOSIS — R80.9 MICROALBUMINURIA DUE TO TYPE 2 DIABETES MELLITUS  (HCC): ICD-10-CM

## 2024-11-06 DIAGNOSIS — Z95.2 S/P AVR (AORTIC VALVE REPLACEMENT): ICD-10-CM

## 2024-11-06 DIAGNOSIS — D69.6 THROMBOCYTOPENIA (HCC): ICD-10-CM

## 2024-11-06 DIAGNOSIS — E11.21 TYPE 2 DIABETES MELLITUS WITH DIABETIC NEPHROPATHY, WITH LONG-TERM CURRENT USE OF INSULIN (HCC): ICD-10-CM

## 2024-11-06 DIAGNOSIS — Z79.4 TYPE 2 DIABETES MELLITUS WITH DIABETIC NEPHROPATHY, WITH LONG-TERM CURRENT USE OF INSULIN (HCC): ICD-10-CM

## 2024-11-06 DIAGNOSIS — I25.10 CORONARY ARTERY DISEASE INVOLVING NATIVE CORONARY ARTERY OF NATIVE HEART WITHOUT ANGINA PECTORIS: ICD-10-CM

## 2024-11-06 DIAGNOSIS — I10 ESSENTIAL HYPERTENSION: Primary | Chronic | ICD-10-CM

## 2024-11-06 DIAGNOSIS — K27.9 PEPTIC ULCER DISEASE: ICD-10-CM

## 2024-11-06 DIAGNOSIS — Z23 NEED FOR PROPHYLACTIC VACCINATION AND INOCULATION AGAINST INFLUENZA: ICD-10-CM

## 2024-11-06 DIAGNOSIS — E11.29 MICROALBUMINURIA DUE TO TYPE 2 DIABETES MELLITUS  (HCC): ICD-10-CM

## 2024-11-06 DIAGNOSIS — N18.31 STAGE 3A CHRONIC KIDNEY DISEASE (HCC): ICD-10-CM

## 2024-11-06 DIAGNOSIS — D53.9 DEFICIENCY ANEMIA: ICD-10-CM

## 2024-11-06 PROCEDURE — 99214 OFFICE O/P EST MOD 30 MIN: CPT | Performed by: INTERNAL MEDICINE

## 2024-11-06 PROCEDURE — G0008 ADMIN INFLUENZA VIRUS VAC: HCPCS | Performed by: INTERNAL MEDICINE

## 2024-11-06 PROCEDURE — 90662 IIV NO PRSV INCREASED AG IM: CPT | Performed by: INTERNAL MEDICINE

## 2024-11-06 RX ORDER — GLIPIZIDE 5 MG/1
5 TABLET ORAL SEE ADMIN INSTRUCTIONS
Qty: 270 TABLET | Refills: 1 | Status: SHIPPED | OUTPATIENT
Start: 2024-11-06 | End: 2024-11-06 | Stop reason: SDUPTHER

## 2024-11-06 RX ORDER — GLIPIZIDE 5 MG/1
5 TABLET ORAL SEE ADMIN INSTRUCTIONS
COMMUNITY
End: 2024-11-06 | Stop reason: SDUPTHER

## 2024-11-06 RX ORDER — GLIPIZIDE 5 MG/1
5 TABLET ORAL SEE ADMIN INSTRUCTIONS
Qty: 270 TABLET | Refills: 1 | Status: SHIPPED | OUTPATIENT
Start: 2024-11-06

## 2024-11-06 NOTE — TELEPHONE ENCOUNTER
----- Message from Lucy MELOL sent at 11/6/2024 11:17 AM EST -----  Regarding: care gap request  11/06/24 11:17 AM    Hello, our patient attached above has had Diabetic Eye Exam completed/performed. Please assist in updating the patient chart by making an External outreach to Dr. Moreira facility located in [place], [place]. The date of service is within 2024.    Thank you,  Lucy Del Rio LPN  [facility] INTERNAL MED

## 2024-11-06 NOTE — PROGRESS NOTES
Assessment/Plan:    1. Essential hypertension  2. 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 Coronary artery disease involving native coronary artery of native heart without angina pectoris  4. Type 2 diabetes mellitus with diabetic nephropathy, with long-term current use of insulin (HCC)  -     glipiZIDE (GLUCOTROL) 5 mg tablet; Take 1 tablet (5 mg total) by mouth see administration instructions He takes a 5 mg every morning and 10 mg every evening.  5. Peptic ulcer disease  6. Deficiency anemia  7. Thrombocytopenia (HCC)  8. BMI 27.0-27.9,adult  Assessment & Plan:  Patient  was advised to decrease portion size.  Advised to decrease carb, sugar, cholesterol intake.  Advised to exercise 3-5 times per week.  Advised to lose weight.  9. Mixed hyperlipidemia  10. Need for prophylactic vaccination and inoculation against influenza  -     influenza vaccine, high-dose, PF 0.5 mL (Fluzone High Dose)  11. Stage 3a chronic kidney disease (HCC)  12. Skin lesion  13. S/P AVR bioprothetic      Discussion/summary/plan:    Blood pressure well-controlled advised to continue present medications and low-salt diet.  For microalbuminuria he is on losartan.  For CAD and status post AVR he has been seen and followed by cardiologist.  Presently he is asymptomatic. he was seen by cardiologist on October 25, 2024 Dr. Yang.  His last hemoglobin A1c was 7.5.  He takes glipizide 5 mg in the morning and 10 mg at night patient stated without glipizide his sugar goes high and he would like to continue glipizide.  Patient denies any hypoglycemia.  Morning sugar around 100 to 140 and evening sugar around 140s.  Sometimes evening sugar goes after meal 200.  Also takes insulin 26 units as prescribed.  He sees endocrinologist.  Does not want to stop his glipizide.  Advised to call if blood sugar less than 80.  Continue 1800-calorie ADA diet.  Follow-up with the endocrinologist  scheduled.  His last hemoglobin was 11.8 and platelet count is 121 he was advised to have follow-up CBC and workup for anemia but he did not go for blood test.  Patient stated he has a blood test done at [facility] last week and he is going to see  At [facility] next Tuesday and he will get a copy of his blood test which she will bring to the office next week.  Will review blood test and advise accordingly.  Cholesterol 129, triglyceride 85, HDL 40, LDL 72 advised to continue present dose of atorvastatin and low-cholesterol low carbs diet.  For CKD he has been seen and followed by nephrologist.  He has appointment to see nephrologist tomorrow.  He has a skin lesion left anterior chest wall raised slightly erythematous without any discharge or vesicles.  Rule out skin carcinoma.  Patient was advised to see dermatologist he will schedule with dermatologist.  He was given influenza vaccination today.  Advised to get updated COVID-19 vaccination for  which he will come next week.    Subjective: Patient presents for follow-up.      Patient ID: Nolberto Guajardo is a 89 y.o. male.    HPI  89-year-old white male patient presents for follow-up of his medical problems.    The following portions of the patient's history were reviewed and updated as appropriate:     Past Medical History:  He has a past medical history of Anemia (06/22/2021), Arthritis, Bilateral carotid artery stenosis (01/04/2021), Bilateral carotid bruits, Bladder calculi (12/21/2023), BMI 25.0-25.9,adult (04/06/2021), BMI 25.0-25.9,adult (08/31/2021), BMI 26.0-26.9,adult (08/31/2021), BMI 27.0-27.9,adult (12/13/2022), Bronchitis (07/09/2024), CKD (chronic kidney disease) (03/28/2024), Coronary artery disease involving native coronary artery of native heart without angina pectoris (04/06/2021), CTS (carpal tunnel syndrome), Deficiency anemia (08/05/2024), Deviated septum, Diabetes (HCC), Diabetes mellitus (HCC) (05/1995), 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[facility]),  Dizziness, Double vision, Ear problems, Encounter for support and coordination of transition of care (06/22/2021), Esophageal dysmotility (06/06/2023), Fatigue, General weakness, GERD (gastroesophageal reflux disease), Hearing impaired person, bilateral, HL (hearing loss), Hypertension, Left upper quadrant pain (06/06/2023), Medicare annual wellness visit, subsequent (08/31/2021), Medicare annual wellness visit, subsequent (12/21/2023), Microscopic hematuria, Myalgia (04/06/2021), New daily persistent headache (03/14/2023), Other dysphagia (12/13/2022), Other headache syndrome (03/14/2023), Other specified anemias (12/13/2022), Pain in joints (04/06/2021), Peptic ulcer disease (08/05/2024), PVD (peripheral vascular disease) ([facility]) (04/02/2021), Rectal bleeding (06/22/2021), Renal calculi, Right leg DVT ([facility]) (06/22/2021), Right lumbar radiculopathy, S/P AVR (aortic valve replacement) (04/02/2021), Skin lesion (11/06/2024), Skin rash (12/13/2022), SOB (shortness of breath) on exertion, Spinal stenosis in cervical region (04/02/2021), Tachycardia, Thrombocytopenia ([facility]) (07/12/2023), Type 2 diabetes mellitus with stage 3a chronic kidney disease, with long-term current use of insulin ([facility]) (09/08/2022), Type 2 diabetes mellitus without complication, with long-term current use of insulin ([facility]) (03/14/2023), Type 2 diabetes mellitus, without long-term current use of insulin ([facility]) (09/08/2022), Urinary frequency, and Vertigo (01/04/2021).,  _______________________________________________________________________  Past Surgical History:   has a past surgical history that includes Ureterectomy; Bypass Graft; Carpal tunnel release (Right); Hand surgery (Left); Cataract extraction, bilateral; Aortic valve replacement (09/2016); 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); Colonoscopy (09/16/2015); [place] retrograde pyelogram (7/10/2023); and pr cysto/uretero w/lithotripsy &indwell stent insrt (Right,  7/10/2023).,  _______________________________________________________________________  Family History:  Family history is unknown by patient.,  _______________________________________________________________________  Social History:   reports that he has quit smoking. His smoking use included cigarettes. He has a 0.5 pack-year smoking history. He has never used smokeless tobacco. He reports that he does not currently use alcohol. He reports that he does not use drugs.,  _______________________________________________________________________  Allergies:  is allergic to lisinopril and penicillin g..  _______________________________________________________________________  Current Outpatient Medications   Medication Sig Dispense Refill    acetaminophen (TYLENOL) 325 mg tablet Take 3 tablets (975 mg total) by mouth every 8 (eight) hours as needed for mild pain  0    aspirin (ECOTRIN LOW STRENGTH) 81 mg EC tablet Take 81 mg by mouth daily      atorvastatin (LIPITOR) 40 mg tablet Take 20 mg by mouth daily 1/2 tablet      Brinzolamide-Brimonidine (Simbrinza) 1-0.2 % SUSP Apply 1 drop to eye 2 (two) times a day      carvedilol (COREG) 25 mg tablet Take 12.5 mg by mouth 2 (two) times a day with meals      Cinnamon 500 MG capsule Take 500 mg by mouth 2 (two) times a day      Empagliflozin 25 MG TABS Take 25 mg by mouth daily      finasteride (PROSCAR) 5 mg tablet Take 1 tablet (5 mg total) by mouth daily 90 tablet 0    glipiZIDE (GLUCOTROL) 5 mg tablet Take 1 tablet (5 mg total) by mouth see administration instructions He takes a 5 mg every morning and 10 mg every evening. 270 tablet 1    insulin glargine (LANTUS) 100 units/mL subcutaneous injection Inject 26 Units under the skin daily at bedtime      latanoprost (XALATAN) 0.005 % ophthalmic solution Administer 1 drop to both eyes daily at bedtime      losartan (COZAAR) 100 MG tablet Take 100 mg by mouth daily       Multiple Vitamin (multivitamin) tablet Take 1 tablet by  "mouth daily      omeprazole (PriLOSEC) 20 mg delayed release capsule Take 20 mg by mouth daily      tamsulosin (FLOMAX) 0.4 mg Take 1 capsule (0.4 mg total) by mouth daily with dinner 30 capsule 0     No current facility-administered medications for this visit.     _______________________________________________________________________  Review of Systems   Constitutional:  Negative for chills and fever.   HENT:  Negative for congestion, ear pain, hearing loss, nosebleeds, sinus pain, sore throat and trouble swallowing.    Eyes:  Negative for discharge, redness and visual disturbance.   Respiratory:  Negative for cough, chest tightness and shortness of breath.    Cardiovascular:  Negative for chest pain and palpitations.   Gastrointestinal:  Negative for abdominal pain, blood in stool, constipation, diarrhea, nausea and vomiting.   Genitourinary:  Negative for dysuria, flank pain and hematuria.   Musculoskeletal:  Negative for arthralgias, myalgias and neck pain.   Skin:  Positive for color change (He has a slightly red raised skin lesion on the left anterior chest wall). Negative for rash.   Neurological:  Negative for dizziness, speech difficulty, weakness and headaches.   Hematological:  Does not bruise/bleed easily.   Psychiatric/Behavioral:  Negative for agitation and behavioral problems.          Objective:  Vitals:    11/06/24 1111   BP: 120/70   BP Location: Left arm   Patient Position: Sitting   Cuff Size: Standard   Pulse: 77   Resp: 16   Temp: 98.4 °F (36.9 °C)   TempSrc: Temporal   SpO2: 98%   Weight: 75.3 kg (166 lb)   Height: 5' 5\" (1.651 m)     Body mass index is 27.62 kg/m².     Physical Exam  Vitals and nursing note reviewed.   Constitutional:       General: He is not in acute distress.     Appearance: Normal appearance.   HENT:      Head: Normocephalic and atraumatic.      Right Ear: External ear normal.      Left Ear: External ear normal.      Nose: Nose normal.      Mouth/Throat:      Mouth: " Mucous membranes are moist.   Eyes:      General: No scleral icterus.        Right eye: No discharge.         Left eye: No discharge.      Extraocular Movements: Extraocular movements intact.      Conjunctiva/sclera: Conjunctivae normal.   Cardiovascular:      Rate and Rhythm: Normal rate and regular rhythm.      Pulses: Normal pulses.      Heart sounds: Murmur heard.   Pulmonary:      Effort: Pulmonary effort is normal. No respiratory distress.      Breath sounds: Normal breath sounds. No wheezing, rhonchi or rales.   Abdominal:      General: Bowel sounds are normal. There is no distension.      Palpations: Abdomen is soft.      Tenderness: There is no abdominal tenderness.   Musculoskeletal:         General: Normal range of motion.      Cervical back: Normal range of motion and neck supple. No muscular tenderness.      Right lower leg: No edema.      Left lower leg: No edema.      Comments: He ambulates with cane.   Skin:     General: Skin is warm.      Findings: Lesion (He has a small slightly red raised skin lesion left anterior chest wall.  No discharge no vesicles.) present.   Neurological:      General: No focal deficit present.      Mental Status: He is alert and oriented to person, place, and time.      Motor: No weakness.      Coordination: Coordination normal.   Psychiatric:         Mood and Affect: Mood normal.         Behavior: Behavior normal.

## 2024-11-06 NOTE — LETTER
Diabetic Eye Exam Form    Date Requested: 24  Patient: Nolberto Guajardo  Patient : 1933   Referring Provider: Pablo Smith MD      DIABETIC Eye Exam Date _______________________________      Type of Exam MUST be documented for Diabetic Eye Exams. Please CHECK ONE.     Retinal Exam       Dilated Retinal Exam       OCT       Optomap-Iris Exam      Fundus Photography       Left Eye - Please check Retinopathy or No Retinopathy        Exam did show retinopathy    Exam did not show retinopathy       Right Eye - Please check Retinopathy or No Retinopathy       Exam did show retinopathy    Exam did not show retinopathy       Comments __________________________________________________________    Practice Providing Exam ______________________________________________    Exam Performed By (print name) _______________________________________      Provider Signature ___________________________________________________      These reports are needed for  compliance.  Please fax this completed form and a copy of the Diabetic Eye Exam report to our office located at [address] [place] [address] as soon as possible via Fax [phone] attention [place]: Phone [phone]  We thank you for your assistance in treating our mutual patient.   Dr Moreira - [phone] F [phone]

## 2024-11-06 NOTE — LETTER
Diabetic Eye Exam Form    Date Requested: 24  Patient: Nolberto Guajardo  Patient : 1933   Referring Provider: Pablo Smith MD      DIABETIC Eye Exam Date _______________________________      Type of Exam MUST be documented for Diabetic Eye Exams. Please CHECK ONE.     Retinal Exam       Dilated Retinal Exam       OCT       Optomap-Iris Exam      Fundus Photography       Left Eye - Please check Retinopathy or No Retinopathy        Exam did show retinopathy    Exam did not show retinopathy       Right Eye - Please check Retinopathy or No Retinopathy       Exam did show retinopathy    Exam did not show retinopathy       Comments __________________________________________________________    Practice Providing Exam ______________________________________________    Exam Performed By (print name) _______________________________________      Provider Signature ___________________________________________________      These reports are needed for  compliance.  Please fax this completed form and a copy of the Diabetic Eye Exam report to our office located at [address] [place] [address] as soon as possible via Fax [phone] attention [place]: Phone [phone]  We thank you for your assistance in treating our mutual patient.   Dr Moreira - [phone] F [phone]

## 2024-11-06 NOTE — PROGRESS NOTES
Diabetic Foot Exam    Patient's shoes and socks removed.    Right Foot/Ankle   Right Foot Inspection  Skin Exam: skin normal, skin intact and dry skin. No warmth, no callus, no erythema, no maceration, no abnormal color, no pre-ulcer, no ulcer and no callus.     Toe Exam: ROM and strength within normal limits.     Sensory   Monofilament testing: intact    Vascular  Capillary refills: < 3 seconds  The right DP pulse is 2+.     Left Foot/Ankle  Left Foot Inspection  Skin Exam: skin normal, skin intact and dry skin. No warmth, no erythema, no maceration, normal color, no pre-ulcer, no ulcer and no callus.     Toe Exam: ROM and strength within normal limits.     Sensory   Monofilament testing: intact    Vascular  Capillary refills: < 3 seconds  The left DP pulse is 2+.     Assign Risk Category  No deformity present  No loss of protective sensation  No weak pulses  Risk: 0

## 2024-11-06 NOTE — PATIENT INSTRUCTIONS
Patient was advised to continue present medications. discussed with the patient medications and laboratory data in detail.  Follow-up with me in 3 months or as advised.  If any blood test was ordered please do 1 week prior to next appointment unless advise to get earlier.  If you have any questions please call the office [phone]

## 2024-11-07 ENCOUNTER — CONSULT (OUTPATIENT)
Dept: NEPHROLOGY | Facility: CLINIC | Age: 89
End: 2024-11-07
Payer: MEDICARE

## 2024-11-07 VITALS
WEIGHT: 167 LBS | SYSTOLIC BLOOD PRESSURE: 122 MMHG | BODY MASS INDEX: 27.82 KG/M2 | DIASTOLIC BLOOD PRESSURE: 70 MMHG | HEIGHT: 65 IN | HEART RATE: 70 BPM

## 2024-11-07 DIAGNOSIS — I10 ESSENTIAL HYPERTENSION: Chronic | ICD-10-CM

## 2024-11-07 DIAGNOSIS — E11.29 MICROALBUMINURIA DUE TO TYPE 2 DIABETES MELLITUS  (HCC): ICD-10-CM

## 2024-11-07 DIAGNOSIS — N18.30 TYPE 2 DIABETES MELLITUS WITH STAGE 3 CHRONIC KIDNEY DISEASE AND HYPERTENSION (HCC): Primary | ICD-10-CM

## 2024-11-07 DIAGNOSIS — E11.22 TYPE 2 DIABETES MELLITUS WITH STAGE 3 CHRONIC KIDNEY DISEASE AND HYPERTENSION (HCC): Primary | ICD-10-CM

## 2024-11-07 DIAGNOSIS — N28.1 RENAL CYST, ACQUIRED, LEFT: ICD-10-CM

## 2024-11-07 DIAGNOSIS — R80.9 MICROALBUMINURIA DUE TO TYPE 2 DIABETES MELLITUS  (HCC): ICD-10-CM

## 2024-11-07 DIAGNOSIS — N18.32 STAGE 3B CHRONIC KIDNEY DISEASE (HCC): ICD-10-CM

## 2024-11-07 DIAGNOSIS — I12.9 TYPE 2 DIABETES MELLITUS WITH STAGE 3 CHRONIC KIDNEY DISEASE AND HYPERTENSION (HCC): Primary | ICD-10-CM

## 2024-11-07 PROBLEM — N18.9 ACUTE KIDNEY INJURY SUPERIMPOSED ON CHRONIC KIDNEY DISEASE  (HCC): Status: RESOLVED | Noted: 2023-07-09 | Resolved: 2024-11-07

## 2024-11-07 PROBLEM — N18.9 CHRONIC RENAL FAILURE SYNDROME: Status: RESOLVED | Noted: 2021-01-04 | Resolved: 2024-11-07

## 2024-11-07 PROBLEM — N17.9 ACUTE KIDNEY INJURY SUPERIMPOSED ON CHRONIC KIDNEY DISEASE  (HCC): Status: RESOLVED | Noted: 2023-07-09 | Resolved: 2024-11-07

## 2024-11-07 PROBLEM — N18.9 CKD (CHRONIC KIDNEY DISEASE): Status: RESOLVED | Noted: 2024-03-28 | Resolved: 2024-11-07

## 2024-11-07 PROCEDURE — 99204 OFFICE O/P NEW MOD 45 MIN: CPT | Performed by: INTERNAL MEDICINE

## 2024-11-07 RX ORDER — AMLODIPINE BESYLATE 10 MG/1
10 TABLET ORAL DAILY
COMMUNITY

## 2024-11-07 NOTE — ASSESSMENT & PLAN NOTE
Lab Results   Component Value Date    HGBA1C 8.7 (H) 06/22/2024   -- Baseline creatinine 1.4-1.8 mg/dL  -- Stable for the past 3 years  -- Presumed to be secondary to diabetic kidney disease, hypertensive nephrosclerosis, age-related nephron loss and possible vascular disease  -- Renal function currently stable and at baseline based on blood work in July creatinine at 1.4 mg/dL.  -- Screen for microalbuminuria  -- CT scan showed a left exophytic cyst increasing in size compared to 2023 CT scan.  -- Will check a CT scan without contrast given CKD.  If there is concerns on this is worsening I would recommend a urology consultation.  -- Avoid NSAIDs  -- Continue good blood pressure control and needs better diabetic control.  -- Continue SGLT2 inhibitor and angiotensin receptor blocker    Hypertension  -- Blood pressure under excellent control  -- Continue current management    Orders:    Cystatin C With eGFR; Future    Albumin / creatinine urine ratio; Future    Comprehensive metabolic panel; Future    Hemoglobin A1C; Future    PTH, intact; Future    CBC and Platelet; Future    Phosphorus; Future    CT renal stone study abdomen pelvis wo contrast; Future

## 2024-11-07 NOTE — PROGRESS NOTES
Ambulatory Visit  Name: Nolberto Guajardo      : 1933      MRN: [id number]  Encounter Provider: Royal Carr MD  Encounter Date: 2024   Encounter department: [facility] [place]    Assessment & Plan  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  (HCC)    Lab Results   Component Value Date    HGBA1C 8.7 (H) 2024   -- Diabetes needs better control hemoglobin A1c 8.7  -- Yearly ophthalmologic and podiatry checks  -- Management as per his primary care physician  -- Continue to screen for microalbuminuria  -- Continue SGLT2 inhibitor and angiotensin receptor blocker    Orders:    Ambulatory Referral to Nephrology    Cystatin C With eGFR; Future    Albumin / creatinine urine ratio; Future    Comprehensive metabolic panel; Future    Hemoglobin A1C; Future    PTH, intact; Future    CBC and Platelet; Future    Phosphorus; Future    Type 2 diabetes mellitus with stage 3 chronic kidney disease and hypertension (HCC)    Lab Results   Component Value Date    HGBA1C 8.7 (H) 2024   -- Baseline creatinine 1.4-1.8 mg/dL  -- Stable for the past 3 years  -- Presumed to be secondary to diabetic kidney disease, hypertensive nephrosclerosis, age-related nephron loss and possible vascular disease  -- Renal function currently stable and at baseline based on blood work in July creatinine at 1.4 mg/dL.  -- Screen for microalbuminuria  -- CT scan showed a left exophytic cyst increasing in size compared to  CT scan.  -- Will check a CT scan without contrast given CKD.  If there is concerns on this is worsening I would recommend a urology consultation.  -- Avoid NSAIDs  -- Continue good blood pressure control and needs better diabetic control.  -- Continue SGLT2 inhibitor and angiotensin receptor blocker    Hypertension  -- Blood pressure under excellent control  -- Continue current management    Orders:    Cystatin C With eGFR; Future    Albumin / creatinine urine ratio; Future    Comprehensive  metabolic panel; Future    Hemoglobin A1C; Future    PTH, intact; Future    CBC and Platelet; Future    Phosphorus; Future    CT renal stone study abdomen pelvis wo contrast; Future    Renal cyst, acquired, left  -- Repeat CT scan due to increased size of the cyst from January  --If the cyst is worsening in size consider urology consultation    Orders:    Cystatin C With eGFR; Future    Albumin / creatinine urine ratio; Future    Comprehensive metabolic panel; Future    Hemoglobin A1C; Future    PTH, intact; Future    CBC and Platelet; Future    Phosphorus; Future      History of Present Illness     Nolberto Guajardo is a 89 y.o. male who presents evaluation of chronic kidney disease stage III and a left renal cyst.  CT scan from January 2024 reviewed.  Left exophytic cyst that increased in size compared to prior scan in 2023.  It was recommended that he have repeat scanning within 3 months.  He also has chronic kidney disease stage III has been stable for the past 3 years.  Baseline 1.4-1.8.  Has underlying diabetes and hypertension.  Non-smoker.    History obtained from : patient  Review of Systems   Constitutional:  Negative for activity change and fever.   Respiratory:  Negative for cough, chest tightness, shortness of breath and wheezing.    Cardiovascular:  Negative for chest pain and leg swelling.   Gastrointestinal:  Negative for abdominal pain, diarrhea, nausea and vomiting.   Endocrine: Negative for polyuria.   Genitourinary:  Negative for difficulty urinating, dysuria, flank pain, frequency and urgency.   Skin:  Negative for rash.   Neurological:  Negative for dizziness, syncope, light-headedness and headaches.   All other systems reviewed and are negative.    Pertinent Medical History         Medical History Reviewed by provider this encounter:  Allergies  Meds       Past Medical History   Past Medical History:   Diagnosis Date    Anemia 06/22/2021    Arthritis     Bilateral carotid artery stenosis  01/04/2021    <50% on doppler 7/27/2018. 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/2024    Deviated septum     Diabetes ([facility])     Diabetes mellitus ([facility]) 05/1995    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[facility])     Dizziness     Double vision     Ear problems     Encounter for support and coordination of transition of care 06/22/2021    Esophageal dysmotility 06/06/2023    Fatigue     General weakness     GERD (gastroesophageal reflux disease)     Hearing impaired person, bilateral     HL (hearing loss)     Hypertension     Left upper quadrant pain 06/06/2023    Medicare annual wellness visit, subsequent 08/31/2021    Medicare annual wellness visit, subsequent 12/21/2023    Microscopic hematuria     Myalgia 04/06/2021    New daily persistent headache 03/14/2023    Other dysphagia 12/13/2022    Other headache syndrome 03/14/2023    Other specified anemias 12/13/2022    Pain in joints 04/06/2021    Peptic ulcer disease 08/05/2024    PVD (peripheral vascular disease) ([facility]) 04/02/2021    Rectal bleeding 06/22/2021    Renal calculi     Right leg DVT ([facility]) 06/22/2021    Right lumbar radiculopathy     S/P AVR (aortic valve replacement) 04/02/2021    Skin lesion 11/06/2024    Skin rash 12/13/2022    SOB (shortness of breath) on exertion     Spinal stenosis in cervical region 04/02/2021    Tachycardia     Thrombocytopenia ([facility]) 07/12/2023    Type 2 diabetes mellitus with stage 3a chronic kidney disease, with long-term current use of insulin ([facility]) 09/08/2022    Type 2 diabetes mellitus without complication, with long-term  current use of insulin ([facility]) 03/14/2023    Type 2 diabetes mellitus, without long-term current use of insulin ([facility]) 09/08/2022    Urinary frequency     Vertigo 01/04/2021     Past Surgical History:   Procedure Laterality Date    AORTIC VALVE REPLACEMENT  09/2016    BYPASS GRAFT      CARPAL TUNNEL RELEASE Right     by Dr. Jurado    CATARACT EXTRACTION, BILATERAL      COLONOSCOPY  09/16/2015    CORONARY ARTERY BYPASS GRAFT  09/2016    x1     FL RETROGRADE PYELOGRAM  7/10/2023    HAND SURGERY Left     AR CYSTO/URETERO W/LITHOTRIPSY &INDWELL STENT INSRT Right 7/10/2023    Procedure: CYSTOSCOPY URETEROSCOPY WITH LITHOTRIPSY HOLMIUM LASER, RETROGRADE PYELOGRAM AND INSERTION STENT URETERAL, STONE BASKET EXTRACTION;  Surgeon: Serge Marks MD;  Location: [facility];  Service: Urology    URETERECTOMY       Family History   Family history unknown: Yes     Current Outpatient Medications on File Prior to Visit   Medication Sig Dispense Refill    acetaminophen (TYLENOL) 325 mg tablet Take 3 tablets (975 mg total) by mouth every 8 (eight) hours as needed for mild pain  0    amLODIPine (NORVASC) 10 mg tablet Take 10 mg by mouth daily      aspirin (ECOTRIN LOW STRENGTH) 81 mg EC tablet Take 81 mg by mouth daily      atorvastatin (LIPITOR) 40 mg tablet Take 20 mg by mouth daily 1/2 tablet      Brinzolamide-Brimonidine (Simbrinza) 1-0.2 % SUSP Apply 1 drop to eye 2 (two) times a day      carvedilol (COREG) 25 mg tablet Take 12.5 mg by mouth 2 (two) times a day with meals      Cinnamon 500 MG capsule Take 500 mg by mouth 2 (two) times a day      Empagliflozin 25 MG TABS Take 25 mg by mouth daily      finasteride (PROSCAR) 5 mg tablet Take 1 tablet (5 mg total) by mouth daily 90 tablet 0    glipiZIDE (GLUCOTROL) 5 mg tablet Take 1 tablet (5 mg total) by mouth see administration instructions He takes a 5 mg every morning and 10 mg every evening. 270 tablet 1    insulin glargine (LANTUS) 100 units/mL subcutaneous injection Inject 26  Units under the skin daily at bedtime      latanoprost (XALATAN) 0.005 % ophthalmic solution Administer 1 drop to both eyes daily at bedtime      losartan (COZAAR) 100 MG tablet Take 100 mg by mouth daily       Multiple Vitamin (multivitamin) tablet Take 1 tablet by mouth daily      omeprazole (PriLOSEC) 20 mg delayed release capsule Take 20 mg by mouth daily      tamsulosin (FLOMAX) 0.4 mg Take 1 capsule (0.4 mg total) by mouth daily with dinner 30 capsule 0     No current facility-administered medications on file prior to visit.     Allergies   Allergen Reactions    Lisinopril Cough    Penicillin G Hives     And other penicillins      Current Outpatient Medications on File Prior to Visit   Medication Sig Dispense Refill    acetaminophen (TYLENOL) 325 mg tablet Take 3 tablets (975 mg total) by mouth every 8 (eight) hours as needed for mild pain  0    amLODIPine (NORVASC) 10 mg tablet Take 10 mg by mouth daily      aspirin (ECOTRIN LOW STRENGTH) 81 mg EC tablet Take 81 mg by mouth daily      atorvastatin (LIPITOR) 40 mg tablet Take 20 mg by mouth daily 1/2 tablet      Brinzolamide-Brimonidine (Simbrinza) 1-0.2 % SUSP Apply 1 drop to eye 2 (two) times a day      carvedilol (COREG) 25 mg tablet Take 12.5 mg by mouth 2 (two) times a day with meals      Cinnamon 500 MG capsule Take 500 mg by mouth 2 (two) times a day      Empagliflozin 25 MG TABS Take 25 mg by mouth daily      finasteride (PROSCAR) 5 mg tablet Take 1 tablet (5 mg total) by mouth daily 90 tablet 0    glipiZIDE (GLUCOTROL) 5 mg tablet Take 1 tablet (5 mg total) by mouth see administration instructions He takes a 5 mg every morning and 10 mg every evening. 270 tablet 1    insulin glargine (LANTUS) 100 units/mL subcutaneous injection Inject 26 Units under the skin daily at bedtime      latanoprost (XALATAN) 0.005 % ophthalmic solution Administer 1 drop to both eyes daily at bedtime      losartan (COZAAR) 100 MG tablet Take 100 mg by mouth daily        "Multiple Vitamin (multivitamin) tablet Take 1 tablet by mouth daily      omeprazole (PriLOSEC) 20 mg delayed release capsule Take 20 mg by mouth daily      tamsulosin (FLOMAX) 0.4 mg Take 1 capsule (0.4 mg total) by mouth daily with dinner 30 capsule 0     No current facility-administered medications on file prior to visit.      Social History     Tobacco Use    Smoking status: Former     Current packs/day: 0.25     Average packs/day: 0.3 packs/day for 2.0 years (0.5 ttl pk-yrs)     Types: Cigarettes    Smokeless tobacco: Never    Tobacco comments:     Never smoker - As per [employer]   Vaping Use    Vaping status: Never Used   Substance and Sexual Activity    Alcohol use: Not Currently    Drug use: Never    Sexual activity: Not Currently     Partners: Female         Objective     Ht 5' 5\" (1.651 m)   Wt 75.8 kg (167 lb)   BMI 27.79 kg/m²     Physical Exam  Vitals and nursing note reviewed.   Constitutional:       General: He is not in acute distress.     Appearance: He is well-developed. He is not diaphoretic.   HENT:      Head: Normocephalic and atraumatic.   Eyes:      General: No scleral icterus.     Pupils: Pupils are equal, round, and reactive to light.   Cardiovascular:      Rate and Rhythm: Normal rate and regular rhythm.      Heart sounds: Murmur heard.      No friction rub. No gallop.   Pulmonary:      Effort: Pulmonary effort is normal. No respiratory distress.      Breath sounds: Normal breath sounds. No wheezing or rales.   Chest:      Chest wall: No tenderness.   Abdominal:      General: Bowel sounds are normal. There is no distension.      Palpations: Abdomen is soft.      Tenderness: There is no abdominal tenderness. There is no rebound.   Musculoskeletal:         General: Normal range of motion.      Cervical back: Normal range of motion and neck supple.   Skin:     Findings: No rash.   Neurological:      Mental Status: He is alert and oriented to person, place, and time.       Administrative " Statements   I have spent a total time of 35 minutes in caring for this patient on the day of the visit/encounter including Risks and benefits of tx options, Importance of tx compliance, Counseling / Coordination of care, Documenting in the medical record, and Obtaining or reviewing history  .

## 2024-11-07 NOTE — ASSESSMENT & PLAN NOTE
-- Repeat CT scan due to increased size of the cyst from January  --If the cyst is worsening in size consider urology consultation    Orders:    Cystatin C With eGFR; Future    Albumin / creatinine urine ratio; Future    Comprehensive metabolic panel; Future    Hemoglobin A1C; Future    PTH, intact; Future    CBC and Platelet; Future    Phosphorus; Future

## 2024-11-07 NOTE — PATIENT INSTRUCTIONS
1.)  Low 2 g sodium diet    2.)  Monitor weights at home    3.)  Avoid NSAIDs (ibuprofen, Motrin, Advil, Aleve, naproxen)    4.)  Monitor blood pressure at home, call if blood pressure greater than 150/90 persistently    5.) I will plan to discuss all results including blood work, and/or imaging at our next visit, unless there is an urgent indication, in which case I will call you earlier. If you have any questions or concerns about your results, please feel free to call our office.    6.) CT scan for the cyst

## 2024-11-07 NOTE — ASSESSMENT & PLAN NOTE
Lab Results   Component Value Date    HGBA1C 8.7 (H) 06/22/2024   -- Diabetes needs better control hemoglobin A1c 8.7  -- Yearly ophthalmologic and podiatry checks  -- Management as per his primary care physician  -- Continue to screen for microalbuminuria  -- Continue SGLT2 inhibitor and angiotensin receptor blocker    Orders:    Ambulatory Referral to Nephrology    Cystatin C With eGFR; Future    Albumin / creatinine urine ratio; Future    Comprehensive metabolic panel; Future    Hemoglobin A1C; Future    PTH, intact; Future    CBC and Platelet; Future    Phosphorus; Future

## 2024-11-12 ENCOUNTER — CLINICAL SUPPORT (OUTPATIENT)
Dept: INTERNAL MEDICINE CLINIC | Facility: CLINIC | Age: 89
End: 2024-11-12
Payer: MEDICARE

## 2024-11-12 DIAGNOSIS — Z23 NEED FOR COVID-19 VACCINE: Primary | ICD-10-CM

## 2024-11-12 PROCEDURE — 91320 SARSCV2 VAC 30MCG TRS-SUC IM: CPT | Performed by: INTERNAL MEDICINE

## 2024-11-12 PROCEDURE — 90480 ADMN SARSCOV2 VAC 1/ONLY CMP: CPT | Performed by: INTERNAL MEDICINE

## 2024-11-15 ENCOUNTER — HOSPITAL ENCOUNTER (OUTPATIENT)
Dept: CT IMAGING | Facility: HOSPITAL | Age: 89
Discharge: HOME | End: 2024-11-15
Attending: INTERNAL MEDICINE
Payer: MEDICARE

## 2024-11-15 DIAGNOSIS — N18.30 TYPE 2 DIABETES MELLITUS WITH STAGE 3 CHRONIC KIDNEY DISEASE AND HYPERTENSION (HCC): ICD-10-CM

## 2024-11-15 DIAGNOSIS — N18.32 STAGE 3B CHRONIC KIDNEY DISEASE (HCC): ICD-10-CM

## 2024-11-15 DIAGNOSIS — E11.22 TYPE 2 DIABETES MELLITUS WITH STAGE 3 CHRONIC KIDNEY DISEASE AND HYPERTENSION (HCC): ICD-10-CM

## 2024-11-15 DIAGNOSIS — I12.9 TYPE 2 DIABETES MELLITUS WITH STAGE 3 CHRONIC KIDNEY DISEASE AND HYPERTENSION (HCC): ICD-10-CM

## 2024-11-15 PROCEDURE — 74176 CT ABD & PELVIS W/O CONTRAST: CPT

## 2024-11-19 NOTE — TELEPHONE ENCOUNTER
Upon review of the In Basket request and the patient's chart, initial outreach has been made via fax to facility. Please see Contacts section for details.     Thank you  Jaclyn Lara MA

## 2024-12-03 NOTE — TELEPHONE ENCOUNTER
As a follow-up, a second attempt has been made for outreach via fax to facility. Please see Contacts section for details.    Thank you  Jaclyn Lara MA

## 2024-12-11 NOTE — TELEPHONE ENCOUNTER
As a final attempt, a third outreach has been made via telephone call to facility. Please see Contacts section for details. This encounter will be closed and completed by end of day. Should we receive the requested information because of previous outreach attempts, the requested patient's chart will be updated appropriately.     Thank you  Jaclyn Lara MA

## 2024-12-27 NOTE — ASSESSMENT & PLAN NOTE
Blood pressure well controlled  Advised to continue present medication  Advised for low-salt diet  No

## 2025-01-21 ENCOUNTER — OFFICE VISIT (OUTPATIENT)
Dept: INTERNAL MEDICINE CLINIC | Facility: CLINIC | Age: OVER 89
End: 2025-01-21
Payer: MEDICARE

## 2025-01-21 VITALS
WEIGHT: 163 LBS | SYSTOLIC BLOOD PRESSURE: 130 MMHG | BODY MASS INDEX: 27.16 KG/M2 | OXYGEN SATURATION: 99 % | HEIGHT: 65 IN | RESPIRATION RATE: 16 BRPM | HEART RATE: 64 BPM | DIASTOLIC BLOOD PRESSURE: 76 MMHG | TEMPERATURE: 98.4 F

## 2025-01-21 DIAGNOSIS — Z95.2 S/P AVR (AORTIC VALVE REPLACEMENT): ICD-10-CM

## 2025-01-21 DIAGNOSIS — E11.29 MICROALBUMINURIA DUE TO TYPE 2 DIABETES MELLITUS  (HCC): ICD-10-CM

## 2025-01-21 DIAGNOSIS — K21.9 GASTROESOPHAGEAL REFLUX DISEASE, UNSPECIFIED WHETHER ESOPHAGITIS PRESENT: ICD-10-CM

## 2025-01-21 DIAGNOSIS — D53.9 DEFICIENCY ANEMIA: ICD-10-CM

## 2025-01-21 DIAGNOSIS — N18.32 STAGE 3B CHRONIC KIDNEY DISEASE (HCC): ICD-10-CM

## 2025-01-21 DIAGNOSIS — E11.21 TYPE 2 DIABETES MELLITUS WITH DIABETIC NEPHROPATHY, WITH LONG-TERM CURRENT USE OF INSULIN (HCC): ICD-10-CM

## 2025-01-21 DIAGNOSIS — I25.10 CORONARY ARTERY DISEASE INVOLVING NATIVE CORONARY ARTERY OF NATIVE HEART WITHOUT ANGINA PECTORIS: ICD-10-CM

## 2025-01-21 DIAGNOSIS — Z00.00 MEDICARE ANNUAL WELLNESS VISIT, SUBSEQUENT: Primary | ICD-10-CM

## 2025-01-21 DIAGNOSIS — E78.2 MIXED HYPERLIPIDEMIA: ICD-10-CM

## 2025-01-21 DIAGNOSIS — I10 ESSENTIAL HYPERTENSION: Chronic | ICD-10-CM

## 2025-01-21 DIAGNOSIS — R80.9 MICROALBUMINURIA DUE TO TYPE 2 DIABETES MELLITUS  (HCC): ICD-10-CM

## 2025-01-21 DIAGNOSIS — I65.23 BILATERAL CAROTID ARTERY STENOSIS: Chronic | ICD-10-CM

## 2025-01-21 DIAGNOSIS — Z79.4 TYPE 2 DIABETES MELLITUS WITH DIABETIC NEPHROPATHY, WITH LONG-TERM CURRENT USE OF INSULIN (HCC): ICD-10-CM

## 2025-01-21 PROCEDURE — G2211 COMPLEX E/M VISIT ADD ON: HCPCS | Performed by: INTERNAL MEDICINE

## 2025-01-21 PROCEDURE — 99214 OFFICE O/P EST MOD 30 MIN: CPT | Performed by: INTERNAL MEDICINE

## 2025-01-21 PROCEDURE — G0439 PPPS, SUBSEQ VISIT: HCPCS | Performed by: INTERNAL MEDICINE

## 2025-01-21 RX ORDER — GLIPIZIDE 5 MG/1
5 TABLET ORAL SEE ADMIN INSTRUCTIONS
Qty: 270 TABLET | Refills: 1 | Status: SHIPPED | OUTPATIENT
Start: 2025-01-21

## 2025-01-21 NOTE — PROGRESS NOTES
Name: Nolberto Guajardo      : 1933      MRN: [id number]  Encounter Provider: Pablo Smith MD  Encounter Date: 2025   Encounter department: [facility] INTERNAL MEDICINE    Assessment & Plan  Medicare annual wellness visit, subsequent         Coronary artery disease involving native coronary artery of native heart without angina pectoris  Stable asymptomatic at present.  Has been seen and followed by cardiologist.       Essential hypertension  Well-controlled advised to continue present medications and low-salt diet.       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  Has been seen and followed by nephrologist renal function stable.  Lab Results   Component Value Date    HGBA1C 8.7 (H) 2024            Gastroesophageal reflux disease, unspecified whether esophagitis present  Symptoms are well-controlled on omeprazole 20 mg daily would like to continue same dose H is effective and has no side effect.       S/P AVR bioprothetic   Has been seen and followed by cardiology recently he was seen by cardiology in  2024 Dr. Zhou.       Mixed hyperlipidemia  Cholesterol 135, triglyceride 92, HDL 45, LDL 72 advised to continue present dose of atorvastatin and low-cholesterol low carbs diet.       BMI 27.0-27.9,adult  Patient  was advised to decrease portion size.  Advised to decrease carb, sugar, cholesterol intake.  Advised to exercise 3-5 times per week.  Advised to lose weight.       Type 2 diabetes mellitus with diabetic nephropathy, with long-term current use of insulin ([facility])  His fasting blood sugar around 130s, noontime sugar 113 and evening sugar around 130s.  Patient denies any hypoglycemia.  He would like to continue glipizide as it is effective and has no hypoglycemia.  He also takes Jardiance as well as insulin.  He check blood sugar 3 times a day.  Last hemoglobin A1c decreased from 8.7-8.0.  He had seen endocrinology in the  past.  Advised to continue present medications.  Advised to continue 1800-calorie ADA diet.  Lab Results   Component Value Date    HGBA1C 8.7 (H) 06/22/2024       Orders:  •  glipiZIDE (GLUCOTROL) 5 mg tablet; Take 1 tablet (5 mg total) by mouth see administration instructions He takes a 5 mg every morning and 10 mg every evening.    Stage 3b chronic kidney disease (HCC)  Lab Results   Component Value Date    EGFR 47 (L) 06/24/2024    EGFR 41 (L) 06/23/2024    EGFR 36 (L) 06/22/2024    CREATININE 1.42 (H) 06/24/2024    CREATININE 1.57 (H) 06/23/2024    CREATININE 1.78 (H) 06/22/2024   Renal function stable.  Has been seen and followed by nephrologist.         Deficiency anemia  Anemia resolved.  Last hemoglobin 14.3 November 2024.       Bilateral carotid artery stenosis  Less than 50% stenosis bilaterally.  Last Doppler test April 2023 unchanged from August 2016.  On aspirin and statin therapy.  Presently asymptomatic.          Preventive health issues were discussed with patient, and age appropriate screening tests were ordered as noted in patient's After Visit Summary. Personalized health advice and appropriate referrals for health education or preventive services given if needed, as noted in patient's After Visit Summary.    History of Present Illness     HPI     89-year-old white male patient presents for annual wellness exam as well as follow-up his medical problems.  He has been seen and followed [facility] nephrologist, [facility]      Current Outpatient Medications:   •  acetaminophen (TYLENOL) 325 mg tablet, Take 3 tablets (975 mg total) by mouth every 8 (eight) hours as needed for mild pain, Disp: , Rfl: 0  •  amLODIPine (NORVASC) 10 mg tablet, Take 10 mg by mouth daily, Disp: , Rfl:   •  aspirin (ECOTRIN LOW STRENGTH) 81 mg EC tablet, Take 81 mg by mouth daily, Disp: , Rfl:   •  atorvastatin (LIPITOR) 40 mg tablet, Take 20 mg by mouth daily 1/2 tablet, Disp: , Rfl:   •   Brinzolamide-Brimonidine (Simbrinza) 1-0.2 % SUSP, Apply 1 drop to eye 2 (two) times a day, Disp: , Rfl:   •  carvedilol (COREG) 25 mg tablet, Take 12.5 mg by mouth 2 (two) times a day with meals, Disp: , Rfl:   •  Cinnamon 500 MG capsule, Take 500 mg by mouth 2 (two) times a day, Disp: , Rfl:   •  Empagliflozin 25 MG TABS, Take 25 mg by mouth daily, Disp: , Rfl:   •  finasteride (PROSCAR) 5 mg tablet, Take 1 tablet (5 mg total) by mouth daily, Disp: 90 tablet, Rfl: 0  •  glipiZIDE (GLUCOTROL) 5 mg tablet, Take 1 tablet (5 mg total) by mouth see administration instructions He takes a 5 mg every morning and 10 mg every evening., Disp: 270 tablet, Rfl: 1  •  insulin glargine (LANTUS) 100 units/mL subcutaneous injection, Inject 26 Units under the skin daily at bedtime, Disp: , Rfl:   •  latanoprost (XALATAN) 0.005 % ophthalmic solution, Administer 1 drop to both eyes daily at bedtime, Disp: , Rfl:   •  losartan (COZAAR) 100 MG tablet, Take 100 mg by mouth daily , Disp: , Rfl:   •  Multiple Vitamin (multivitamin) tablet, Take 1 tablet by mouth daily, Disp: , Rfl:   •  omeprazole (PriLOSEC) 20 mg delayed release capsule, Take 20 mg by mouth daily, Disp: , Rfl:   •  tamsulosin (FLOMAX) 0.4 mg, Take 1 capsule (0.4 mg total) by mouth daily with dinner, Disp: 30 capsule, Rfl: 0     Past Medical History:   Diagnosis Date   • Anemia 06/22/2021   • Arthritis    • Bilateral carotid artery stenosis 01/04/2021    <50% on doppler 7/27/2018. No change from Aug/2016   • Bilateral carotid bruits    • Bladder calculi 12/21/2023   • BMI 25.0-25.9,adult 04/06/2021   • BMI 25.0-25.9,adult 08/31/2021   • BMI 26.0-26.9,adult 08/31/2021   • BMI 27.0-27.9,adult 12/13/2022   • Bronchitis 07/09/2024   • CKD (chronic kidney disease) 03/28/2024   • Coronary artery disease involving native coronary artery of native heart without angina pectoris 04/06/2021   • CTS (carpal tunnel syndrome)    • Deficiency anemia 08/05/2024   • Deviated septum    •  Diabetes ([facility])    • Diabetes mellitus ([facility]) 05/1995   • 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[facility])    • Dizziness    • Double vision    • Ear problems    • Encounter for support and coordination of transition of care 06/22/2021   • Esophageal dysmotility 06/06/2023   • Fatigue    • General weakness    • GERD (gastroesophageal reflux disease)    • Hearing impaired person, bilateral    • HL (hearing loss)    • Hypertension    • Left upper quadrant pain 06/06/2023   • Medicare annual wellness visit, subsequent 08/31/2021   • Medicare annual wellness visit, subsequent 12/21/2023   • Medicare annual wellness visit, subsequent 01/21/2025   • Microscopic hematuria    • Myalgia 04/06/2021   • New daily persistent headache 03/14/2023   • Other dysphagia 12/13/2022   • Other headache syndrome 03/14/2023   • Other specified anemias 12/13/2022   • Pain in joints 04/06/2021   • Peptic ulcer disease 08/05/2024   • PVD (peripheral vascular disease) ([facility]) 04/02/2021   • Rectal bleeding 06/22/2021   • Renal calculi    • Right leg DVT ([facility]) 06/22/2021   • Right lumbar radiculopathy    • S/P AVR (aortic valve replacement) 04/02/2021   • Skin lesion 11/06/2024   • Skin rash 12/13/2022   • SOB (shortness of breath) on exertion    • Spinal stenosis in cervical region 04/02/2021   • Tachycardia    • Thrombocytopenia ([facility]) 07/12/2023   • Type 2 diabetes mellitus with stage 3a chronic kidney disease, with long-term current use of insulin ([facility]) 09/08/2022   • Type 2 diabetes mellitus without complication, with long-term current use of insulin ([facility]) 03/14/2023   • Type 2 diabetes mellitus, without long-term current use of insulin ([facility]) 09/08/2022   • Urinary frequency    • Vertigo 01/04/2021      Patient Care Team:  Pablo Smith MD as PCP - General (Internal Medicine)    Review of Systems   Constitutional:  Negative for chills, fatigue and fever.   HENT:  Negative for congestion, ear pain, hearing loss, nosebleeds, sinus pain,  sore throat and trouble swallowing.    Eyes:  Negative for discharge, redness and visual disturbance.   Respiratory:  Negative for cough, chest tightness and shortness of breath.    Cardiovascular:  Negative for chest pain and palpitations.   Gastrointestinal:  Negative for abdominal pain, blood in stool, constipation, diarrhea, nausea and vomiting.   Genitourinary:  Negative for dysuria, flank pain, frequency and hematuria.   Musculoskeletal:  Negative for arthralgias, myalgias and neck pain.   Skin:  Negative for color change and rash.   Neurological:  Negative for dizziness, speech difficulty, weakness and headaches.   Hematological:  Does not bruise/bleed easily.   Psychiatric/Behavioral:  Negative for agitation and behavioral problems.      Medical History Reviewed by provider this encounter:  Tobacco  Allergies  Meds  Problems  Med Hx  Surg Hx  Fam Hx       Annual Wellness Visit Questionnaire   Nolberto is here for his Subsequent Wellness visit. Last Medicare Wellness visit information reviewed, patient interviewed and updates made to the record today.      Health Risk Assessment:   Patient rates overall health as good. Patient feels that their physical health rating is same. Patient is satisfied with their life. Eyesight was rated as same. Hearing was rated as same. Patient feels that their emotional and mental health rating is same. Patients states they are never, rarely angry. Patient states they are sometimes unusually tired/fatigued. Pain experienced in the last 7 days has been none. Patient states that he has experienced no weight loss or gain in last 6 months.     Depression Screening:   PHQ-2 Score: 0      Fall Risk Screening:   In the past year, patient has experienced: no history of falling in past year      Home Safety:  Patient does not have trouble with stairs inside or outside of their home. Patient has working smoke alarms and has working carbon monoxide detector. Home safety hazards  include: none.     Nutrition:   Current diet is Limited junk food, Low Cholesterol, Diabetic and No Added Salt.     Medications:   Patient is currently taking over-the-counter supplements. OTC medications include: see medication list. Patient is able to manage medications.     Activities of Daily Living (ADLs)/Instrumental Activities of Daily Living (IADLs):   Walk and transfer into and out of bed and chair?: Yes  Dress and groom yourself?: Yes    Bathe or shower yourself?: Yes    Feed yourself? Yes  Do your laundry/housekeeping?: Yes  Manage your money, pay your bills and track your expenses?: Yes  Make your own meals?: Yes    Do your own shopping?: Yes    Previous Hospitalizations:   Any hospitalizations or ED visits within the last 12 months?: Yes    How many hospitalizations have you had in the last year?: 1-2    Advance Care Planning:   Living will: Yes    Durable POA for healthcare: Yes    Advanced directive: Yes    Advanced directive counseling given: Yes    ACP document given: Yes    Patient declined ACP directive: No      Cognitive Screening:   Provider or family/friend/caregiver concerned regarding cognition?: No    PREVENTIVE SCREENINGS      Cardiovascular Screening:    General: History Lipid Disorder and Screening Current      Diabetes Screening:     General: History Diabetes and Screening Current      Colorectal Cancer Screening:     General: Screening Not Indicated      Prostate Cancer Screening:    General: Screening Not Indicated      Abdominal Aortic Aneurysm (AAA) Screening:    Risk factors include: tobacco use        Lung Cancer Screening:     General: Screening Not Indicated    Screening, Brief Intervention, and Referral to Treatment (SBIRT)    Screening  Typical number of drinks in a day: 0  Typical number of drinks in a week: 0  Interpretation: Low risk drinking behavior.    Single Item Drug Screening:  How often have you used an illegal drug (including marijuana) or a prescription medication  "for non-medical reasons in the past year? never    Single Item Drug Screen Score: 0  Interpretation: Negative screen for possible drug use disorder    Brief Intervention  Alcohol & drug use screenings were reviewed. No concerns regarding substance use disorder identified.     Other Counseling Topics:   Car/seat belt/driving safety, skin self-exam, sunscreen and calcium and vitamin D intake and regular weightbearing exercise.     Social Drivers of Health     Financial Resource Strain: Low Risk  (6/22/2024)    Received from [facility]    Overall Financial Resource Strain (CARDIA)    • Difficulty of Paying Living Expenses: Not very hard   Food Insecurity: No Food Insecurity (1/21/2025)    Hunger Vital Sign    • Worried About Running Out of Food in the Last Year: Never true    • Ran Out of Food in the Last Year: Never true   Transportation Needs: No Transportation Needs (1/21/2025)    PRAPARE - Transportation    • Lack of Transportation (Medical): No    • Lack of Transportation (Non-Medical): No   Housing Stability: Low Risk  (1/21/2025)    Housing Stability Vital Sign    • Unable to Pay for Housing in the Last Year: No    • Number of Times Moved in the Last Year: 0    • Homeless in the Last Year: No   Utilities: Not At Risk (1/21/2025)    [facility] Utilities    • Threatened with loss of utilities: No     No results found.    Objective   /76 (BP Location: Left arm, Patient Position: Sitting, Cuff Size: Standard)   Pulse 64   Temp 98.4 °F (36.9 °C) (Temporal)   Resp 16   Ht 5' 5\" (1.651 m)   Wt 73.9 kg (163 lb)   SpO2 99%   BMI 27.12 kg/m²     Physical Exam  Vitals and nursing note reviewed.   Constitutional:       General: He is not in acute distress.     Appearance: Normal appearance. He is well-developed.   HENT:      Head: Normocephalic and atraumatic.      Right Ear: External ear normal.      Left Ear: External ear normal.      Nose: Nose normal.      Mouth/Throat:      Mouth: Mucous " membranes are moist.      Pharynx: Oropharynx is clear.   Eyes:      General: No scleral icterus.        Right eye: No discharge.         Left eye: No discharge.      Extraocular Movements: Extraocular movements intact.      Conjunctiva/sclera: Conjunctivae normal.   Cardiovascular:      Rate and Rhythm: Normal rate and regular rhythm.      Pulses: Normal pulses.      Heart sounds: Murmur heard.   Pulmonary:      Effort: Pulmonary effort is normal. No respiratory distress.      Breath sounds: Normal breath sounds. No wheezing.   Abdominal:      General: Bowel sounds are normal. There is no distension.      Palpations: Abdomen is soft.      Tenderness: There is no abdominal tenderness.   Musculoskeletal:         General: No swelling. Normal range of motion.      Cervical back: Normal range of motion and neck supple.      Right lower leg: No edema.      Left lower leg: No edema.      Comments: He ambulates with cane   Skin:     General: Skin is warm and dry.      Capillary Refill: Capillary refill takes less than 2 seconds.      Findings: No rash.   Neurological:      General: No focal deficit present.      Mental Status: He is alert and oriented to person, place, and time.   Psychiatric:         Mood and Affect: Mood normal.         Behavior: Behavior normal.

## 2025-01-21 NOTE — ASSESSMENT & PLAN NOTE
Symptoms are well-controlled on omeprazole 20 mg daily would like to continue same dose H is effective and has no side effect.

## 2025-01-21 NOTE — ASSESSMENT & PLAN NOTE
Has been seen and followed by cardiology recently he was seen by cardiology in  October 2024 Dr. Zhou.

## 2025-01-21 NOTE — ASSESSMENT & PLAN NOTE
He is on losartan.  Has been seen and followed by nephrologist renal function stable.  Lab Results   Component Value Date    HGBA1C 8.7 (H) 06/22/2024

## 2025-01-21 NOTE — ASSESSMENT & PLAN NOTE
Less than 50% stenosis bilaterally.  Last Doppler test April 2023 unchanged from August 2016.  On aspirin and statin therapy.  Presently asymptomatic.

## 2025-01-21 NOTE — ASSESSMENT & PLAN NOTE
Cholesterol 135, triglyceride 92, HDL 45, LDL 72 advised to continue present dose of atorvastatin and low-cholesterol low carbs diet.

## 2025-01-21 NOTE — PATIENT INSTRUCTIONS
Patient was advised to continue present medications. discussed with the patient medications and laboratory data in detail.  Follow-up with me in 3 months or as advised.  If any blood test was ordered please do 1 week prior to next appointment unless advise to get earlier.  If you have any questions please call the office [phone]

## 2025-01-22 ENCOUNTER — TELEPHONE (OUTPATIENT)
Dept: ADMINISTRATIVE | Facility: OTHER | Age: OVER 89
End: 2025-01-22

## 2025-01-22 NOTE — TELEPHONE ENCOUNTER
Upon review of the In Basket request and the patient's chart, initial outreach has been made via fax to facility. Please see Contacts section for details.     Thank you  Dann Morelos MA

## 2025-01-22 NOTE — LETTER
Diabetic Eye Exam Form    Date Requested: 25  Patient: Nolberto Guajardo     Please complete form  Patient : 1933   Referring Provider: Pablo Smith MD      DIABETIC Eye Exam Date _______________________________      Type of Exam MUST be documented for Diabetic Eye Exams. Please CHECK ONE.     Retinal Exam       Dilated Retinal Exam       OCT       Optomap-Iris Exam      Fundus Photography       Left Eye - Please check Retinopathy or No Retinopathy        Exam did show retinopathy    Exam did not show retinopathy       Right Eye - Please check Retinopathy or No Retinopathy       Exam did show retinopathy    Exam did not show retinopathy       Comments __________________________________________________________    Practice Providing Exam ______________________________________________    Exam Performed By (print name) _______________________________________      Provider Signature ___________________________________________________      These reports are needed for  compliance.  Please fax this completed form and a copy of the Diabetic Eye Exam report to our office located at [address] [place] [address] as soon as possible via Fax [phone] attention Dann: Phone [phone]  We thank you for your assistance in treating our mutual patient.

## 2025-01-22 NOTE — TELEPHONE ENCOUNTER
----- Message from Lucy MELLO sent at 1/21/2025 11:24 AM EST -----  Regarding: care gap request  01/21/25 11:24 AM    Hello, our patient attached above has had Diabetic Eye Exam completed/performed. Please assist in updating the patient chart by making an External outreach to Dr. Diaz facility located in [place], [place]. The date of service is within the last year.    Thank you,  Lucy Del Rio LPN  [facility] INTERNAL MED

## 2025-01-22 NOTE — LETTER
Diabetic Eye Exam Form    Date Requested: 25  Patient: Nolberto Guajardo      2nd Request  Patient : 1933       Please complete form  Referring Provider: Pablo Smith MD      DIABETIC Eye Exam Date _______________________________      Type of Exam MUST be documented for Diabetic Eye Exams. Please CHECK ONE.     Retinal Exam       Dilated Retinal Exam       OCT       Optomap-Iris Exam      Fundus Photography       Left Eye - Please check Retinopathy or No Retinopathy        Exam did show retinopathy    Exam did not show retinopathy       Right Eye - Please check Retinopathy or No Retinopathy       Exam did show retinopathy    Exam did not show retinopathy       Comments __________________________________________________________    Practice Providing Exam ______________________________________________    Exam Performed By (print name) _______________________________________      Provider Signature ___________________________________________________      These reports are needed for  compliance.  Please fax this completed form and a copy of the Diabetic Eye Exam report to our office located at [address] [place] [address] as soon as possible via Fax [phone] attention Dann: Phone [phone]  We thank you for your assistance in treating our mutual patient.

## 2025-01-27 NOTE — TELEPHONE ENCOUNTER
As a follow-up, a second attempt has been made for outreach via fax to facility. Please see Contacts section for details.    Thank you  Dann Morelos MA

## 2025-01-30 DIAGNOSIS — E11.21 TYPE 2 DIABETES MELLITUS WITH DIABETIC NEPHROPATHY, WITH LONG-TERM CURRENT USE OF INSULIN (HCC): ICD-10-CM

## 2025-01-30 DIAGNOSIS — Z79.4 TYPE 2 DIABETES MELLITUS WITH DIABETIC NEPHROPATHY, WITH LONG-TERM CURRENT USE OF INSULIN (HCC): ICD-10-CM

## 2025-01-30 RX ORDER — GLIPIZIDE 5 MG/1
TABLET ORAL
Qty: 180 TABLET | Refills: 1 | Status: SHIPPED | OUTPATIENT
Start: 2025-01-30

## 2025-01-30 NOTE — TELEPHONE ENCOUNTER
As a final attempt, a third outreach has been made via telephone call to facility. Please see Contacts section for details. This encounter will be closed and completed by end of day. Should we receive the requested information because of previous outreach attempts, the requested patient's chart will be updated appropriately.     Thank you  Dann Morelos MA

## 2025-02-20 PROBLEM — Z00.00 MEDICARE ANNUAL WELLNESS VISIT, SUBSEQUENT: Status: RESOLVED | Noted: 2025-01-21 | Resolved: 2025-02-20

## 2025-03-03 ENCOUNTER — TELEPHONE (OUTPATIENT)
Dept: INTERNAL MEDICINE CLINIC | Facility: CLINIC | Age: OVER 89
End: 2025-03-03

## 2025-03-03 NOTE — TELEPHONE ENCOUNTER
Received a fax requesting Office notes for a CGM.  Patient is not eligible for a GCM.     Patient unaware of request.

## 2025-04-24 ENCOUNTER — OFFICE VISIT (OUTPATIENT)
Dept: INTERNAL MEDICINE CLINIC | Facility: CLINIC | Age: OVER 89
End: 2025-04-24
Payer: MEDICARE

## 2025-04-24 VITALS
TEMPERATURE: 98.5 F | SYSTOLIC BLOOD PRESSURE: 132 MMHG | BODY MASS INDEX: 26.33 KG/M2 | HEART RATE: 62 BPM | WEIGHT: 158 LBS | RESPIRATION RATE: 18 BRPM | OXYGEN SATURATION: 97 % | HEIGHT: 65 IN | DIASTOLIC BLOOD PRESSURE: 80 MMHG

## 2025-04-24 DIAGNOSIS — N18.30 TYPE 2 DIABETES MELLITUS WITH STAGE 3 CHRONIC KIDNEY DISEASE AND HYPERTENSION (HCC): ICD-10-CM

## 2025-04-24 DIAGNOSIS — M48.062 SPINAL STENOSIS OF LUMBAR REGION WITH NEUROGENIC CLAUDICATION: ICD-10-CM

## 2025-04-24 DIAGNOSIS — I12.9 TYPE 2 DIABETES MELLITUS WITH STAGE 3 CHRONIC KIDNEY DISEASE AND HYPERTENSION (HCC): ICD-10-CM

## 2025-04-24 DIAGNOSIS — I25.10 CORONARY ARTERY DISEASE INVOLVING NATIVE CORONARY ARTERY OF NATIVE HEART WITHOUT ANGINA PECTORIS: ICD-10-CM

## 2025-04-24 DIAGNOSIS — I10 ESSENTIAL HYPERTENSION: Primary | Chronic | ICD-10-CM

## 2025-04-24 DIAGNOSIS — E11.22 TYPE 2 DIABETES MELLITUS WITH STAGE 3 CHRONIC KIDNEY DISEASE AND HYPERTENSION (HCC): ICD-10-CM

## 2025-04-24 DIAGNOSIS — D64.9 ANEMIA, UNSPECIFIED TYPE: ICD-10-CM

## 2025-04-24 DIAGNOSIS — Z95.2 S/P AVR (AORTIC VALVE REPLACEMENT): ICD-10-CM

## 2025-04-24 DIAGNOSIS — I65.23 BILATERAL CAROTID ARTERY STENOSIS: Chronic | ICD-10-CM

## 2025-04-24 DIAGNOSIS — E78.2 MIXED HYPERLIPIDEMIA: ICD-10-CM

## 2025-04-24 PROCEDURE — 99214 OFFICE O/P EST MOD 30 MIN: CPT | Performed by: INTERNAL MEDICINE

## 2025-04-24 PROCEDURE — G2211 COMPLEX E/M VISIT ADD ON: HCPCS | Performed by: INTERNAL MEDICINE

## 2025-04-24 RX ORDER — REPAGLINIDE 2 MG/1
1 TABLET ORAL
COMMUNITY
Start: 2025-01-31 | End: 2026-01-31

## 2025-04-24 NOTE — ASSESSMENT & PLAN NOTE
Anemia resolved last hemoglobin 14.3.       
Blood pressure controlled advised to continue present medications and low-salt diet.     
Has been seen and followed by cardiologist as above.     
He has been seen and followed by endocrinologist.  He was seen by endocrinologist March 2025 his glipizide was discontinued and was placed on repaglinide 2 mg 3 times daily he still takes his insulin and Jardiance as well.  Patient states his sugar was better on glipizide then repaglinide.  Advised to discuss with the endocrinologist for further recommendation about his diabetes medications.  Continue 1800-calorie diet.  Lab Results   Component Value Date    HGBA1C 8.7 (H) 06/22/2024       Orders:  •  VAS carotid complete study; Future  
He has been seen and followed by pain management Dr. Asencio will be going for lower back injection     
His cholesterol 125, triglyceride 92, HDL 45, LDL 72 on atorvastatin  daily advised to continue same dose and low-cholesterol low carbs diet.  Orders:  •  VAS carotid complete study; Future  
Last carotid Doppler April 2023 less than 50% stenosis bilaterally.  Presently asymptomatic.  On aspirin and statin therapy.  Was advised follow-up carotid artery Doppler.  Orders:  •  VAS carotid complete study; Future  
Presently stable.  Has been seen and followed by cardiologist.  Last appointment October 2024 with Dr. Lai hZou.  Was advised follow-up in 6 months discussed with the patient to make appointment to see cardiologist for follow-up.     
Weight is fairly stable advised to watch calorie intake and carbs intake.     
Statement Selected

## 2025-04-24 NOTE — PROGRESS NOTES
Name: Nolberto Guajardo      : 1933      MRN: [id number]  Encounter Provider: Pablo Smith MD  Encounter Date: 2025   Encounter department: [facility] INTERNAL MEDICINE  :  Assessment & Plan  Essential hypertension  Blood pressure controlled advised to continue present medications and low-salt diet.       Coronary artery disease involving native coronary artery of native heart without angina pectoris  Presently stable.  Has been seen and followed by cardiologist.  Last appointment 2024 with Dr. Lai Zhou.  Was advised follow-up in 6 months discussed with the patient to make appointment to see cardiologist for follow-up.       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  He was seen by endocrinologist 2025 his glipizide was discontinued and was placed on repaglinide 2 mg 3 times daily he still takes his insulin and Jardiance as well.  Patient states his sugar was better on glipizide then repaglinide.  Advised to discuss with the endocrinologist for further recommendation about his diabetes medications.  Continue 1800-calorie diet.  Lab Results   Component Value Date    HGBA1C 8.7 (H) 2024       Orders:  •  VAS carotid complete study; Future    Mixed hyperlipidemia  His cholesterol 125, triglyceride 92, HDL 45, LDL 72 on atorvastatin  daily advised to continue same dose and low-cholesterol low carbs diet.  Orders:  •  VAS carotid complete study; Future    BMI 26.0-26.9,adult  Weight is fairly stable advised to watch calorie intake and carbs intake.       S/P AVR bioprothetic   Has been seen and followed by cardiologist as above.       Anemia, unspecified type  Anemia resolved last hemoglobin 14.3.         Bilateral carotid artery stenosis  Last carotid Doppler 2023 less than 50% stenosis bilaterally.  Presently asymptomatic.  On aspirin and statin therapy.  Was  advised follow-up carotid artery Doppler.  Orders:  •  VAS carotid complete study; Future    Spinal stenosis of lumbar region with neurogenic claudication  He has been seen and followed by pain management Dr. Asencio will be going for lower back injection         [facility] that [facility]  He will bring the copy of the blood test result once it is done we will review and advise accordingly.     History of Present Illness   HPI  89-year-old white male patient presents for follow-up his medical problems.      Current Outpatient Medications:   •  amLODIPine (NORVASC) 10 mg tablet, Take 10 mg by mouth daily, Disp: , Rfl:   •  aspirin (ECOTRIN LOW STRENGTH) 81 mg EC tablet, Take 81 mg by mouth daily, Disp: , Rfl:   •  atorvastatin (LIPITOR) 40 mg tablet, Take 20 mg by mouth daily 1/2 tablet, Disp: , Rfl:   •  Brinzolamide-Brimonidine (Simbrinza) 1-0.2 % SUSP, Apply 1 drop to eye 2 (two) times a day, Disp: , Rfl:   •  carvedilol (COREG) 25 mg tablet, Take 12.5 mg by mouth 2 (two) times a day with meals, Disp: , Rfl:   •  Cinnamon 500 MG capsule, Take 500 mg by mouth 2 (two) times a day, Disp: , Rfl:   •  Empagliflozin 25 MG TABS, Take 25 mg by mouth daily, Disp: , Rfl:   •  insulin glargine (LANTUS) 100 units/mL subcutaneous injection, Inject 22 Units under the skin daily at bedtime, Disp: , Rfl:   •  latanoprost (XALATAN) 0.005 % ophthalmic solution, Administer 1 drop to both eyes daily at bedtime, Disp: , Rfl:   •  losartan (COZAAR) 100 MG tablet, Take 100 mg by mouth daily , Disp: , Rfl:   •  Multiple Vitamin (multivitamin) tablet, Take 1 tablet by mouth daily, Disp: , Rfl:   •  omeprazole (PriLOSEC) 20 mg delayed release capsule, Take 20 mg by mouth daily, Disp: , Rfl:   •  repaglinide (PRANDIN) 2 mg tablet, Take 1 tablet by mouth 3 (three) times a day before meals, Disp: , Rfl:   •  tamsulosin (FLOMAX) 0.4 mg, Take 1 capsule (0.4 mg total) by mouth daily with dinner,  Disp: 30 capsule, Rfl: 0  •  acetaminophen (TYLENOL) 325 mg tablet, Take 3 tablets (975 mg total) by mouth every 8 (eight) hours as needed for mild pain, Disp: , Rfl: 0  •  finasteride (PROSCAR) 5 mg tablet, Take 1 tablet (5 mg total) by mouth daily, Disp: 90 tablet, Rfl: 0     Past Medical History:   Diagnosis Date   • Anemia 06/22/2021   • Arthritis    • Bilateral carotid artery stenosis 01/04/2021    <50% on doppler 7/27/2018. No change from Aug/2016   • Bilateral carotid bruits    • Bladder calculi 12/21/2023   • BMI 25.0-25.9,adult 04/06/2021   • BMI 25.0-25.9,adult 08/31/2021   • BMI 26.0-26.9,adult 08/31/2021   • BMI 27.0-27.9,adult 12/13/2022   • Bronchitis 07/09/2024   • CKD (chronic kidney disease) 03/28/2024   • Coronary artery disease involving native coronary artery of native heart without angina pectoris 04/06/2021   • CTS (carpal tunnel syndrome)    • Deficiency anemia 08/05/2024   • Deviated septum    • Diabetes ([facility])    • Diabetes mellitus ([facility]) 05/1995   • 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[facility])    • Dizziness    • Double vision    • Ear problems    • Encounter for support and coordination of transition of care 06/22/2021   • Esophageal dysmotility 06/06/2023   • Fatigue    • General weakness    • GERD (gastroesophageal reflux disease)    • Hearing impaired person, bilateral    • HL (hearing loss)    • Hypertension    • Left upper quadrant pain 06/06/2023   • Medicare annual wellness visit, subsequent 08/31/2021   • Medicare annual wellness visit, subsequent 12/21/2023   • Medicare annual wellness visit, subsequent 01/21/2025   • Microscopic hematuria    • Myalgia 04/06/2021   • New daily persistent headache 03/14/2023   • Other dysphagia 12/13/2022   • Other headache syndrome 03/14/2023   • Other specified anemias 12/13/2022   • Pain in joints 04/06/2021   • Peptic ulcer disease 08/05/2024   • PVD (peripheral vascular disease) ([facility]) 04/02/2021   • Rectal bleeding 06/22/2021   • Renal calculi    •  "Right leg DVT ([facility]) 06/22/2021   • Right lumbar radiculopathy    • S/P AVR (aortic valve replacement) 04/02/2021   • Skin lesion 11/06/2024   • Skin rash 12/13/2022   • SOB (shortness of breath) on exertion    • Spinal stenosis in cervical region 04/02/2021   • Spinal stenosis of lumbar region with neurogenic claudication 04/24/2025   • Tachycardia    • Thrombocytopenia ([facility]) 07/12/2023   • Type 2 diabetes mellitus with stage 3a chronic kidney disease, with long-term current use of insulin ([facility]) 09/08/2022   • Type 2 diabetes mellitus without complication, with long-term current use of insulin ([facility]) 03/14/2023   • Type 2 diabetes mellitus, without long-term current use of insulin ([facility]) 09/08/2022   • Urinary frequency    • Vertigo 01/04/2021      Review of Systems   Constitutional:  Negative for chills and fever.   HENT:  Negative for congestion, ear pain, nosebleeds, sinus pain, sore throat and trouble swallowing.    Eyes:  Negative for discharge, redness and visual disturbance.   Respiratory:  Negative for cough, chest tightness and shortness of breath.    Cardiovascular:  Negative for chest pain and palpitations.   Gastrointestinal:  Negative for abdominal pain, blood in stool, constipation, diarrhea, nausea and vomiting.   Genitourinary:  Negative for dysuria and hematuria.   Musculoskeletal:  Positive for back pain (Low back pain). Negative for arthralgias, myalgias and neck pain.   Skin:  Negative for rash.   Neurological:  Negative for dizziness, speech difficulty, weakness and headaches.   Hematological:  Does not bruise/bleed easily.   Psychiatric/Behavioral:  Negative for agitation and behavioral problems.          Objective   /80 (BP Location: Left arm, Patient Position: Sitting, Cuff Size: Standard)   Pulse 62   Temp 98.5 °F (36.9 °C) (Temporal)   Resp 18   Ht 5' 5\" (1.651 m)   Wt 71.7 kg (158 lb)   SpO2 97%   BMI 26.29 kg/m²      Physical Exam  Vitals and nursing note reviewed. "   Constitutional:       General: He is not in acute distress.     Appearance: Normal appearance. He is well-developed.   HENT:      Head: Normocephalic and atraumatic.      Right Ear: External ear normal.      Left Ear: External ear normal.      Nose: Nose normal.      Mouth/Throat:      Pharynx: Oropharynx is clear.   Eyes:      General: No scleral icterus.        Right eye: No discharge.         Left eye: No discharge.      Extraocular Movements: Extraocular movements intact.      Conjunctiva/sclera: Conjunctivae normal.   Cardiovascular:      Rate and Rhythm: Normal rate and regular rhythm.      Pulses: Normal pulses.      Heart sounds: Murmur heard.   Pulmonary:      Effort: Pulmonary effort is normal. No respiratory distress.      Breath sounds: Normal breath sounds. No wheezing or rales.   Abdominal:      General: Bowel sounds are normal.      Palpations: Abdomen is soft.      Tenderness: There is no abdominal tenderness.   Musculoskeletal:         General: No swelling. Normal range of motion.      Cervical back: Normal range of motion and neck supple.      Right lower leg: No edema.      Left lower leg: No edema.      Comments: He ambulates with cane.   Skin:     General: Skin is warm and dry.      Findings: No rash.   Neurological:      General: No focal deficit present.      Mental Status: He is alert and oriented to person, place, and time.   Psychiatric:         Mood and Affect: Mood normal.         Behavior: Behavior normal.

## 2025-04-24 NOTE — PATIENT INSTRUCTIONS
Patient was advised to continue present medications. discussed with the patient medications and laboratory data in detail.  Follow-up with me in 3 months or as advised.  If any blood test was ordered please do 1 week prior to next appointment unless advise to get earlier.  If you have any questions please call the office [phone]

## 2025-05-05 ENCOUNTER — NURSE TRIAGE (OUTPATIENT)
Age: OVER 89
End: 2025-05-05

## 2025-05-05 NOTE — TELEPHONE ENCOUNTER
FOLLOW UP: Today     REASON FOR CONVERSATION: Memory Loss    SYMPTOMS: forgetfulness is getting worse.     OTHER: patient's wife was advised to call office today after 3 pm to schedule a same day appointment for tomorrow.     DISPOSITION: See PCP Within 24 Hours

## 2025-05-05 NOTE — TELEPHONE ENCOUNTER
Patient's spouse advised of Dr. Smith's recommendation. Patient should be assessed at ED due to worsening confusion, hyperglycemia, and possible dehydration.

## 2025-05-05 NOTE — TELEPHONE ENCOUNTER
"Reason for Disposition  • [1] Confusion getting worse AND [2] slow onset (days to weeks)    Answer Assessment - Initial Assessment Questions  1. MAIN CONCERN OR SYMPTOM:  \"What is your main concern right now?\" \"What questions do you have?\" \"What's the main symptom you're worried about?\" (e.g., confusion, memory loss)      Memory issue, forgetting things he used   2. ONSET:  \"When did the symptom start (or worsen)?\" (minutes, hours, days, weeks)      Few days ago   3. BETTER-SAME-WORSE: \"Are you (the patient) getting better, staying the same, or getting worse compared to the day you (they) were diagnosed or most recent hospital discharge?\"      Worsening   4. DIAGNOSIS: \"Was the dementia diagnosed by a doctor?\" If Yes, ask: \"When?\" (e.g., days, months, years ago)      No   5. MEDICINES: \"Has there been any change in medicines recently?\" (e.g., narcotics, antihistamines, benzodiazepines, etc.)      No   6. OTHER SYMPTOMS: \"Are there any other symptoms?\" (e.g., cough, falling, fever, pain)      No   7. SUPPORT: \"What type of support do you (the patient) have?\" Note: Document living circumstances and support (e.g., family, nursing home).      Wife, daughter.    Protocols used: Dementia Symptoms and Questions-Adult-AH    "

## 2025-05-05 NOTE — TELEPHONE ENCOUNTER
I spoke with Jud. She said that this morning after eating a piece of cake for breakfast his blood sugar was 285. Around 1pm his blood sugar was 350 and right now (2:05pm) 302.     She is concerned that he is not acting himself. He couldn't remember how to take his blood sugar this morning and Jud had to do it for him, then his granddaughter had to assist him the other times.     Patient sees [facility]. Last seen 1/31/2025. Patient was in [facility] ED yesterday for hypoglycemia.     Please advise?

## 2025-05-15 ENCOUNTER — OFFICE VISIT (OUTPATIENT)
Dept: NEPHROLOGY | Facility: CLINIC | Age: OVER 89
End: 2025-05-15
Payer: MEDICARE

## 2025-05-15 VITALS
HEIGHT: 65 IN | DIASTOLIC BLOOD PRESSURE: 76 MMHG | BODY MASS INDEX: 26.16 KG/M2 | HEART RATE: 57 BPM | SYSTOLIC BLOOD PRESSURE: 130 MMHG | WEIGHT: 157 LBS | OXYGEN SATURATION: 98 %

## 2025-05-15 DIAGNOSIS — E11.22 TYPE 2 DIABETES MELLITUS WITH STAGE 3 CHRONIC KIDNEY DISEASE AND HYPERTENSION (HCC): Primary | ICD-10-CM

## 2025-05-15 DIAGNOSIS — I65.23 BILATERAL CAROTID ARTERY STENOSIS: Chronic | ICD-10-CM

## 2025-05-15 DIAGNOSIS — I25.10 CORONARY ARTERY DISEASE INVOLVING NATIVE CORONARY ARTERY OF NATIVE HEART WITHOUT ANGINA PECTORIS: ICD-10-CM

## 2025-05-15 DIAGNOSIS — N28.1 RENAL CYST, ACQUIRED, LEFT: ICD-10-CM

## 2025-05-15 DIAGNOSIS — N18.30 TYPE 2 DIABETES MELLITUS WITH STAGE 3 CHRONIC KIDNEY DISEASE AND HYPERTENSION (HCC): Primary | ICD-10-CM

## 2025-05-15 DIAGNOSIS — I73.9 PVD (PERIPHERAL VASCULAR DISEASE) (HCC): ICD-10-CM

## 2025-05-15 DIAGNOSIS — I12.9 TYPE 2 DIABETES MELLITUS WITH STAGE 3 CHRONIC KIDNEY DISEASE AND HYPERTENSION (HCC): Primary | ICD-10-CM

## 2025-05-15 PROCEDURE — 99214 OFFICE O/P EST MOD 30 MIN: CPT | Performed by: INTERNAL MEDICINE

## 2025-05-15 NOTE — ASSESSMENT & PLAN NOTE
Lab Results   Component Value Date    HGBA1C 8.7 (H) 06/22/2024   -- Baseline creatinine 1.4-1.8 mg/dL  -- Presumed to be secondary to diabetic kidney disease, hypertensive nephrosclerosis, age-related nephron loss and possible vascular disease  --Renal function remained stable and at baseline with a creatinine 1.47 mg/dL  --Urinalysis showed 1+ protein.  Check albumin/creatinine ratio at the next visit  --CT scan without contrast done in November 2024 personally reviewed by me.  Interval resolution of small exophytic cyst of the left pole of the left kidney.  Bladder calculus unchanged.  Prostamegaly.  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  Atherosclerosis noted.  -- Will check a CT scan without contrast given CKD.  If there is concerns on this is worsening I would recommend a urology consultation.  -- Avoid NSAIDs  -- Needs better diabetic control  -- Blood pressure at target  -- Continue SGLT2 inhibitor with Jardiance 25 mg daily.  If starts to have recurrent urinary infections may need to consider stopping this in the future  -- Continue RAAS blockade with losartan 100 mg daily    Hypertension  -- Blood pressure controlled and euvolemic  -- Continue carvedilol 12.5 mg twice a day, amlodipine 10 mg daily, losartan 100 mg daily  -- BMI 26  -- Low 2 g sodium diet    Diabetes mellitus type 2  -- Hemoglobin A1c 8.7  -- Urinalysis positive for glucose but currently on SGLT2 inhibitor.    Orders:    CBC w/o differential; Future    Albumin / creatinine urine ratio; Future    PTH, intact; Future    Vitamin D 25 hydroxy; Future    Phosphorus; Future    Comprehensive metabolic panel; Future

## 2025-05-15 NOTE — ASSESSMENT & PLAN NOTE
-- Stable with a BMI of 26.1  Orders:    CBC w/o differential; Future    Albumin / creatinine urine ratio; Future    PTH, intact; Future    Vitamin D 25 hydroxy; Future    Phosphorus; Future    Comprehensive metabolic panel; Future

## 2025-05-15 NOTE — ASSESSMENT & PLAN NOTE
-- Continue goal-directed therapy with beta-blocker, statin, aspirin  Orders:    CBC w/o differential; Future    Albumin / creatinine urine ratio; Future    PTH, intact; Future    Vitamin D 25 hydroxy; Future    Phosphorus; Future    Comprehensive metabolic panel; Future

## 2025-05-15 NOTE — ASSESSMENT & PLAN NOTE
-- Asymptomatic, continue carvedilol, aspirin, good diabetic and blood pressure control continue statin  Orders:    CBC w/o differential; Future    Albumin / creatinine urine ratio; Future    PTH, intact; Future    Vitamin D 25 hydroxy; Future    Phosphorus; Future    Comprehensive metabolic panel; Future

## 2025-05-15 NOTE — ASSESSMENT & PLAN NOTE
-- Continue beta-blocker with carvedilol needs better diabetic control, continue atorvastatin    Orders:    CBC w/o differential; Future    Albumin / creatinine urine ratio; Future    PTH, intact; Future    Vitamin D 25 hydroxy; Future    Phosphorus; Future    Comprehensive metabolic panel; Future

## 2025-05-15 NOTE — ASSESSMENT & PLAN NOTE
-- Stable on CT scan  Orders:    CBC w/o differential; Future    Albumin / creatinine urine ratio; Future    PTH, intact; Future    Vitamin D 25 hydroxy; Future    Phosphorus; Future    Comprehensive metabolic panel; Future

## 2025-05-15 NOTE — PROGRESS NOTES
Name: Nolberto Guajardo      : 1933      MRN: [id number]  Encounter Provider: Royal Carr MD  Encounter Date: 5/15/2025   Encounter department: 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  Check albumin/creatinine ratio at the next visit  --CT scan without contrast done in 2024 personally reviewed by me.  Interval resolution of small exophytic cyst of the left pole of the left kidney.  Bladder calculus unchanged.  Prostamegaly.  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  Atherosclerosis noted.  -- Will check a CT scan without contrast given CKD.  If there is concerns on this is worsening I would recommend a urology consultation.  -- Avoid NSAIDs  -- Needs better diabetic control  -- Blood pressure at target  -- Continue SGLT2 inhibitor with Jardiance 25 mg daily.  If starts to have recurrent urinary infections may need to consider stopping this in the future  -- Continue RAAS blockade with losartan 100 mg daily    Hypertension  -- Blood pressure controlled and euvolemic  -- Continue carvedilol 12.5 mg twice a day, amlodipine 10 mg daily, losartan 100 mg daily  -- BMI 26  -- Low 2 g sodium diet    Diabetes mellitus type 2  -- Hemoglobin A1c 8.7  -- Urinalysis positive for glucose but currently on SGLT2 inhibitor.    Orders:    CBC w/o differential; Future    Albumin / creatinine urine ratio; Future    PTH, intact; Future    Vitamin D 25 hydroxy; Future    Phosphorus; Future    Comprehensive metabolic panel; Future    PVD (peripheral  vascular disease) (HCC)  -- Continue beta-blocker with carvedilol needs better diabetic control, continue atorvastatin    Orders:    CBC w/o differential; Future    Albumin / creatinine urine ratio; Future    PTH, intact; Future    Vitamin D 25 hydroxy; Future    Phosphorus; Future    Comprehensive metabolic panel; Future    Coronary artery disease involving native coronary artery of native heart without angina pectoris  -- Asymptomatic, continue carvedilol, aspirin, good diabetic and blood pressure control continue statin  Orders:    CBC w/o differential; Future    Albumin / creatinine urine ratio; Future    PTH, intact; Future    Vitamin D 25 hydroxy; Future    Phosphorus; Future    Comprehensive metabolic panel; Future    Bilateral carotid artery stenosis  -- Continue goal-directed therapy with beta-blocker, statin, aspirin  Orders:    CBC w/o differential; Future    Albumin / creatinine urine ratio; Future    PTH, intact; Future    Vitamin D 25 hydroxy; Future    Phosphorus; Future    Comprehensive metabolic panel; Future    Renal cyst, acquired, left  -- Stable on CT scan  Orders:    CBC w/o differential; Future    Albumin / creatinine urine ratio; Future    PTH, intact; Future    Vitamin D 25 hydroxy; Future    Phosphorus; Future    Comprehensive metabolic panel; Future    BMI 26.0-26.9,adult  -- Stable with a BMI of 26.1  Orders:    CBC w/o differential; Future    Albumin / creatinine urine ratio; Future    PTH, intact; Future    Vitamin D 25 hydroxy; Future    Phosphorus; Future    Comprehensive metabolic panel; Future        Patient Instructions   1.)  Low 2 g sodium diet    2.)  Monitor weights at home    3.)  Avoid NSAIDs (ibuprofen, Motrin, Advil, Aleve, naproxen)    4.)  Monitor blood pressure at home, call if blood pressure greater than 150/90 persistently    5.) I will plan to discuss all results including blood work, and/or imaging at our next visit, unless there is an urgent indication, in which  case I will call you earlier. If you have any questions or concerns about your results, please feel free to call our office.        It was a pleasure evaluating your patient in the office today. Thank you for allowing our team to participate in the care of  Nolberto Guajardo. Please do not hesitate to contact our team if further issues/questions shall arise in the interim.     History of Present Illness   HPI  Nolberto Guajardo is a 89 y.o. male who presents chronic kidney disease stage III.    Patient recently had a urinary tract infection where he presented with altered mental status.  Urine analysis showed bacteria.  [facility]  He was treated with antibiotics which she completed about a day ago.  Mental status has improved.  No complaints of dysuria fever or chills.    Reviewed his blood work from May 5.  Creatinine stable at 1.4 mg/dL sodium potassium are normal.  Calcium is normal.  CBC shows normal hemoglobin.  Platelets 134.  CT scan from November 2024 personally reviewed showed some interval resolution of the cyst.  No hydronephrosis no masses no lesions.    Urinalysis showed 1+ protein with positive WBCs and positive glucose.  History obtained from: patient and patient's child  Pertinent Medical History         Review of Systems   Constitutional:  Negative for activity change and fever.   Respiratory:  Negative for cough, chest tightness, shortness of breath and wheezing.    Cardiovascular:  Negative for chest pain and leg swelling.   Gastrointestinal:  Negative for abdominal pain, diarrhea, nausea and vomiting.   Endocrine: Negative for polyuria.   Genitourinary:  Negative for difficulty urinating, dysuria, flank pain, frequency and urgency.   Skin:  Negative for rash.   Neurological:  Negative for dizziness, syncope, light-headedness and headaches.     Medical History Reviewed by provider this encounter:  Meds     .  Medications Ordered Prior to Encounter[1]      Medications Ordered Prior to  "Encounter[2]  Objective   /76 (BP Location: Left arm, Patient Position: Sitting, Cuff Size: Adult)   Pulse 57   Ht 5' 5\" (1.651 m)   Wt 71.2 kg (157 lb)   SpO2 98%   BMI 26.13 kg/m²      Physical Exam  Constitutional:       General: He is not in acute distress.     Appearance: He is well-developed. He is not diaphoretic.   HENT:      Head: Normocephalic and atraumatic.     Eyes:      General: No scleral icterus.     Pupils: Pupils are equal, round, and reactive to light.       Cardiovascular:      Rate and Rhythm: Normal rate and regular rhythm.      Heart sounds: Normal heart sounds. No murmur heard.     No friction rub. No gallop.   Pulmonary:      Effort: Pulmonary effort is normal. No respiratory distress.      Breath sounds: Normal breath sounds. No wheezing or rales.   Chest:      Chest wall: No tenderness.   Abdominal:      General: Bowel sounds are normal. There is no distension.      Palpations: Abdomen is soft.      Tenderness: There is no abdominal tenderness. There is no rebound.     Musculoskeletal:         General: Normal range of motion.      Cervical back: Normal range of motion and neck supple.     Skin:     Findings: No rash.     Neurological:      Mental Status: He is alert and oriented to person, place, and time.           Laboratory Results:        Invalid input(s): \"ALBUMIN\"    Results for orders placed or performed in visit on 07/09/24   POCT Rapid Covid Ag   Result Value Ref Range    POCT SARS-CoV-2 Ag Negative Negative    VALID CONTROL Valid        Administrative Statements   I have spent a total time of 25 minutes in caring for this patient on the day of the visit/encounter including Risk factor reductions, Impressions, Counseling / Coordination of care, Documenting in the medical record, Reviewing/placing orders in the medical record (including tests, medications, and/or procedures), and Obtaining or reviewing history  .       [1]   Current Outpatient Medications on File Prior " to Visit   Medication Sig Dispense Refill    acetaminophen (TYLENOL) 325 mg tablet Take 3 tablets (975 mg total) by mouth every 8 (eight) hours as needed for mild pain  0    amLODIPine (NORVASC) 10 mg tablet Take 10 mg by mouth in the morning.      aspirin (ECOTRIN LOW STRENGTH) 81 mg EC tablet Take 81 mg by mouth in the morning.      atorvastatin (LIPITOR) 40 mg tablet Take 20 mg by mouth in the morning. 1/2 tablet.      Brinzolamide-Brimonidine (Simbrinza) 1-0.2 % SUSP Apply 1 drop to eye in the morning and 1 drop before bedtime.      carvedilol (COREG) 25 mg tablet Take 12.5 mg by mouth in the morning and 12.5 mg in the evening. Take with meals.      Cinnamon 500 MG capsule Take 500 mg by mouth in the morning and 500 mg in the evening.      Empagliflozin 25 MG TABS Take 25 mg by mouth in the morning.      finasteride (PROSCAR) 5 mg tablet Take 1 tablet (5 mg total) by mouth daily 90 tablet 0    insulin glargine (LANTUS) 100 units/mL subcutaneous injection Inject 22 Units under the skin daily at bedtime      latanoprost (XALATAN) 0.005 % ophthalmic solution Administer 1 drop to both eyes daily at bedtime      losartan (COZAAR) 100 MG tablet Take 100 mg by mouth in the morning.      Multiple Vitamin (multivitamin) tablet Take 1 tablet by mouth in the morning.      omeprazole (PriLOSEC) 20 mg delayed release capsule Take 20 mg by mouth in the morning.      repaglinide (PRANDIN) 2 mg tablet Take 1 tablet by mouth in the morning and 1 tablet at noon and 1 tablet in the evening. Take before meals.      tamsulosin (FLOMAX) 0.4 mg Take 1 capsule (0.4 mg total) by mouth daily with dinner 30 capsule 0     No current facility-administered medications on file prior to visit.   [2]   Current Outpatient Medications on File Prior to Visit   Medication Sig Dispense Refill    acetaminophen (TYLENOL) 325 mg tablet Take 3 tablets (975 mg total) by mouth every 8 (eight) hours as needed for mild pain  0    amLODIPine (NORVASC) 10 mg  tablet Take 10 mg by mouth in the morning.      aspirin (ECOTRIN LOW STRENGTH) 81 mg EC tablet Take 81 mg by mouth in the morning.      atorvastatin (LIPITOR) 40 mg tablet Take 20 mg by mouth in the morning. 1/2 tablet.      Brinzolamide-Brimonidine (Simbrinza) 1-0.2 % SUSP Apply 1 drop to eye in the morning and 1 drop before bedtime.      carvedilol (COREG) 25 mg tablet Take 12.5 mg by mouth in the morning and 12.5 mg in the evening. Take with meals.      Cinnamon 500 MG capsule Take 500 mg by mouth in the morning and 500 mg in the evening.      Empagliflozin 25 MG TABS Take 25 mg by mouth in the morning.      finasteride (PROSCAR) 5 mg tablet Take 1 tablet (5 mg total) by mouth daily 90 tablet 0    insulin glargine (LANTUS) 100 units/mL subcutaneous injection Inject 22 Units under the skin daily at bedtime      latanoprost (XALATAN) 0.005 % ophthalmic solution Administer 1 drop to both eyes daily at bedtime      losartan (COZAAR) 100 MG tablet Take 100 mg by mouth in the morning.      Multiple Vitamin (multivitamin) tablet Take 1 tablet by mouth in the morning.      omeprazole (PriLOSEC) 20 mg delayed release capsule Take 20 mg by mouth in the morning.      repaglinide (PRANDIN) 2 mg tablet Take 1 tablet by mouth in the morning and 1 tablet at noon and 1 tablet in the evening. Take before meals.      tamsulosin (FLOMAX) 0.4 mg Take 1 capsule (0.4 mg total) by mouth daily with dinner 30 capsule 0     No current facility-administered medications on file prior to visit.

## 2025-05-21 ENCOUNTER — TELEPHONE (OUTPATIENT)
Age: OVER 89
End: 2025-05-21

## 2025-05-21 ENCOUNTER — HOSPITAL ENCOUNTER (OUTPATIENT)
Dept: VASCULAR ULTRASOUND | Facility: HOSPITAL | Age: OVER 89
Discharge: HOME | End: 2025-05-21
Attending: INTERNAL MEDICINE
Payer: MEDICARE

## 2025-05-21 DIAGNOSIS — E11.22 TYPE 2 DIABETES MELLITUS WITH STAGE 3 CHRONIC KIDNEY DISEASE AND HYPERTENSION (HCC): ICD-10-CM

## 2025-05-21 DIAGNOSIS — E78.2 MIXED HYPERLIPIDEMIA: ICD-10-CM

## 2025-05-21 DIAGNOSIS — N18.30 TYPE 2 DIABETES MELLITUS WITH STAGE 3 CHRONIC KIDNEY DISEASE AND HYPERTENSION (HCC): ICD-10-CM

## 2025-05-21 DIAGNOSIS — I65.23 BILATERAL CAROTID ARTERY STENOSIS: Chronic | ICD-10-CM

## 2025-05-21 DIAGNOSIS — I12.9 TYPE 2 DIABETES MELLITUS WITH STAGE 3 CHRONIC KIDNEY DISEASE AND HYPERTENSION (HCC): ICD-10-CM

## 2025-05-21 PROCEDURE — 93880 EXTRACRANIAL BILAT STUDY: CPT

## 2025-05-21 NOTE — TELEPHONE ENCOUNTER
Scheduled for 5/22 @ 11:20 am - while I was talking to Pat all of the sudden she couldn't hear me - I think she may have muted herself.   I tried to call her back but she still couldn't hear me.    I will try her again later but if she should call just confirm that 5/22 @ 11:20 is ok.

## 2025-05-21 NOTE — TELEPHONE ENCOUNTER
Pt's wife stated that pt had a recent UTI and she wants pt to be evaluated. However, PCP is not available until June. Office staff was not available at this time.     Please contact Jessica and try to schedule a sooner appt as she is very concerned.  Thank you for your help.

## 2025-05-22 ENCOUNTER — OFFICE VISIT (OUTPATIENT)
Dept: INTERNAL MEDICINE CLINIC | Facility: CLINIC | Age: OVER 89
End: 2025-05-22
Payer: MEDICARE

## 2025-05-22 VITALS
WEIGHT: 154 LBS | HEART RATE: 58 BPM | HEIGHT: 65 IN | OXYGEN SATURATION: 99 % | BODY MASS INDEX: 25.66 KG/M2 | RESPIRATION RATE: 16 BRPM | SYSTOLIC BLOOD PRESSURE: 110 MMHG | DIASTOLIC BLOOD PRESSURE: 70 MMHG | TEMPERATURE: 98.5 F

## 2025-05-22 DIAGNOSIS — N39.0 URINARY TRACT INFECTION WITHOUT HEMATURIA, SITE UNSPECIFIED: ICD-10-CM

## 2025-05-22 DIAGNOSIS — Z79.899 HIGH RISK MEDICATION USE: ICD-10-CM

## 2025-05-22 DIAGNOSIS — R74.8 ELEVATED LIVER ENZYMES: ICD-10-CM

## 2025-05-22 DIAGNOSIS — E11.22 TYPE 2 DIABETES MELLITUS WITH STAGE 3 CHRONIC KIDNEY DISEASE AND HYPERTENSION (HCC): ICD-10-CM

## 2025-05-22 DIAGNOSIS — I12.9 TYPE 2 DIABETES MELLITUS WITH STAGE 3 CHRONIC KIDNEY DISEASE AND HYPERTENSION (HCC): ICD-10-CM

## 2025-05-22 DIAGNOSIS — I10 ESSENTIAL HYPERTENSION: Chronic | ICD-10-CM

## 2025-05-22 DIAGNOSIS — N18.30 TYPE 2 DIABETES MELLITUS WITH STAGE 3 CHRONIC KIDNEY DISEASE AND HYPERTENSION (HCC): ICD-10-CM

## 2025-05-22 DIAGNOSIS — R41.0 CONFUSION: Primary | ICD-10-CM

## 2025-05-22 DIAGNOSIS — E78.2 MIXED HYPERLIPIDEMIA: ICD-10-CM

## 2025-05-22 DIAGNOSIS — R41.3 MEMORY IMPAIRMENT: ICD-10-CM

## 2025-05-22 DIAGNOSIS — I25.10 CORONARY ARTERY DISEASE INVOLVING NATIVE CORONARY ARTERY OF NATIVE HEART WITHOUT ANGINA PECTORIS: ICD-10-CM

## 2025-05-22 PROCEDURE — G2211 COMPLEX E/M VISIT ADD ON: HCPCS | Performed by: INTERNAL MEDICINE

## 2025-05-22 PROCEDURE — 93880 EXTRACRANIAL BILAT STUDY: CPT | Performed by: SURGERY

## 2025-05-22 PROCEDURE — 99214 OFFICE O/P EST MOD 30 MIN: CPT | Performed by: INTERNAL MEDICINE

## 2025-05-22 NOTE — PROGRESS NOTES
Name: Nolberto Guajardo      : 1933      MRN: [id number]  Encounter Provider: Pablo Smith MD  Encounter Date: 2025   Encounter department: [facility]  :  Assessment & Plan  Confusion  Patient developed confusion.  He went to the emergency room twice.  Had a blood test, urine test, CT of the brain.  Found to have a UTI.  Finished antibiotic.  Will order UA and C&S.  Per patient's son who is present with him confusion somewhat improved but at times get confused and some memory impairment.  Will order metabolic workup as below.  Orders:  •  Comprehensive metabolic panel; Future  •  TSH, 3rd generation; Future    Essential hypertension  Controlled advised to continue present medication and low-salt diet.       Coronary artery disease involving native coronary artery of native heart without angina pectoris  Patient denies any chest pain breathing is okay.  Had seen cardiologist 2024.  Advised to follow with cardiologist.       Mixed hyperlipidemia  Is on atorvastatin daily.  Advised for low-cholesterol diet.  Last cholesterol 126, triglyceride 71, HDL 40, LDL 91 advised to continue present medication low-cholesterol low-carb diet.  Orders:  •  TSH, 3rd generation; Future  •  CK; Future    Elevated liver enzymes  Had elevated liver enzymes in the emergency room.  Will follow liver enzymes and advise accordingly.       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  Last time he was seen by endocrinologist was 2025.  His blood sugar was elevated after he had low back injection.  Now his blood sugar is somewhere between 100-190 per patient's family.  Advised to follow-up with endocrinologist.  Denies any hypoglycemia.  Lab Results   Component Value Date    HGBA1C 8.7 (H) 2024       Orders:  •  Comprehensive metabolic panel; Future  •  Hemoglobin A1C;  "Future    Urinary tract infection without hematuria, site unspecified  He was treated with antibiotic.  Will check UA and C&S make sure it is all resolved.  Orders:  •  Comprehensive metabolic panel; Future  •  UA (URINE) with reflex to Scope; Future  •  Urine culture; Future    High risk medication use    Orders:  •  Comprehensive metabolic panel; Future  •  CK; Future    Memory impairment  CT of the brain revealed atrophy and some chronic ischemic changes.  Will check B12 folic acid level and TSH.  I asks questions and he was able to answer his, date of birth, date, year, he was able to subtract 100-7 appropriately.  He knows his home address.  He is awake oriented x 3.  Will do a metabolic workup.  And will check urine make sure he has no any infection.  It seems like a age-appropriate.for now advised to observe may need evaluation by specialist if worsens or persist.  Orders:  •  Vitamin B12; Future  •  Folate; Future    He is emergency room medical record reviewed.  He is emergency room medical record reviewed.     History of Present Illness   HPI  89-year-old white male here with his son for follow-up after his emergency room visit.  He went to emergency room for confusion.    Current Medications[1]     Past Medical History[2]   Review of Systems    He denies any chest pain, shortness of breath.  Denies any cough, fever, chills.  Denies any nausea vomiting diarrhea or pain in abdomen.  Denies any focal weakness.    Objective   /70 (BP Location: Left arm, Patient Position: Sitting, Cuff Size: Standard)   Pulse 58   Temp 98.5 °F (36.9 °C) (Temporal)   Resp 16   Ht 5' 5\" (1.651 m)   Wt 69.9 kg (154 lb)   SpO2 99%   BMI 25.63 kg/m²      Physical Exam  Vitals and nursing note reviewed.   Constitutional:       General: He is not in acute distress.     Appearance: Normal appearance. He is well-developed.   HENT:      Head: Normocephalic and atraumatic.      Right Ear: External ear normal.      Left Ear: " External ear normal.      Nose: Nose normal.      Mouth/Throat:      Pharynx: Oropharynx is clear.     Eyes:      General: No scleral icterus.        Right eye: No discharge.         Left eye: No discharge.      Extraocular Movements: Extraocular movements intact.      Conjunctiva/sclera: Conjunctivae normal.       Cardiovascular:      Rate and Rhythm: Normal rate and regular rhythm.      Pulses: Normal pulses.      Heart sounds: Murmur heard.   Pulmonary:      Effort: Pulmonary effort is normal. No respiratory distress.      Breath sounds: Normal breath sounds. No wheezing, rhonchi or rales.   Abdominal:      General: There is no distension.      Palpations: Abdomen is soft.      Tenderness: There is no abdominal tenderness.     Musculoskeletal:         General: No swelling. Normal range of motion.      Cervical back: Normal range of motion and neck supple.      Right lower leg: No edema.      Left lower leg: No edema.      Comments: He was ambulating with cane by himself.     Skin:     General: Skin is warm and dry.      Findings: No rash.     Neurological:      General: No focal deficit present.      Mental Status: He is alert and oriented to person, place, and time.     Psychiatric:         Mood and Affect: Mood normal.         Behavior: Behavior normal.                [1]    Current Outpatient Medications:   •  acetaminophen (TYLENOL) 325 mg tablet, Take 3 tablets (975 mg total) by mouth every 8 (eight) hours as needed for mild pain, Disp: , Rfl: 0  •  amLODIPine (NORVASC) 10 mg tablet, Take 10 mg by mouth in the morning., Disp: , Rfl:   •  aspirin (ECOTRIN LOW STRENGTH) 81 mg EC tablet, Take 81 mg by mouth in the morning., Disp: , Rfl:   •  atorvastatin (LIPITOR) 40 mg tablet, Take 20 mg by mouth in the morning. 1/2 tablet., Disp: , Rfl:   •  Brinzolamide-Brimonidine (Simbrinza) 1-0.2 % SUSP, Apply 1 drop to eye in the morning and 1 drop before bedtime., Disp: , Rfl:   •  carvedilol (COREG) 25 mg tablet, Take  12.5 mg by mouth in the morning and 12.5 mg in the evening. Take with meals., Disp: , Rfl:   •  Cinnamon 500 MG capsule, Take 500 mg by mouth in the morning and 500 mg in the evening., Disp: , Rfl:   •  Empagliflozin 25 MG TABS, Take 25 mg by mouth in the morning., Disp: , Rfl:   •  finasteride (PROSCAR) 5 mg tablet, Take 1 tablet (5 mg total) by mouth daily, Disp: 90 tablet, Rfl: 0  •  insulin glargine (LANTUS) 100 units/mL subcutaneous injection, Inject 22 Units under the skin daily at bedtime, Disp: , Rfl:   •  latanoprost (XALATAN) 0.005 % ophthalmic solution, Administer 1 drop to both eyes daily at bedtime, Disp: , Rfl:   •  losartan (COZAAR) 100 MG tablet, Take 100 mg by mouth in the morning., Disp: , Rfl:   •  Multiple Vitamin (multivitamin) tablet, Take 1 tablet by mouth in the morning., Disp: , Rfl:   •  omeprazole (PriLOSEC) 20 mg delayed release capsule, Take 20 mg by mouth in the morning., Disp: , Rfl:   •  repaglinide (PRANDIN) 2 mg tablet, Take 1 tablet by mouth in the morning and 1 tablet at noon and 1 tablet in the evening. Take before meals., Disp: , Rfl:   •  tamsulosin (FLOMAX) 0.4 mg, Take 1 capsule (0.4 mg total) by mouth daily with dinner, Disp: 30 capsule, Rfl: 0[2]  Past Medical History:  Diagnosis Date   • Anemia 06/22/2021   • Arthritis    • Bilateral carotid artery stenosis 01/04/2021    <50% on doppler 7/27/2018. No change from Aug/2016   • Bilateral carotid bruits    • Bladder calculi 12/21/2023   • BMI 25.0-25.9,adult 04/06/2021   • BMI 25.0-25.9,adult 08/31/2021   • BMI 26.0-26.9,adult 08/31/2021   • BMI 27.0-27.9,adult 12/13/2022   • Bronchitis 07/09/2024   • CKD (chronic kidney disease) 03/28/2024   • Confusion 05/22/2025   • Coronary artery disease involving native coronary artery of native heart without angina pectoris 04/06/2021   • CTS (carpal tunnel syndrome)    • Deficiency anemia 08/05/2024   • Deviated septum    • Diabetes ([facility])    • Diabetes mellitus ([facility]) 05/1995   •  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[facility])    • Dizziness    • Double vision    • Ear problems    • Elevated liver enzymes 05/22/2025   • Encounter for support and coordination of transition of care 06/22/2021   • Esophageal dysmotility 06/06/2023   • Fatigue    • General weakness    • GERD (gastroesophageal reflux disease)    • Hearing impaired person, bilateral    • HL (hearing loss)    • Hypertension    • Left upper quadrant pain 06/06/2023   • Medicare annual wellness visit, subsequent 08/31/2021   • Medicare annual wellness visit, subsequent 12/21/2023   • Medicare annual wellness visit, subsequent 01/21/2025   • Microscopic hematuria    • Myalgia 04/06/2021   • New daily persistent headache 03/14/2023   • Other dysphagia 12/13/2022   • Other headache syndrome 03/14/2023   • Other specified anemias 12/13/2022   • Pain in joints 04/06/2021   • Peptic ulcer disease 08/05/2024   • PVD (peripheral vascular disease) ([facility]) 04/02/2021   • Rectal bleeding 06/22/2021   • Renal calculi    • Right leg DVT ([facility]) 06/22/2021   • Right lumbar radiculopathy    • S/P AVR (aortic valve replacement) 04/02/2021   • Skin lesion 11/06/2024   • Skin rash 12/13/2022   • SOB (shortness of breath) on exertion    • Spinal stenosis in cervical region 04/02/2021   • Spinal stenosis of lumbar region with neurogenic claudication 04/24/2025   • Tachycardia    • Thrombocytopenia ([facility]) 07/12/2023   • Type 2 diabetes mellitus with stage 3a chronic kidney disease, with long-term current use of insulin ([facility]) 09/08/2022   • Type 2 diabetes mellitus without complication, with long-term current use of insulin ([facility]) 03/14/2023   • Type 2 diabetes mellitus, without long-term current use of insulin ([facility]) 09/08/2022   • Urinary frequency    • Urinary tract infection without hematuria 05/22/2025   • Vertigo 01/04/2021

## 2025-05-22 NOTE — ASSESSMENT & PLAN NOTE
Is on atorvastatin daily.  Advised for low-cholesterol diet.  Last cholesterol 126, triglyceride 71, HDL 40, LDL 91 advised to continue present medication low-cholesterol low-carb diet.  Orders:  •  TSH, 3rd generation; Future  •  CK; Future

## 2025-05-22 NOTE — ASSESSMENT & PLAN NOTE
He was treated with antibiotic.  Will check UA and C&S make sure it is all resolved.  Orders:  •  Comprehensive metabolic panel; Future  •  UA (URINE) with reflex to Scope; Future  •  Urine culture; Future

## 2025-05-22 NOTE — ASSESSMENT & PLAN NOTE
Patient denies any chest pain breathing is okay.  Had seen cardiologist October 2024.  Advised to follow with cardiologist.

## 2025-05-22 NOTE — ASSESSMENT & PLAN NOTE
Patient developed confusion.  He went to the emergency room twice.  Had a blood test, urine test, CT of the brain.  Found to have a UTI.  Finished antibiotic.  Will order UA and C&S.  Per patient's son who is present with him confusion somewhat improved but at times get confused and some memory impairment.  Will order metabolic workup as below.  Orders:  •  Comprehensive metabolic panel; Future  •  TSH, 3rd generation; Future

## 2025-05-22 NOTE — ASSESSMENT & PLAN NOTE
Had elevated liver enzymes in the emergency room.  Will follow liver enzymes and advise accordingly.

## 2025-05-22 NOTE — ASSESSMENT & PLAN NOTE
He has been seen and followed by endocrinologist.  Last time he was seen by endocrinologist was January 2025.  His blood sugar was elevated after he had low back injection.  Now his blood sugar is somewhere between 100-190 per patient's family.  Advised to follow-up with endocrinologist.  Denies any hypoglycemia.  Lab Results   Component Value Date    HGBA1C 8.7 (H) 06/22/2024       Orders:  •  Comprehensive metabolic panel; Future  •  Hemoglobin A1C; Future

## 2025-05-22 NOTE — ASSESSMENT & PLAN NOTE
CT of the brain revealed atrophy and some chronic ischemic changes.  Will check B12 folic acid level and TSH.  I asks questions and he was able to answer his, date of birth, date, year, he was able to subtract 100-7 appropriately.  He knows his home address.  He is awake oriented x 3.  Will do a metabolic workup.  And will check urine make sure he has no any infection.  It seems like a age-appropriate.for now advised to observe may need evaluation by specialist if worsens or persist.  Orders:  •  Vitamin B12; Future  •  Folate; Future

## 2025-05-22 NOTE — PATIENT INSTRUCTIONS
Patient was advised to continue present medications. discussed with the patient medications and laboratory data in detail.  Follow-up with me as advised.  If any blood test was ordered please do 1 week prior to next appointment unless advise to get earlier.  If you have any questions please call the office [phone]

## 2025-05-23 ENCOUNTER — APPOINTMENT (OUTPATIENT)
Dept: LAB | Facility: CLINIC | Age: OVER 89
End: 2025-05-23
Payer: MEDICARE

## 2025-05-23 DIAGNOSIS — E11.29 MICROALBUMINURIA DUE TO TYPE 2 DIABETES MELLITUS  (HCC): ICD-10-CM

## 2025-05-23 DIAGNOSIS — D69.6 THROMBOCYTOPENIA (HCC): ICD-10-CM

## 2025-05-23 DIAGNOSIS — E78.2 MIXED HYPERLIPIDEMIA: ICD-10-CM

## 2025-05-23 DIAGNOSIS — N28.1 RENAL CYST, ACQUIRED, LEFT: ICD-10-CM

## 2025-05-23 DIAGNOSIS — Z79.4 TYPE 2 DIABETES MELLITUS WITH DIABETIC NEPHROPATHY, WITH LONG-TERM CURRENT USE OF INSULIN (HCC): ICD-10-CM

## 2025-05-23 DIAGNOSIS — N39.0 URINARY TRACT INFECTION WITHOUT HEMATURIA, SITE UNSPECIFIED: ICD-10-CM

## 2025-05-23 DIAGNOSIS — I25.10 CORONARY ARTERY DISEASE INVOLVING NATIVE CORONARY ARTERY OF NATIVE HEART WITHOUT ANGINA PECTORIS: ICD-10-CM

## 2025-05-23 DIAGNOSIS — Z79.899 HIGH RISK MEDICATION USE: ICD-10-CM

## 2025-05-23 DIAGNOSIS — R41.0 CONFUSION: ICD-10-CM

## 2025-05-23 DIAGNOSIS — R41.3 MEMORY IMPAIRMENT: ICD-10-CM

## 2025-05-23 DIAGNOSIS — E11.22 TYPE 2 DIABETES MELLITUS WITH STAGE 3 CHRONIC KIDNEY DISEASE AND HYPERTENSION (HCC): ICD-10-CM

## 2025-05-23 DIAGNOSIS — D53.9 DEFICIENCY ANEMIA: ICD-10-CM

## 2025-05-23 DIAGNOSIS — I10 ESSENTIAL HYPERTENSION: Chronic | ICD-10-CM

## 2025-05-23 DIAGNOSIS — E11.21 TYPE 2 DIABETES MELLITUS WITH DIABETIC NEPHROPATHY, WITH LONG-TERM CURRENT USE OF INSULIN (HCC): ICD-10-CM

## 2025-05-23 DIAGNOSIS — I12.9 TYPE 2 DIABETES MELLITUS WITH STAGE 3 CHRONIC KIDNEY DISEASE AND HYPERTENSION (HCC): ICD-10-CM

## 2025-05-23 DIAGNOSIS — N18.30 TYPE 2 DIABETES MELLITUS WITH STAGE 3 CHRONIC KIDNEY DISEASE AND HYPERTENSION (HCC): ICD-10-CM

## 2025-05-23 DIAGNOSIS — R80.9 MICROALBUMINURIA DUE TO TYPE 2 DIABETES MELLITUS  (HCC): ICD-10-CM

## 2025-05-23 DIAGNOSIS — N18.32 STAGE 3B CHRONIC KIDNEY DISEASE (HCC): ICD-10-CM

## 2025-05-23 LAB
ALBUMIN SERPL BCG-MCNC: 4.1 G/DL (ref 3.5–5)
ALP SERPL-CCNC: 87 U/L (ref 34–104)
ALT SERPL W P-5'-P-CCNC: 52 U/L (ref 7–52)
ANION GAP SERPL CALCULATED.3IONS-SCNC: 8 MMOL/L (ref 4–13)
AST SERPL W P-5'-P-CCNC: 23 U/L (ref 13–39)
BASOPHILS # BLD AUTO: 0.06 THOUSANDS/ÂΜL (ref 0–0.1)
BASOPHILS NFR BLD AUTO: 1 % (ref 0–1)
BILIRUB SERPL-MCNC: 0.5 MG/DL (ref 0.2–1)
BUN SERPL-MCNC: 40 MG/DL (ref 5–25)
CALCIUM SERPL-MCNC: 8.8 MG/DL (ref 8.4–10.2)
CHLORIDE SERPL-SCNC: 110 MMOL/L (ref 96–108)
CHOLEST SERPL-MCNC: 143 MG/DL (ref ?–200)
CK SERPL-CCNC: 26 U/L (ref 39–308)
CO2 SERPL-SCNC: 27 MMOL/L (ref 21–32)
CREAT SERPL-MCNC: 1.31 MG/DL (ref 0.6–1.3)
EOSINOPHIL # BLD AUTO: 0.22 THOUSAND/ÂΜL (ref 0–0.61)
EOSINOPHIL NFR BLD AUTO: 3 % (ref 0–6)
ERYTHROCYTE [DISTWIDTH] IN BLOOD BY AUTOMATED COUNT: 13.1 % (ref 11.6–15.1)
EST. AVERAGE GLUCOSE BLD GHB EST-MCNC: 217 MG/DL
FERRITIN SERPL-MCNC: 75 NG/ML (ref 30–336)
FOLATE SERPL-MCNC: 22.1 NG/ML
GFR SERPL CREATININE-BSD FRML MDRD: 47 ML/MIN/1.73SQ M
GLUCOSE P FAST SERPL-MCNC: 144 MG/DL (ref 65–99)
HBA1C MFR BLD: 9.2 %
HCT VFR BLD AUTO: 43.1 % (ref 36.5–49.3)
HDLC SERPL-MCNC: 50 MG/DL
HGB BLD-MCNC: 13.6 G/DL (ref 12–17)
IMM GRANULOCYTES # BLD AUTO: 0.02 THOUSAND/UL (ref 0–0.2)
IMM GRANULOCYTES NFR BLD AUTO: 0 % (ref 0–2)
IRON SATN MFR SERPL: 37 % (ref 15–50)
IRON SERPL-MCNC: 127 UG/DL (ref 50–212)
LDLC SERPL CALC-MCNC: 76 MG/DL (ref 0–100)
LYMPHOCYTES # BLD AUTO: 2.12 THOUSANDS/ÂΜL (ref 0.6–4.47)
LYMPHOCYTES NFR BLD AUTO: 29 % (ref 14–44)
MCH RBC QN AUTO: 31 PG (ref 26.8–34.3)
MCHC RBC AUTO-ENTMCNC: 31.6 G/DL (ref 31.4–37.4)
MCV RBC AUTO: 98 FL (ref 82–98)
MONOCYTES # BLD AUTO: 0.63 THOUSAND/ÂΜL (ref 0.17–1.22)
MONOCYTES NFR BLD AUTO: 9 % (ref 4–12)
NEUTROPHILS # BLD AUTO: 4.2 THOUSANDS/ÂΜL (ref 1.85–7.62)
NEUTS SEG NFR BLD AUTO: 58 % (ref 43–75)
NONHDLC SERPL-MCNC: 93 MG/DL
NRBC BLD AUTO-RTO: 0 /100 WBCS
PHOSPHATE SERPL-MCNC: 3.9 MG/DL (ref 2.3–4.1)
PLATELET # BLD AUTO: 121 THOUSANDS/UL (ref 149–390)
PMV BLD AUTO: 11.4 FL (ref 8.9–12.7)
POTASSIUM SERPL-SCNC: 4.3 MMOL/L (ref 3.5–5.3)
PROT SERPL-MCNC: 7.4 G/DL (ref 6.4–8.4)
PTH-INTACT SERPL-MCNC: 80.8 PG/ML (ref 12–88)
RBC # BLD AUTO: 4.39 MILLION/UL (ref 3.88–5.62)
SODIUM SERPL-SCNC: 145 MMOL/L (ref 135–147)
TIBC SERPL-MCNC: 345.8 UG/DL (ref 250–450)
TRANSFERRIN SERPL-MCNC: 247 MG/DL (ref 203–362)
TRIGL SERPL-MCNC: 84 MG/DL (ref ?–150)
TSH SERPL DL<=0.05 MIU/L-ACNC: 4.64 UIU/ML (ref 0.45–4.5)
UIBC SERPL-MCNC: 219 UG/DL (ref 155–355)
VIT B12 SERPL-MCNC: 838 PG/ML (ref 180–914)
WBC # BLD AUTO: 7.25 THOUSAND/UL (ref 4.31–10.16)

## 2025-05-23 PROCEDURE — 82746 ASSAY OF FOLIC ACID SERUM: CPT

## 2025-05-23 PROCEDURE — 82728 ASSAY OF FERRITIN: CPT

## 2025-05-23 PROCEDURE — 83550 IRON BINDING TEST: CPT

## 2025-05-23 PROCEDURE — 84165 PROTEIN E-PHORESIS SERUM: CPT

## 2025-05-23 PROCEDURE — 82550 ASSAY OF CK (CPK): CPT

## 2025-05-23 PROCEDURE — 87186 SC STD MICRODIL/AGAR DIL: CPT

## 2025-05-23 PROCEDURE — 80061 LIPID PANEL: CPT

## 2025-05-23 PROCEDURE — 83036 HEMOGLOBIN GLYCOSYLATED A1C: CPT

## 2025-05-23 PROCEDURE — 82610 CYSTATIN C: CPT

## 2025-05-23 PROCEDURE — 85025 COMPLETE CBC W/AUTO DIFF WBC: CPT

## 2025-05-23 PROCEDURE — 87086 URINE CULTURE/COLONY COUNT: CPT

## 2025-05-23 PROCEDURE — 82607 VITAMIN B-12: CPT

## 2025-05-23 PROCEDURE — 83540 ASSAY OF IRON: CPT

## 2025-05-23 PROCEDURE — 84443 ASSAY THYROID STIM HORMONE: CPT

## 2025-05-23 PROCEDURE — 80053 COMPREHEN METABOLIC PANEL: CPT

## 2025-05-23 PROCEDURE — 84100 ASSAY OF PHOSPHORUS: CPT

## 2025-05-23 PROCEDURE — 83970 ASSAY OF PARATHORMONE: CPT

## 2025-05-23 PROCEDURE — 87077 CULTURE AEROBIC IDENTIFY: CPT

## 2025-05-23 PROCEDURE — 36415 COLL VENOUS BLD VENIPUNCTURE: CPT

## 2025-05-24 LAB
CYSTATIN C SERPL-MCNC: 1.74 MG/L (ref 0.87–1.12)
GFR/BSA.PRED SERPLBLD CYS-BASED-ARV: 33 ML/MIN/1.73

## 2025-05-25 LAB — BACTERIA UR CULT: ABNORMAL

## 2025-05-26 ENCOUNTER — RESULTS FOLLOW-UP (OUTPATIENT)
Dept: INTERNAL MEDICINE CLINIC | Facility: CLINIC | Age: OVER 89
End: 2025-05-26

## 2025-05-26 DIAGNOSIS — N39.0 URINARY TRACT INFECTION WITHOUT HEMATURIA, SITE UNSPECIFIED: Primary | ICD-10-CM

## 2025-05-26 LAB
BACTERIA UR CULT: ABNORMAL
BACTERIA UR CULT: ABNORMAL

## 2025-05-26 RX ORDER — CEPHALEXIN 500 MG/1
500 CAPSULE ORAL EVERY 8 HOURS SCHEDULED
Qty: 21 CAPSULE | Refills: 0 | Status: SHIPPED | OUTPATIENT
Start: 2025-05-26 | End: 2025-06-02

## 2025-05-27 DIAGNOSIS — R41.3 MEMORY IMPAIRMENT: Primary | ICD-10-CM

## 2025-05-30 LAB
ALBUMIN SERPL ELPH-MCNC: 3.82 G/DL (ref 3.2–5.1)
ALBUMIN SERPL ELPH-MCNC: 54.6 % (ref 48–70)
ALPHA1 GLOB SERPL ELPH-MCNC: 0.34 G/DL (ref 0.15–0.47)
ALPHA1 GLOB SERPL ELPH-MCNC: 4.8 % (ref 1.8–7)
ALPHA2 GLOB SERPL ELPH-MCNC: 0.8 G/DL (ref 0.42–1.04)
ALPHA2 GLOB SERPL ELPH-MCNC: 11.4 % (ref 5.9–14.9)
BETA GLOB ABNORMAL SERPL ELPH-MCNC: 0.5 G/DL (ref 0.31–0.57)
BETA1 GLOB SERPL ELPH-MCNC: 7.1 % (ref 4.7–7.7)
BETA2 GLOB SERPL ELPH-MCNC: 7.5 % (ref 3.1–7.9)
BETA2+GAMMA GLOB SERPL ELPH-MCNC: 0.53 G/DL (ref 0.2–0.58)
GAMMA GLOB ABNORMAL SERPL ELPH-MCNC: 1.02 G/DL (ref 0.4–1.66)
GAMMA GLOB SERPL ELPH-MCNC: 14.6 % (ref 6.9–22.3)
IGG/ALB SER: 1.2 {RATIO} (ref 1.1–1.8)
PROT SERPL-MCNC: 7 G/DL (ref 6.4–8.4)

## 2025-05-30 PROCEDURE — 84165 PROTEIN E-PHORESIS SERUM: CPT | Performed by: STUDENT IN AN ORGANIZED HEALTH CARE EDUCATION/TRAINING PROGRAM

## 2025-06-02 ENCOUNTER — TELEPHONE (OUTPATIENT)
Age: OVER 89
End: 2025-06-02

## 2025-06-02 NOTE — TELEPHONE ENCOUNTER
"Spouse states that she believes he was advised to stop taking \"some time ago.\" He is not currently taking the medication. Advised to monitor blood pressure at home and call if above 130 or below 110 systolic. Pat verbalized understanding.   "

## 2025-06-02 NOTE — TELEPHONE ENCOUNTER
Patient's wife Jessica called and is requesting a call back to review the patient's medication list.  She stated amLODIPine (NORVASC) 10 mg tablet is still on his current medication list.  However, she thought Dr. Smith took him off of the medication.  Please advise.  Thank you!

## 2025-06-21 PROBLEM — N39.0 URINARY TRACT INFECTION WITHOUT HEMATURIA: Status: RESOLVED | Noted: 2025-05-22 | Resolved: 2025-06-21

## 2025-07-28 ENCOUNTER — TELEPHONE (OUTPATIENT)
Age: OVER 89
End: 2025-07-28

## 2025-08-01 ENCOUNTER — TRANSITIONAL CARE MANAGEMENT (OUTPATIENT)
Dept: INTERNAL MEDICINE CLINIC | Facility: CLINIC | Age: OVER 89
End: 2025-08-01

## 2025-08-05 ENCOUNTER — OFFICE VISIT (OUTPATIENT)
Dept: INTERNAL MEDICINE CLINIC | Facility: CLINIC | Age: OVER 89
End: 2025-08-05
Payer: MEDICARE

## 2025-08-05 VITALS
WEIGHT: 154 LBS | SYSTOLIC BLOOD PRESSURE: 113 MMHG | TEMPERATURE: 98.6 F | BODY MASS INDEX: 25.66 KG/M2 | RESPIRATION RATE: 16 BRPM | HEART RATE: 62 BPM | OXYGEN SATURATION: 98 % | DIASTOLIC BLOOD PRESSURE: 58 MMHG | HEIGHT: 65 IN

## 2025-08-05 DIAGNOSIS — I25.10 CORONARY ARTERY DISEASE INVOLVING NATIVE CORONARY ARTERY OF NATIVE HEART WITHOUT ANGINA PECTORIS: ICD-10-CM

## 2025-08-05 DIAGNOSIS — R79.89 ELEVATED TSH: ICD-10-CM

## 2025-08-05 DIAGNOSIS — I10 ESSENTIAL HYPERTENSION: Chronic | ICD-10-CM

## 2025-08-05 DIAGNOSIS — N18.32 STAGE 3B CHRONIC KIDNEY DISEASE (HCC): ICD-10-CM

## 2025-08-05 DIAGNOSIS — D69.6 THROMBOCYTOPENIA (HCC): ICD-10-CM

## 2025-08-05 DIAGNOSIS — D53.9 DEFICIENCY ANEMIA: ICD-10-CM

## 2025-08-05 DIAGNOSIS — E78.2 MIXED HYPERLIPIDEMIA: ICD-10-CM

## 2025-08-05 DIAGNOSIS — Z76.89 ENCOUNTER FOR SUPPORT AND COORDINATION OF TRANSITION OF CARE: Primary | ICD-10-CM

## 2025-08-05 PROCEDURE — 99495 TRANSJ CARE MGMT MOD F2F 14D: CPT | Performed by: INTERNAL MEDICINE

## 2025-08-05 RX ORDER — ZEAXANTHIN 90 %
1 POWDER (GRAM) MISCELLANEOUS 2 TIMES DAILY
COMMUNITY

## (undated) DEVICE — GLOVE SRG BIOGEL ECLIPSE 7.5

## (undated) DEVICE — PACK TUR

## (undated) DEVICE — SHEATH URETERAL ACCESS 12/14FR 45CM PROXIS

## (undated) DEVICE — SINGLE-USE DIGITAL FLEXIBLE URETEROSCOPE: Brand: APTRA

## (undated) DEVICE — UROLOGIC DRAIN BAG: Brand: UNBRANDED

## (undated) DEVICE — FIBER LASER HOLMIUM 272MICRON HOL1020F

## (undated) DEVICE — CHLORHEXIDINE 4PCT 4 OZ

## (undated) DEVICE — PREMIUM DRY TRAY LF: Brand: MEDLINE INDUSTRIES, INC.

## (undated) DEVICE — INVIEW CLEAR LEGGINGS: Brand: CONVERTORS

## (undated) DEVICE — BASKET SPECIMEN RETRIVAL 1.9FR 120CM

## (undated) DEVICE — CATH FOLEY COUDE 18FR 5ML 2 WAY TIEMANN LUBRICATH

## (undated) DEVICE — GLOVE INDICATOR PI UNDERGLOVE SZ 8 BLUE

## (undated) DEVICE — BAG URINE DRAINAGE 2000ML ANTI RFLX LF

## (undated) DEVICE — SPECIMEN CONTAINER STERILE PEEL PACK

## (undated) DEVICE — STERILE SURGICAL LUBRICANT,  TUBE: Brand: SURGILUBE

## (undated) DEVICE — CATH URETERAL 5FR X 70 CM FLEX TIP POLYUR BARD

## (undated) DEVICE — GUIDEWIRE STRGHT TIP 0.035 IN  SOLO PLUS

## (undated) DEVICE — DISPOSABLE OR TOWEL: Brand: CARDINAL HEALTH